# Patient Record
Sex: MALE | Race: WHITE | NOT HISPANIC OR LATINO | URBAN - METROPOLITAN AREA
[De-identification: names, ages, dates, MRNs, and addresses within clinical notes are randomized per-mention and may not be internally consistent; named-entity substitution may affect disease eponyms.]

---

## 2017-01-08 ENCOUNTER — GENERIC CONVERSION - ENCOUNTER (OUTPATIENT)
Dept: OTHER | Facility: OTHER | Age: 77
End: 2017-01-08

## 2017-01-24 ENCOUNTER — GENERIC CONVERSION - ENCOUNTER (OUTPATIENT)
Dept: OTHER | Facility: OTHER | Age: 77
End: 2017-01-24

## 2017-03-03 ENCOUNTER — GENERIC CONVERSION - ENCOUNTER (OUTPATIENT)
Dept: OTHER | Facility: OTHER | Age: 77
End: 2017-03-03

## 2017-03-22 ENCOUNTER — GENERIC CONVERSION - ENCOUNTER (OUTPATIENT)
Dept: OTHER | Facility: OTHER | Age: 77
End: 2017-03-22

## 2017-03-29 ENCOUNTER — GENERIC CONVERSION - ENCOUNTER (OUTPATIENT)
Dept: OTHER | Facility: OTHER | Age: 77
End: 2017-03-29

## 2017-06-15 ENCOUNTER — GENERIC CONVERSION - ENCOUNTER (OUTPATIENT)
Dept: OTHER | Facility: OTHER | Age: 77
End: 2017-06-15

## 2017-07-01 ENCOUNTER — GENERIC CONVERSION - ENCOUNTER (OUTPATIENT)
Dept: OTHER | Facility: OTHER | Age: 77
End: 2017-07-01

## 2017-07-14 ENCOUNTER — GENERIC CONVERSION - ENCOUNTER (OUTPATIENT)
Dept: OTHER | Facility: OTHER | Age: 77
End: 2017-07-14

## 2017-07-17 ENCOUNTER — GENERIC CONVERSION - ENCOUNTER (OUTPATIENT)
Dept: OTHER | Facility: OTHER | Age: 77
End: 2017-07-17

## 2017-07-31 ENCOUNTER — GENERIC CONVERSION - ENCOUNTER (OUTPATIENT)
Dept: OTHER | Facility: OTHER | Age: 77
End: 2017-07-31

## 2017-08-08 ENCOUNTER — GENERIC CONVERSION - ENCOUNTER (OUTPATIENT)
Dept: OTHER | Facility: OTHER | Age: 77
End: 2017-08-08

## 2017-08-09 ENCOUNTER — GENERIC CONVERSION - ENCOUNTER (OUTPATIENT)
Dept: OTHER | Facility: OTHER | Age: 77
End: 2017-08-09

## 2017-08-15 ENCOUNTER — GENERIC CONVERSION - ENCOUNTER (OUTPATIENT)
Dept: OTHER | Facility: OTHER | Age: 77
End: 2017-08-15

## 2017-08-22 ENCOUNTER — GENERIC CONVERSION - ENCOUNTER (OUTPATIENT)
Dept: OTHER | Facility: OTHER | Age: 77
End: 2017-08-22

## 2017-08-25 ENCOUNTER — GENERIC CONVERSION - ENCOUNTER (OUTPATIENT)
Dept: OTHER | Facility: OTHER | Age: 77
End: 2017-08-25

## 2017-08-30 ENCOUNTER — GENERIC CONVERSION - ENCOUNTER (OUTPATIENT)
Dept: OTHER | Facility: OTHER | Age: 77
End: 2017-08-30

## 2017-09-05 ENCOUNTER — ALLSCRIPTS OFFICE VISIT (OUTPATIENT)
Dept: OTHER | Facility: OTHER | Age: 77
End: 2017-09-05

## 2017-09-12 ENCOUNTER — GENERIC CONVERSION - ENCOUNTER (OUTPATIENT)
Dept: OTHER | Facility: OTHER | Age: 77
End: 2017-09-12

## 2017-09-22 ENCOUNTER — GENERIC CONVERSION - ENCOUNTER (OUTPATIENT)
Dept: OTHER | Facility: OTHER | Age: 77
End: 2017-09-22

## 2017-09-27 ENCOUNTER — GENERIC CONVERSION - ENCOUNTER (OUTPATIENT)
Dept: OTHER | Facility: OTHER | Age: 77
End: 2017-09-27

## 2017-10-03 ENCOUNTER — GENERIC CONVERSION - ENCOUNTER (OUTPATIENT)
Dept: OTHER | Facility: OTHER | Age: 77
End: 2017-10-03

## 2017-10-04 ENCOUNTER — GENERIC CONVERSION - ENCOUNTER (OUTPATIENT)
Dept: OTHER | Facility: OTHER | Age: 77
End: 2017-10-04

## 2017-10-11 ENCOUNTER — GENERIC CONVERSION - ENCOUNTER (OUTPATIENT)
Dept: OTHER | Facility: OTHER | Age: 77
End: 2017-10-11

## 2017-10-16 ENCOUNTER — GENERIC CONVERSION - ENCOUNTER (OUTPATIENT)
Dept: OTHER | Facility: OTHER | Age: 77
End: 2017-10-16

## 2017-10-27 ENCOUNTER — GENERIC CONVERSION - ENCOUNTER (OUTPATIENT)
Dept: OTHER | Facility: OTHER | Age: 77
End: 2017-10-27

## 2017-11-17 ENCOUNTER — GENERIC CONVERSION - ENCOUNTER (OUTPATIENT)
Dept: OTHER | Facility: OTHER | Age: 77
End: 2017-11-17

## 2017-11-22 ENCOUNTER — ALLSCRIPTS OFFICE VISIT (OUTPATIENT)
Dept: OTHER | Facility: OTHER | Age: 77
End: 2017-11-22

## 2017-12-05 ENCOUNTER — GENERIC CONVERSION - ENCOUNTER (OUTPATIENT)
Dept: OTHER | Facility: OTHER | Age: 77
End: 2017-12-05

## 2017-12-06 ENCOUNTER — GENERIC CONVERSION - ENCOUNTER (OUTPATIENT)
Dept: FAMILY MEDICINE CLINIC | Facility: CLINIC | Age: 77
End: 2017-12-06

## 2017-12-28 ENCOUNTER — GENERIC CONVERSION - ENCOUNTER (OUTPATIENT)
Dept: OTHER | Facility: OTHER | Age: 77
End: 2017-12-28

## 2018-01-09 NOTE — RESULT NOTES
Verified Results  Coumadin Flow Sheet [id number] 06:47PM      Test Name Result Flag Reference   INR 2 3

## 2018-01-09 NOTE — RESULT NOTES
Verified Results  (1) PT WITH INR [id number] 12:00AM Lucy Keane     Test Name Result Flag Reference   INR 2 3 H 0 8-1 2   Reference interval is for non-anticoagulated patients                   Suggested INR therapeutic range for Vitamin K                 antagonist therapy:                    Standard Dose (moderate intensity                                   therapeutic range):       2 0 - 3 0                    Higher intensity therapeutic range       2 5 - 3 5   Prothrombin Time 24 2 sec H 9 1-12 0

## 2018-01-10 NOTE — RESULT NOTES
Verified Results  Coumadin Flow Sheet [id number] 07:10AM Ray Beck     Test Name Result Flag Reference   Recheck INR [id number]     New Dose      7 5 t,th and 6mg other days

## 2018-01-10 NOTE — RESULT NOTES
Verified Results  Coumadin Flow Sheet [id number] 12:00AM Robyn Pearl     Test Name Result Flag Reference   Recheck INR [id number]     New Dose 6mg daily

## 2018-01-10 NOTE — RESULT NOTES
Verified Results  (1) PT WITH INR [id number] 12:00AM Isael Kinsey     Test Name Result Flag Reference   INR 1 1  0 8-1 2   Reference interval is for non-anticoagulated patients                   Suggested INR therapeutic range for Vitamin K                 antagonist therapy:                    Standard Dose (moderate intensity                                   therapeutic range):       2 0 - 3 0                    Higher intensity therapeutic range       2 5 - 3 5   Prothrombin Time 11 3 sec  9 1-12 0

## 2018-01-10 NOTE — RESULT NOTES
Verified Results  Coumadin Flow Sheet [id number] 03:36PM Amauri Crawford     Test Name Result Flag Reference   Recheck INR [id number]     Current Dose      6mg m,w,f and 7 5mg all other days   New Dose      6mg m,w,f and 7 5 mg all other days   INR 2 5

## 2018-01-10 NOTE — RESULT NOTES
Verified Results  Coumadin Flow Sheet [id number] 12:31PM Yasir Efrain     Test Name Result Flag Reference   Recheck INR [id number]     Current Dose      6mg m,w,f and 7 5mg other days   New Dose      6mg m,f and 7 5mg other days     (1) PT WITH INR [id number] 12:00AM Oliviacuco Zuniga     Test Name Result Flag Reference   INR 1 7 H 0 8-1 2   Reference interval is for non-anticoagulated patients                   Suggested INR therapeutic range for Vitamin K                 antagonist therapy:                    Standard Dose (moderate intensity                                   therapeutic range):       2 0 - 3 0                    Higher intensity therapeutic range       2 5 - 3 5   Prothrombin Time 17 9 sec H 9 1-12 0

## 2018-01-11 NOTE — RESULT NOTES
Verified Results  Coumadin Flow Sheet [id number] 03:38PM Rolando Ocampo     Test Name Result Flag Reference   Recheck INR [id number]     New Dose      7 5mg t, th and 6mg other days

## 2018-01-11 NOTE — RESULT NOTES
Verified Results  Coumadin Flow Sheet [id number] 12:17PM Ke Russo     Test Name Result Flag Reference   Recheck INR [id number]     New Dose      7 5mg t,th and 6mg other days

## 2018-01-11 NOTE — RESULT NOTES
Verified Results  (1) PT WITH INR [id number] 12:00AM Vineet Reeder     Test Name Result Flag Reference   INR 2 1 H 0 8-1 2   Reference interval is for non-anticoagulated patients                   Suggested INR therapeutic range for Vitamin K                 antagonist therapy:                    Standard Dose (moderate intensity                                   therapeutic range):       2 0 - 3 0                    Higher intensity therapeutic range       2 5 - 3 5   Prothrombin Time 22 2 sec H 9 1-12 0

## 2018-01-11 NOTE — RESULT NOTES
Verified Results  Coumadin Flow Sheet [id number] 10:12AM Stanford Nuñez     Test Name Result Flag Reference   Recheck INR [id number]     New Dose      hold 1 day, 7 5mg t and 6mg other days

## 2018-01-11 NOTE — RESULT NOTES
Verified Results  Coumadin Flow Sheet [id number] 12:22PM Lucy Keane     Test Name Result Flag Reference   Recheck INR [id number]     New Dose      6mg m,w,f and 7 5mg all other days

## 2018-01-11 NOTE — RESULT NOTES
Verified Results  Coumadin Flow Sheet [id number] 02:35PM Shyrl Colby     Test Name Result Flag Reference   Recheck INR [id number]     New Dose      6mg m,f and 7 5mg other days

## 2018-01-12 NOTE — PROGRESS NOTES
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)   11  Anxiety (300 00) (F41 9)    Plan  Anxiety disorder due to medical condition    · ALPRAZolam 0 5 MG Oral Tablet; Take 1/2 to 1 tab three times a day as needed  for anxiety  Central obesity    · Some eating tips that can help you lose weight ; Status:Complete;   Done: 09LET6509   · There are many exercise options for seniors ; Status:Complete;   Done: 33DPL7386  Depression screening    · *VB-Depression Screening; Status:Complete;   Done: 41QIS1352 02:58PM   · *VB-Depression Screening ; every 1 year; Last 18MFD1073; Next 40Ljb4096;  Status:Active  Encounter for screening for malignant neoplasm of colon    · COLONOSCOPY; Status:Temporary Deferral;    1/1/1901  Encounter for screening for osteoporosis    · * DXA BONE DENSITY SPINE HIP AND PELVIS; Status:Temporary Deferral;    1/1/1901  Encounter for screening for other nervous system disorder    · *VB - Fall Risk Assessment  (Dx Z13 89 Screen for Neurologic Disorder) ; every 1 year; Last 56RNB9333; Next 93Xvk9151; Status:Active  Encounter for special screening examination for genitourinary disorder    · *VB - Urinary Incontinence Screen (Dx Z13 89 Screen for UI); Status:Complete;   Done:  36DPA9217 02:57PM   · *VB - Urinary Incontinence Screen (Dx Z13 89 Screen for UI) ; every 1 year;  Last  50DIV6719; Next 93Wtk0663; Status:Active  Medicare annual wellness visit, subsequent    · Alcohol misuse can be a problem with advancing age ; Status:Complete;   Done:  01IRZ6246   · Always use a seat belt and shoulder strap when riding or driving a motor vehicle ;  Status:Complete;   Done: 83BPX7851   · Brush your teeth freq1 and floss at least once a day ; Status:Complete;   Done:  25IEX5561   · Eat foods that are high in calcium ; Status:Complete;   Done: 67RTC8803   · It is important that you drink enough fluids to stay healthy ; Status:Complete;   Done:  19FQL2454   · We encourage all of our patients to exercise regularly  30 minutes of exercise or physical  activity five or more days a week is recommended for children and adults ;  Status:Complete;   Done: 82EBT6647   · We encourage you to begin to make lifestyle changes to help control your blood  pressure  These may include losing weight, increasing your activity level, limiting salt in  your diet, decreasing alcohol intake, and eating a diet low in fat and rich in fruits  and vegetables ; Status:Complete;   Done: 36XSO3631   · We recommend routine visits to a dentist ; Status:Complete;   Done: 73VNI2643   · We recommend that you create an advance directive ; Status:Complete;   Done:  41YNC3661   · We recommend that you follow the "Mediterranean diet "; Status:Complete;   Done:  48FMD0782   · We recommend that you follow these steps to lower your risk of osteoporosis  ;  Status:Complete;   Done: 59HRY1527   · We recommend you modify your diet to achieve and maintain a healthy weight  Being  underweight may increase your risk of developing health problems from vitamin and  mineral deficiencies  We recommend a balanced diet rich in fruits and vegetables  You  may also consider increasing your calorie intake by eating more frequently or adding  nuts, avocados, and low-fat cheese or milk to your meals    Please let us know  if you would like to learn more about your nutrition and calorie needs, and additional  options to help you achieve your weight goals ; Status:Complete;   Done: 06YDP3023   · Call [phone] if: You have any warning signs of skin cancer ; Status:Complete;    Done: 25KEH3956   · Seek Immediate Medical Attention if: You experience a new kind of chest pain (angina) or  pressure ; Status:Complete;   Done: 11BMJ4438   · Medicare Annual Wellness Visit; Status:Complete;   Done: 92CJL3196 02:59PM   · Medicare Annual Wellness Visit ; every 1 year; Last 76BBS1282; Next 65Hqz2041;  Status:Active  Special screening for other neurological conditions    · *VB - Fall Risk Assessment  (Dx Z13 89 Screen for Neurologic Disorder);  Status:Complete;   Done: 81QUF3470 02:57PM    Discussion/Summary    1) schedule with eye doctor  2) deferring dexa and colonoscopy   3) hypertension: borderline, monitor at home and call if elevated, continue current medications and follow up with cardiology  4) anxiety, controlled, uses alprazolam as needed  Impression: Subsequent Annual Wellness Visit  Cardiovascular screening and counseling: screening is current  Diabetes screening and counseling: screening is current  Colorectal cancer screening and counseling: screening is current  Prostate cancer screening and counseling: screening is current  Osteoporosis screening and counseling: the patient declines screening  Abdominal aortic aneurysm screening and counseling: screening not indicated  Glaucoma screening and counseling: ophthalmologist referral    HIV screening and counseling: screening not indicated  Immunizations: influenza vaccine is up to date this year, influenza vaccination is recommended annually, hepatitis B vaccination series is not indicated at this time due to the patient's low risk of mikaela the disease, the patient declines the Zostavax vaccine, the patient declines the Td vaccine and the patient declines the Tdap vaccine  Advance Directive Planning: complete and up to date, paperwork and instructions were given to the patient   Patient Discussion: plan discussed with the patient, follow-up visit needed in one year  Chief Complaint  PATIENT IS HERE FOR AWV  PATIENT IS DEFERRING DEXA SCAN, AND COLONOSCOPY AT THIS TIME  Advance Directives  Advance Directive [facility]:   The patient is in agreement to receive the Advance Care Planning service    YES - Patient has an advance health care directive  The patient has a living will located   Durable Power of  For Healthcare:    Name: Jacinta Faulkner   Relationship: wife   Capacity/Competence: This patient has full decision making capacity for discussion of advance care planning and This patient has full decision making competency for discussion of advance care planning  History of Present Illness  pt here for medicare wellness  denies complaints today  no chest pain, no shortness of breath  denies recent illnesses  The patient is being seen for the subsequent annual wellness visit  Medicare Screening and Risk Factors   Hospitalizations: no previous hospitalizations  Once per lifetime medicare screening tests: ECG has not been done and AAA screening US has not yet been done  Medicare Screening Tests Risk Questions   Abdominal aortic aneurysm risk assessment: none indicated  Osteoporosis risk assessment: , over 48years of age, alcohol use and low calcium diet  HIV risk assessment: none indicated  Drug and Alcohol Use: The patient is a former cigarette smoker and SMOKES CIGARS  The patient reports drinking 2 drinks per day  Alcohol concern:   The patient has no concerns about alcohol abuse  He has never used illicit drugs     Diet and Physical Activity: Current diet includes limited junk food, 0 servings of fruit per day, 2 servings of vegetables per day, 1 servings of meat per day, 2 servings of whole grains per day, 0 servings of dairy products per day, 2 cups of coffee per day, 0 cups of tea per day, 0 cans of regular soda per day, 0 cans of diet soda per day and DRINKS TWO GLASSES OF WATER PER DAY  The patient does not exercise  Mood Disorder and Cognitive Impairment Screening:   Depression screening  no significant symptoms  He denies feeling down, depressed, or hopeless over the past two weeks  He denies feeling little interest or pleasure in doing things over the past two weeks  Cognitive impairment screening: denies difficulty learning/retaining new information, denies difficulty handling complex tasks, denies difficulty with reasoning, denies difficulty with spatial ability and orientation, denies difficulty with language and denies difficulty with behavior  Functional Ability/Level of Safety: Hearing is slightly decreased, slightly decreased in the right ear, slightly decreased in the left ear and a hearing aid is not used  He denies hearing difficulties  The patient is currently able to do activities of daily living without limitations, able to do instrumental activities of daily living without limitations and able to participate in social activities without limitations  Activities of daily living details: transportation help needed and needs help managing medications, but does not need help using the phone, does not need help shopping, no meal preparation help needed, does not need help doing housework, does not need help doing laundry and does not need help managing money  Home safety risk factors:  no unfamiliar surroundings, no loose rugs, no poor household lighting, no uneven floors, no household clutter, grab bars in the bathroom and handrails on the stairs  Advance Directives: Advance directives: no living will, no durable power of  for health care directives and no advance directives  Co-Managers and Medical Equipment/Suppliers: See Patient Care Team      Patient Care Team    Care Team Member Role Specialty Office Number   DR WAYNE 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)   3  Arthralgia (719 40) (M25 50)   4  Asymptomatic cholelithiasis (574 20) (K80 20)   5  Atrial fibrillation (427 31) (I48 91)   6  Benign essential hypertension (401 1) (I10)   7  Benign prostatic hypertrophy (600 00) (N40 0)   8  Bladder cancer (188 9) (C67 9)   9  Chronic kidney disease, stage 3 (585 3) (N18 3)   10  Colonoscopy (Fiberoptic) Screening   11  Coronary arteriosclerosis (414 00) (I25 10)   12  Depression screening (V79 0) (Z13 89)   13  Encounter for prostate cancer screening (V76 44) (Z12 5)   14  Encounter for screening for malignant neoplasm of colon (V76 51) (Z12 11)   15  Encounter for screening for osteoporosis (V82 81) (Z13 820)   16  Encounter for screening for other metabolic disorder (Q67 52) (Z13 228)   17  Encounter for screening for other nervous system disorder (V80 09) (Z13 858)   18  Encounter for special screening examination for genitourinary disorder (V81 6) (Z13 89)   19  Epilepsy (345 90) (G40 909)   20  Hiatal hernia (553 3) (K44 9)   21  Hyperlipidemia (272 4) (E78 5)   22  Impacted cerumen of both ears (380 4) (H61 23)   23  History of Intracerebral hemorrhage (431) (I61 9)   24  Medicare annual wellness visit, subsequent (V70 0) (Z00 00)   25  Paroxysmal atrial fibrillation (427 31) (I48 0)   26  Renal insufficiency (593 9) (N28 9)   27  Screening for glaucoma (V80 1) (Z13 5)   28  Screening PSA (prostate specific antigen) (V76 44) (Z12 5)    Past Medical History    1  Bladder cancer (188 9) (C67 9)   2   History of anemia (V12 3) (Z86 2)   3  History of gastroesophageal reflux (GERD) (V12 79) (Z87 19)   4  History of hematuria (V13 09) (Z87 448)   5  History of intracranial hemorrhage (V12 59) (Z86 79)   6  History of Intracerebral hemorrhage (431) (I61 9)   7  History of Stroke Syndrome (436)    Surgical History    1  History of Bladder Surgery    The surgical history was reviewed and updated today  Family History  Mother    1  Family history of Lung Cancer (V16 1)   2  Family history of Mother  At Age 72  Father    3  Family history of Father  At Age 79    The family history was reviewed and updated today  Social History    · Being A Social Drinker   · Former smoker (M20 31) (Z91 433)   · Smokes cigars (305 1) (F17 290)  The social history was reviewed and updated today  The social history was reviewed and is unchanged  Current Meds   1  ALPRAZolam 0 5 MG Oral Tablet; Take 1/2 to 1 tab three times a day as needed for   anxiety; Therapy: 48HDU3219 to (Evaluate:2016); Last Rx:2016 Ordered   2  Aspirin 81 MG TABS; TAKE 1 TABLET DAILY; Therapy: 19GEI6471 to Recorded   3  Centrum Silver Oral Tablet Chewable; CHEW AND SWALLOW 1 TABLET DAILY; Therapy: 20Ggv7987 to Recorded   4  Crestor 10 MG Oral Tablet; 1/2 tab 4 days a week; Therapy: 07LGD7960 to (Evaluate:41Ktk4138)  Requested for: 2014 Recorded   5  LevETIRAcetam 500 MG Oral Tablet; Take 1 tablet twice daily; Therapy: 64Wsr6503 to (Evaluate:06Hdl9298)  Requested for: 2016; Last   Rx:2016 Ordered   6  Lopressor 50 MG Oral Tablet; Take 1 tablet twice a day; Therapy: 97NPG0211 to Recorded   7  Warfarin Sodium 1 MG Oral Tablet; take one tablet by mouth daily as directed; Therapy: 45ZCZ5944 to (Evaluate:30Tkh5616)  Requested for: 2016; Last   Rx:2016 Ordered   8  Warfarin Sodium 5 MG Oral Tablet; TAKE 1 1/2 TABLETS DAILY;    Therapy: 47OUE7751 to (Javi Roblero)  Requested for: 47BMY2889; Last Rx: 01RNB4769 Ordered   9  Zostavax 91149 UNT/0 65ML Subcutaneous Solution Reconstituted; To be given at   pharmacy; Therapy: 69LXD5771 to (Last Rx:19Keh6993) Ordered    The medication list was reviewed and updated today  Allergies    1  No Known Drug Allergies    Immunizations  Influenza --- Lequita Hose: 64-Pye-0565Hvhapp Ridin-Oct-2016   PPSV --- Lequita Hose: 2014     Vitals  Signs   Recorded: 70AMW1560 87:34QV   Systolic: 210  Diastolic: 90  Recorded: 64YSZ4528 01:37PM   Temperature: 98 4 F  Heart Rate: 85  Systolic: 249  Diastolic: 86  Height: 5 ft 10 4 in  Weight: 221 lb 12 oz  BMI Calculated: 31 46  BSA Calculated: 2 19    Results/Data  Medicare Annual Wellness Visit 14Ilf6064 02:59PM Ray Beck     Test Name Result Flag Reference   MEDICARE [place] VISIT [id number]       Falls Risk Assessment (Dx Z13 89 Screen for Neurologic Disorder) 76LTG4995 01:25PM User, Ahs     Test Name Result Flag Reference   Falls Risk      No falls in the past year     AUA Symptom Score 95Bdi1133 01:24PM User, Ahs     Test Name Result Flag Reference   AUA Symptom Score (for prostate disease) 0     Incomplete emptying: Not at all (0)  Frequency: Not at all (0)  Intermittency: Not at all (0)  Urgency: Not at all (0)  Weak-stream: Not at all (0)  Straining: Not at all (0)  Nocturia: Not at all (0)   AUA Symptom Score (for prostate disease) - Score Category N/A     AUA Symptom Score (for prostate disease) - Quality of Life Due to Urinary Symptoms No Feelings       PHQ-9 Adult Depression Screening 81Twk2888 01:24PM User, Ahs     Test Name Result Flag Reference   PHQ-9 Adult Depression Score 5     Over the last two weeks, how often have you been bothered by any of the following problems?   Little interest or pleasure in doing things: Not at all - 0  Feeling down, depressed, or hopeless: Several days - 1  Trouble falling or staying asleep, or sleeping too much: More than half the days - 2  Feeling tired or having little energy: More than half the days - 2  Poor appetite or over eating: Not at all - 0  Feeling bad about yourself - or that you are a failure or have let yourself or your family down: Not at all - 0  Trouble concentrating on things, such as reading the newspaper or watching television: Not at all - 0  Moving or speaking so slowly that other people could have noticed  Or the opposite -  being so fidgety or restless that you have been moving around a lot more than usual: Not at all - 0  Thoughts that you would be better off dead, or of hurting yourself in some way: Not at all - 0   PHQ-9 Adult Depression Screening Negative     PHQ-9 Difficulty Level Somewhat difficult     PHQ-9 Severity Mild Depression         Health Management  Depression screening   *VB-Depression Screening; every 1 year; Last 23Dec2016; Next Due: 08Ovo0445; Active  Encounter for screening for other nervous system disorder   *VB - Fall Risk Assessment  (Dx Z13 89 Screen for Neurologic Disorder); every 1 year; Last  23Dec2016; Next Due: 76Pvm4009; Active  Encounter for special screening examination for genitourinary disorder   *VB - Urinary Incontinence Screen (Dx Z13 89 Screen for UI); every 1 year; Last 49Pds7542; Next  Due: 18Nyv2031;  Active    Signatures   Electronically signed by : Jackie Packer DO; Dec 24 2016  6:14AM EST                       (Author)

## 2018-01-12 NOTE — RESULT NOTES
Verified Results  Coumadin Flow Sheet [id number] 11:36AM Zoë Gu     Test Name Result Flag Reference   Recheck INR [id number]     Current Dose      m,w,f 7 5mg, other days 6mg   New Dose      6mg m,w,f and 7 5mg other days     (1) PT WITH INR [id number] 12:00AM Zoë Gu     Test Name Result Flag Reference   INR 1 9 H 0 8-1 2   Reference interval is for non-anticoagulated patients  Suggested INR therapeutic range for Vitamin K                 antagonist therapy:                    Standard Dose (moderate intensity                                   therapeutic range):       2 0 - 3 0                    Higher intensity therapeutic range       2 5 - 3 5   Prothrombin Time 20 2 sec H 9 1-12 0     (LC) One Specimen Identifier [id number] 12:00AM Zoë Gu     Test Name Result Flag Reference   One Specimen Identifier Comment     The specimen received included only one patient identifier on the  primary collection container    Our laboratory accrediting agency  states "All primary specimen containers must be labeled with 2  identifiers at the time of collection "

## 2018-01-12 NOTE — RESULT NOTES
Verified Results  (1) CBC/PLT/DIFF [id number] 12:00AM KG Fundingeror     Test Name Result Flag Reference   WBC 7 8 x10E3/uL  3 4-10 8   RBC 4 41 x10E6/uL  4 14-5 80   Hemoglobin 14 8 g/dL  12 6-17 7   Hematocrit 42 8 %  37 5-51 0   MCV 97 fL  79-97   MCH 33 6 pg H 26 6-33 0   MCHC 34 6 g/dL  31 5-35 7   RDW 13 7 %  12 3-15 4   Platelets 132 Z64F1/HQ  150-379   Neutrophils 56 %     Lymphs 27 %     Monocytes 11 %     Eos 5 %     Basos 1 %     Neutrophils (Absolute) 4 4 x10E3/uL  1 4-7 0   Lymphs (Absolute) 2 1 x10E3/uL  0 7-3 1   Monocytes(Absolute) 0 9 x10E3/uL  0 1-0 9   Eos (Absolute) 0 4 x10E3/uL  0 0-0 4   Baso (Absolute) 0 1 x10E3/uL  0 0-0 2   Immature Granulocytes 0 %     Immature Grans (Abs) 0 0 x10E3/uL  0 0-0 1     (1) TSH [id number] 12:00AM KG Fundingeror     Test Name Result Flag Reference   TSH 4 020 uIU/mL  0 450-4 500     (LC) PSA Total+% Free [id number] 12:00AM Verena EZMoveeror     Test Name Result Flag Reference   Prostate Specific Ag, Serum 1 2 ng/mL  0 0-4 0   Roche ECLIA methodology  According to the American Urological Association, Serum PSA should  decrease and remain at undetectable levels after radical  prostatectomy  The AUA defines biochemical recurrence as an initial  PSA value 0 2 ng/mL or greater followed by a subsequent confirmatory  PSA value 0 2 ng/mL or greater  Values obtained with different assay methods or kits cannot be used  interchangeably  Results cannot be interpreted as absolute evidence  of the presence or absence of malignant disease  PSA, Free 0 46 ng/mL  N/A   Roche ECLIA methodology  % Free PSA 38 3 %     The table below lists the probability of prostate cancer for  men with non-suspicious JUAN MIGUEL results and total PSA between  4 and 10 ng/mL, by patient age Ashleighlosjoseline Justine, [address] [place] [address],  [phone])                      % Free PSA       50-64 yr        65-75 yr                    0 00-10 00%        56%             55%                   10 01-15 00%        24%             35% 15 01-20 00%        17%             23%                   20 01-25 00%        10%             20%                        >25 00%         5%              9%  Please note:  Abril et al did not make specific                recommendations regarding the use of                percent free PSA for any other population                of men  (1) PT WITH INR [id number] 12:00AM Francisco Colby     Test Name Result Flag Reference   INR 2 7 H 0 8-1 2   Reference interval is for non-anticoagulated patients                   Suggested INR therapeutic range for Vitamin K                 antagonist therapy:                    Standard Dose (moderate intensity                                   therapeutic range):       2 0 - 3 0                    Higher intensity therapeutic range       2 5 - 3 5   Prothrombin Time 27 3 sec H 9 1-12 0

## 2018-01-12 NOTE — RESULT NOTES
Verified Results  (1) PT WITH INR [id number] 12:00AM Ariela Teixeira     Test Name Result Flag Reference   INR 3 0 H 0 8-1 2   Reference interval is for non-anticoagulated patients  Suggested INR therapeutic range for Vitamin K                 antagonist therapy:                    Standard Dose (moderate intensity                                   therapeutic range):       2 0 - 3 0                    Higher intensity therapeutic range       2 5 - 3 5   Prothrombin Time 30 5 sec H 9 1-12 0       Plan  Screening PSA (prostate specific antigen)    · (1) PT WITH INR; Status: In Progress - Specimen/Data Collected;   Done: [id number]

## 2018-01-12 NOTE — RESULT NOTES
Verified Results  Coumadin Flow Sheet [id number] 01:51PM Michelle      Test Name Result Flag Reference   INR 2 9     Recheck INR [id number]     Current Dose      6mg m,w,f and 7 5mg other days   New Dose      6mg m,w,f and 7 5mg other days

## 2018-01-12 NOTE — RESULT NOTES
Verified Results  (1) PT WITH INR [id number] 12:00AM Yesenia Batch     Test Name Result Flag Reference   INR 2 1 H 0 8-1 2   Reference interval is for non-anticoagulated patients                   Suggested INR therapeutic range for Vitamin K                 antagonist therapy:                    Standard Dose (moderate intensity                                   therapeutic range):       2 0 - 3 0                    Higher intensity therapeutic range       2 5 - 3 5   Prothrombin Time 22 9 sec H 9 1-12 0

## 2018-01-12 NOTE — RESULT NOTES
Verified Results  Coumadin Flow Sheet [id number] 12:34PM Shyrl Colby     Test Name Result Flag Reference   Recheck INR [id number]     Current Dose      6MG M,F AND 7 5MG OTHER DAYS   New Dose      6MG M,F AND 7 5MG OTHER DAYS     (1) PT WITH INR [id number] 12:00AM Shyrl Colby     Test Name Result Flag Reference   INR 2 0 H 0 8-1 2   Reference interval is for non-anticoagulated patients                   Suggested INR therapeutic range for Vitamin K                 antagonist therapy:                    Standard Dose (moderate intensity                                   therapeutic range):       2 0 - 3 0                    Higher intensity therapeutic range       2 5 - 3 5   Prothrombin Time 21 6 sec H 9 1-12 0

## 2018-01-13 NOTE — RESULT NOTES
Verified Results  Coumadin Flow Sheet [id number] 09:11AM Hanna Nunes     Test Name Result Flag Reference   Recheck INR [id number]     New Dose      7 5mg tu and thursday, 6mg all other days

## 2018-01-13 NOTE — RESULT NOTES
Verified Results  (1) PT WITH INR [id number] 12:00AM Stanford Rosin     Test Name Result Flag Reference   INR 2 2 H 0 8-1 2   Reference interval is for non-anticoagulated patients                   Suggested INR therapeutic range for Vitamin K                 antagonist therapy:                    Standard Dose (moderate intensity                                   therapeutic range):       2 0 - 3 0                    Higher intensity therapeutic range       2 5 - 3 5   Prothrombin Time 23 5 sec H 9 1-12 0

## 2018-01-13 NOTE — RESULT NOTES
Verified Results  Coumadin Flow Sheet [id number] 05:58AM Anny New     Test Name Result Flag Reference   Recheck INR [id number]     Current Dose 5mg daily     New Dose      take 10mg today then alternate 5mg/ 7 5mg daily     (1) PT WITH INR [id number] 12:00AM Anny New     Test Name Result Flag Reference   INR 1 4 H 0 8-1 2   Reference interval is for non-anticoagulated patients                   Suggested INR therapeutic range for Vitamin K                 antagonist therapy:                    Standard Dose (moderate intensity                                   therapeutic range):       2 0 - 3 0                    Higher intensity therapeutic range       2 5 - 3 5   Prothrombin Time 14 9 sec H 9 1-12 0

## 2018-01-13 NOTE — RESULT NOTES
Verified Results  (1) PT WITH INR [id number] 12:00AM Parvin Gonzales     Test Name Result Flag Reference   INR 2 0 H 0 8-1 2   Reference interval is for non-anticoagulated patients                   Suggested INR therapeutic range for Vitamin K                 antagonist therapy:                    Standard Dose (moderate intensity                                   therapeutic range):       2 0 - 3 0                    Higher intensity therapeutic range       2 5 - 3 5   Prothrombin Time 21 6 sec H 9 1-12 0

## 2018-01-14 VITALS
HEART RATE: 92 BPM | OXYGEN SATURATION: 98 % | WEIGHT: 207.5 LBS | SYSTOLIC BLOOD PRESSURE: 130 MMHG | BODY MASS INDEX: 29.71 KG/M2 | RESPIRATION RATE: 20 BRPM | HEIGHT: 70 IN | DIASTOLIC BLOOD PRESSURE: 85 MMHG | TEMPERATURE: 98.3 F

## 2018-01-14 NOTE — RESULT NOTES
Verified Results  (1) PT WITH INR [id number] 12:00AM Ray Beck     Test Name Result Flag Reference   INR 4 7 H 0 8-1 2   Reference interval is for non-anticoagulated patients  Suggested INR therapeutic range for Vitamin K                 antagonist therapy:                    Standard Dose (moderate intensity                                   therapeutic range):       2 0 - 3 0                    Higher intensity therapeutic range       2 5 - 3 5   Prothrombin Time 50 1 sec H 9 1-12 0     () One Specimen Identifier [id number] 12:00AM Ray Beck     Test Name Result Flag Reference   One Specimen Identifier Comment     The specimen received included only one patient identifier on the  primary collection container    Our laboratory accrediting agency  states "All primary specimen containers must be labeled with 2  identifiers at the time of collection "

## 2018-01-14 NOTE — RESULT NOTES
Verified Results  Coumadin Flow Sheet [id number] 10:48AM Ke Russo     Test Name Result Flag Reference   Recheck INR [id number]     Current Dose      7 5mg m and 6mg other days   New Dose      hold 1 day then 6mg daily

## 2018-01-15 NOTE — RESULT NOTES
Verified Results  Coumadin Flow Sheet [id number] 10:29AM Vineet Reeder     Test Name Result Flag Reference   Recheck INR [id number]     INR 3 5     Current Dose      6mg m,f and 7 5mg other days   New Dose      hold 2 days then 6mg m,w,f and 7 5 other days     Coumadin Flow Sheet [id number] 01:46PM      Test Name Result Flag Reference   Patient Notified YES/DB       Coumadin Flow Sheet [id number] 08:57AM      Test Name Result Flag Reference   INR 2 6     Current Dose      6 mg m and f, 7 5 mg all other days

## 2018-01-15 NOTE — RESULT NOTES
Verified Results  Coumadin Flow Sheet [id number] 09:12PM Rolando Ocampo     Test Name Result Flag Reference   Recheck INR [id number]     Current Dose 6mg daily     New Dose 6mg daily

## 2018-01-15 NOTE — RESULT NOTES
Verified Results  (1) PT WITH INR [id number] 12:00AM Zoë Riccardo     Test Name Result Flag Reference   INR 2 3 H 0 8-1 2   Reference interval is for non-anticoagulated patients                   Suggested INR therapeutic range for Vitamin K                 antagonist therapy:                    Standard Dose (moderate intensity                                   therapeutic range):       2 0 - 3 0                    Higher intensity therapeutic range       2 5 - 3 5   Prothrombin Time 24 0 sec H 9 1-12 0

## 2018-01-16 NOTE — RESULT NOTES
Verified Results  (1) COMPREHENSIVE METABOLIC PANEL [id number] 13:32FU Trov     Test Name Result Flag Reference   Glucose, Serum 100 mg/dL H 65-99   BUN 28 mg/dL H 8-27   Creatinine, Serum 1 49 mg/dL H 0 76-1 27   eGFR If NonAfricn Am 45 mL/min/1 73 L >59   eGFR If Africn Am 52 mL/min/1 73 L >59   BUN/Creatinine Ratio 19  10-22   Sodium, Serum 143 mmol/L  134-144   Potassium, Serum 4 9 mmol/L  3 5-5 2   Chloride, Serum 106 mmol/L     Carbon Dioxide, Total 19 mmol/L  18-29   Calcium, Serum 9 2 mg/dL  8 6-10 2   Protein, Total, Serum 7 3 g/dL  6 0-8 5   Albumin, Serum 4 2 g/dL  3 5-4 8   Globulin, Total 3 1 g/dL  1 5-4 5   A/G Ratio 1 4  1 1-2 5   Bilirubin, Total 0 8 mg/dL  0 0-1 2   Alkaline Phosphatase, S 80 IU/L     AST (SGOT) 20 IU/L  0-40   ALT (SGPT) 16 IU/L  0-44     (1) LIPID PANEL, FASTING [id number] 12:00AM Trov     Test Name Result Flag Reference   Cholesterol, Total 168 mg/dL  100-199   Triglycerides 104 mg/dL  0-149   HDL Cholesterol 47 mg/dL  >39   According to ATP-III Guidelines, HDL-C >59 mg/dL is considered a  negative risk factor for CHD  VLDL Cholesterol Nacho 21 mg/dL  5-40   LDL Cholesterol Calc 100 mg/dL H 0-99   T  Chol/HDL Ratio 3 6 ratio units  0 0-5 0   T  Chol/HDL Ratio                                                             Men  Women                                               1/2 Avg  Risk  3 4    3 3                                                   Avg Risk  5 0    4 4                                                2X Avg  Risk  9 6    7 1                                                3X Avg  Risk 23 4   11 0

## 2018-01-16 NOTE — RESULT NOTES
Verified Results  (1) PT WITH INR [id number] 12:00AM Az Mac     Test Name Result Flag Reference   INR 1 1  0 8-1 2   Reference interval is for non-anticoagulated patients                   Suggested INR therapeutic range for Vitamin K                 antagonist therapy:                    Standard Dose (moderate intensity                                   therapeutic range):       2 0 - 3 0                    Higher intensity therapeutic range       2 5 - 3 5   Prothrombin Time 12 0 sec  9 1-12 0

## 2018-01-16 NOTE — MISCELLANEOUS
Assessment    1  Gait instability (781 2) (R26 81)   2  History of Intracerebral hemorrhage (431) (I61 9)   3  Atrial fibrillation (427 31) (I48 91)   4  Need for pneumococcal vaccination (V03 82) (Z23)   5  Benign essential hypertension (401 1) (I10)   6  Current every day smoker (305 1) (F17 200)    Plan  Need for pneumococcal vaccination    · Prevnar 13 Intramuscular Suspension   For: Need for pneumococcal vaccination; Ordered By:Richard Santos; Effective Date:22Nov2017; Administered by: Mattie Sheppard: 11/22/2017 3:04:00 PM  SocHx: Current every day smoker    · You need to quit smoking ; Status:Complete;   Done: 53YXU8197   Ordered; For:SocHx: Current every day smoker; Ordered By:Richard Santos;    Discussion/Summary  Discussion Summary:   1) follow up APPT WITH DR Maricruz Spicer, 12/6  2) INCREASE ATORVASTATIN 10MG 1/2 TAB DAILY   3) HEAT 15 MIN 3 TIMES A DAY TO LOWER BACK   4) prevnar given today  5) hypertension: continue to monitor, slight elevation, may need medication adjustment  6) continue with physical therapy for increased strength  Medication SE Review and Pt Understands Tx: Possible side effects of new medications were reviewed with the patient/guardian today  The treatment plan was reviewed with the patient/guardian  The patient/guardian understands and agrees with the treatment plan      Chief Complaint  Chief Complaint Free Text Note Form: PT HERE FOR A FOLLOW UP TO [facility]  PT WAS TAKEN BY AMBULANCE ON 11/17/17 AND WAS ADMITTED FOR HIGH BP AND DIZZINESS  HE WAS DISCHARGED 11/19/17  PT IS TO FOLLOW UP WITH DR ESTRELLA ON 12/6  HE DOES HAVE HOME HEALTH COMING OUT TO SEE HIM  PT WANTS TO GET THE PREVNAR SHOT TODAY  HE DID HAVE HIS FLU SHOT AT [facility]       History of Present Illness  TCM Communication [facility]: The patient is being contacted for follow-up after hospitalization  Hospital records were reviewed   He was hospitalized at [facility]  The date of admission: 11/17/2017, date of discharge: 11/19/2017  Diagnosis: DIZZINESS  He was discharged to home  Medications reviewed and updated today  He scheduled a follow up appointment  Symptoms: dizziness  The patient is currently experiencing symptoms  MENTALLY SLOWER PER WIFE/STRENGTH AND WALKING NOT GOOD Counseling was provided to the patient and patient's family  Topics counseled included diagnostic results and instructions for management  Communication performed and completed by Lawyer Mcburney   HPI: pt here for follow up on hospitalization at [address] [place] [address] for dizziness  he had a ct and mri of brain with no new changes  his dizziness has now resolved  He has generalized weakness and home health/physical therapy is coming to the home  Review of Systems  Complete-Male:   Constitutional: feeling tired, but as noted in HPI, no fever and no chills  Eyes: No complaints of eye pain, no red eyes, no discharge from eyes, no itchy eyes  ENT: no complaints of earache, no hearing loss, no nosebleeds, no nasal discharge, no sore throat, no hoarseness  Cardiovascular: No complaints of slow heart rate, no fast heart rate, no chest pain, no palpitations, no leg claudication, no lower extremity  Respiratory: No complaints of shortness of breath, no wheezing, no cough, no SOB on exertion, no orthopnea or PND  Gastrointestinal: No complaints of abdominal pain, no constipation, no nausea or vomiting, no diarrhea or bloody stools  Genitourinary: No complaints of dysuria, no incontinence, no hesitancy, no nocturia, no genital lesion, no testicular pain  Musculoskeletal: weakness, but as noted in HPI  Integumentary: No complaints of skin rash or skin lesions, no itching, no skin wound, no dry skin  Neurological: dizziness, but as noted in HPI  Psychiatric: Is not suicidal, no sleep disturbances, no anxiety or depression, no change in personality, no emotional problems     Endocrine: No complaints of proptosis, no hot flashes, no muscle weakness, no erectile dysfunction, no deepening of the voice, no feelings of weakness  Hematologic/Lymphatic: No complaints of swollen glands, no swollen glands in the neck, does not bleed easily, no easy bruising  Active Problems    1  Anxiety (300 00) (F41 9)   2  Arthralgia (719 40) (M25 50)   3  Asymptomatic cholelithiasis (574 20) (K80 20)   4  Atrial fibrillation (427 31) (I48 91)   5  Benign essential hypertension (401 1) (I10)   6  Benign prostatic hypertrophy (600 00) (N40 0)   7  Cataract (366 9) (H26 9)   8  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)   9  Chronic kidney disease, stage 3 (585 3) (N18 3)   10  Coronary arteriosclerosis (414 00) (I25 10)   11  Epilepsy (345 90) (G40 909)   12  Hiatal hernia (553 3) (K44 9)   13  Hyperlipidemia (272 4) (E78 5)   14  History of Intracerebral hemorrhage (431) (I61 9)    Past Medical History    1  History of anemia (V12 3) (Z86 2)   2  History of gastroesophageal reflux (GERD) (V12 79) (Z87 19)   3  History of hematuria (V13 09) (Z87 448)   4  History of intracranial hemorrhage (V12 59) (Z86 79)   5  History of Intracerebral hemorrhage (431) (I61 9)   6  History of Stroke Syndrome (436)    Surgical History    1  History of Bladder Surgery  Surgical History Reviewed: The surgical history was reviewed and updated today  Family History  Mother    1  Family history of Lung Cancer (V16 1)   2  Family history of Mother  At Age 72  Father    3  Family history of Father  At Age 79  Family History Reviewed: The family history was reviewed and updated today  Social History    · Being A Social Drinker   · Current every day smoker (305 1) (F17 200)   · Smokes cigars (305 1) (F17 290)  Social History Reviewed: The social history was reviewed and updated today  The social history was reviewed and is unchanged  Current Meds   1  ALPRAZolam 0 5 MG Oral Tablet; Take 1/2 to 1 tab three times a day as needed for   anxiety;    Therapy: 43HZF8020 to (Evaluate:19Nov2017); Last Rx:77Lcs2151 Ordered   2  Crestor 10 MG Oral Tablet; 1/2 tab 4 days a week; Therapy: 16VZA6585 to (Evaluate:49Gxd4889)  Requested for: 55Xlz0703 Recorded   3  Lancets Miscellaneous; testing twice a monthdx I48 91; Therapy: 05ZUO8585 to (Last Rx:13Nov2017)  Requested for: 59IPB0891 Ordered   4  Lopressor 50 MG Oral Tablet; Take 1 tablet twice a day; Therapy: 28QPY5829 to Recorded   5  Warfarin Sodium 1 MG Oral Tablet; take one tablet by mouth daily as directed; Therapy: 70HLH0612 to (Evaluate:85Gar7975)  Requested for: 14ZNS8166; Last   Rx:22Jun2017 Ordered   6  Warfarin Sodium 5 MG Oral Tablet; Take 1 daily; Therapy: 20Jun2016 to (Evaluate:11Mar2018)  Requested for: 44Vxi5290; Last   Rx:77Lmx7946 Ordered   7  Zostavax 14574 UNT/0 65ML Subcutaneous Solution Reconstituted; To be given at   pharmacy; Therapy: 92RYZ3943 to (Last Rx:77Amg1526) Ordered  Medication List Reviewed: The medication list was reviewed and updated today  Allergies    1  No Known Drug Allergies    Vitals  Signs   Recorded: 22Nov2017 02:58PM   Temperature: 97 7 F  Heart Rate: 90  Systolic: 555  Diastolic: 90  Height: 5 ft 10 4 in  Weight: 207 lb 8 oz  BMI Calculated: 29 43  BSA Calculated: 2 13  O2 Saturation: 96    Physical Exam    Constitutional   General appearance: No acute distress, well appearing and well nourished  Eyes   Conjunctiva and lids: No swelling, erythema, or discharge  Pupils and irises: Equal, round and reactive to light  Pulmonary   Respiratory effort: No increased work of breathing or signs of respiratory distress  Auscultation of lungs: Clear to auscultation, equal breath sounds bilaterally, no wheezes, no rales, no rhonci  Cardiovascular   Auscultation of heart: Abnormal   irregularly irregular  Examination of extremities for edema and/or varicosities: Normal     Abdomen   Abdomen: Non-tender, no masses  Liver and spleen: No hepatomegaly or splenomegaly  Musculoskeletal   Gait and station: Abnormal     Digits and nails: Normal without clubbing or cyanosis  Inspection/palpation of joints, bones, and muscles: Normal     Psychiatric   Orientation to person, place and time: Normal     Mood and affect: Normal          Health Management  History of Depression screening   *VB-Depression Screening; every 1 year; Last 23Dec2016; Next Due: 60Fmz5037; Near Due  History of Encounter for screening for other nervous system disorder   *VB - Fall Risk Assessment  (Dx Z13 89 Screen for Neurologic Disorder); every 1 year; Last  23Dec2016; Next Due: 57Cpp2574; Near Due  History of Encounter for special screening examination for genitourinary disorder   *VB - Urinary Incontinence Screen (Dx Z13 89 Screen for UI); every 1 year; Last 23Dec2016; Next  Due: 09Pne3816; Near Due  History of Medicare annual wellness visit, subsequent   Medicare Annual Wellness Visit; every 1 year;  Last 23Dec2016; Next Due: 91GXZ8674; Near Due    Signatures   Electronically signed by : Xuan Nielsen DO; Nov 25 2017 12:26AM EST                       (Author)

## 2018-01-17 NOTE — RESULT NOTES
Verified Results  (1) PT WITH INR [id number] 12:00AM Zoëcuco Gu     Test Name Result Flag Reference   INR 2 1 H 0 8-1 2   Reference interval is for non-anticoagulated patients                   Suggested INR therapeutic range for Vitamin K                 antagonist therapy:                    Standard Dose (moderate intensity                                   therapeutic range):       2 0 - 3 0                    Higher intensity therapeutic range       2 5 - 3 5   Prothrombin Time 22 8 sec H 9 1-12 0

## 2018-01-17 NOTE — RESULT NOTES
Verified Results  (1) PT WITH INR [id number] 12:00AM Mita Prescott     Test Name Result Flag Reference   INR 2 9 H 0 8-1 2   Reference interval is for non-anticoagulated patients                   Suggested INR therapeutic range for Vitamin K                 antagonist therapy:                    Standard Dose (moderate intensity                                   therapeutic range):       2 0 - 3 0                    Higher intensity therapeutic range       2 5 - 3 5   Prothrombin Time 29 8 sec H 9 1-12 0

## 2018-01-18 ENCOUNTER — GENERIC CONVERSION - ENCOUNTER (OUTPATIENT)
Dept: OTHER | Facility: OTHER | Age: 78
End: 2018-01-18

## 2018-01-18 LAB — INR PPP: 3 (ref 0.86–1.16)

## 2018-01-18 NOTE — RESULT NOTES
Verified Results  Coumadin Flow Sheet [id number] 07:05PM Anny New     Test Name Result Flag Reference   Recheck INR [id number]     New Dose      7 5mg t,th and 6mg other days

## 2018-01-18 NOTE — RESULT NOTES
Verified Results  Coumadin Flow Sheet [id number] 12:40PM April Matos     Test Name Result Flag Reference   Recheck INR [id number]     Current Dose 6mg daily     New Dose      7 5 today and m,f and 6mg all other days     (1) PT WITH INR [id number] 12:00AM April Matos   A duplicate report has been generated due to demographic updates  Test Name Result Flag Reference   INR 1 5 H 0 8-1 2   Reference interval is for non-anticoagulated patients                   Suggested INR therapeutic range for Vitamin K                 antagonist therapy:                    Standard Dose (moderate intensity                                   therapeutic range):       2 0 - 3 0                    Higher intensity therapeutic range       2 5 - 3 5   Prothrombin Time 15 7 sec H 9 1-12 0

## 2018-01-18 NOTE — RESULT NOTES
Verified Results  Coumadin Flow Sheet [id number] 05:18PM Laura Large     Test Name Result Flag Reference   Recheck INR [id number]     New Dose      hold 1 day then restart 7 5mg t,th and 6mg other days

## 2018-01-18 NOTE — RESULT NOTES
Verified Results  Coumadin Flow Sheet [id number] 10:02AM Robyn Pearl     Test Name Result Flag Reference   Recheck INR [id number]     New Dose      7 5mg tu, 6mg all other days

## 2018-01-22 VITALS
HEART RATE: 90 BPM | TEMPERATURE: 97.7 F | BODY MASS INDEX: 29.71 KG/M2 | OXYGEN SATURATION: 96 % | SYSTOLIC BLOOD PRESSURE: 130 MMHG | WEIGHT: 207.5 LBS | DIASTOLIC BLOOD PRESSURE: 90 MMHG | HEIGHT: 70 IN

## 2018-01-23 ENCOUNTER — ANTICOAG VISIT (OUTPATIENT)
Dept: FAMILY MEDICINE CLINIC | Facility: CLINIC | Age: 78
End: 2018-01-23

## 2018-01-23 NOTE — RESULT NOTES
Verified Results  Coumadin Flow Sheet [id number] 10:18PM Leanna Erwin     Test Name Result Flag Reference   Recheck INR [id number]     Current Dose 6mg daily     New Dose 6mg daily

## 2018-02-02 LAB — INR PPP: 2.9 (ref 0.86–1.16)

## 2018-02-05 ENCOUNTER — TELEPHONE (OUTPATIENT)
Dept: FAMILY MEDICINE CLINIC | Facility: CLINIC | Age: 78
End: 2018-02-05

## 2018-02-05 ENCOUNTER — ANTICOAG VISIT (OUTPATIENT)
Dept: FAMILY MEDICINE CLINIC | Facility: CLINIC | Age: 78
End: 2018-02-05

## 2018-02-05 DIAGNOSIS — F41.9 ANXIETY: Primary | ICD-10-CM

## 2018-02-05 RX ORDER — ALPRAZOLAM 0.5 MG/1
0.5 TABLET ORAL 3 TIMES DAILY PRN
Qty: 90 TABLET | Refills: 0 | Status: SHIPPED | OUTPATIENT
Start: 2018-02-05 | End: 2018-05-30 | Stop reason: SDUPTHER

## 2018-02-05 RX ORDER — ALPRAZOLAM 0.5 MG/1
1 TABLET ORAL 3 TIMES DAILY PRN
COMMUNITY
Start: 2013-01-11 | End: 2018-02-05 | Stop reason: SDUPTHER

## 2018-02-21 ENCOUNTER — TELEPHONE (OUTPATIENT)
Dept: FAMILY MEDICINE CLINIC | Facility: CLINIC | Age: 78
End: 2018-02-21

## 2018-02-21 ENCOUNTER — ANTICOAG VISIT (OUTPATIENT)
Dept: FAMILY MEDICINE CLINIC | Facility: CLINIC | Age: 78
End: 2018-02-21

## 2018-02-21 LAB — INR PPP: 2.6 (ref 0.86–1.16)

## 2018-02-21 NOTE — TELEPHONE ENCOUNTER
Continue same dose and recheck 3/7  Try making fingers warm, run under warm water dilates blood vessels and milk blood down to tip of finger

## 2018-02-21 NOTE — TELEPHONE ENCOUNTER
dayai-Spoke w/pt's wife he almost a week over due for his inr check  Pt's wife states she is having a hard time getting blood out of him to check it  I advised her to call [facility] to possibly get a new machine  I also offered to put in a order to go to lab to check it checked an he doesn't have time for that  She stated she will talk to him about it and get back to us

## 2018-03-07 LAB — INR PPP: 2.7 (ref 0.86–1.16)

## 2018-03-08 ENCOUNTER — ANTICOAG VISIT (OUTPATIENT)
Dept: FAMILY MEDICINE CLINIC | Facility: CLINIC | Age: 78
End: 2018-03-08

## 2018-03-30 ENCOUNTER — TELEPHONE (OUTPATIENT)
Dept: FAMILY MEDICINE CLINIC | Facility: CLINIC | Age: 78
End: 2018-03-30

## 2018-03-30 ENCOUNTER — ANTICOAG VISIT (OUTPATIENT)
Dept: FAMILY MEDICINE CLINIC | Facility: CLINIC | Age: 78
End: 2018-03-30

## 2018-03-30 LAB — INR PPP: 2.4 (ref 0.86–1.16)

## 2018-04-13 ENCOUNTER — ANTICOAG VISIT (OUTPATIENT)
Dept: FAMILY MEDICINE CLINIC | Facility: CLINIC | Age: 78
End: 2018-04-13

## 2018-04-13 LAB — INR PPP: 2.7 (ref 0.86–1.16)

## 2018-04-27 ENCOUNTER — TELEPHONE (OUTPATIENT)
Dept: FAMILY MEDICINE CLINIC | Facility: CLINIC | Age: 78
End: 2018-04-27

## 2018-04-27 ENCOUNTER — ANTICOAG VISIT (OUTPATIENT)
Dept: FAMILY MEDICINE CLINIC | Facility: CLINIC | Age: 78
End: 2018-04-27

## 2018-04-27 LAB — INR PPP: 2.8 (ref 0.86–1.16)

## 2018-05-11 ENCOUNTER — TELEPHONE (OUTPATIENT)
Dept: FAMILY MEDICINE CLINIC | Facility: CLINIC | Age: 78
End: 2018-05-11

## 2018-05-13 LAB — INR PPP: 2.2 (ref 0.86–1.16)

## 2018-05-14 ENCOUNTER — ANTICOAG VISIT (OUTPATIENT)
Dept: FAMILY MEDICINE CLINIC | Facility: CLINIC | Age: 78
End: 2018-05-14

## 2018-05-14 ENCOUNTER — TELEPHONE (OUTPATIENT)
Dept: FAMILY MEDICINE CLINIC | Facility: CLINIC | Age: 78
End: 2018-05-14

## 2018-05-16 ENCOUNTER — ANTICOAG VISIT (OUTPATIENT)
Dept: FAMILY MEDICINE CLINIC | Facility: CLINIC | Age: 78
End: 2018-05-16

## 2018-05-30 DIAGNOSIS — F41.9 ANXIETY: ICD-10-CM

## 2018-05-30 LAB — INR PPP: 1.7 (ref 0.86–1.17)

## 2018-05-30 RX ORDER — ALPRAZOLAM 0.5 MG/1
0.5 TABLET ORAL 3 TIMES DAILY PRN
Qty: 90 TABLET | Refills: 0 | Status: SHIPPED | OUTPATIENT
Start: 2018-05-30 | End: 2018-08-27 | Stop reason: SDUPTHER

## 2018-05-30 NOTE — TELEPHONE ENCOUNTER
Pt due for regular blood work  Please find out what lab he is using and then will need routine screen

## 2018-05-31 DIAGNOSIS — I48.21 PERMANENT ATRIAL FIBRILLATION (HCC): ICD-10-CM

## 2018-05-31 DIAGNOSIS — N18.30 CHRONIC KIDNEY DISEASE, STAGE 3 (HCC): ICD-10-CM

## 2018-05-31 DIAGNOSIS — I25.10 CORONARY ARTERIOSCLEROSIS: ICD-10-CM

## 2018-05-31 DIAGNOSIS — I10 BENIGN ESSENTIAL HYPERTENSION: Primary | ICD-10-CM

## 2018-05-31 DIAGNOSIS — E78.49 OTHER HYPERLIPIDEMIA: ICD-10-CM

## 2018-05-31 PROBLEM — H26.9 CATARACT: Status: ACTIVE | Noted: 2017-10-25

## 2018-05-31 PROBLEM — R26.81 GAIT INSTABILITY: Status: ACTIVE | Noted: 2017-11-23

## 2018-06-01 ENCOUNTER — TELEPHONE (OUTPATIENT)
Dept: FAMILY MEDICINE CLINIC | Facility: CLINIC | Age: 78
End: 2018-06-01

## 2018-06-12 LAB
ALBUMIN SERPL-MCNC: 4.3 G/DL (ref 3.5–4.8)
ALBUMIN/GLOB SERPL: 1.3 {RATIO} (ref 1.2–2.2)
ALP SERPL-CCNC: 77 IU/L (ref 39–117)
ALT SERPL-CCNC: 16 IU/L (ref 0–44)
AST SERPL-CCNC: 17 IU/L (ref 0–40)
BASOPHILS # BLD AUTO: 0.1 X10E3/UL (ref 0–0.2)
BASOPHILS NFR BLD AUTO: 1 %
BILIRUB SERPL-MCNC: 0.8 MG/DL (ref 0–1.2)
BUN SERPL-MCNC: 24 MG/DL (ref 8–27)
BUN/CREAT SERPL: 18 (ref 10–24)
CALCIUM SERPL-MCNC: 9.6 MG/DL (ref 8.6–10.2)
CHLORIDE SERPL-SCNC: 102 MMOL/L (ref 96–106)
CHOLEST SERPL-MCNC: 164 MG/DL (ref 100–199)
CO2 SERPL-SCNC: 25 MMOL/L (ref 20–29)
CREAT SERPL-MCNC: 1.34 MG/DL (ref 0.76–1.27)
EOSINOPHIL # BLD AUTO: 0.4 X10E3/UL (ref 0–0.4)
EOSINOPHIL NFR BLD AUTO: 5 %
ERYTHROCYTE [DISTWIDTH] IN BLOOD BY AUTOMATED COUNT: 13.7 % (ref 12.3–15.4)
GLOBULIN SER-MCNC: 3.2 G/DL (ref 1.5–4.5)
GLUCOSE SERPL-MCNC: 106 MG/DL (ref 65–99)
HCT VFR BLD AUTO: 42 % (ref 37.5–51)
HDLC SERPL-MCNC: 56 MG/DL
HGB BLD-MCNC: 14.4 G/DL (ref 13–17.7)
IMM GRANULOCYTES # BLD: 0 X10E3/UL (ref 0–0.1)
IMM GRANULOCYTES NFR BLD: 0 %
LDLC SERPL CALC-MCNC: 88 MG/DL (ref 0–99)
LDLC/HDLC SERPL: 1.6 RATIO (ref 0–3.6)
LYMPHOCYTES # BLD AUTO: 2.2 X10E3/UL (ref 0.7–3.1)
LYMPHOCYTES NFR BLD AUTO: 26 %
MCH RBC QN AUTO: 34.1 PG (ref 26.6–33)
MCHC RBC AUTO-ENTMCNC: 34.3 G/DL (ref 31.5–35.7)
MCV RBC AUTO: 100 FL (ref 79–97)
MONOCYTES # BLD AUTO: 0.8 X10E3/UL (ref 0.1–0.9)
MONOCYTES NFR BLD AUTO: 9 %
NEUTROPHILS # BLD AUTO: 5.1 X10E3/UL (ref 1.4–7)
NEUTROPHILS NFR BLD AUTO: 59 %
PLATELET # BLD AUTO: 254 X10E3/UL (ref 150–379)
POTASSIUM SERPL-SCNC: 5.5 MMOL/L (ref 3.5–5.2)
PROT SERPL-MCNC: 7.5 G/DL (ref 6–8.5)
RBC # BLD AUTO: 4.22 X10E6/UL (ref 4.14–5.8)
SL AMB EGFR AFRICAN AMERICAN: 59 ML/MIN/1.73
SL AMB EGFR NON AFRICAN AMERICAN: 51 ML/MIN/1.73
SL AMB VLDL CHOLESTEROL CALC: 20 MG/DL (ref 5–40)
SODIUM SERPL-SCNC: 143 MMOL/L (ref 134–144)
TRIGL SERPL-MCNC: 99 MG/DL (ref 0–149)
TSH SERPL DL<=0.005 MIU/L-ACNC: 3.09 UIU/ML (ref 0.45–4.5)
WBC # BLD AUTO: 8.5 X10E3/UL (ref 3.4–10.8)

## 2018-06-22 ENCOUNTER — ANTICOAG VISIT (OUTPATIENT)
Dept: FAMILY MEDICINE CLINIC | Facility: CLINIC | Age: 78
End: 2018-06-22

## 2018-06-22 ENCOUNTER — TELEPHONE (OUTPATIENT)
Dept: FAMILY MEDICINE CLINIC | Facility: CLINIC | Age: 78
End: 2018-06-22

## 2018-06-22 LAB — INR PPP: 2.2 (ref 0.86–1.17)

## 2018-07-03 ENCOUNTER — TELEPHONE (OUTPATIENT)
Dept: FAMILY MEDICINE CLINIC | Facility: CLINIC | Age: 78
End: 2018-07-03

## 2018-07-03 ENCOUNTER — OFFICE VISIT (OUTPATIENT)
Dept: FAMILY MEDICINE CLINIC | Facility: CLINIC | Age: 78
End: 2018-07-03
Payer: COMMERCIAL

## 2018-07-03 VITALS
HEIGHT: 71 IN | OXYGEN SATURATION: 99 % | DIASTOLIC BLOOD PRESSURE: 90 MMHG | SYSTOLIC BLOOD PRESSURE: 130 MMHG | BODY MASS INDEX: 28.63 KG/M2 | HEART RATE: 79 BPM | WEIGHT: 204.5 LBS | TEMPERATURE: 98.5 F

## 2018-07-03 DIAGNOSIS — R29.6 FREQUENT FALLS: ICD-10-CM

## 2018-07-03 DIAGNOSIS — I48.21 PERMANENT ATRIAL FIBRILLATION (HCC): ICD-10-CM

## 2018-07-03 DIAGNOSIS — R26.81 GAIT INSTABILITY: Primary | ICD-10-CM

## 2018-07-03 DIAGNOSIS — R53.1 WEAKNESS: ICD-10-CM

## 2018-07-03 DIAGNOSIS — N18.30 CHRONIC KIDNEY DISEASE (CKD), STAGE III (MODERATE) (HCC): ICD-10-CM

## 2018-07-03 DIAGNOSIS — I10 BENIGN ESSENTIAL HYPERTENSION: ICD-10-CM
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
## 2018-07-03 DIAGNOSIS — R26.81 GAIT INSTABILITY: ICD-10-CM

## 2018-07-03 DIAGNOSIS — S22.42XD CLOSED FRACTURE OF MULTIPLE RIBS OF LEFT SIDE WITH ROUTINE HEALING, SUBSEQUENT ENCOUNTER: ICD-10-CM
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
## 2018-07-03 PROCEDURE — 1036F TOBACCO NON-USER: CPT | Performed by: FAMILY MEDICINE

## 2018-07-03 PROCEDURE — 99214 OFFICE O/P EST MOD 30 MIN: CPT | Performed by: FAMILY MEDICINE

## 2018-07-03 PROCEDURE — 1160F RVW MEDS BY RX/DR IN RCRD: CPT | Performed by: FAMILY MEDICINE

## 2018-07-03 PROCEDURE — 4040F PNEUMOC VAC/ADMIN/RCVD: CPT | Performed by: FAMILY MEDICINE

## 2018-07-03 PROCEDURE — 3008F BODY MASS INDEX DOCD: CPT | Performed by: FAMILY MEDICINE

## 2018-07-03 RX ORDER — WARFARIN SODIUM 5 MG/1
5 TABLET ORAL DAILY
Refills: 5 | COMMUNITY
Start: 2018-06-15 | End: 2018-07-13 | Stop reason: SDUPTHER

## 2018-07-03 NOTE — PATIENT INSTRUCTIONS
Increase water intake   Recheck kidney function next week  Physical Therapy - initially working with lower body until ribs heal , then upper body strength  Tylenol as needed for rib pain  Consider pain medication if pain worsens    Pt will try to take deep breaths during the day without inducing pain

## 2018-07-03 NOTE — TELEPHONE ENCOUNTER
Patient's wife called stating that patient fell the other day and finally went to [address] [place] [address] for treatment yesterday  At the hospital, they discovered that he has multiple broken ribs and other injuries and the hospital d/c him with the recommendation of outpatient rehab  Patient then fell again last night and EMS had to be called because the patient's wife is unable to life him and get him up  He is still at home, but patient needs advise on how to get started with the outpatient rehab  I will request the records from [facility], please advise

## 2018-07-04 PROBLEM — N18.30 CHRONIC KIDNEY DISEASE (CKD), STAGE III (MODERATE) (HCC): Status: ACTIVE | Noted: 2018-07-04

## 2018-07-04 NOTE — PROGRESS NOTES
Assessment/Plan:      Diagnoses and all orders for this visit:    Fall with injury, sequela  -     Ambulatory referral to Physical Therapy; Future    Closed fracture of multiple ribs of left side with routine healing, subsequent encounter    Weakness  -     Ambulatory referral to Physical Therapy; Future    Permanent atrial fibrillation (HCC)    Gait instability  -     Ambulatory referral to Physical Therapy; Future    Frequent falls  -     Ambulatory referral to Physical Therapy; Future    Chronic kidney disease (CKD), stage III (moderate)  -     Basic metabolic panel; Future  -     Basic metabolic panel    Benign essential hypertension    Other orders  -     warfarin (COUMADIN) 5 mg tablet; Take 5 mg by mouth daily        Fall with injury and fracture to left side of ribs: pt will avoid aggravating motions  Pt is on coumadin so cannot use ibuprofen, aleve, motrin  He can use tylenol  Pt would benefit from physical therapy for muscle strengthening and gait stability to avoid falls  Atrial fibrillation: controlled rate, pt on coumadin  Hypertension: controlled, continue current medication  Subjective:  Chief Complaint   Patient presents with    ER f/u     pt wasseen in [address] for 4 fx lt side ribs, pt could not walk yesterday , pt's wife is requesting rehab or a visiting nurse        Patient ID: Nieves Boss is a 69 y o  male  Pt is here with wife today in follow up to fall and recent visit to emergency department  Pt is having trouble with his balance  He has been falling and most recently fell on his left side breaking 5 ribs  Pt wife has been unable to get him up  She had to call the squad  Pts legs are weak  He denies chest pain or shortness of breath  Pts wife states he has been to physical therapy in the past after the stroke but has not been continuing with any exercises and continues to get weak  He is not very active     Pt states he does not remember hitting his head in the falls and has no headache  Wife states patient does not drink water and his kidney function was low in the hospital  They recommended recheck in 1 week         Review of Systems   Constitutional: Positive for fatigue  Negative for fever  HENT: Negative  Eyes: Negative  Respiratory: Negative  Negative for cough  Cardiovascular: Negative  Gastrointestinal: Negative  Endocrine: Negative  Genitourinary: Negative  Musculoskeletal:        Pain over left ribs   Skin: Negative  Allergic/Immunologic: Negative  Neurological: Negative  Psychiatric/Behavioral: Negative  The following portions of the patient's history were reviewed and updated as appropriate: allergies, current medications, past family history, past medical history, past social history, past surgical history and problem list     Objective:  Vitals:    07/03/18 1701   BP: 130/90   Pulse: 79   Temp: 98 5 °F (36 9 °C)   SpO2: 99%   Weight: 92 8 kg (204 lb 8 oz)   Height: 5' 11" (1 803 m)      Physical Exam   Constitutional: He is oriented to person, place, and time  He appears well-developed and well-nourished  HENT:   Head: Normocephalic and atraumatic  Cardiovascular: Normal rate, regular rhythm and normal heart sounds  Pulmonary/Chest: Effort normal and breath sounds normal    Abdominal: Soft  Bowel sounds are normal    Musculoskeletal: He exhibits tenderness  Pain to palpation mid axillary rib 7-12 left side  Neurological: He is alert and oriented to person, place, and time  Skin: Skin is warm and dry  Psychiatric: His behavior is normal  Judgment and thought content normal    Nursing note and vitals reviewed

## 2018-07-05 PROBLEM — S22.42XA MULTIPLE CLOSED FRACTURES OF RIBS OF LEFT SIDE: Status: ACTIVE | Noted: 2018-07-05

## 2018-07-07 DIAGNOSIS — Z79.01 ANTICOAGULATED: Primary | ICD-10-CM

## 2018-07-09 ENCOUNTER — TELEPHONE (OUTPATIENT)
Dept: FAMILY MEDICINE CLINIC | Facility: CLINIC | Age: 78
End: 2018-07-09

## 2018-07-09 DIAGNOSIS — S22.49XD CLOSED FRACTURE OF MULTIPLE RIBS WITH ROUTINE HEALING, UNSPECIFIED LATERALITY, SUBSEQUENT ENCOUNTER: Primary | ICD-10-CM

## 2018-07-09 RX ORDER — WARFARIN SODIUM 1 MG/1
TABLET ORAL
Qty: 30 TABLET | Refills: 5 | Status: SHIPPED | OUTPATIENT
Start: 2018-07-09 | End: 2018-12-31 | Stop reason: SDUPTHER

## 2018-07-09 RX ORDER — TRAMADOL HYDROCHLORIDE 50 MG/1
50 TABLET ORAL EVERY 6 HOURS PRN
Qty: 30 TABLET | Refills: 0 | Status: SHIPPED | OUTPATIENT
Start: 2018-07-09 | End: 2019-06-10 | Stop reason: SDUPTHER

## 2018-07-09 NOTE — TELEPHONE ENCOUNTER
Patient was in to see Dr Annika Zayas recently after falling and breaking several ribs  Patient has been taking tylenol but it is not touching the pain and they are asking if there's anything stronger he could take  Please advise, patient uses [facility]  Thanks

## 2018-07-13 DIAGNOSIS — Z79.01 ANTICOAGULATED: Primary | ICD-10-CM

## 2018-07-13 RX ORDER — WARFARIN SODIUM 5 MG/1
TABLET ORAL
Qty: 30 TABLET | Refills: 5 | Status: SHIPPED | OUTPATIENT
Start: 2018-07-13 | End: 2018-07-16 | Stop reason: SDUPTHER

## 2018-07-16 DIAGNOSIS — I48.21 PERMANENT ATRIAL FIBRILLATION (HCC): Primary | ICD-10-CM

## 2018-07-16 DIAGNOSIS — Z79.01 ANTICOAGULATED: ICD-10-CM

## 2018-07-16 RX ORDER — WARFARIN SODIUM 5 MG/1
TABLET ORAL
Qty: 30 TABLET | Refills: 5 | Status: SHIPPED | OUTPATIENT
Start: 2018-07-16 | End: 2019-07-02 | Stop reason: SDUPTHER

## 2018-07-17 ENCOUNTER — TELEPHONE (OUTPATIENT)
Dept: FAMILY MEDICINE CLINIC | Facility: CLINIC | Age: 78
End: 2018-07-17

## 2018-07-17 ENCOUNTER — ANTICOAG VISIT (OUTPATIENT)
Dept: FAMILY MEDICINE CLINIC | Facility: CLINIC | Age: 78
End: 2018-07-17

## 2018-07-17 LAB — INR PPP: 3.6 (ref 0.86–1.17)

## 2018-08-02 ENCOUNTER — ANTICOAG VISIT (OUTPATIENT)
Dept: FAMILY MEDICINE CLINIC | Facility: CLINIC | Age: 78
End: 2018-08-02

## 2018-08-02 ENCOUNTER — TELEPHONE (OUTPATIENT)
Dept: FAMILY MEDICINE CLINIC | Facility: CLINIC | Age: 78
End: 2018-08-02

## 2018-08-02 LAB — INR PPP: 3.2 (ref 0.86–1.17)

## 2018-08-15 ENCOUNTER — TELEPHONE (OUTPATIENT)
Dept: FAMILY MEDICINE CLINIC | Facility: CLINIC | Age: 78
End: 2018-08-15

## 2018-08-15 ENCOUNTER — ANTICOAG VISIT (OUTPATIENT)
Dept: FAMILY MEDICINE CLINIC | Facility: CLINIC | Age: 78
End: 2018-08-15

## 2018-08-15 LAB — INR PPP: 2.4 (ref 0.86–1.17)

## 2018-08-27 DIAGNOSIS — F41.9 ANXIETY: ICD-10-CM

## 2018-08-28 RX ORDER — ALPRAZOLAM 0.5 MG/1
0.5 TABLET ORAL 3 TIMES DAILY PRN
Qty: 90 TABLET | Refills: 0 | Status: SHIPPED | OUTPATIENT
Start: 2018-08-28 | End: 2018-12-12 | Stop reason: SDUPTHER

## 2018-09-06 LAB — INR PPP: 1.7 (ref 0.86–1.17)

## 2018-09-07 ENCOUNTER — ANTICOAG VISIT (OUTPATIENT)
Dept: FAMILY MEDICINE CLINIC | Facility: CLINIC | Age: 78
End: 2018-09-07

## 2018-09-07 ENCOUNTER — TELEPHONE (OUTPATIENT)
Dept: FAMILY MEDICINE CLINIC | Facility: CLINIC | Age: 78
End: 2018-09-07

## 2018-10-04 ENCOUNTER — ANTICOAG VISIT (OUTPATIENT)
Dept: FAMILY MEDICINE CLINIC | Facility: CLINIC | Age: 78
End: 2018-10-04

## 2018-10-04 DIAGNOSIS — Z79.01 ANTICOAGULANT LONG-TERM USE: Primary | ICD-10-CM

## 2018-10-04 LAB — INTERNATIONAL NORMALIZATION RATIO, POC: 2.4

## 2018-10-04 NOTE — PROGRESS NOTES
Please review INR and advise dosing schedule  INR 2 4 today and next test date is 10/18/2018  Patient uses Alere monitor

## 2018-10-05 ENCOUNTER — TELEPHONE (OUTPATIENT)
Dept: FAMILY MEDICINE CLINIC | Facility: CLINIC | Age: 78
End: 2018-10-05

## 2018-10-18 ENCOUNTER — TRANSCRIBE ORDERS (OUTPATIENT)
Dept: ADMINISTRATIVE | Facility: HOSPITAL | Age: 78
End: 2018-10-18

## 2018-10-18 DIAGNOSIS — I10 ESSENTIAL HYPERTENSION, MALIGNANT: Primary | ICD-10-CM

## 2018-10-23 ENCOUNTER — ANTICOAG VISIT (OUTPATIENT)
Dept: FAMILY MEDICINE CLINIC | Facility: CLINIC | Age: 78
End: 2018-10-23

## 2018-10-23 LAB — INR PPP: 1.8 (ref 0.86–1.17)

## 2018-11-23 ENCOUNTER — TELEPHONE (OUTPATIENT)
Dept: FAMILY MEDICINE CLINIC | Facility: CLINIC | Age: 78
End: 2018-11-23

## 2018-11-23 ENCOUNTER — ANTICOAG VISIT (OUTPATIENT)
Dept: FAMILY MEDICINE CLINIC | Facility: CLINIC | Age: 78
End: 2018-11-23

## 2018-11-23 LAB — INR PPP: 2 (ref 0.86–1.17)

## 2018-12-12 ENCOUNTER — ANTICOAG VISIT (OUTPATIENT)
Dept: FAMILY MEDICINE CLINIC | Facility: CLINIC | Age: 78
End: 2018-12-12

## 2018-12-12 DIAGNOSIS — F41.9 ANXIETY: ICD-10-CM

## 2018-12-12 LAB — INR PPP: 2 (ref 0.86–1.17)

## 2018-12-12 RX ORDER — ALPRAZOLAM 0.5 MG/1
0.5 TABLET ORAL 3 TIMES DAILY PRN
Qty: 90 TABLET | Refills: 0 | Status: SHIPPED | OUTPATIENT
Start: 2018-12-12 | End: 2019-05-09 | Stop reason: SDUPTHER

## 2018-12-12 NOTE — TELEPHONE ENCOUNTER
Please e-scribe  Left message for patient that this will be his last refill until he makes an appointment for controlled substance

## 2018-12-28 ENCOUNTER — ANTICOAG VISIT (OUTPATIENT)
Dept: FAMILY MEDICINE CLINIC | Facility: CLINIC | Age: 78
End: 2018-12-28

## 2018-12-28 LAB — INR PPP: 1.7 (ref 0.86–1.17)

## 2018-12-30 DIAGNOSIS — Z79.01 ANTICOAGULATED: ICD-10-CM

## 2018-12-31 ENCOUNTER — ANTICOAG VISIT (OUTPATIENT)
Dept: FAMILY MEDICINE CLINIC | Facility: CLINIC | Age: 78
End: 2018-12-31

## 2018-12-31 DIAGNOSIS — Z79.01 ANTICOAGULATED: ICD-10-CM

## 2018-12-31 RX ORDER — WARFARIN SODIUM 1 MG/1
TABLET ORAL
Qty: 45 TABLET | Refills: 0 | Status: SHIPPED | OUTPATIENT
Start: 2018-12-31 | End: 2019-03-30 | Stop reason: SDUPTHER

## 2018-12-31 RX ORDER — WARFARIN SODIUM 1 MG/1
TABLET ORAL
Qty: 30 TABLET | Refills: 5 | OUTPATIENT
Start: 2018-12-31

## 2019-01-16 ENCOUNTER — TELEPHONE (OUTPATIENT)
Dept: FAMILY MEDICINE CLINIC | Facility: CLINIC | Age: 79
End: 2019-01-16

## 2019-01-16 ENCOUNTER — ANTICOAG VISIT (OUTPATIENT)
Dept: FAMILY MEDICINE CLINIC | Facility: CLINIC | Age: 79
End: 2019-01-16

## 2019-01-16 LAB — INR PPP: 2.6 (ref 0.86–1.17)

## 2019-02-01 ENCOUNTER — TELEPHONE (OUTPATIENT)
Dept: FAMILY MEDICINE CLINIC | Facility: CLINIC | Age: 79
End: 2019-02-01

## 2019-02-01 ENCOUNTER — ANTICOAG VISIT (OUTPATIENT)
Dept: FAMILY MEDICINE CLINIC | Facility: CLINIC | Age: 79
End: 2019-02-01

## 2019-02-01 LAB — INR PPP: 3 (ref 0.86–1.17)

## 2019-03-06 ENCOUNTER — ANTICOAG VISIT (OUTPATIENT)
Dept: FAMILY MEDICINE CLINIC | Facility: CLINIC | Age: 79
End: 2019-03-06

## 2019-03-06 ENCOUNTER — TELEPHONE (OUTPATIENT)
Dept: FAMILY MEDICINE CLINIC | Facility: CLINIC | Age: 79
End: 2019-03-06

## 2019-03-06 LAB — INR PPP: 3 (ref 0.86–1.17)

## 2019-03-28 ENCOUNTER — ANTICOAG VISIT (OUTPATIENT)
Dept: FAMILY MEDICINE CLINIC | Facility: CLINIC | Age: 79
End: 2019-03-28

## 2019-03-28 LAB — INR PPP: 2.6 (ref 0.86–1.17)

## 2019-03-29 ENCOUNTER — TELEPHONE (OUTPATIENT)
Dept: FAMILY MEDICINE CLINIC | Facility: CLINIC | Age: 79
End: 2019-03-29

## 2019-03-29 NOTE — TELEPHONE ENCOUNTER
DO Darrin Wyatt [facility] Clerical 3 minutes ago (4:54 PM)      Please call patient  Coumadin level is good, continue same dose and recheck 4/11     Patient notified

## 2019-03-30 DIAGNOSIS — Z79.01 ANTICOAGULATED: ICD-10-CM

## 2019-04-01 RX ORDER — WARFARIN SODIUM 1 MG/1
TABLET ORAL
Qty: 60 TABLET | Refills: 0 | Status: SHIPPED | OUTPATIENT
Start: 2019-04-01 | End: 2019-06-22 | Stop reason: SDUPTHER

## 2019-04-13 LAB — INR PPP: 1.3 (ref 0.86–1.17)

## 2019-04-15 ENCOUNTER — TELEPHONE (OUTPATIENT)
Dept: FAMILY MEDICINE CLINIC | Facility: CLINIC | Age: 79
End: 2019-04-15

## 2019-04-15 ENCOUNTER — ANTICOAG VISIT (OUTPATIENT)
Dept: FAMILY MEDICINE CLINIC | Facility: CLINIC | Age: 79
End: 2019-04-15

## 2019-04-23 ENCOUNTER — ANTICOAG VISIT (OUTPATIENT)
Dept: FAMILY MEDICINE CLINIC | Facility: CLINIC | Age: 79
End: 2019-04-23

## 2019-04-23 ENCOUNTER — TELEPHONE (OUTPATIENT)
Dept: FAMILY MEDICINE CLINIC | Facility: CLINIC | Age: 79
End: 2019-04-23

## 2019-04-23 LAB
INR PPP: 1.8 (ref 0.86–1.17)
INR PPP: 1.8 (ref 0.86–1.17)

## 2019-04-24 ENCOUNTER — ANTICOAG VISIT (OUTPATIENT)
Dept: FAMILY MEDICINE CLINIC | Facility: CLINIC | Age: 79
End: 2019-04-24

## 2019-04-30 ENCOUNTER — OFFICE VISIT (OUTPATIENT)
Dept: FAMILY MEDICINE CLINIC | Facility: CLINIC | Age: 79
End: 2019-04-30
Payer: COMMERCIAL

## 2019-04-30 VITALS
HEIGHT: 71 IN | SYSTOLIC BLOOD PRESSURE: 120 MMHG | OXYGEN SATURATION: 96 % | WEIGHT: 209.5 LBS | TEMPERATURE: 97.8 F | BODY MASS INDEX: 29.33 KG/M2 | DIASTOLIC BLOOD PRESSURE: 70 MMHG | HEART RATE: 90 BPM

## 2019-04-30 DIAGNOSIS — F41.9 ANXIETY: ICD-10-CM

## 2019-04-30 DIAGNOSIS — I10 BENIGN ESSENTIAL HYPERTENSION: ICD-10-CM

## 2019-04-30 DIAGNOSIS — Z00.00 MEDICARE ANNUAL WELLNESS VISIT, SUBSEQUENT: Primary | ICD-10-CM

## 2019-04-30 PROBLEM — S22.49XD CLOSED FRACTURE OF MULTIPLE RIBS WITH ROUTINE HEALING: Status: ACTIVE | Noted: 2019-04-30

## 2019-04-30 PROCEDURE — 1125F AMNT PAIN NOTED PAIN PRSNT: CPT | Performed by: FAMILY MEDICINE

## 2019-04-30 PROCEDURE — 3078F DIAST BP <80 MM HG: CPT | Performed by: FAMILY MEDICINE

## 2019-04-30 PROCEDURE — 3074F SYST BP LT 130 MM HG: CPT | Performed by: FAMILY MEDICINE

## 2019-04-30 PROCEDURE — 1036F TOBACCO NON-USER: CPT | Performed by: FAMILY MEDICINE

## 2019-04-30 PROCEDURE — 1160F RVW MEDS BY RX/DR IN RCRD: CPT | Performed by: FAMILY MEDICINE

## 2019-04-30 PROCEDURE — 1170F FXNL STATUS ASSESSED: CPT | Performed by: FAMILY MEDICINE

## 2019-04-30 PROCEDURE — G0439 PPPS, SUBSEQ VISIT: HCPCS | Performed by: FAMILY MEDICINE

## 2019-04-30 RX ORDER — AMLODIPINE BESYLATE 5 MG/1
5 TABLET ORAL DAILY
Refills: 3 | COMMUNITY
Start: 2019-03-16

## 2019-05-03 ENCOUNTER — ANTICOAG VISIT (OUTPATIENT)
Dept: FAMILY MEDICINE CLINIC | Facility: CLINIC | Age: 79
End: 2019-05-03

## 2019-05-03 LAB
INR PPP: 2.8 (ref 0.86–1.17)
INR PPP: 2.8 (ref 0.86–1.17)

## 2019-05-04 ENCOUNTER — TELEPHONE (OUTPATIENT)
Dept: FAMILY MEDICINE CLINIC | Facility: CLINIC | Age: 79
End: 2019-05-04

## 2019-05-09 DIAGNOSIS — F41.9 ANXIETY: ICD-10-CM

## 2019-05-10 ENCOUNTER — ANTICOAG VISIT (OUTPATIENT)
Dept: FAMILY MEDICINE CLINIC | Facility: CLINIC | Age: 79
End: 2019-05-10

## 2019-05-10 RX ORDER — ALPRAZOLAM 0.5 MG/1
0.5 TABLET ORAL 3 TIMES DAILY PRN
Qty: 90 TABLET | Refills: 0 | Status: SHIPPED | OUTPATIENT
Start: 2019-05-10 | End: 2019-09-07 | Stop reason: SDUPTHER

## 2019-05-22 ENCOUNTER — ANTICOAG VISIT (OUTPATIENT)
Dept: FAMILY MEDICINE CLINIC | Facility: CLINIC | Age: 79
End: 2019-05-22

## 2019-05-22 LAB — INR PPP: 2.6 (ref 0.86–1.17)

## 2019-06-09 DIAGNOSIS — S22.49XD CLOSED FRACTURE OF MULTIPLE RIBS WITH ROUTINE HEALING, UNSPECIFIED LATERALITY, SUBSEQUENT ENCOUNTER: ICD-10-CM

## 2019-06-10 DIAGNOSIS — S22.49XD CLOSED FRACTURE OF MULTIPLE RIBS WITH ROUTINE HEALING, UNSPECIFIED LATERALITY, SUBSEQUENT ENCOUNTER: ICD-10-CM

## 2019-06-10 RX ORDER — TRAMADOL HYDROCHLORIDE 50 MG/1
50 TABLET ORAL EVERY 6 HOURS PRN
Qty: 30 TABLET | OUTPATIENT
Start: 2019-06-10

## 2019-06-10 RX ORDER — TRAMADOL HYDROCHLORIDE 50 MG/1
50 TABLET ORAL EVERY 6 HOURS PRN
Qty: 30 TABLET | Refills: 0 | Status: SHIPPED | OUTPATIENT
Start: 2019-06-10 | End: 2020-05-08 | Stop reason: ALTCHOICE

## 2019-06-13 ENCOUNTER — TELEPHONE (OUTPATIENT)
Dept: FAMILY MEDICINE CLINIC | Facility: CLINIC | Age: 79
End: 2019-06-13

## 2019-06-13 ENCOUNTER — ANTICOAG VISIT (OUTPATIENT)
Dept: FAMILY MEDICINE CLINIC | Facility: CLINIC | Age: 79
End: 2019-06-13

## 2019-06-13 LAB — INR PPP: 3 (ref 0.86–1.17)

## 2019-06-22 DIAGNOSIS — Z79.01 ANTICOAGULATED: ICD-10-CM

## 2019-06-23 ENCOUNTER — ANTICOAG VISIT (OUTPATIENT)
Dept: FAMILY MEDICINE CLINIC | Facility: CLINIC | Age: 79
End: 2019-06-23

## 2019-06-23 RX ORDER — WARFARIN SODIUM 1 MG/1
TABLET ORAL
Qty: 90 TABLET | Refills: 2 | Status: SHIPPED | OUTPATIENT
Start: 2019-06-23 | End: 2019-12-12 | Stop reason: SDUPTHER

## 2019-06-28 LAB
INR PPP: 2.4 (ref 0.84–1.19)
INR PPP: 2.4 (ref 0.84–1.19)

## 2019-07-02 DIAGNOSIS — I48.21 PERMANENT ATRIAL FIBRILLATION (HCC): ICD-10-CM

## 2019-07-02 DIAGNOSIS — Z79.01 ANTICOAGULATED: ICD-10-CM

## 2019-07-02 RX ORDER — WARFARIN SODIUM 5 MG/1
TABLET ORAL
Qty: 90 TABLET | Refills: 1 | Status: SHIPPED | OUTPATIENT
Start: 2019-07-02 | End: 2019-12-12 | Stop reason: SDUPTHER

## 2019-07-09 ENCOUNTER — TELEPHONE (OUTPATIENT)
Dept: FAMILY MEDICINE CLINIC | Facility: CLINIC | Age: 79
End: 2019-07-09

## 2019-07-09 ENCOUNTER — ANTICOAG VISIT (OUTPATIENT)
Dept: FAMILY MEDICINE CLINIC | Facility: CLINIC | Age: 79
End: 2019-07-09

## 2019-07-09 NOTE — TELEPHONE ENCOUNTER
Pt has not heard anything about the results of the [employer] and the direction of what he should do        Please advise

## 2019-07-11 NOTE — TELEPHONE ENCOUNTER
Informed patient's wife of the instructions  Sharyle Riding, can you help with entering the flowsheet?  thanks No retinal detachment or retinal tear noted.

## 2019-07-16 ENCOUNTER — ANTICOAG VISIT (OUTPATIENT)
Dept: FAMILY MEDICINE CLINIC | Facility: CLINIC | Age: 79
End: 2019-07-16

## 2019-07-16 ENCOUNTER — TELEPHONE (OUTPATIENT)
Dept: FAMILY MEDICINE CLINIC | Facility: CLINIC | Age: 79
End: 2019-07-16

## 2019-07-16 LAB — INR PPP: 3.4 (ref 0.84–1.19)

## 2019-07-16 NOTE — PROGRESS NOTES
PT PT/INR 3 4 TODAY  PLEASE ADVISE DOSING AND NEXT TESTING DATE   Hold for 3 days then take 7mg m and Friday, 6 mg all other days recheck 7/31

## 2019-08-01 ENCOUNTER — ANTICOAG VISIT (OUTPATIENT)
Dept: FAMILY MEDICINE CLINIC | Facility: CLINIC | Age: 79
End: 2019-08-01

## 2019-08-01 LAB
INR PPP: 2.7 (ref 0.84–1.19)
INR PPP: 2.7 (ref 0.84–1.19)

## 2019-08-17 LAB
INR PPP: 2.9 (ref 0.84–1.19)
INR PPP: 2.9 (ref 0.84–1.19)

## 2019-08-19 ENCOUNTER — ANTICOAG VISIT (OUTPATIENT)
Dept: FAMILY MEDICINE CLINIC | Facility: CLINIC | Age: 79
End: 2019-08-19

## 2019-08-20 DIAGNOSIS — Z79.01 ANTICOAGULATED: ICD-10-CM

## 2019-08-21 ENCOUNTER — ANTICOAG VISIT (OUTPATIENT)
Dept: FAMILY MEDICINE CLINIC | Facility: CLINIC | Age: 79
End: 2019-08-21

## 2019-08-21 RX ORDER — WARFARIN SODIUM 1 MG/1
TABLET ORAL
Qty: 30 TABLET | Refills: 3 | Status: SHIPPED | OUTPATIENT
Start: 2019-08-21 | End: 2019-09-13 | Stop reason: SDUPTHER

## 2019-09-07 DIAGNOSIS — F41.9 ANXIETY: ICD-10-CM

## 2019-09-08 RX ORDER — ALPRAZOLAM 0.5 MG/1
0.5 TABLET ORAL 3 TIMES DAILY PRN
Qty: 90 TABLET | Refills: 0 | Status: SHIPPED | OUTPATIENT
Start: 2019-09-08 | End: 2020-01-06

## 2019-09-10 ENCOUNTER — ANTICOAG VISIT (OUTPATIENT)
Dept: FAMILY MEDICINE CLINIC | Facility: CLINIC | Age: 79
End: 2019-09-10

## 2019-09-10 ENCOUNTER — TELEPHONE (OUTPATIENT)
Dept: FAMILY MEDICINE CLINIC | Facility: CLINIC | Age: 79
End: 2019-09-10

## 2019-09-12 LAB — INR PPP: 1.9 (ref 0.84–1.19)

## 2019-09-13 DIAGNOSIS — Z79.01 ANTICOAGULATED: ICD-10-CM

## 2019-09-13 RX ORDER — WARFARIN SODIUM 1 MG/1
TABLET ORAL
Qty: 36 TABLET | Refills: 3 | Status: SHIPPED | OUTPATIENT
Start: 2019-09-13 | End: 2019-12-14 | Stop reason: SDUPTHER

## 2019-09-25 ENCOUNTER — ANTICOAG VISIT (OUTPATIENT)
Dept: FAMILY MEDICINE CLINIC | Facility: CLINIC | Age: 79
End: 2019-09-25

## 2019-09-25 LAB — INR PPP: 2.1 (ref 0.84–1.19)

## 2019-09-26 ENCOUNTER — TELEPHONE (OUTPATIENT)
Dept: FAMILY MEDICINE CLINIC | Facility: CLINIC | Age: 79
End: 2019-09-26

## 2019-10-10 ENCOUNTER — ANTICOAG VISIT (OUTPATIENT)
Dept: FAMILY MEDICINE CLINIC | Facility: CLINIC | Age: 79
End: 2019-10-10

## 2019-10-10 LAB — INR PPP: 2 (ref 0.84–1.19)

## 2019-10-12 ENCOUNTER — TELEPHONE (OUTPATIENT)
Dept: FAMILY MEDICINE CLINIC | Facility: CLINIC | Age: 79
End: 2019-10-12

## 2019-10-24 ENCOUNTER — ANTICOAG VISIT (OUTPATIENT)
Dept: FAMILY MEDICINE CLINIC | Facility: CLINIC | Age: 79
End: 2019-10-24

## 2019-10-24 LAB — INR PPP: 2.3 (ref 0.84–1.19)

## 2019-11-07 ENCOUNTER — ANTICOAG VISIT (OUTPATIENT)
Dept: FAMILY MEDICINE CLINIC | Facility: CLINIC | Age: 79
End: 2019-11-07

## 2019-11-07 LAB — INR PPP: 3.2 (ref 0.84–1.19)

## 2019-11-08 ENCOUNTER — TELEPHONE (OUTPATIENT)
Dept: FAMILY MEDICINE CLINIC | Facility: CLINIC | Age: 79
End: 2019-11-08

## 2019-11-19 LAB — INR PPP: 2.2 (ref 0.84–1.19)

## 2019-12-12 ENCOUNTER — TELEPHONE (OUTPATIENT)
Dept: FAMILY MEDICINE CLINIC | Facility: CLINIC | Age: 79
End: 2019-12-12

## 2019-12-12 DIAGNOSIS — Z79.01 ANTICOAGULATED: ICD-10-CM

## 2019-12-12 DIAGNOSIS — I48.21 PERMANENT ATRIAL FIBRILLATION (HCC): ICD-10-CM

## 2019-12-12 LAB — INR PPP: 1.4 (ref 0.84–1.19)

## 2019-12-12 RX ORDER — WARFARIN SODIUM 5 MG/1
5 TABLET ORAL DAILY
Qty: 90 TABLET | Refills: 1 | Status: SHIPPED | OUTPATIENT
Start: 2019-12-12 | End: 2019-12-14 | Stop reason: SDUPTHER

## 2019-12-12 RX ORDER — WARFARIN SODIUM 5 MG/1
TABLET ORAL
Qty: 90 TABLET | Refills: 1 | OUTPATIENT
Start: 2019-12-12

## 2019-12-12 NOTE — TELEPHONE ENCOUNTER
Patient's wife stated that the only change for patient is that he started taking preservision vitamins for his eyes   Please review messages and advise course of action

## 2019-12-12 NOTE — TELEPHONE ENCOUNTER
Can you call and see if he has had any major diet changes, like eating a lot more green leafy vegetables, or any other changes like taking new supplements? This is very strange since his INR was completely fine last time on the same dose of medication  Tonight I would advise he take 10 mg  Dr Bossman Dennis will have further instructions for him tomorrow once we find out if there have been any changes  Please send the follow-up message to her

## 2019-12-13 ENCOUNTER — ANTICOAG VISIT (OUTPATIENT)
Dept: FAMILY MEDICINE CLINIC | Facility: CLINIC | Age: 79
End: 2019-12-13

## 2019-12-14 DIAGNOSIS — Z79.01 ANTICOAGULATED: ICD-10-CM

## 2019-12-14 DIAGNOSIS — I48.21 PERMANENT ATRIAL FIBRILLATION (HCC): ICD-10-CM

## 2019-12-14 NOTE — TELEPHONE ENCOUNTER
Ok, may adjust coumadin level  Instructed by Dr Chayo Valdivia to take 10mg 1 day,  Then take 6mg sat and Sunday and 7mg through the week , Monday through Friday    Recheck thursday12/19

## 2019-12-16 ENCOUNTER — ANTICOAG VISIT (OUTPATIENT)
Dept: FAMILY MEDICINE CLINIC | Facility: CLINIC | Age: 79
End: 2019-12-16

## 2019-12-16 RX ORDER — WARFARIN SODIUM 5 MG/1
5 TABLET ORAL DAILY
Qty: 90 TABLET | Refills: 1 | Status: SHIPPED | OUTPATIENT
Start: 2019-12-16 | End: 2020-09-02 | Stop reason: SDUPTHER

## 2019-12-16 RX ORDER — WARFARIN SODIUM 1 MG/1
TABLET ORAL
Qty: 48 TABLET | Refills: 2 | Status: SHIPPED | OUTPATIENT
Start: 2019-12-16 | End: 2020-03-09

## 2019-12-20 LAB — INR PPP: 2.2 (ref 0.84–1.19)

## 2019-12-23 ENCOUNTER — TELEPHONE (OUTPATIENT)
Dept: FAMILY MEDICINE CLINIC | Facility: CLINIC | Age: 79
End: 2019-12-23

## 2020-01-06 DIAGNOSIS — F41.9 ANXIETY: ICD-10-CM

## 2020-01-06 RX ORDER — ALPRAZOLAM 0.5 MG/1
0.5 TABLET ORAL 3 TIMES DAILY PRN
Qty: 90 TABLET | Refills: 0 | Status: SHIPPED | OUTPATIENT
Start: 2020-01-06 | End: 2020-05-07 | Stop reason: SDUPTHER

## 2020-01-09 ENCOUNTER — ANTICOAG VISIT (OUTPATIENT)
Dept: FAMILY MEDICINE CLINIC | Facility: CLINIC | Age: 80
End: 2020-01-09

## 2020-01-09 ENCOUNTER — TELEPHONE (OUTPATIENT)
Dept: FAMILY MEDICINE CLINIC | Facility: CLINIC | Age: 80
End: 2020-01-09

## 2020-01-09 LAB
INR PPP: 2.7 (ref 0.84–1.19)
INR PPP: 2.7 (ref 0.84–1.19)

## 2020-01-09 NOTE — PROGRESS NOTES
Continue same dose and recheck 1/30/2020    Pt/inr today 2 7  Please advise dosing and next testing date

## 2020-01-30 ENCOUNTER — ANTICOAG VISIT (OUTPATIENT)
Dept: FAMILY MEDICINE CLINIC | Facility: CLINIC | Age: 80
End: 2020-01-30

## 2020-01-30 LAB — INR PPP: 4.1 (ref 0.84–1.19)

## 2020-02-07 ENCOUNTER — ANTICOAG VISIT (OUTPATIENT)
Dept: FAMILY MEDICINE CLINIC | Facility: CLINIC | Age: 80
End: 2020-02-07

## 2020-02-07 LAB — INR PPP: 1.6 (ref 0.84–1.19)

## 2020-02-19 LAB — INR PPP: 1.8 (ref 0.84–1.19)

## 2020-02-20 ENCOUNTER — ANTICOAG VISIT (OUTPATIENT)
Dept: FAMILY MEDICINE CLINIC | Facility: CLINIC | Age: 80
End: 2020-02-20

## 2020-02-21 ENCOUNTER — TELEPHONE (OUTPATIENT)
Dept: FAMILY MEDICINE CLINIC | Facility: CLINIC | Age: 80
End: 2020-02-21

## 2020-03-07 LAB — INR PPP: 2.3 (ref 0.84–1.19)

## 2020-03-08 DIAGNOSIS — Z79.01 ANTICOAGULATED: ICD-10-CM

## 2020-03-09 ENCOUNTER — TELEPHONE (OUTPATIENT)
Dept: FAMILY MEDICINE CLINIC | Facility: CLINIC | Age: 80
End: 2020-03-09

## 2020-03-09 ENCOUNTER — ANTICOAG VISIT (OUTPATIENT)
Dept: FAMILY MEDICINE CLINIC | Facility: CLINIC | Age: 80
End: 2020-03-09

## 2020-03-09 RX ORDER — WARFARIN SODIUM 1 MG/1
TABLET ORAL
Qty: 144 TABLET | Refills: 0 | Status: SHIPPED | OUTPATIENT
Start: 2020-03-09 | End: 2020-06-04 | Stop reason: SDUPTHER

## 2020-03-25 ENCOUNTER — TELEPHONE (OUTPATIENT)
Dept: FAMILY MEDICINE CLINIC | Facility: CLINIC | Age: 80
End: 2020-03-25

## 2020-03-25 LAB — INR PPP: 3.1 (ref 0.84–1.19)

## 2020-03-25 NOTE — TELEPHONE ENCOUNTER
Please call patient or wife  Warfarin level slight elevation, hold 1 dose then continue same dose 6mg tu and Thursday and 7mg other days and recheck on 4/8

## 2020-04-09 LAB — INR PPP: 3.5 (ref 0.84–1.19)

## 2020-04-10 ENCOUNTER — ANTICOAG VISIT (OUTPATIENT)
Dept: FAMILY MEDICINE CLINIC | Facility: CLINIC | Age: 80
End: 2020-04-10

## 2020-04-10 ENCOUNTER — TELEPHONE (OUTPATIENT)
Dept: FAMILY MEDICINE CLINIC | Facility: CLINIC | Age: 80
End: 2020-04-10

## 2020-04-23 LAB — INR PPP: 3.2 (ref 0.84–1.19)

## 2020-04-24 ENCOUNTER — ANTICOAG VISIT (OUTPATIENT)
Dept: FAMILY MEDICINE CLINIC | Facility: CLINIC | Age: 80
End: 2020-04-24

## 2020-04-24 ENCOUNTER — TELEPHONE (OUTPATIENT)
Dept: FAMILY MEDICINE CLINIC | Facility: CLINIC | Age: 80
End: 2020-04-24

## 2020-05-07 ENCOUNTER — TELEPHONE (OUTPATIENT)
Dept: FAMILY MEDICINE CLINIC | Facility: CLINIC | Age: 80
End: 2020-05-07

## 2020-05-07 DIAGNOSIS — F41.9 ANXIETY: ICD-10-CM

## 2020-05-07 RX ORDER — ALPRAZOLAM 0.5 MG/1
0.5 TABLET ORAL 3 TIMES DAILY PRN
Qty: 90 TABLET | Refills: 0 | Status: SHIPPED | OUTPATIENT
Start: 2020-05-07 | End: 2020-09-04

## 2020-05-08 ENCOUNTER — ANTICOAG VISIT (OUTPATIENT)
Dept: FAMILY MEDICINE CLINIC | Facility: CLINIC | Age: 80
End: 2020-05-08

## 2020-05-08 ENCOUNTER — TELEMEDICINE (OUTPATIENT)
Dept: FAMILY MEDICINE CLINIC | Facility: CLINIC | Age: 80
End: 2020-05-08
Payer: COMMERCIAL

## 2020-05-08 VITALS
DIASTOLIC BLOOD PRESSURE: 78 MMHG | WEIGHT: 220 LBS | HEIGHT: 72 IN | SYSTOLIC BLOOD PRESSURE: 120 MMHG | BODY MASS INDEX: 29.8 KG/M2

## 2020-05-08 DIAGNOSIS — I48.21 PERMANENT ATRIAL FIBRILLATION (HCC): ICD-10-CM

## 2020-05-08 DIAGNOSIS — I10 BENIGN ESSENTIAL HYPERTENSION: Primary | ICD-10-CM

## 2020-05-08 DIAGNOSIS — F41.9 ANXIETY: ICD-10-CM

## 2020-05-08 PROBLEM — H35.369 MACULAR DRUSEN: Status: ACTIVE | Noted: 2017-08-29

## 2020-05-08 PROBLEM — S22.42XA MULTIPLE CLOSED FRACTURES OF RIBS OF LEFT SIDE: Status: RESOLVED | Noted: 2018-07-05 | Resolved: 2020-05-08
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
## 2020-05-08 PROBLEM — Z96.1 BILATERAL PSEUDOPHAKIA: Status: ACTIVE | Noted: 2017-09-13

## 2020-05-08 PROBLEM — S22.49XD CLOSED FRACTURE OF MULTIPLE RIBS WITH ROUTINE HEALING: Status: RESOLVED | Noted: 2019-04-30 | Resolved: 2020-05-08

## 2020-05-08 PROBLEM — Z00.00 MEDICARE ANNUAL WELLNESS VISIT, SUBSEQUENT: Status: RESOLVED | Noted: 2019-04-30 | Resolved: 2020-05-08

## 2020-05-08 PROBLEM — H43.813 POSTERIOR VITREOUS DETACHMENT OF BOTH EYES: Status: ACTIVE | Noted: 2017-08-29

## 2020-05-08 PROBLEM — H25.9 AGE-RELATED CATARACT OF RIGHT EYE: Status: ACTIVE | Noted: 2017-09-13

## 2020-05-08 LAB — INR PPP: 2.2 (ref 0.84–1.19)

## 2020-05-08 PROCEDURE — 99214 OFFICE O/P EST MOD 30 MIN: CPT | Performed by: FAMILY MEDICINE

## 2020-05-08 PROCEDURE — 3074F SYST BP LT 130 MM HG: CPT | Performed by: FAMILY MEDICINE

## 2020-05-08 PROCEDURE — 3078F DIAST BP <80 MM HG: CPT | Performed by: FAMILY MEDICINE

## 2020-05-08 PROCEDURE — 3288F FALL RISK ASSESSMENT DOCD: CPT | Performed by: FAMILY MEDICINE

## 2020-05-08 PROCEDURE — 1160F RVW MEDS BY RX/DR IN RCRD: CPT | Performed by: FAMILY MEDICINE

## 2020-05-08 PROCEDURE — 1101F PT FALLS ASSESS-DOCD LE1/YR: CPT | Performed by: FAMILY MEDICINE

## 2020-05-08 RX ORDER — CYCLOSPORINE 0.5 MG/ML
1 EMULSION OPHTHALMIC EVERY 12 HOURS
COMMUNITY
Start: 2017-08-07 | End: 2021-02-12 | Stop reason: ALTCHOICE

## 2020-05-23 LAB
INR PPP: 1.7 (ref 0.84–1.19)
INR PPP: 1.7 (ref 0.84–1.19)

## 2020-05-26 ENCOUNTER — ANTICOAG VISIT (OUTPATIENT)
Dept: FAMILY MEDICINE CLINIC | Facility: CLINIC | Age: 80
End: 2020-05-26

## 2020-05-29 ENCOUNTER — ANTICOAG VISIT (OUTPATIENT)
Dept: FAMILY MEDICINE CLINIC | Facility: CLINIC | Age: 80
End: 2020-05-29

## 2020-06-04 DIAGNOSIS — Z79.01 ANTICOAGULATED: ICD-10-CM

## 2020-06-04 RX ORDER — WARFARIN SODIUM 1 MG/1
TABLET ORAL
Qty: 144 TABLET | Refills: 0 | Status: SHIPPED | OUTPATIENT
Start: 2020-06-04 | End: 2020-08-27

## 2020-06-05 ENCOUNTER — TELEPHONE (OUTPATIENT)
Dept: FAMILY MEDICINE CLINIC | Facility: CLINIC | Age: 80
End: 2020-06-05

## 2020-06-05 ENCOUNTER — ANTICOAG VISIT (OUTPATIENT)
Dept: FAMILY MEDICINE CLINIC | Facility: CLINIC | Age: 80
End: 2020-06-05

## 2020-06-05 LAB — INR PPP: 2 (ref 0.84–1.19)

## 2020-06-23 ENCOUNTER — ANTICOAG VISIT (OUTPATIENT)
Dept: FAMILY MEDICINE CLINIC | Facility: CLINIC | Age: 80
End: 2020-06-23

## 2020-06-23 ENCOUNTER — TELEPHONE (OUTPATIENT)
Dept: FAMILY MEDICINE CLINIC | Facility: CLINIC | Age: 80
End: 2020-06-23

## 2020-06-23 LAB — INR PPP: 1.9 (ref 0.84–1.19)

## 2020-07-24 ENCOUNTER — TELEPHONE (OUTPATIENT)
Dept: FAMILY MEDICINE CLINIC | Facility: CLINIC | Age: 80
End: 2020-07-24

## 2020-07-24 ENCOUNTER — ANTICOAG VISIT (OUTPATIENT)
Dept: FAMILY MEDICINE CLINIC | Facility: CLINIC | Age: 80
End: 2020-07-24

## 2020-07-24 LAB — INR PPP: 3 (ref 0.84–1.19)

## 2020-08-11 ENCOUNTER — ANTICOAG VISIT (OUTPATIENT)
Dept: FAMILY MEDICINE CLINIC | Facility: CLINIC | Age: 80
End: 2020-08-11

## 2020-08-11 LAB — INR PPP: 2.9 (ref 0.84–1.19)

## 2020-08-12 ENCOUNTER — TELEPHONE (OUTPATIENT)
Dept: FAMILY MEDICINE CLINIC | Facility: CLINIC | Age: 80
End: 2020-08-12

## 2020-08-24 LAB — INR PPP: 2.8 (ref 0.84–1.19)

## 2020-08-26 ENCOUNTER — ANTICOAG VISIT (OUTPATIENT)
Dept: FAMILY MEDICINE CLINIC | Facility: CLINIC | Age: 80
End: 2020-08-26

## 2020-08-27 DIAGNOSIS — Z79.01 ANTICOAGULATED: ICD-10-CM

## 2020-08-27 RX ORDER — WARFARIN SODIUM 1 MG/1
TABLET ORAL
Qty: 144 TABLET | Refills: 0 | Status: SHIPPED | OUTPATIENT
Start: 2020-08-27 | End: 2020-12-22

## 2020-08-28 ENCOUNTER — ANTICOAG VISIT (OUTPATIENT)
Dept: FAMILY MEDICINE CLINIC | Facility: CLINIC | Age: 80
End: 2020-08-28

## 2020-09-02 DIAGNOSIS — I48.21 PERMANENT ATRIAL FIBRILLATION (HCC): ICD-10-CM

## 2020-09-02 DIAGNOSIS — Z79.01 ANTICOAGULATED: ICD-10-CM

## 2020-09-02 RX ORDER — WARFARIN SODIUM 5 MG/1
5 TABLET ORAL DAILY
Qty: 90 TABLET | Refills: 1 | Status: SHIPPED | OUTPATIENT
Start: 2020-09-02 | End: 2021-02-12 | Stop reason: SDUPTHER

## 2020-09-04 DIAGNOSIS — F41.9 ANXIETY: ICD-10-CM

## 2020-09-04 RX ORDER — ALPRAZOLAM 0.5 MG/1
0.5 TABLET ORAL 3 TIMES DAILY PRN
Qty: 90 TABLET | Refills: 0 | Status: SHIPPED | OUTPATIENT
Start: 2020-09-04 | End: 2020-12-11

## 2020-09-11 LAB — INR PPP: 2.4 (ref 0.84–1.19)

## 2020-10-02 LAB — INR PPP: 1.9 (ref 0.84–1.19)

## 2020-10-03 ENCOUNTER — ANTICOAG VISIT (OUTPATIENT)
Dept: FAMILY MEDICINE CLINIC | Facility: CLINIC | Age: 80
End: 2020-10-03

## 2020-10-03 ENCOUNTER — TELEPHONE (OUTPATIENT)
Dept: FAMILY MEDICINE CLINIC | Facility: CLINIC | Age: 80
End: 2020-10-03

## 2020-10-29 LAB — INR PPP: 2.2 (ref 0.84–1.19)

## 2020-10-30 ENCOUNTER — ANTICOAG VISIT (OUTPATIENT)
Dept: FAMILY MEDICINE CLINIC | Facility: CLINIC | Age: 80
End: 2020-10-30

## 2020-10-30 ENCOUNTER — TELEPHONE (OUTPATIENT)
Dept: FAMILY MEDICINE CLINIC | Facility: CLINIC | Age: 80
End: 2020-10-30

## 2020-11-27 ENCOUNTER — TELEPHONE (OUTPATIENT)
Dept: FAMILY MEDICINE CLINIC | Facility: CLINIC | Age: 80
End: 2020-11-27

## 2020-11-27 DIAGNOSIS — G89.29 CHRONIC MIDLINE LOW BACK PAIN WITHOUT SCIATICA: Primary | ICD-10-CM

## 2020-11-27 DIAGNOSIS — G89.29 CHRONIC LEFT HIP PAIN: ICD-10-CM

## 2020-11-27 DIAGNOSIS — M25.552 CHRONIC LEFT HIP PAIN: ICD-10-CM

## 2020-11-27 DIAGNOSIS — M54.50 CHRONIC MIDLINE LOW BACK PAIN WITHOUT SCIATICA: Primary | ICD-10-CM

## 2020-12-07 ENCOUNTER — HOSPITAL ENCOUNTER (OUTPATIENT)
Dept: RADIOLOGY | Facility: HOSPITAL | Age: 80
Discharge: HOME | End: 2020-12-07
Payer: COMMERCIAL

## 2020-12-07 DIAGNOSIS — G89.29 CHRONIC MIDLINE LOW BACK PAIN WITHOUT SCIATICA: ICD-10-CM

## 2020-12-07 DIAGNOSIS — M25.552 CHRONIC LEFT HIP PAIN: ICD-10-CM

## 2020-12-07 DIAGNOSIS — M54.50 CHRONIC MIDLINE LOW BACK PAIN WITHOUT SCIATICA: ICD-10-CM

## 2020-12-07 DIAGNOSIS — G89.29 CHRONIC LEFT HIP PAIN: ICD-10-CM

## 2020-12-07 PROCEDURE — 72110 X-RAY EXAM L-2 SPINE 4/>VWS: CPT

## 2020-12-07 PROCEDURE — 73502 X-RAY EXAM HIP UNI 2-3 VIEWS: CPT

## 2020-12-08 ENCOUNTER — TELEPHONE (OUTPATIENT)
Dept: FAMILY MEDICINE CLINIC | Facility: CLINIC | Age: 80
End: 2020-12-08

## 2020-12-08 NOTE — TELEPHONE ENCOUNTER
If he is willing to get injections, we will need to try to get a MRI approved through his insurance  The alere monitor is the only one that I know of to monitor INR  He may need to get blood draws monthly then if alere is not covered, can be done in office

## 2020-12-08 NOTE — TELEPHONE ENCOUNTER
Pt's wife wants to know if he has to go to a bone and joint center? Will he need injections? Also, an [employer]R Company will be sending a form for you to fill out  His old INR machine is not covered by insurance  They are trying to find out that is covered

## 2020-12-08 NOTE — TELEPHONE ENCOUNTER
----- Message from Jessica Owen DO sent at 12/7/2020  5:26 PM EST -----  He has some arthritis in his low back and bone spurs with degenerative discs at 2 levels

## 2020-12-11 DIAGNOSIS — F41.9 ANXIETY: ICD-10-CM

## 2020-12-11 RX ORDER — ALPRAZOLAM 0.5 MG/1
0.5 TABLET ORAL 3 TIMES DAILY PRN
Qty: 90 TABLET | Refills: 0 | Status: SHIPPED | OUTPATIENT
Start: 2020-12-11 | End: 2021-03-16

## 2020-12-14 ENCOUNTER — TELEPHONE (OUTPATIENT)
Dept: FAMILY MEDICINE CLINIC | Facility: CLINIC | Age: 80
End: 2020-12-14

## 2020-12-18 ENCOUNTER — TELEMEDICINE (OUTPATIENT)
Dept: FAMILY MEDICINE CLINIC | Facility: CLINIC | Age: 80
End: 2020-12-18
Payer: COMMERCIAL

## 2020-12-18 ENCOUNTER — ANTICOAG VISIT (OUTPATIENT)
Dept: FAMILY MEDICINE CLINIC | Facility: CLINIC | Age: 80
End: 2020-12-18

## 2020-12-18 VITALS
BODY MASS INDEX: 28.25 KG/M2 | HEART RATE: 94 BPM | HEIGHT: 72 IN | WEIGHT: 208.6 LBS | SYSTOLIC BLOOD PRESSURE: 115 MMHG | DIASTOLIC BLOOD PRESSURE: 79 MMHG

## 2020-12-18 DIAGNOSIS — M16.12 PRIMARY OSTEOARTHRITIS OF LEFT HIP: ICD-10-CM

## 2020-12-18 DIAGNOSIS — Z00.00 MEDICARE ANNUAL WELLNESS VISIT, SUBSEQUENT: Primary | ICD-10-CM

## 2020-12-18 DIAGNOSIS — M47.816 OSTEOARTHRITIS OF LUMBAR SPINE, UNSPECIFIED SPINAL OSTEOARTHRITIS COMPLICATION STATUS: ICD-10-CM

## 2020-12-18 DIAGNOSIS — I48.21 PERMANENT ATRIAL FIBRILLATION (HCC): ICD-10-CM

## 2020-12-18 LAB — INR PPP: 4.1 (ref 0.84–1.19)

## 2020-12-18 PROCEDURE — 3725F SCREEN DEPRESSION PERFORMED: CPT | Performed by: FAMILY MEDICINE

## 2020-12-18 PROCEDURE — 3078F DIAST BP <80 MM HG: CPT | Performed by: FAMILY MEDICINE

## 2020-12-18 PROCEDURE — 1036F TOBACCO NON-USER: CPT | Performed by: FAMILY MEDICINE

## 2020-12-18 PROCEDURE — G0439 PPPS, SUBSEQ VISIT: HCPCS | Performed by: FAMILY MEDICINE

## 2020-12-18 PROCEDURE — 1125F AMNT PAIN NOTED PAIN PRSNT: CPT | Performed by: FAMILY MEDICINE

## 2020-12-18 PROCEDURE — 1160F RVW MEDS BY RX/DR IN RCRD: CPT | Performed by: FAMILY MEDICINE

## 2020-12-18 PROCEDURE — 1170F FXNL STATUS ASSESSED: CPT | Performed by: FAMILY MEDICINE

## 2020-12-18 PROCEDURE — 3074F SYST BP LT 130 MM HG: CPT | Performed by: FAMILY MEDICINE

## 2020-12-19 PROBLEM — M16.12 PRIMARY OSTEOARTHRITIS OF LEFT HIP: Status: ACTIVE | Noted: 2020-12-19

## 2020-12-19 PROBLEM — M47.816 OSTEOARTHRITIS OF LUMBAR SPINE: Status: ACTIVE | Noted: 2020-12-19

## 2020-12-22 DIAGNOSIS — Z79.01 ANTICOAGULATED: ICD-10-CM

## 2020-12-22 RX ORDER — WARFARIN SODIUM 1 MG/1
TABLET ORAL
Qty: 144 TABLET | Refills: 0 | Status: SHIPPED | OUTPATIENT
Start: 2020-12-22 | End: 2021-02-12 | Stop reason: DRUGHIGH

## 2020-12-29 ENCOUNTER — ANTICOAG VISIT (OUTPATIENT)
Dept: FAMILY MEDICINE CLINIC | Facility: CLINIC | Age: 80
End: 2020-12-29

## 2020-12-29 LAB — INR PPP: 2.2 (ref 0.84–1.19)

## 2021-01-06 ENCOUNTER — OFFICE VISIT (OUTPATIENT)
Dept: OBGYN CLINIC | Facility: CLINIC | Age: 81
End: 2021-01-06
Payer: COMMERCIAL

## 2021-01-06 VITALS
BODY MASS INDEX: 28.17 KG/M2 | HEIGHT: 72 IN | SYSTOLIC BLOOD PRESSURE: 120 MMHG | WEIGHT: 208 LBS | HEART RATE: 78 BPM | DIASTOLIC BLOOD PRESSURE: 80 MMHG

## 2021-01-06 DIAGNOSIS — M51.36 DDD (DEGENERATIVE DISC DISEASE), LUMBAR: Primary | ICD-10-CM

## 2021-01-06 PROCEDURE — 3074F SYST BP LT 130 MM HG: CPT | Performed by: ORTHOPAEDIC SURGERY

## 2021-01-06 PROCEDURE — 1160F RVW MEDS BY RX/DR IN RCRD: CPT | Performed by: ORTHOPAEDIC SURGERY

## 2021-01-06 PROCEDURE — 99204 OFFICE O/P NEW MOD 45 MIN: CPT | Performed by: ORTHOPAEDIC SURGERY

## 2021-01-06 PROCEDURE — 3079F DIAST BP 80-89 MM HG: CPT | Performed by: ORTHOPAEDIC SURGERY

## 2021-01-06 PROCEDURE — 1036F TOBACCO NON-USER: CPT | Performed by: ORTHOPAEDIC SURGERY

## 2021-01-06 RX ORDER — METHOCARBAMOL 500 MG/1
500 TABLET, FILM COATED ORAL 3 TIMES DAILY
Qty: 30 TABLET | Refills: 1 | Status: SHIPPED | OUTPATIENT
Start: 2021-01-06 | End: 2021-02-09 | Stop reason: ALTCHOICE

## 2021-01-06 NOTE — PROGRESS NOTES
79 y o male presents for evaluation of low back pain  Patient states that he has had low back pain for many years  Denies any trauma at the onset of his symptoms  States that left side is worse than right side  He denies any radiating pain down either leg  He denies any associated numbness or tingling  Back pain is made worse with increased activity improved somewhat at rest   Patient takes over-the-counter Tylenol for symptomatic relief with mild symptomatic improvement  Patient is on Coumadin for history of stroke with very mild left-sided residual weakness  Patient has never had any treatment in the form of physical therapy or injections for his back pain the past     Review of Systems  Review of systems negative unless otherwise specified in HPI    Past Medical History  Past Medical History:   Diagnosis Date    Anemia     last assessed: 05/23/2015    GERD (gastroesophageal reflux disease)     Hematuria     [facility]     last assessed: 04/21/2014; right    [facility]     last assessed: 11/23/2017    Stroke syndrome     last assessed: 06/13/2013       Past Surgical History  Past Surgical History:   Procedure Laterality Date    BLADDER SURGERY         Current Medications  Current Outpatient Medications on File Prior to Visit   Medication Sig Dispense Refill    ALPRAZolam (XANAX) 0 5 mg tablet TAKE 1 TABLET (0 5 MG TOTAL) BY MOUTH 3 (THREE) TIMES A DAY AS NEEDED FOR ANXIETY   90 tablet 0    amLODIPine (NORVASC) 5 mg tablet Take 5 mg by mouth daily  3    cycloSPORINE (Restasis) 0 05 % ophthalmic emulsion Apply 1 drop to eye every 12 (twelve) hours      Lancets MISC by Does not apply route      metoprolol tartrate (LOPRESSOR) 50 mg tablet       rosuvastatin (CRESTOR) 5 mg tablet       warfarin (COUMADIN) 1 mg tablet TAKE 1MG TAB ON TUESDAY AND THURSDAY AND 2 TABS OTHER DAYS, COMBINING WITH 5MG  tablet 0    warfarin (COUMADIN) 5 mg tablet Take 1 tablet (5 mg total) by mouth daily 90 tablet 1     No current facility-administered medications on file prior to visit  Recent Labs [facility] [place])  0   Lab Value Date/Time    HCT 42 0 06/11/2018 0936    HCT 42 8 08/29/2016 0000    HGB 14 4 06/11/2018 0936    HGB 14 8 08/29/2016 0000    WBC 8 5 06/11/2018 0936    WBC 7 8 08/29/2016 0000    INR 2 20 (A) 12/29/2020    INR 3 0 (H) 11/03/2016 0000    GLUCOSE 100 (H) 08/29/2016 0000         Physical exam  · General: Awake, Alert, Oriented  · Eyes: Pupils equal, round and reactive to light  · Heart: regular rate and rhythm  · Lungs: No audible wheezing  · Abdomen: soft  Back exam  · No tenderness palpation over lumbosacral spine, no tenderness palpation over left or right SI joint  · Excellent range of motion that is painless of left and right hip  · Negative straight leg raise test bilaterally  · 5/5 strength L2-S1 and equal bilateral lower extremities  · Sensation intact to light touch and equal bilateral lower extremities  · Distal extremity warm well perfused  · Negative Guillermo's test    Procedure  None    Imaging  Previously obtained x-rays of lumbar spine reviewed with patient today's visit  This reveals multilevel degenerative disc disease with prominent osteophyte formation  No evidence of spondylolisthesis  64-year-old male with lumbar DDD    Long discussion was had with patient his wife concerning treatment options  At this point given his lack of previous treatment, we will begin with a course of physical therapy  Patient will follow up in office in a week's repeat evaluation  At this time of his symptoms have not improved, consideration will be given for MRI of lumbar spine  Patient was in agreement with this plan  Encouraged to call the office in the interim if any further problems should arise  Advised to continue with over-the-counter Tylenol    Patient will also be prescribed a muscle relaxant and sent to his pharmacy at today's visit

## 2021-01-06 NOTE — PROGRESS NOTES
Assessment:  No diagnosis found  Plan:      To do next visit:  No follow-ups on file  The above stated was discussed in layman's terms and the patient expressed understanding  All questions were answered to the patient's satisfaction         Scribe Attestation    I,:   am acting as a scribe while in the presence of the attending physician :       I,:   personally performed the services described in this documentation    as scribed in my presence :             Subjective:   Daisy Mora is a 79 y o  male who presents       Review of systems negative unless otherwise specified in HPI  Review of Systems    Past Medical History:   Diagnosis Date    Anemia     last assessed: 05/23/2015    GERD (gastroesophageal reflux disease)     Hematuria     [facility]     last assessed: 04/21/2014; right    [facility] )     last assessed: 11/23/2017    Stroke syndrome     last assessed: 06/13/2013       Past Surgical History:   Procedure Laterality Date    BLADDER SURGERY         Family History   Problem Relation Age of Onset    Lung cancer Mother        Social History     Occupational History    Not on file   Tobacco Use    Smoking status: Former Smoker     Types: Cigars    Smokeless tobacco: Current User   Substance and Sexual Activity    Alcohol use: Yes     Comment: Social     Drug use: No    Sexual activity: Not on file         Current Outpatient Medications:     ALPRAZolam (XANAX) 0 5 mg tablet, TAKE 1 TABLET (0 5 MG TOTAL) BY MOUTH 3 (THREE) TIMES A DAY AS NEEDED FOR ANXIETY , Disp: 90 tablet, Rfl: 0    amLODIPine (NORVASC) 5 mg tablet, Take 5 mg by mouth daily, Disp: , Rfl: 3    cycloSPORINE (Restasis) 0 05 % ophthalmic emulsion, Apply 1 drop to eye every 12 (twelve) hours, Disp: , Rfl:     Lancets MISC, by Does not apply route, Disp: , Rfl:     metoprolol tartrate (LOPRESSOR) 50 mg tablet, , Disp: , Rfl:     rosuvastatin (CRESTOR) 5 mg tablet, , Disp: , Rfl:   warfarin (COUMADIN) 1 mg tablet, TAKE 1MG TAB ON TUESDAY AND THURSDAY AND 2 TABS OTHER DAYS, COMBINING WITH 5MG TAB, Disp: 144 tablet, Rfl: 0    warfarin (COUMADIN) 5 mg tablet, Take 1 tablet (5 mg total) by mouth daily, Disp: 90 tablet, Rfl: 1    No Known Allergies         Vitals:    01/06/21 1314   BP: 120/80   Pulse: 78       Objective:                    Ortho Exam    Diagnostics, reviewed and taken today if performed as documented:    None performed      The attending physician has personally reviewed the pertinent films in PACS and interpretation is as follows:      Procedures, if performed today:    Procedures    None performed      Portions of the record may have been created with voice recognition software  Occasional wrong word or "sound a like" substitutions may have occurred due to the inherent limitations of voice recognition software  Read the chart carefully and recognize, using context, where substitutions have occurred

## 2021-01-13 ENCOUNTER — ANTICOAG VISIT (OUTPATIENT)
Dept: FAMILY MEDICINE CLINIC | Facility: CLINIC | Age: 81
End: 2021-01-13

## 2021-01-13 LAB — INR PPP: 3.2 (ref 0.84–1.19)

## 2021-01-27 ENCOUNTER — ANTICOAG VISIT (OUTPATIENT)
Dept: FAMILY MEDICINE CLINIC | Facility: CLINIC | Age: 81
End: 2021-01-27

## 2021-01-27 DIAGNOSIS — Z23 ENCOUNTER FOR IMMUNIZATION: ICD-10-CM

## 2021-01-27 LAB — INR PPP: 2.8 (ref 0.84–1.19)

## 2021-02-09 ENCOUNTER — TELEPHONE (OUTPATIENT)
Dept: OBGYN CLINIC | Facility: HOSPITAL | Age: 81
End: 2021-02-09

## 2021-02-09 DIAGNOSIS — M51.36 DDD (DEGENERATIVE DISC DISEASE), LUMBAR: ICD-10-CM

## 2021-02-09 RX ORDER — METHOCARBAMOL 500 MG/1
500 TABLET, FILM COATED ORAL 4 TIMES DAILY PRN
Qty: 30 TABLET | Refills: 1 | Status: SHIPPED | OUTPATIENT
Start: 2021-02-09 | End: 2021-04-05 | Stop reason: SDUPTHER

## 2021-02-09 NOTE — TELEPHONE ENCOUNTER
Patient needs refill, 0 left   Please  call when sent they are completely out  methocarbamol (ROBAXIN) 500 mg tablet [[id number]]     Order Details  Dose: 500 mg Route: Oral Frequency: 3 times daily   Dispense Quantity: 30 tablet Refills: 1 Fills remaining: --           Sig: Take 1 tablet (500 mg total) by mouth 3 (three) times a day          Written Date: 01/06/21 Expiration Date: 01/06/22     Start Date: 01/06/21 End Date: --            Ordering Provider: -- Phone:  -- Fax:  --    Address:  -- Nikhil Ruvalcaba #:  -- NPI:  --           Authorizing Provider: Didier Lai MD Phone:  [phone] Fax:  [phone]    Address:  [address] [place] [address] STAR #:  [id number] NPI:  [id number]           Ordering User:  Didier Lai MD            Diagnosis Association: DDD (degenerative disc disease), lumbar (M51 36)      Pharmacy:  [facility]/pharm[facility] #[id number] - [place] - [address]     Phone:  [phone]   Fax:  [phone]   Address:  [address] , [facility] [address]   STAR #:  [id number]   Pharmacy Comments: --          Fill quantity remaining: -- Fill quantity used: -- Next fill due: --       Warnings History    No Interaction Warnings Shown    E-Prescribing Status    Outpatient Medication Detail    methocarbamol (ROBAXIN) 500 mg tablet        Sig: Take 1 tablet (500 mg total) by mouth 3 (three) times a day        Sent to pharmacy as: methocarbamol (ROBAXIN) 500 mg tablet        Class: Normal        Route: Oral        E-Prescribing Status: Receipt confirmed by pharmacy (1/6/2021  2:07 PM EST)

## 2021-02-11 ENCOUNTER — ANTICOAG VISIT (OUTPATIENT)
Dept: FAMILY MEDICINE CLINIC | Facility: CLINIC | Age: 81
End: 2021-02-11

## 2021-02-11 ENCOUNTER — TELEPHONE (OUTPATIENT)
Dept: FAMILY MEDICINE CLINIC | Facility: CLINIC | Age: 81
End: 2021-02-11

## 2021-02-11 LAB
INR PPP: 3.6 (ref 0.84–1.19)
INR PPP: 3.6 (ref 0.84–1.19)

## 2021-02-11 NOTE — TELEPHONE ENCOUNTER
Fer Just hold for 2 days, no coumadin for 2 days  I have him alternating 6mg and 7mg  Has he been taking 6mg daily? Do they have 5mg tabs?  I would like him to restart on 2/13 with alternating 5mg/6mg every other day and recheck on 2/24

## 2021-02-11 NOTE — TELEPHONE ENCOUNTER
Called pt to inform him that he needs to test his INR  Spoke with wife and she said his INR was 3  6  He has been taking 6 mgs daily  She held the 5 mgs last night and only gave him 1 mg  Please advise dose and next blood draw date

## 2021-02-12 ENCOUNTER — IMMUNIZATIONS (OUTPATIENT)
Dept: FAMILY MEDICINE CLINIC | Facility: HOSPITAL | Age: 81
End: 2021-02-12

## 2021-02-12 DIAGNOSIS — I48.21 PERMANENT ATRIAL FIBRILLATION (HCC): ICD-10-CM

## 2021-02-12 DIAGNOSIS — Z79.01 ANTICOAGULATED: ICD-10-CM

## 2021-02-12 DIAGNOSIS — Z23 ENCOUNTER FOR IMMUNIZATION: Primary | ICD-10-CM

## 2021-02-12 PROBLEM — I10 HYPERTENSION: Status: ACTIVE | Noted: 2021-02-12

## 2021-02-12 PROCEDURE — 91301 SARS-COV-2 / COVID-19 MRNA VACCINE (MODERNA) 100 MCG: CPT

## 2021-02-12 PROCEDURE — 0011A SARS-COV-2 / COVID-19 MRNA VACCINE (MODERNA) 100 MCG: CPT

## 2021-02-12 RX ORDER — WARFARIN SODIUM 5 MG/1
TABLET ORAL
Qty: 90 TABLET | Refills: 1 | Status: SHIPPED | OUTPATIENT
Start: 2021-02-12 | End: 2021-08-25

## 2021-02-12 NOTE — TELEPHONE ENCOUNTER
PT'S WIFE INFORMED OF CHANGE IN DOSING TO 5MG / 6 MG     REQUESTING RX TO BE SENT FOR THE 5 MG TO [facility]      DR Bev Rodriguez INFORMED AND IS SENDING

## 2021-02-25 ENCOUNTER — TELEPHONE (OUTPATIENT)
Dept: FAMILY MEDICINE CLINIC | Facility: CLINIC | Age: 81
End: 2021-02-25

## 2021-02-25 ENCOUNTER — ANTICOAG VISIT (OUTPATIENT)
Dept: FAMILY MEDICINE CLINIC | Facility: CLINIC | Age: 81
End: 2021-02-25

## 2021-02-25 LAB — INR PPP: 2.1 (ref 0.84–1.19)

## 2021-03-12 ENCOUNTER — IMMUNIZATIONS (OUTPATIENT)
Dept: FAMILY MEDICINE CLINIC | Facility: HOSPITAL | Age: 81
End: 2021-03-12

## 2021-03-12 DIAGNOSIS — Z23 ENCOUNTER FOR IMMUNIZATION: Primary | ICD-10-CM

## 2021-03-12 PROCEDURE — 0012A SARS-COV-2 / COVID-19 MRNA VACCINE (MODERNA) 100 MCG: CPT

## 2021-03-12 PROCEDURE — 91301 SARS-COV-2 / COVID-19 MRNA VACCINE (MODERNA) 100 MCG: CPT

## 2021-03-14 DIAGNOSIS — F41.9 ANXIETY: ICD-10-CM

## 2021-03-16 ENCOUNTER — TELEPHONE (OUTPATIENT)
Dept: FAMILY MEDICINE CLINIC | Facility: CLINIC | Age: 81
End: 2021-03-16

## 2021-03-16 ENCOUNTER — ANTICOAG VISIT (OUTPATIENT)
Dept: FAMILY MEDICINE CLINIC | Facility: CLINIC | Age: 81
End: 2021-03-16
Payer: COMMERCIAL

## 2021-03-16 LAB — SL AMB POCT INR: 1.9

## 2021-03-16 PROCEDURE — 85610 PROTHROMBIN TIME: CPT | Performed by: FAMILY MEDICINE

## 2021-03-16 RX ORDER — ALPRAZOLAM 0.5 MG/1
0.5 TABLET ORAL 3 TIMES DAILY PRN
Qty: 90 TABLET | Refills: 0 | Status: SHIPPED | OUTPATIENT
Start: 2021-03-16 | End: 2021-06-21

## 2021-03-16 NOTE — TELEPHONE ENCOUNTER
Phoned Gisela Orlando regarding Ousmane's inr        Per Dr Margarette Gallardo,   He is to   Take 6 mg daily through 3/21  Then return to alternating 5 mg / 6 mg   Re-check on 03/29

## 2021-03-30 ENCOUNTER — ANTICOAG VISIT (OUTPATIENT)
Dept: FAMILY MEDICINE CLINIC | Facility: CLINIC | Age: 81
End: 2021-03-30

## 2021-03-30 LAB — INR PPP: 2 (ref 0.84–1.19)

## 2021-04-05 ENCOUNTER — TELEPHONE (OUTPATIENT)
Dept: OBGYN CLINIC | Facility: HOSPITAL | Age: 81
End: 2021-04-05

## 2021-04-05 DIAGNOSIS — M51.36 DDD (DEGENERATIVE DISC DISEASE), LUMBAR: ICD-10-CM

## 2021-04-05 RX ORDER — METHOCARBAMOL 500 MG/1
500 TABLET, FILM COATED ORAL 4 TIMES DAILY PRN
Qty: 30 TABLET | Refills: 1 | Status: SHIPPED | OUTPATIENT
Start: 2021-04-05 | End: 2021-07-12

## 2021-04-05 NOTE — TELEPHONE ENCOUNTER
Patient sees Dr Bryanna Hernandez  Patient's wife called requesting medication refill of: methocarbamol (ROBAXIN) 500 mg  Patient has 1 remaining tablet

## 2021-04-07 ENCOUNTER — ANTICOAG VISIT (OUTPATIENT)
Dept: FAMILY MEDICINE CLINIC | Facility: CLINIC | Age: 81
End: 2021-04-07

## 2021-04-07 LAB — INR PPP: 2.3 (ref 0.84–1.19)

## 2021-04-12 ENCOUNTER — TELEPHONE (OUTPATIENT)
Dept: FAMILY MEDICINE CLINIC | Facility: CLINIC | Age: 81
End: 2021-04-12

## 2021-04-12 ENCOUNTER — ANTICOAG VISIT (OUTPATIENT)
Dept: FAMILY MEDICINE CLINIC | Facility: CLINIC | Age: 81
End: 2021-04-12

## 2021-04-12 LAB
INR PPP: 1.8 (ref 0.84–1.19)
INR PPP: 1.8 (ref 0.84–1.19)

## 2021-04-19 LAB — INR PPP: 1.7 (ref 0.84–1.19)

## 2021-04-22 ENCOUNTER — TELEPHONE (OUTPATIENT)
Dept: FAMILY MEDICINE CLINIC | Facility: CLINIC | Age: 81
End: 2021-04-22

## 2021-04-22 NOTE — TELEPHONE ENCOUNTER
Ousmane's PT/INR number on 4/19 was 1 7  Salina Mauricio gave first two nights 19th and 20th 6 mg and now back to 5 alt with 6  Please advise Salina Mauricio at [phone]    They never notified us of labs

## 2021-04-22 NOTE — TELEPHONE ENCOUNTER
[facility] INR    Calling with patient's results - 1 7 on the 19th  She will also refax, she stated that she will be faxing these every week to us

## 2021-04-27 NOTE — TELEPHONE ENCOUNTER
Dr Ivonne Owens, this pt showed up in my inbasket due for his INR on 4/26/21  His anticoag visits look a little confusing lately and I just wanted to run this by you so we can get the patient to test if he is truly supposed to be testing on 4/26  Pt had his INR on 3/15/21 and it was 1 9  Supposed to recehck on 3/29  INR was 2 00 on 3/29 with his recheck due on 6/22/21??? Pt had an INR documented on 4/7, but INR was done on 4/5 and it was 2 30  Recheck was 4/21  Pt had another INR done on 4/12 and it was 1 80  Recheck on 4/26  No INR documented since 4/12  Please advise so I can tell pt to check his INR if needed

## 2021-04-28 ENCOUNTER — ANTICOAG VISIT (OUTPATIENT)
Dept: FAMILY MEDICINE CLINIC | Facility: CLINIC | Age: 81
End: 2021-04-28

## 2021-04-28 LAB
INR PPP: 1.8 (ref 0.84–1.19)
INR PPP: 1.9 (ref 0.84–1.19)
INR PPP: 1.9 (ref 0.84–1.19)

## 2021-04-28 NOTE — PROGRESS NOTES
WIFE INFORMED   DENI TO TAKE 6 MG X 3 DAYS THEN ALT 5 MG / 6 MG EVERY OTHER DAY  RECHECK 05/10     AMILCAR STATED DENI IS CHECKING EVERY Monday  PER HIS INSURANCE COMPANY  THAT IS WHY WE ARE SEEING INCREASED FREQUENCY OF HIS INR TESTING

## 2021-04-28 NOTE — TELEPHONE ENCOUNTER
I corrected the inr and warfarin doses on the flowsheet and Yun Mata contacted the patient regarding the most recent instructions  He changed meters and companies   His insurance would no longer cover the alere meter

## 2021-05-05 ENCOUNTER — ANTICOAG VISIT (OUTPATIENT)
Dept: FAMILY MEDICINE CLINIC | Facility: CLINIC | Age: 81
End: 2021-05-05

## 2021-05-05 LAB — INR PPP: 2 (ref 0.84–1.19)

## 2021-05-05 NOTE — PROGRESS NOTES
Pt's wife informed   Pt is to continue same dose and recheck in 2 weeks  Pt does check weekly due to insurance

## 2021-05-11 ENCOUNTER — TELEPHONE (OUTPATIENT)
Dept: FAMILY MEDICINE CLINIC | Facility: CLINIC | Age: 81
End: 2021-05-11

## 2021-05-11 NOTE — TELEPHONE ENCOUNTER
Pt's INR was 2 4  They tested late Monday afternoon  She states there must be a problem because we have not been receiving his results

## 2021-05-12 ENCOUNTER — ANTICOAG VISIT (OUTPATIENT)
Dept: FAMILY MEDICINE CLINIC | Facility: CLINIC | Age: 81
End: 2021-05-12

## 2021-05-12 LAB — INR PPP: 2.4 (ref 0.84–1.19)

## 2021-05-17 ENCOUNTER — TELEPHONE (OUTPATIENT)
Dept: FAMILY MEDICINE CLINIC | Facility: CLINIC | Age: 81
End: 2021-05-17

## 2021-05-17 LAB
INR PPP: 2.2 (ref 0.84–1.19)
INR PPP: 2.2 (ref 0.84–1.19)

## 2021-05-18 ENCOUNTER — ANTICOAG VISIT (OUTPATIENT)
Dept: FAMILY MEDICINE CLINIC | Facility: CLINIC | Age: 81
End: 2021-05-18

## 2021-05-18 NOTE — PATIENT INSTRUCTIONS
Keo Hanna spoke with pt's wife in regards to INR instructions per note written on 5/17/21 in pt's chart

## 2021-05-26 ENCOUNTER — TELEPHONE (OUTPATIENT)
Dept: FAMILY MEDICINE CLINIC | Facility: CLINIC | Age: 81
End: 2021-05-26

## 2021-05-26 ENCOUNTER — ANTICOAG VISIT (OUTPATIENT)
Dept: FAMILY MEDICINE CLINIC | Facility: CLINIC | Age: 81
End: 2021-05-26

## 2021-05-26 ENCOUNTER — TRANSITIONAL CARE MANAGEMENT (OUTPATIENT)
Dept: FAMILY MEDICINE CLINIC | Facility: CLINIC | Age: 81
End: 2021-05-26

## 2021-05-26 LAB
INR PPP: 1.8 (ref 0.84–1.19)
INR PPP: 1.8 (ref 0.84–1.19)

## 2021-05-26 NOTE — TELEPHONE ENCOUNTER
Take 6mg for the next 3 days then return to alternating 5mg and 6mg every other day  Recheck 1 week as required by insurance  Next Wednesday would be better

## 2021-05-26 NOTE — PROGRESS NOTES
Pt's wife informed   Pt to take 6 mg for 3 days then   5 mg and 6 mg alternating every other day  Re-check next Wednesday

## 2021-06-02 ENCOUNTER — ANTICOAG VISIT (OUTPATIENT)
Dept: FAMILY MEDICINE CLINIC | Facility: CLINIC | Age: 81
End: 2021-06-02

## 2021-06-03 ENCOUNTER — TELEPHONE (OUTPATIENT)
Dept: FAMILY MEDICINE CLINIC | Facility: CLINIC | Age: 81
End: 2021-06-03

## 2021-06-03 LAB — INR PPP: 2.4 (ref 0.84–1.19)

## 2021-06-03 NOTE — TELEPHONE ENCOUNTER
Shiela called with patients INR - 2 4    Please advise - I believe I saw a form come through with this information today as well

## 2021-06-04 NOTE — TELEPHONE ENCOUNTER
Yes, I saw the paper and commented on the paper  Continue same dose and recheck 1 week  Levels are good

## 2021-06-11 ENCOUNTER — TELEPHONE (OUTPATIENT)
Dept: FAMILY MEDICINE CLINIC | Facility: CLINIC | Age: 81
End: 2021-06-11

## 2021-06-17 ENCOUNTER — TELEPHONE (OUTPATIENT)
Dept: FAMILY MEDICINE CLINIC | Facility: CLINIC | Age: 81
End: 2021-06-17

## 2021-06-18 ENCOUNTER — ANTICOAG VISIT (OUTPATIENT)
Dept: FAMILY MEDICINE CLINIC | Facility: CLINIC | Age: 81
End: 2021-06-18

## 2021-06-18 ENCOUNTER — TELEPHONE (OUTPATIENT)
Dept: FAMILY MEDICINE CLINIC | Facility: CLINIC | Age: 81
End: 2021-06-18

## 2021-06-18 LAB
INR PPP: 2.1 (ref 0.84–1.19)

## 2021-06-21 DIAGNOSIS — F41.9 ANXIETY: ICD-10-CM

## 2021-06-21 RX ORDER — ALPRAZOLAM 0.5 MG/1
0.5 TABLET ORAL 3 TIMES DAILY PRN
Qty: 90 TABLET | Refills: 0 | Status: SHIPPED | OUTPATIENT
Start: 2021-06-21 | End: 2021-09-13

## 2021-06-25 ENCOUNTER — TELEPHONE (OUTPATIENT)
Dept: FAMILY MEDICINE CLINIC | Facility: CLINIC | Age: 81
End: 2021-06-25

## 2021-06-29 ENCOUNTER — TELEPHONE (OUTPATIENT)
Dept: FAMILY MEDICINE CLINIC | Facility: CLINIC | Age: 81
End: 2021-06-29

## 2021-06-29 ENCOUNTER — ANTICOAG VISIT (OUTPATIENT)
Dept: FAMILY MEDICINE CLINIC | Facility: CLINIC | Age: 81
End: 2021-06-29

## 2021-06-29 LAB — INR PPP: 1.9 (ref 0.84–1.19)

## 2021-07-10 DIAGNOSIS — M51.36 DDD (DEGENERATIVE DISC DISEASE), LUMBAR: ICD-10-CM

## 2021-07-12 ENCOUNTER — ANTICOAG VISIT (OUTPATIENT)
Dept: FAMILY MEDICINE CLINIC | Facility: CLINIC | Age: 81
End: 2021-07-12

## 2021-07-12 LAB
INR PPP: 2.3 (ref 0.84–1.19)
INR PPP: 2.3 (ref 0.84–1.19)

## 2021-07-12 RX ORDER — METHOCARBAMOL 500 MG/1
500 TABLET, FILM COATED ORAL 4 TIMES DAILY PRN
Qty: 30 TABLET | Refills: 1 | Status: SHIPPED | OUTPATIENT
Start: 2021-07-12 | End: 2021-09-04

## 2021-07-19 LAB — INR PPP: 2.3 (ref 0.84–1.19)

## 2021-07-23 ENCOUNTER — TELEPHONE (OUTPATIENT)
Dept: FAMILY MEDICINE CLINIC | Facility: CLINIC | Age: 81
End: 2021-07-23

## 2021-07-23 ENCOUNTER — ANTICOAG VISIT (OUTPATIENT)
Dept: FAMILY MEDICINE CLINIC | Facility: CLINIC | Age: 81
End: 2021-07-23

## 2021-07-28 ENCOUNTER — TELEPHONE (OUTPATIENT)
Dept: FAMILY MEDICINE CLINIC | Facility: CLINIC | Age: 81
End: 2021-07-28

## 2021-07-28 ENCOUNTER — ANTICOAG VISIT (OUTPATIENT)
Dept: FAMILY MEDICINE CLINIC | Facility: CLINIC | Age: 81
End: 2021-07-28

## 2021-07-28 LAB — INR PPP: 2.5 (ref 0.84–1.19)

## 2021-08-03 ENCOUNTER — TELEPHONE (OUTPATIENT)
Dept: FAMILY MEDICINE CLINIC | Facility: CLINIC | Age: 81
End: 2021-08-03

## 2021-08-03 ENCOUNTER — ANTICOAG VISIT (OUTPATIENT)
Dept: FAMILY MEDICINE CLINIC | Facility: CLINIC | Age: 81
End: 2021-08-03

## 2021-08-03 LAB — INR PPP: 3.1 (ref 0.84–1.19)

## 2021-08-03 NOTE — TELEPHONE ENCOUNTER
Hold one dose warfarin and then resume alternating 6mg and 5mg every other day and recheck in 1 week

## 2021-08-10 ENCOUNTER — ANTICOAG VISIT (OUTPATIENT)
Dept: FAMILY MEDICINE CLINIC | Facility: CLINIC | Age: 81
End: 2021-08-10

## 2021-08-10 ENCOUNTER — TELEPHONE (OUTPATIENT)
Dept: FAMILY MEDICINE CLINIC | Facility: CLINIC | Age: 81
End: 2021-08-10

## 2021-08-10 LAB — INR PPP: 1.9 (ref 0.84–1.19)

## 2021-08-10 NOTE — TELEPHONE ENCOUNTER
INFORMED WIFE ,(AMILCAR), THAT DENI IS TO TAKE 7 MG X 2 DAYS THEN ALT 7/6 MG EVERY OTHER DAY  SHE CONFIRMED  SHE IS ALSO CONCERNED ABOUT HIM  PT FELL AGAIN YESTERDAY  PT'S BACK IS GETTING WORSE   PT IS NOT DOING WELL  I INFORMED HER TO SCHEDULE APPOINTMENT FOR HIM TO BE EVALUATED  IF SHE IS UNABLE TO GET HIM IN HERE SHE WILL DO VIRTUAL  SHE WAS UNABLE TO SCHEDULE WHILE ON THE PHONE   SHE WILL CALL BACK LATER TODAY

## 2021-08-13 ENCOUNTER — OFFICE VISIT (OUTPATIENT)
Dept: FAMILY MEDICINE CLINIC | Facility: CLINIC | Age: 81
End: 2021-08-13
Payer: COMMERCIAL

## 2021-08-13 VITALS
WEIGHT: 212 LBS | TEMPERATURE: 97.3 F | DIASTOLIC BLOOD PRESSURE: 84 MMHG | HEIGHT: 72 IN | OXYGEN SATURATION: 100 % | BODY MASS INDEX: 28.71 KG/M2 | SYSTOLIC BLOOD PRESSURE: 130 MMHG | HEART RATE: 71 BPM

## 2021-08-13 DIAGNOSIS — R53.1 WEAKNESS: ICD-10-CM

## 2021-08-13 DIAGNOSIS — I10 ESSENTIAL HYPERTENSION: ICD-10-CM

## 2021-08-13 DIAGNOSIS — F41.9 ANXIETY: ICD-10-CM

## 2021-08-13 DIAGNOSIS — M25.552 PAIN OF LEFT HIP JOINT: ICD-10-CM

## 2021-08-13 DIAGNOSIS — M16.12 PRIMARY OSTEOARTHRITIS OF LEFT HIP: ICD-10-CM

## 2021-08-13 DIAGNOSIS — R26.81 GAIT INSTABILITY: ICD-10-CM

## 2021-08-13 DIAGNOSIS — G89.29 CHRONIC BILATERAL LOW BACK PAIN WITHOUT SCIATICA: Primary | ICD-10-CM

## 2021-08-13 DIAGNOSIS — N18.31 STAGE 3A CHRONIC KIDNEY DISEASE (HCC): ICD-10-CM

## 2021-08-13 DIAGNOSIS — M54.50 CHRONIC BILATERAL LOW BACK PAIN WITHOUT SCIATICA: Primary | ICD-10-CM

## 2021-08-13 DIAGNOSIS — E78.49 OTHER HYPERLIPIDEMIA: ICD-10-CM

## 2021-08-13 DIAGNOSIS — I48.11 LONGSTANDING PERSISTENT ATRIAL FIBRILLATION (HCC): ICD-10-CM

## 2021-08-13 DIAGNOSIS — R29.6 FREQUENT FALLS: ICD-10-CM

## 2021-08-13 PROBLEM — Z00.00 MEDICARE ANNUAL WELLNESS VISIT, SUBSEQUENT: Status: RESOLVED | Noted: 2019-04-30 | Resolved: 2021-08-13

## 2021-08-13 PROBLEM — E86.0 LUETSCHER'S SYNDROME: Status: ACTIVE | Noted: 2021-08-13

## 2021-08-13 PROCEDURE — 1036F TOBACCO NON-USER: CPT | Performed by: FAMILY MEDICINE

## 2021-08-13 PROCEDURE — 99214 OFFICE O/P EST MOD 30 MIN: CPT | Performed by: FAMILY MEDICINE

## 2021-08-13 PROCEDURE — 3725F SCREEN DEPRESSION PERFORMED: CPT | Performed by: FAMILY MEDICINE

## 2021-08-13 PROCEDURE — 1160F RVW MEDS BY RX/DR IN RCRD: CPT | Performed by: FAMILY MEDICINE

## 2021-08-13 NOTE — PROGRESS NOTES
Assessment/Plan:      1  Chronic bilateral low back pain without sciatica  Assessment & Plan:  Physical therapy evaluation  Robaxin as needed  Pt is on warfarin and is limited with medication  2  Primary osteoarthritis of left hip  Assessment & Plan:  Physical therapy, if no help, consider ortho evaluation      3  Gait instability  Assessment & Plan:  Pt would benefit from physical therapy    Orders:  -     Ambulatory referral to Physical Therapy; Future    4  Weakness  -     TSH, 3rd generation with Free T4 reflex; Future  -     Ambulatory referral to Physical Therapy; Future  -     TSH, 3rd generation with Free T4 reflex    5  Frequent falls    6  Essential hypertension  Assessment & Plan:  Controlled, continue current medication    Orders:  -     CBC and differential; Future  -     Comprehensive metabolic panel; Future  -     Lipid Panel with Direct LDL reflex; Future  -     TSH, 3rd generation with Free T4 reflex; Future  -     CBC and differential  -     Comprehensive metabolic panel  -     Lipid Panel with Direct LDL reflex  -     TSH, 3rd generation with Free T4 reflex    7  Other hyperlipidemia  Assessment & Plan:  Continue crestor, due for lipid panel    Orders:  -     CBC and differential; Future  -     Comprehensive metabolic panel; Future  -     Lipid Panel with Direct LDL reflex; Future  -     CBC and differential  -     Comprehensive metabolic panel  -     Lipid Panel with Direct LDL reflex    8  Anxiety  -     CBC and differential; Future  -     Comprehensive metabolic panel; Future  -     TSH, 3rd generation with Free T4 reflex; Future  -     CBC and differential  -     Comprehensive metabolic panel  -     TSH, 3rd generation with Free T4 reflex    9  Longstanding persistent atrial fibrillation (HCC)  Assessment & Plan:  Controlled rate, continue warfarin and requires inr weekly  10  Pain of left hip joint    11   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 [facility])  Assessment & Plan:  Lab Results Component Value Date    CREATININE 1 34 (H) 06/11/2018    CREATININE 1 49 (H) 08/29/2016    CREATININE 1 60 (H) 05/22/2015   gfr 59 6/11/2018          Subjective:  Chief Complaint   Patient presents with    Back Pain     pt c/o back pain         Patient ID: Matt Cordero is a [de-identified] y o  male  Pt is here with his wife today  Complains of ongoing back pain with no specific injury, worse in the am, left side worse than the right  No radiation into legs  Shower helps with warm water  Some days hard to get out of bed due to back pain  Also with pain in the Left hip area   Had xrays of lumbar spine and left hip with degenerative discs and arthritis and moderate arthritis in his left hip  Review of Systems   Constitutional: Negative  Negative for fatigue and fever  HENT: Negative  Eyes: Negative  Respiratory: Negative  Negative for cough and chest tightness  Cardiovascular: Negative  Gastrointestinal: Negative  Endocrine: Negative  Genitourinary: Negative  Musculoskeletal: Positive for arthralgias, back pain, gait problem and myalgias  Skin: Negative  Allergic/Immunologic: Negative  Psychiatric/Behavioral: Negative  The following portions of the patient's history were reviewed and updated as appropriate: allergies, current medications, past family history, past medical history, past social history, past surgical history and problem list     Objective:  Vitals:    08/13/21 1331   BP: 130/84   BP Location: Right arm   Patient Position: Sitting   Cuff Size: Large   Pulse: 71   Temp: (!) 97 3 °F (36 3 °C)   SpO2: 100%   Weight: 96 2 kg (212 lb)   Height: 6' (1 829 m)      Physical Exam  Vitals and nursing note reviewed  Constitutional:       Appearance: He is well-developed  HENT:      Head: Normocephalic and atraumatic  Cardiovascular:      Rate and Rhythm: Normal rate  Rhythm irregular  Heart sounds: Normal heart sounds     Pulmonary:      Effort: Pulmonary effort is normal       Breath sounds: Normal breath sounds  Abdominal:      General: Bowel sounds are normal       Palpations: Abdomen is soft  Musculoskeletal:         General: Tenderness present  Comments: Generalized tenderness over lumbar spine paraspinal left greater than right  No central tenderness  Mild tenderness over left greater trochanter- good rom     Skin:     General: Skin is warm and dry  Neurological:      Mental Status: He is alert and oriented to person, place, and time  Psychiatric:         Behavior: Behavior normal          Thought Content: Thought content normal          Judgment: Judgment normal        BMI Counseling: Body mass index is 28 75 kg/m²  The BMI is above normal  Nutrition recommendations include reducing portion sizes  Exercise recommendations include exercising 3-5 times per week

## 2021-08-15 PROBLEM — G89.29 CHRONIC BILATERAL LOW BACK PAIN WITHOUT SCIATICA: Status: ACTIVE | Noted: 2021-08-15

## 2021-08-15 PROBLEM — M54.50 CHRONIC BILATERAL LOW BACK PAIN WITHOUT SCIATICA: Status: ACTIVE | Noted: 2021-08-15

## 2021-08-15 PROBLEM — M51.369 DDD (DEGENERATIVE DISC DISEASE), LUMBAR: Status: ACTIVE | Noted: 2021-08-15

## 2021-08-15 PROBLEM — M51.36 DDD (DEGENERATIVE DISC DISEASE), LUMBAR: Status: ACTIVE | Noted: 2021-08-15

## 2021-08-16 ENCOUNTER — TELEPHONE (OUTPATIENT)
Dept: FAMILY MEDICINE CLINIC | Facility: CLINIC | Age: 81
End: 2021-08-16

## 2021-08-16 LAB — INR PPP: 2.7 (ref 0.84–1.19)

## 2021-08-16 NOTE — ASSESSMENT & PLAN NOTE
Lab Results   Component Value Date    CREATININE 1 34 (H) 06/11/2018    CREATININE 1 49 (H) 08/29/2016    CREATININE 1 60 (H) 05/22/2015   gfr 59 6/11/2018

## 2021-08-19 ENCOUNTER — ANTICOAG VISIT (OUTPATIENT)
Dept: FAMILY MEDICINE CLINIC | Facility: CLINIC | Age: 81
End: 2021-08-19

## 2021-08-23 LAB — INR PPP: 2 (ref 0.84–1.19)

## 2021-08-24 ENCOUNTER — EVALUATION (OUTPATIENT)
Dept: PHYSICAL THERAPY | Facility: CLINIC | Age: 81
End: 2021-08-24
Payer: COMMERCIAL

## 2021-08-24 DIAGNOSIS — R26.89 BALANCE PROBLEM: ICD-10-CM

## 2021-08-24 DIAGNOSIS — M62.81 MUSCLE WEAKNESS (GENERALIZED): ICD-10-CM

## 2021-08-24 DIAGNOSIS — R26.2 DIFFICULTY WALKING: Primary | ICD-10-CM

## 2021-08-24 PROCEDURE — 97162 PT EVAL MOD COMPLEX 30 MIN: CPT | Performed by: PHYSICAL THERAPIST

## 2021-08-24 PROCEDURE — 97110 THERAPEUTIC EXERCISES: CPT | Performed by: PHYSICAL THERAPIST

## 2021-08-24 NOTE — PROGRESS NOTES
PT Evaluation     Today's date: 2021  Patient name: Agustin Levi  :   MRN: [id number]  Referring provider: Bonnie Parish DO  Dx:   Encounter Diagnosis     ICD-10-CM    1  Difficulty walking  R26 2    2  Balance problem  R26 89    3  Muscle weakness (generalized)  M62 81                   Assessment  Assessment details: Agustin Levi is a [de-identified] y o  male presenting to outpatient physical therapy at [address] [place] [address] with complaints of gait disturbances and balance impairments   He presents with decreased range of motion, decreased strength, limited flexibility, poor postural awareness, poor body mechanics, poor balance, decreased tolerance to activity and decreased functional mobility due to difficulty walking  (primary encounter diagnosis), Balance problem, and muscle weakness (generalized)  Therapist discussed diagnosis, prognosis, plan of care, proper responses to exercises, updated and reviewed HEP  He would benefit from skilled PT services in order to address these deficits and reach maximum level of function   Thank you for the referral!  Impairments: abnormal coordination, abnormal gait, abnormal muscle firing, abnormal muscle tone, abnormal or restricted ROM, abnormal movement, activity intolerance, impaired balance, impaired physical strength, lacks appropriate home exercise program, pain with function, scapular dyskinesis and poor posture     Symptom irritability: moderateUnderstanding of Dx/Px/POC: good   Prognosis: good    Goals  STGs (in 4 weeks):  1  Pt will report having at least a 50% improvement since I E   2  Pt will be able to perform 10 sit<>stands in 30 seconds with definite use of UE support  3  Pt will report having at most 2/10 with low back and hip with function mobility  LTGs (in 12 weeks)  1  Pt will report having at least a 75% improvement since I E   2  Pt will perform at least 12 sit<>stands in 30 seconds without use of UE support     3  Pt will perform TUG in average of less than 18 seconds without AD  4  Pt will ambulate at least 400m during 6MWT to demonstrate improved endurance  Plan  Patient would benefit from: skilled physical therapy  Planned modality interventions: unattended electrical stimulation and TENS  Planned therapy interventions: activity modification, balance, manual therapy, joint mobilization, neuromuscular re-education, patient education, self care, strengthening, stretching, therapeutic activities, therapeutic exercise, home exercise program and gait training  Frequency: 2x week  Plan of Care beginning date: 2021  Plan of Care expiration date: 2021  Treatment plan discussed with: patient        Subjective Evaluation    History of Present Illness  Mechanism of injury: Pt is a [de-identified]year old male who presents with insidious onset of chronic progressive low back pain with bilateral hip pain  He reports imaging reveals degeneration  He also reports having decreasing balance when walking especially over uneven terrain resulting in falls  Patient also reports having decreased strength and balance when getting in and out of a chair  Now referred to skilled OP PT  Quality of life: good    Pain  Current pain ratin  At best pain ratin  At worst pain ratin  Quality: dull ache, tight, pressure and discomfort  Relieving factors: relaxation, rest and medications  Aggravating factors: standing, walking and lifting (chair transfers, AM stiffness, difficulty ambulating inclines/desclines and over uneven terrain)  Progression: worsening    Patient Goals  Patient goals for therapy: decreased pain, improved balance, increased motion, increased strength, independence with ADLs/IADLs and return to sport/leisure activities          Objective     Posture: Patient stands with forward flexed trunk  Lumbar AROM limitations:  (*=  Pain)  Lumbar flexion: 50%  Lumbar extension:  To neutral*  R side glide:  50%  L side glide:  50%      Strength:  Core strength: Upper abs: 3-/5; lower abs: 3-/5     Right  Left  Hip flexion:  3+/5  3+/5  Knee ext  4/5  4/5  Ankle DF  4/5  4/5  Ankle PF  3+/5  3+/5  Knee flex  3+/5  3-/5      Hip abduction  3+/5  3+/5  Hip extension  3-/5  3-/5    Tenderness/Palpation: slight tenderness with palpation of right glute med/piriformis    Sensation: intact throughout BLE with light touch     Flexibility: decreased HS, piriformis, hip flexor, and quadriceps bilaterally    Function: Pt ambulates with decreased arm swing and WBOS as well as decreased alvin  During task such as TUG, therapist noticed pill rolling with LUE       Squat: pt is unable to fully squat to chair without use of UE    Special test:   TU seconds without AD  30 second sit<>stand: 6 times with definite use of UEs  6MWT: TBA  DGI: TBA      Precautions: fall risk probable PD    HEP: LTR; seated HS stretch    Specialty Daily Treatment Diary       Manual        B HS; guido; piriformis stretch        STM/TPR to R sided lumbar spine and glutes                                Exercise Diary         RB/laps/ treadmill        6 MWT focus on speed and arm swing (BIG movements)                 HR/TR        Mini -> Chair Squats        3 way H'         Lat Band Walks                Step Ups        Lat Step Ups                Floor -> Airex NBOS        Floor -> Airex Tandem stance        Floor -> Airex SLS                1 alt cone taps        2 alt cone taps        Arc cone taps                 Stepping over hurdles        Cone tap stepping over        Lateral stepping over hurdles/cones                BIG lunges forward and laterally                                Seated HS stretch                Bridges        [facility]        Sidelying H' ABD        TAC; TAC c BKFO        TAC c Marching        TAC Dying Bug        LTR 5 sec x 5 ea        2 way piriformis stretch        Guido stretch/Prone quad stretch c SOS                        HEP/EDU Focus on response to exercises; HEP; attendance policy        Modalities                CP        Estim           Skin checks performed pre and post application: intact

## 2021-08-26 ENCOUNTER — ANTICOAG VISIT (OUTPATIENT)
Dept: FAMILY MEDICINE CLINIC | Facility: CLINIC | Age: 81
End: 2021-08-26

## 2021-08-26 NOTE — PATIENT INSTRUCTIONS
Left detailed message for Monse Perez regarding Ousmane's INR   Sriram is to continue his same dose and recheck in 1 week on 8/30/2021

## 2021-08-27 ENCOUNTER — APPOINTMENT (OUTPATIENT)
Dept: PHYSICAL THERAPY | Facility: CLINIC | Age: 81
End: 2021-08-27
Payer: COMMERCIAL

## 2021-08-30 ENCOUNTER — OFFICE VISIT (OUTPATIENT)
Dept: PHYSICAL THERAPY | Facility: CLINIC | Age: 81
End: 2021-08-30
Payer: COMMERCIAL

## 2021-08-30 DIAGNOSIS — R26.89 BALANCE PROBLEM: ICD-10-CM

## 2021-08-30 DIAGNOSIS — R26.2 DIFFICULTY WALKING: Primary | ICD-10-CM

## 2021-08-30 DIAGNOSIS — M62.81 MUSCLE WEAKNESS (GENERALIZED): ICD-10-CM

## 2021-08-30 LAB — INR PPP: 2.4 (ref 0.84–1.19)

## 2021-08-30 PROCEDURE — 97110 THERAPEUTIC EXERCISES: CPT | Performed by: PHYSICAL THERAPIST

## 2021-08-30 PROCEDURE — 97112 NEUROMUSCULAR REEDUCATION: CPT | Performed by: PHYSICAL THERAPIST

## 2021-08-30 PROCEDURE — 97140 MANUAL THERAPY 1/> REGIONS: CPT | Performed by: PHYSICAL THERAPIST

## 2021-08-30 NOTE — PROGRESS NOTES
Daily Note     Today's date: 2021  Patient name: Amrit Rivera  :   MRN: [id number]  Referring provider: Hanna Nunes DO  Dx:   Encounter Diagnosis     ICD-10-CM    1  Difficulty walking  R26 2    2  Balance problem  R26 89    3  Muscle weakness (generalized)  M62 81          Subjective: Patient reports that he was not in significant pain after IE  He mentions that he just felt as though he worked out  Patient reports that he was not compliant with his HEP  He states he would like drop down to coming to PT 1x per week and mention he has pain pre-treatment today rating 8/10  Objective: See treatment diary below      Assessment: Patient tolerated treatment well  He was able to complete program as noted below  Discussed importance of completing HEP especially if he drops to 1x per week, patient reports understanding  He demonstrates fatigue at end of session and would benefit from continued PT  Plan: Continue per plan of care        Precautions: fall risk probable PD    HEP: LTR; seated HS stretch    Specialty Daily Treatment Diary       Manual       B HS; fransisco; piriformis stretch  SK  HS & Piriformis b/l      STM/TPR to R sided lumbar spine and glutes                                Exercise Diary         RB/laps/ treadmill        6 MWT focus on speed and arm swing (BIG movements)                 HR/TR        Mini -> Chair Squats        3 way H'         Lat Band Walks                Step Ups        Lat Step Ups                Floor -> Airex NBOS        Floor -> Airex Tandem stance        Floor -> Airex SLS                1 alt cone taps        2 alt cone taps        Arc cone taps                 Stepping over hurdles        Cone tap stepping over        Lateral stepping over hurdles/cones                BIG lunges forward and laterally                                Seated HS stretch  20 sec x 3 ea              Bridges        Clamshells  Hooklying  YTB 5 sec  2 x 10      Sidelying H' ABD        TAC; TAC c BKFO  5 sec x 10 TAC      TAC c Marching  x 10 ea      TAC Dying Bug        LTR 5 sec x 5 ea  5 sec x 10 ea      2 way piriformis stretch        Guido stretch/Prone quad stretch c SOS                        HEP/EDU Focus on response to exercises; HEP; attendance policy  Importance of completing HEP        Modalities                CP        Estim

## 2021-08-31 ENCOUNTER — TELEPHONE (OUTPATIENT)
Dept: FAMILY MEDICINE CLINIC | Facility: CLINIC | Age: 81
End: 2021-08-31

## 2021-08-31 ENCOUNTER — ANTICOAG VISIT (OUTPATIENT)
Dept: FAMILY MEDICINE CLINIC | Facility: CLINIC | Age: 81
End: 2021-08-31

## 2021-09-03 ENCOUNTER — APPOINTMENT (OUTPATIENT)
Dept: PHYSICAL THERAPY | Facility: CLINIC | Age: 81
End: 2021-09-03
Payer: COMMERCIAL

## 2021-09-04 DIAGNOSIS — M51.36 DDD (DEGENERATIVE DISC DISEASE), LUMBAR: ICD-10-CM

## 2021-09-04 RX ORDER — METHOCARBAMOL 500 MG/1
500 TABLET, FILM COATED ORAL 4 TIMES DAILY PRN
Qty: 30 TABLET | Refills: 1 | Status: SHIPPED | OUTPATIENT
Start: 2021-09-04 | End: 2021-12-02 | Stop reason: SDUPTHER

## 2021-09-07 ENCOUNTER — ANTICOAG VISIT (OUTPATIENT)
Dept: FAMILY MEDICINE CLINIC | Facility: CLINIC | Age: 81
End: 2021-09-07

## 2021-09-07 LAB — INR PPP: 2 (ref 0.84–1.19)

## 2021-09-07 NOTE — PATIENT INSTRUCTIONS
Left detailed message for Aisha Darden and asked that she call us back to confirm she received our message  LL 9/7/21

## 2021-09-09 ENCOUNTER — OFFICE VISIT (OUTPATIENT)
Dept: PHYSICAL THERAPY | Facility: CLINIC | Age: 81
End: 2021-09-09
Payer: COMMERCIAL

## 2021-09-09 DIAGNOSIS — R26.89 BALANCE PROBLEM: ICD-10-CM

## 2021-09-09 DIAGNOSIS — M62.81 MUSCLE WEAKNESS (GENERALIZED): ICD-10-CM

## 2021-09-09 DIAGNOSIS — R26.2 DIFFICULTY WALKING: Primary | ICD-10-CM

## 2021-09-09 PROCEDURE — 97112 NEUROMUSCULAR REEDUCATION: CPT

## 2021-09-09 PROCEDURE — 97110 THERAPEUTIC EXERCISES: CPT

## 2021-09-09 NOTE — PROGRESS NOTES
Daily Note     Today's date: 2021  Patient name: Colten Cortez  :   MRN: [id number]  Referring provider: Kathrine Rico DO  Dx:   Encounter Diagnosis     ICD-10-CM    1  Difficulty walking  R26 2    2  Balance problem  R26 89    3  Muscle weakness (generalized)  M62 81                   Subjective: Patient states he is doing okay today  Reports that he has some back pain       Objective: See treatment diary below      Assessment: Tolerated treatment fair  Initiated POC in supine with TE  He demonstrates weakness in all areas  When asked if he has been keeping up with HEP he denied  Provided patient new HEP and encouraged patient importance of completing at home  Patient demonstrated fatigue post treatment and would benefit from continued PT      Plan: Continue per plan of care        Precautions: fall risk probable PD    HEP: LTR; seated HS stretch    Specialty Daily Treatment Diary       Manual      B HS; fransisco; piriformis stretch  SK  HS & Piriformis b/l RA HS stretch      STM/TPR to R sided lumbar spine and glutes                                Exercise Diary         RB/laps/ treadmill        6 MWT focus on speed and arm swing (BIG movements)                 HR/TR   20 ea      Mini -> Chair Squats   15x      3 way H'         Lat Band Walks                Step Ups        Lat Step Ups                Floor -> Airex NBOS        Floor -> Airex Tandem stance        Floor -> Airex SLS                1 alt cone taps        2 alt cone taps        Arc cone taps                 Stepping over hurdles        Cone tap stepping over        Lateral stepping over hurdles/cones                BIG lunges forward and laterally                                Seated HS stretch  20 sec x 3 ea      Ball squeezes   5 sec x 30      Bridges        Clamshells  Hooklying  YTB 5 sec  2 x 10 hooklying GTB 30      Sidelying H' ABD        TAC; TAC c BKFO  5 sec x 10 TAC 5 sec x 10 TAC      TAC c Marching  x 10 ea 20x TAC Dying Bug        LTR 5 sec x 5 ea  5 sec x 10 ea 5 sec x 10 ea      2 way piriformis stretch        Guido stretch/Prone quad stretch c SOS                        HEP/EDU Focus on response to exercises; HEP; attendance policy  Importance of completing HEP        Modalities                CP        Estim

## 2021-09-13 ENCOUNTER — APPOINTMENT (OUTPATIENT)
Dept: PHYSICAL THERAPY | Facility: CLINIC | Age: 81
End: 2021-09-13
Payer: COMMERCIAL

## 2021-09-13 ENCOUNTER — ANTICOAG VISIT (OUTPATIENT)
Dept: FAMILY MEDICINE CLINIC | Facility: CLINIC | Age: 81
End: 2021-09-13

## 2021-09-13 DIAGNOSIS — F41.9 ANXIETY: ICD-10-CM

## 2021-09-13 LAB — INR PPP: 2.7 (ref 0.84–1.19)

## 2021-09-13 RX ORDER — ALPRAZOLAM 0.5 MG/1
0.5 TABLET ORAL 3 TIMES DAILY PRN
Qty: 90 TABLET | Refills: 0 | Status: SHIPPED | OUTPATIENT
Start: 2021-09-13 | End: 2021-11-29

## 2021-09-14 ENCOUNTER — OFFICE VISIT (OUTPATIENT)
Dept: PHYSICAL THERAPY | Facility: CLINIC | Age: 81
End: 2021-09-14
Payer: COMMERCIAL

## 2021-09-14 DIAGNOSIS — M62.81 MUSCLE WEAKNESS (GENERALIZED): ICD-10-CM

## 2021-09-14 DIAGNOSIS — R26.2 DIFFICULTY WALKING: Primary | ICD-10-CM

## 2021-09-14 DIAGNOSIS — R26.89 BALANCE PROBLEM: ICD-10-CM

## 2021-09-14 PROCEDURE — 97110 THERAPEUTIC EXERCISES: CPT

## 2021-09-14 NOTE — PROGRESS NOTES
Daily Note     Today's date: 2021  Patient name: Davia Sacks  :   MRN: [id number]  Referring provider: Lucy Keane DO  Dx:   Encounter Diagnosis     ICD-10-CM    1  Difficulty walking  R26 2    2  Balance problem  R26 89    3  Muscle weakness (generalized)  M62 81                   Subjective: Patient states he is doing alright today  Objective: See treatment diary below      Assessment: Tolerated treatment fair  Added in some balance exercises today, he needed cuing for correct technique  He continues to be limited in exercise limit  Concluded with RB  Patient demonstrated fatigue post treatment, exhibited good technique with therapeutic exercises and would benefit from continued PT      Plan: Continue per plan of care        Precautions: fall risk probable PD    HEP: LTR; seated HS stretch    Specialty Daily Treatment Diary       Manual     B HS; fransisco; piriformis stretch  SK  HS & Piriformis b/l RA HS stretch  RA HS stretch     STM/TPR to R sided lumbar spine and glutes                                Exercise Diary         RB/laps/ treadmill    2 min     6 MWT focus on speed and arm swing (BIG movements)                 HR/TR   20 ea  20 ea    Mini -> Chair Squats   15x  20    3 way H'     20 R only per patient request    Lat Band Walks                Step Ups        Lat Step Ups                Floor -> Airex NBOS    Floor 30 sec x 2     Floor -> Airex Tandem stance    Floor 30 sec x 2     Floor -> Airex SLS                1 alt cone taps        2 alt cone taps        Arc cone taps                 Stepping over hurdles        Cone tap stepping over        Lateral stepping over hurdles/cones                BIG lunges forward and laterally                                Seated HS stretch  20 sec x 3 ea      Ball squeezes   5 sec x 30  5 sec x 30    Bridges        Clamshells  Hooklying  YTB 5 sec  2 x 10 hooklying GTB 30  hooklying GTB 30     Sidelying H' ABD TAC; TAC c BKFO  5 sec x 10 TAC 5 sec x 10 TAC  5 sec x 10 TAC     TAC c Marching  x 10 ea 20x  30x     TAC Dying Bug        LTR 5 sec x 5 ea  5 sec x 10 ea 5 sec x 10 ea  5 sec x 15    2 way piriformis stretch        Guido stretch/Prone quad stretch c SOS        LAQ    20 ea             HEP/EDU Focus on response to exercises; HEP; attendance policy  Importance of completing HEP        Modalities                CP        Estim

## 2021-09-16 ENCOUNTER — APPOINTMENT (OUTPATIENT)
Dept: PHYSICAL THERAPY | Facility: CLINIC | Age: 81
End: 2021-09-16
Payer: COMMERCIAL

## 2021-09-20 ENCOUNTER — ANTICOAG VISIT (OUTPATIENT)
Dept: FAMILY MEDICINE CLINIC | Facility: CLINIC | Age: 81
End: 2021-09-20

## 2021-09-20 LAB — INR PPP: 2.3 (ref 0.84–1.19)

## 2021-09-21 ENCOUNTER — OFFICE VISIT (OUTPATIENT)
Dept: PHYSICAL THERAPY | Facility: CLINIC | Age: 81
End: 2021-09-21
Payer: COMMERCIAL

## 2021-09-21 DIAGNOSIS — R26.2 DIFFICULTY WALKING: Primary | ICD-10-CM

## 2021-09-21 DIAGNOSIS — M62.81 MUSCLE WEAKNESS (GENERALIZED): ICD-10-CM

## 2021-09-21 DIAGNOSIS — R26.89 BALANCE PROBLEM: ICD-10-CM

## 2021-09-21 PROCEDURE — 97110 THERAPEUTIC EXERCISES: CPT

## 2021-09-21 PROCEDURE — 97112 NEUROMUSCULAR REEDUCATION: CPT

## 2021-09-21 NOTE — PROGRESS NOTES
Daily Note     Today's date: 2021  Patient name: Raven Cedeño  :   MRN: [id number]  Referring provider: Isael Kinsey DO  Dx:   Encounter Diagnosis     ICD-10-CM    1  Difficulty walking  R26 2    2  Balance problem  R26 89    3  Muscle weakness (generalized)  M62 81                   Subjective: Patient offers no new complaints arriving to therapy  Objective: See treatment diary below      Assessment: Tolerated treatment fair  Patient wanted to drop to 1x a week  He presents with unsteady gait, encouraged/educated patient on importance of utilizing AD in order to have safe gait  Patient required use of therapist in order to safely ambulate out of clinic at end of session  He requires attention for correct counting with reps  Added in step ups today  Patient demonstrated fatigue post treatment and would benefit from continued PT      Plan: Continue per plan of care        Precautions: fall risk probable PD    HEP: LTR; seated HS stretch    Specialty Daily Treatment Diary       Manual     B HS; fransisco; piriformis stretch  SK  HS & Piriformis b/l RA HS stretch  RA HS stretch  RA HS stretch    STM/TPR to R sided lumbar spine and glutes                                Exercise Diary         RB/laps/ treadmill    2 min  2 min RB   6 MWT focus on speed and arm swing (BIG movements)                 HR/TR   20 ea  20 ea 20   Mini -> Chair Squats   15x  20 20   3 way H'     20 R only per patient request 20 b/l    Lat Band Walks                Step Ups     6" x 10 b/l   Lat Step Ups                Floor -> Airex NBOS    Floor 30 sec x 2  Floor 30 sec x 2   Floor -> Airex Tandem stance    Floor 30 sec x 2  Floor 30 sec x 2    Floor -> Airex SLS                1 alt cone taps        2 alt cone taps        Arc cone taps                 Stepping over hurdles        Cone tap stepping over        Lateral stepping over hurdles/cones                BIG lunges forward and laterally SLR     15x b/l   Seated HS stretch  20 sec x 3 ea      Ball squeezes   5 sec x 30  5 sec x 30 5 sec x 30    Bridges        Clamshells  Hooklying  YTB 5 sec  2 x 10 hooklying GTB 30  hooklying GTB 30  Seated GTB 30   Sidelying H' ABD        TAC; TAC c BKFO  5 sec x 10 TAC 5 sec x 10 TAC  5 sec x 10 TAC  5 sec x 10   TAC c Marching  x 10 ea 20x  30x  Seated 30   TAC Dying Bug        LTR 5 sec x 5 ea  5 sec x 10 ea 5 sec x 10 ea  5 sec x 15 5 sec x 15    2 way piriformis stretch        Guido stretch/Prone quad stretch c SOS        LAQ    20 ea  Seated 30 ea           HEP/EDU Focus on response to exercises; HEP; attendance policy  Importance of completing HEP        Modalities                CP        Estim

## 2021-09-23 ENCOUNTER — APPOINTMENT (OUTPATIENT)
Dept: PHYSICAL THERAPY | Facility: CLINIC | Age: 81
End: 2021-09-23
Payer: COMMERCIAL

## 2021-09-27 ENCOUNTER — ANTICOAG VISIT (OUTPATIENT)
Dept: FAMILY MEDICINE CLINIC | Facility: CLINIC | Age: 81
End: 2021-09-27

## 2021-09-27 ENCOUNTER — APPOINTMENT (OUTPATIENT)
Dept: PHYSICAL THERAPY | Facility: CLINIC | Age: 81
End: 2021-09-27
Payer: COMMERCIAL

## 2021-09-27 LAB — INR PPP: 2.2 (ref 0.84–1.19)

## 2021-09-27 NOTE — PATIENT INSTRUCTIONS
Cristóbal Johnson informed of Ousmane's INR instructions to continue same dose and recheck in 1 week   LL

## 2021-09-29 ENCOUNTER — APPOINTMENT (OUTPATIENT)
Dept: PHYSICAL THERAPY | Facility: CLINIC | Age: 81
End: 2021-09-29
Payer: COMMERCIAL

## 2021-10-08 ENCOUNTER — ANTICOAG VISIT (OUTPATIENT)
Dept: FAMILY MEDICINE CLINIC | Facility: CLINIC | Age: 81
End: 2021-10-08

## 2021-10-08 LAB — INR PPP: 2.4 (ref 0.84–1.19)

## 2021-10-13 ENCOUNTER — ANTICOAG VISIT (OUTPATIENT)
Dept: FAMILY MEDICINE CLINIC | Facility: CLINIC | Age: 81
End: 2021-10-13
Payer: COMMERCIAL

## 2021-10-13 ENCOUNTER — ANTICOAG VISIT (OUTPATIENT)
Dept: FAMILY MEDICINE CLINIC | Facility: CLINIC | Age: 81
End: 2021-10-13

## 2021-10-13 ENCOUNTER — TELEPHONE (OUTPATIENT)
Dept: FAMILY MEDICINE CLINIC | Facility: CLINIC | Age: 81
End: 2021-10-13

## 2021-10-13 LAB
INR PPP: 1.7 (ref 0.84–1.19)
SL AMB POCT INR: 1.7

## 2021-10-13 PROCEDURE — 85610 PROTHROMBIN TIME: CPT | Performed by: FAMILY MEDICINE

## 2021-10-26 ENCOUNTER — TELEPHONE (OUTPATIENT)
Dept: FAMILY MEDICINE CLINIC | Facility: CLINIC | Age: 81
End: 2021-10-26

## 2021-10-26 ENCOUNTER — ANTICOAG VISIT (OUTPATIENT)
Dept: FAMILY MEDICINE CLINIC | Facility: CLINIC | Age: 81
End: 2021-10-26

## 2021-10-26 LAB — INR PPP: 1.6 (ref 0.84–1.19)

## 2021-11-02 ENCOUNTER — TELEPHONE (OUTPATIENT)
Dept: FAMILY MEDICINE CLINIC | Facility: CLINIC | Age: 81
End: 2021-11-02

## 2021-11-02 ENCOUNTER — ANTICOAG VISIT (OUTPATIENT)
Dept: FAMILY MEDICINE CLINIC | Facility: CLINIC | Age: 81
End: 2021-11-02

## 2021-11-02 LAB — INR PPP: 1.6 (ref 0.84–1.19)

## 2021-11-10 ENCOUNTER — TELEPHONE (OUTPATIENT)
Dept: FAMILY MEDICINE CLINIC | Facility: CLINIC | Age: 81
End: 2021-11-10

## 2021-11-16 ENCOUNTER — ANTICOAG VISIT (OUTPATIENT)
Dept: FAMILY MEDICINE CLINIC | Facility: CLINIC | Age: 81
End: 2021-11-16

## 2021-11-16 LAB — INR PPP: 2.4 (ref 0.84–1.19)

## 2021-11-29 DIAGNOSIS — F41.9 ANXIETY: ICD-10-CM

## 2021-11-29 RX ORDER — ALPRAZOLAM 0.5 MG/1
0.5 TABLET ORAL 3 TIMES DAILY PRN
Qty: 90 TABLET | Refills: 0 | Status: SHIPPED | OUTPATIENT
Start: 2021-11-29 | End: 2022-01-20

## 2021-12-02 DIAGNOSIS — M51.36 DDD (DEGENERATIVE DISC DISEASE), LUMBAR: ICD-10-CM

## 2021-12-02 RX ORDER — METHOCARBAMOL 500 MG/1
500 TABLET, FILM COATED ORAL 4 TIMES DAILY PRN
Qty: 30 TABLET | Refills: 1 | Status: SHIPPED | OUTPATIENT
Start: 2021-12-02 | End: 2022-01-20

## 2021-12-14 ENCOUNTER — TELEPHONE (OUTPATIENT)
Dept: FAMILY MEDICINE CLINIC | Facility: CLINIC | Age: 81
End: 2021-12-14

## 2021-12-14 ENCOUNTER — ANTICOAG VISIT (OUTPATIENT)
Dept: FAMILY MEDICINE CLINIC | Facility: CLINIC | Age: 81
End: 2021-12-14

## 2021-12-14 LAB — INR PPP: 2.9 (ref 0.84–1.19)

## 2021-12-21 ENCOUNTER — ANTICOAG VISIT (OUTPATIENT)
Dept: FAMILY MEDICINE CLINIC | Facility: CLINIC | Age: 81
End: 2021-12-21

## 2021-12-21 ENCOUNTER — TELEPHONE (OUTPATIENT)
Dept: FAMILY MEDICINE CLINIC | Facility: CLINIC | Age: 81
End: 2021-12-21

## 2021-12-21 LAB — INR PPP: 2.7 (ref 0.84–1.19)

## 2021-12-29 ENCOUNTER — TELEPHONE (OUTPATIENT)
Dept: FAMILY MEDICINE CLINIC | Facility: CLINIC | Age: 81
End: 2021-12-29

## 2021-12-29 ENCOUNTER — ANTICOAG VISIT (OUTPATIENT)
Dept: FAMILY MEDICINE CLINIC | Facility: CLINIC | Age: 81
End: 2021-12-29

## 2021-12-29 LAB — INR PPP: 2.9 (ref 0.84–1.19)

## 2022-01-04 ENCOUNTER — ANTICOAG VISIT (OUTPATIENT)
Dept: FAMILY MEDICINE CLINIC | Facility: CLINIC | Age: 82
End: 2022-01-04

## 2022-01-04 ENCOUNTER — TELEPHONE (OUTPATIENT)
Dept: FAMILY MEDICINE CLINIC | Facility: CLINIC | Age: 82
End: 2022-01-04

## 2022-01-04 LAB — INR PPP: 2.4 (ref 0.84–1.19)

## 2022-01-10 ENCOUNTER — TELEPHONE (OUTPATIENT)
Dept: FAMILY MEDICINE CLINIC | Facility: CLINIC | Age: 82
End: 2022-01-10

## 2022-01-10 ENCOUNTER — ANTICOAG VISIT (OUTPATIENT)
Dept: FAMILY MEDICINE CLINIC | Facility: CLINIC | Age: 82
End: 2022-01-10

## 2022-01-10 LAB — INR PPP: 2.3 (ref 0.84–1.19)

## 2022-01-12 DIAGNOSIS — I48.21 PERMANENT ATRIAL FIBRILLATION (HCC): ICD-10-CM

## 2022-01-12 DIAGNOSIS — Z79.01 ANTICOAGULATED: ICD-10-CM

## 2022-01-12 RX ORDER — WARFARIN SODIUM 1 MG/1
TABLET ORAL
Qty: 144 TABLET | Refills: 0 | Status: SHIPPED | OUTPATIENT
Start: 2022-01-12 | End: 2022-04-07

## 2022-01-12 RX ORDER — WARFARIN SODIUM 5 MG/1
TABLET ORAL
Qty: 90 TABLET | Refills: 1 | Status: SHIPPED | OUTPATIENT
Start: 2022-01-12

## 2022-01-18 ENCOUNTER — ANTICOAG VISIT (OUTPATIENT)
Dept: FAMILY MEDICINE CLINIC | Facility: CLINIC | Age: 82
End: 2022-01-18

## 2022-01-18 ENCOUNTER — TELEPHONE (OUTPATIENT)
Dept: FAMILY MEDICINE CLINIC | Facility: CLINIC | Age: 82
End: 2022-01-18

## 2022-01-18 LAB — INR PPP: 2.2 (ref 0.84–1.19)

## 2022-01-20 DIAGNOSIS — M51.36 DDD (DEGENERATIVE DISC DISEASE), LUMBAR: ICD-10-CM

## 2022-01-20 DIAGNOSIS — F41.9 ANXIETY: ICD-10-CM

## 2022-01-20 RX ORDER — METHOCARBAMOL 500 MG/1
TABLET, FILM COATED ORAL
Qty: 30 TABLET | Refills: 1 | Status: SHIPPED | OUTPATIENT
Start: 2022-01-20

## 2022-01-20 RX ORDER — ALPRAZOLAM 0.5 MG/1
0.5 TABLET ORAL 3 TIMES DAILY PRN
Qty: 90 TABLET | Refills: 0 | Status: SHIPPED | OUTPATIENT
Start: 2022-01-20 | End: 2022-04-18 | Stop reason: SDUPTHER

## 2022-01-24 ENCOUNTER — TELEPHONE (OUTPATIENT)
Dept: FAMILY MEDICINE CLINIC | Facility: CLINIC | Age: 82
End: 2022-01-24

## 2022-01-24 NOTE — TELEPHONE ENCOUNTER
Submitted prior auth for patient's methocarbamol (ROBAXIN) 500 mg tablet   To Cover My Meds  Awaiting Determination

## 2022-01-24 NOTE — TELEPHONE ENCOUNTER
Patient's methocarbamal was approved through 1/24/24  Adivsed patient's wife - approval sent to centralized faxing

## 2022-01-31 ENCOUNTER — ANTICOAG VISIT (OUTPATIENT)
Dept: FAMILY MEDICINE CLINIC | Facility: CLINIC | Age: 82
End: 2022-01-31

## 2022-01-31 ENCOUNTER — TELEPHONE (OUTPATIENT)
Dept: FAMILY MEDICINE CLINIC | Facility: CLINIC | Age: 82
End: 2022-01-31

## 2022-01-31 LAB — INR PPP: 2.3 (ref 0.84–1.19)

## 2022-02-07 ENCOUNTER — ANTICOAG VISIT (OUTPATIENT)
Dept: FAMILY MEDICINE CLINIC | Facility: CLINIC | Age: 82
End: 2022-02-07

## 2022-02-07 ENCOUNTER — TELEPHONE (OUTPATIENT)
Dept: FAMILY MEDICINE CLINIC | Facility: CLINIC | Age: 82
End: 2022-02-07

## 2022-02-07 LAB — INR PPP: 2.5 (ref 0.84–1.19)

## 2022-02-14 LAB — INR PPP: 3 (ref 0.84–1.19)

## 2022-02-15 ENCOUNTER — ANTICOAG VISIT (OUTPATIENT)
Dept: FAMILY MEDICINE CLINIC | Facility: CLINIC | Age: 82
End: 2022-02-15

## 2022-02-21 ENCOUNTER — TELEPHONE (OUTPATIENT)
Dept: FAMILY MEDICINE CLINIC | Facility: CLINIC | Age: 82
End: 2022-02-21

## 2022-02-21 ENCOUNTER — ANTICOAG VISIT (OUTPATIENT)
Dept: FAMILY MEDICINE CLINIC | Facility: CLINIC | Age: 82
End: 2022-02-21

## 2022-02-21 LAB — INR PPP: 2.6 (ref 0.84–1.19)

## 2022-02-28 ENCOUNTER — TELEPHONE (OUTPATIENT)
Dept: FAMILY MEDICINE CLINIC | Facility: CLINIC | Age: 82
End: 2022-02-28

## 2022-02-28 ENCOUNTER — ANTICOAG VISIT (OUTPATIENT)
Dept: FAMILY MEDICINE CLINIC | Facility: CLINIC | Age: 82
End: 2022-02-28

## 2022-02-28 LAB — INR PPP: 2.6 (ref 0.84–1.19)

## 2022-03-08 ENCOUNTER — TELEPHONE (OUTPATIENT)
Dept: FAMILY MEDICINE CLINIC | Facility: CLINIC | Age: 82
End: 2022-03-08

## 2022-03-08 ENCOUNTER — ANTICOAG VISIT (OUTPATIENT)
Dept: FAMILY MEDICINE CLINIC | Facility: CLINIC | Age: 82
End: 2022-03-08

## 2022-03-08 LAB
INR PPP: 2.4 (ref 0.84–1.19)
INR PPP: 2.4 (ref 0.84–1.19)

## 2022-03-16 ENCOUNTER — TELEPHONE (OUTPATIENT)
Dept: FAMILY MEDICINE CLINIC | Facility: CLINIC | Age: 82
End: 2022-03-16

## 2022-03-16 ENCOUNTER — ANTICOAG VISIT (OUTPATIENT)
Dept: FAMILY MEDICINE CLINIC | Facility: CLINIC | Age: 82
End: 2022-03-16

## 2022-03-16 LAB — INR PPP: 2.6 (ref 0.84–1.19)

## 2022-03-16 NOTE — TELEPHONE ENCOUNTER
Called wife to remind her Nedra Holman is due for his 1 week coumadin check  Toney Sender stated she tested him on Monday and phoned here with the results to be given to you     03/14 2 6    Please advise dosing and testing date  Akua and I will document in chart

## 2022-03-22 ENCOUNTER — ANTICOAG VISIT (OUTPATIENT)
Dept: FAMILY MEDICINE CLINIC | Facility: CLINIC | Age: 82
End: 2022-03-22

## 2022-03-22 LAB — INR PPP: 2.1 (ref 0.84–1.19)

## 2022-03-28 LAB — INR PPP: 2.3 (ref 0.84–1.19)

## 2022-03-31 ENCOUNTER — ANTICOAG VISIT (OUTPATIENT)
Dept: FAMILY MEDICINE CLINIC | Facility: CLINIC | Age: 82
End: 2022-03-31

## 2022-04-04 LAB — INR PPP: 2.5 (ref 0.84–1.19)

## 2022-04-05 ENCOUNTER — ANTICOAG VISIT (OUTPATIENT)
Dept: FAMILY MEDICINE CLINIC | Facility: CLINIC | Age: 82
End: 2022-04-05

## 2022-04-07 DIAGNOSIS — Z79.01 ANTICOAGULATED: ICD-10-CM

## 2022-04-07 RX ORDER — WARFARIN SODIUM 1 MG/1
TABLET ORAL
Qty: 48 TABLET | Refills: 0 | Status: SHIPPED | OUTPATIENT
Start: 2022-04-07 | End: 2022-04-21

## 2022-04-11 ENCOUNTER — TELEPHONE (OUTPATIENT)
Dept: FAMILY MEDICINE CLINIC | Facility: CLINIC | Age: 82
End: 2022-04-11

## 2022-04-11 ENCOUNTER — ANTICOAG VISIT (OUTPATIENT)
Dept: FAMILY MEDICINE CLINIC | Facility: CLINIC | Age: 82
End: 2022-04-11

## 2022-04-11 LAB — INR PPP: 2.2 (ref 0.84–1.19)

## 2022-04-18 ENCOUNTER — ANTICOAG VISIT (OUTPATIENT)
Dept: FAMILY MEDICINE CLINIC | Facility: CLINIC | Age: 82
End: 2022-04-18

## 2022-04-18 ENCOUNTER — TELEPHONE (OUTPATIENT)
Dept: FAMILY MEDICINE CLINIC | Facility: CLINIC | Age: 82
End: 2022-04-18

## 2022-04-18 ENCOUNTER — OFFICE VISIT (OUTPATIENT)
Dept: FAMILY MEDICINE CLINIC | Facility: CLINIC | Age: 82
End: 2022-04-18
Payer: COMMERCIAL

## 2022-04-18 VITALS
TEMPERATURE: 97.4 F | HEART RATE: 78 BPM | WEIGHT: 205.12 LBS | OXYGEN SATURATION: 97 % | SYSTOLIC BLOOD PRESSURE: 132 MMHG | HEIGHT: 72 IN | DIASTOLIC BLOOD PRESSURE: 80 MMHG | BODY MASS INDEX: 27.78 KG/M2

## 2022-04-18 DIAGNOSIS — E78.49 OTHER HYPERLIPIDEMIA: ICD-10-CM

## 2022-04-18 DIAGNOSIS — M54.31 SCIATICA, RIGHT SIDE: ICD-10-CM

## 2022-04-18 DIAGNOSIS — I10 ESSENTIAL HYPERTENSION: ICD-10-CM

## 2022-04-18 DIAGNOSIS — I10 BENIGN ESSENTIAL HYPERTENSION: ICD-10-CM

## 2022-04-18 DIAGNOSIS — G40.919 INTRACTABLE EPILEPSY WITHOUT STATUS EPILEPTICUS, UNSPECIFIED EPILEPSY TYPE (HCC): ICD-10-CM

## 2022-04-18 DIAGNOSIS — F41.9 ANXIETY: ICD-10-CM

## 2022-04-18 DIAGNOSIS — N18.31 STAGE 3A CHRONIC KIDNEY DISEASE (HCC): ICD-10-CM

## 2022-04-18 DIAGNOSIS — R35.0 URINARY FREQUENCY: ICD-10-CM

## 2022-04-18 DIAGNOSIS — M51.36 DDD (DEGENERATIVE DISC DISEASE), LUMBAR: ICD-10-CM

## 2022-04-18 DIAGNOSIS — I48.21 PERMANENT ATRIAL FIBRILLATION (HCC): ICD-10-CM

## 2022-04-18 DIAGNOSIS — Z00.00 MEDICARE ANNUAL WELLNESS VISIT, SUBSEQUENT: Primary | ICD-10-CM

## 2022-04-18 PROBLEM — M54.41 CHRONIC BILATERAL LOW BACK PAIN WITH RIGHT-SIDED SCIATICA: Status: ACTIVE | Noted: 2021-08-15

## 2022-04-18 LAB
INR PPP: 2.2 (ref 0.84–1.19)
INR PPP: 2.2 (ref 0.84–1.19)

## 2022-04-18 PROCEDURE — 1160F RVW MEDS BY RX/DR IN RCRD: CPT | Performed by: FAMILY MEDICINE

## 2022-04-18 PROCEDURE — 1101F PT FALLS ASSESS-DOCD LE1/YR: CPT | Performed by: FAMILY MEDICINE

## 2022-04-18 PROCEDURE — 1036F TOBACCO NON-USER: CPT | Performed by: FAMILY MEDICINE

## 2022-04-18 PROCEDURE — 1125F AMNT PAIN NOTED PAIN PRSNT: CPT | Performed by: FAMILY MEDICINE

## 2022-04-18 PROCEDURE — 3079F DIAST BP 80-89 MM HG: CPT | Performed by: FAMILY MEDICINE

## 2022-04-18 PROCEDURE — 3288F FALL RISK ASSESSMENT DOCD: CPT | Performed by: FAMILY MEDICINE

## 2022-04-18 PROCEDURE — 1170F FXNL STATUS ASSESSED: CPT | Performed by: FAMILY MEDICINE

## 2022-04-18 PROCEDURE — G0439 PPPS, SUBSEQ VISIT: HCPCS | Performed by: FAMILY MEDICINE

## 2022-04-18 PROCEDURE — 3725F SCREEN DEPRESSION PERFORMED: CPT | Performed by: FAMILY MEDICINE

## 2022-04-18 PROCEDURE — 3075F SYST BP GE 130 - 139MM HG: CPT | Performed by: FAMILY MEDICINE

## 2022-04-18 PROCEDURE — 99214 OFFICE O/P EST MOD 30 MIN: CPT | Performed by: FAMILY MEDICINE

## 2022-04-18 RX ORDER — GABAPENTIN 100 MG/1
CAPSULE ORAL
Qty: 90 CAPSULE | Refills: 0 | Status: SHIPPED | OUTPATIENT
Start: 2022-04-18 | End: 2022-05-25

## 2022-04-18 RX ORDER — ALPRAZOLAM 0.5 MG/1
0.5 TABLET ORAL 3 TIMES DAILY PRN
Qty: 90 TABLET | Refills: 0 | Status: SHIPPED | OUTPATIENT
Start: 2022-04-18

## 2022-04-18 NOTE — PROGRESS NOTES
Assessment and Plan:     Problem List Items Addressed This Visit        Cardiovascular and Mediastinum    Atrial fibrillation (HCC)     Controlled rate         Benign essential hypertension     Controlled, continue current medication         Essential hypertension       Nervous and Auditory    Epilepsy ([facility] [address] )    Relevant Medications    gabapentin (NEURONTIN) 100 mg capsule    Sciatica, right side     Complains of right leg pain going down to right foot  Trial gabapentin 100mg and titrate gradually  If no help, xray and pain management evaluation         Relevant Medications    gabapentin (NEURONTIN) 100 mg capsule       Musculoskeletal and Integument    DDD (degenerative disc disease), lumbar       Genitourinary    Chronic kidney disease (CKD), stage III (moderate) (HCC)     Lab Results   Component Value Date    CREATININE 1 34 (H) 06/11/2018    CREATININE 1 49 (H) 08/29/2016    CREATININE 1 60 (H) 05/22/2015   had blood work done recently by Dr Jos Lopez, cardiology            Other    Anxiety    Relevant Medications    ALPRAZolam (XANAX) 0 5 mg tablet    Hyperlipidemia      Other Visit Diagnoses     Medicare annual wellness visit, subsequent    -  Primary    Urinary frequency        Relevant Orders    PSA, total and free        BMI Counseling: Body mass index is 27 82 kg/m²  The BMI is above normal  Nutrition recommendations include decreasing portion sizes  Exercise recommendations include exercising 3-5 times per week  No pharmacotherapy was ordered  Rationale for BMI follow-up plan is due to patient being overweight or obese  Depression Screening and Follow-up Plan: Patient was screened for depression during today's encounter  They screened negative with a PHQ-2 score of 2  Preventive health issues were discussed with patient, and age appropriate screening tests were ordered as noted in patient's After Visit Summary    Personalized health advice and appropriate referrals for health education or preventive services given if needed, as noted in patient's After Visit Summary       History of Present Illness:     Patient presents for Medicare Annual Wellness visit    Patient Care Team:  Lydia Joseph DO as PCP - General  Lydia Joseph DO     Problem List:     Patient Active Problem List   Diagnosis    Anxiety    Arthralgia    Asymptomatic cholelithiasis    Atrial fibrillation ([facility] [address] )    Benign essential hypertension    Benign prostatic hyperplasia    Bilateral pseudophakia    Chronic kidney disease, stage 3 ([facility] [address] )    Coronary arteriosclerosis    Epilepsy ([facility] [address] )    Gait instability    Hiatal hernia    Hyperlipidemia    Frequent falls    Weakness    Chronic kidney disease (CKD), stage III (moderate) ([facility])    Age-related cataract of right eye    Macular drusen    Posterior vitreous detachment of both eyes    Primary osteoarthritis of left hip    Osteoarthritis of lumbar spine    Essential hypertension    Luetscher's syndrome    Chronic bilateral low back pain with right-sided sciatica    DDD (degenerative disc disease), lumbar    Sciatica, right side      Past Medical and Surgical History:     Past Medical History:   Diagnosis Date    Anemia     last assessed: 05/23/2015    GERD (gastroesophageal reflux disease)     Hematuria     Intracerebral hemorrhage ([facility] [address] )     last assessed: 04/21/2014; right    Intracerebral hemorrhage ([facility] [address] )     last assessed: 11/23/2017    Stroke syndrome     last assessed: 06/13/2013     Past Surgical History:   Procedure Laterality Date    BLADDER SURGERY        Family History:     Family History   Problem Relation Age of Onset    Lung cancer Mother       Social History:     Social History     Socioeconomic History    Marital status: /Civil Union     Spouse name: None    Number of children: None    Years of education: None    Highest education level: None   Occupational History    None   Tobacco Use    Smoking status: Former Smoker     Types: Cigars  Smokeless tobacco: Current User   Vaping Use    Vaping Use: Never used   Substance and Sexual Activity    Alcohol use: Yes     Comment: Social     Drug use: No    Sexual activity: None   Other Topics Concern    None   Social History Narrative    None     Social Determinants of Health     Financial Resource Strain: Not on file   Food Insecurity: Not on file   Transportation Needs: Not on file   Physical Activity: Not on file   Stress: Not on file   Social Connections: Not on file   Intimate Partner Violence: Not on file   Housing Stability: Not on file      Medications and Allergies:     Current Outpatient Medications   Medication Sig Dispense Refill    ALPRAZolam (XANAX) 0 5 mg tablet Take 1 tablet (0 5 mg total) by mouth 3 (three) times a day as needed for anxiety 90 tablet 0    amLODIPine (NORVASC) 5 mg tablet Take 5 mg by mouth daily  3    Lancets MISC by Does not apply route      methocarbamol (ROBAXIN) 500 mg tablet TAKE 1 TABLET BY MOUTH 4 TIMES A DAY AS NEEDED FOR MUSCLE SPASMS  30 tablet 1    metoprolol tartrate (LOPRESSOR) 50 mg tablet       rosuvastatin (CRESTOR) 5 mg tablet       warfarin (COUMADIN) 1 mg tablet TAKE 1MG TAB ON TUESDAY AND THURSDAY AND 2 TABS OTHER DAYS, COMBINING WITH 5MG TAB 48 tablet 0    warfarin (COUMADIN) 5 mg tablet TAKE 1 TABLET BY MOUTH EVERY DAY 90 tablet 1    gabapentin (NEURONTIN) 100 mg capsule Take 1 tab daily for 1 week, then 2 tabs daily in am for 1 week, then 3 tabs daily in am 90 capsule 0     No current facility-administered medications for this visit       No Known Allergies   Immunizations:     Immunization History   Administered Date(s) Administered    COVID-19 MODERNA VACC 0 5 ML IM 02/12/2021, 03/12/2021, 11/15/2021    INFLUENZA 10/03/2018    Influenza Split High Dose Preservative Free IM 09/22/2017, 10/03/2018    Influenza, high dose seasonal 0 7 mL 10/13/2020    Influenza, seasonal, injectable 10/11/2014, 10/08/2016    Pneumococcal Conjugate 13-Valent 11/22/2017    Pneumococcal Polysaccharide PPV23 04/21/2014    influenza, trivalent, adjuvanted 10/10/2019      Health Maintenance: There are no preventive care reminders to display for this patient  There are no preventive care reminders to display for this patient  Medicare Health Risk Assessment:     /80   Pulse 78   Temp (!) 97 4 °F (36 3 °C)   Ht 6' (1 829 m)   Wt 93 kg (205 lb 1 9 oz)   SpO2 97%   BMI 27 82 kg/m²      Gene Chakraborty is here for his Subsequent Wellness visit  Last Medicare Wellness visit information reviewed, patient interviewed and updates made to the record today  Health Risk Assessment:   Patient rates overall health as good  Patient feels that their physical health rating is much worse  Patient is satisfied with their life  Eyesight was rated as same  Hearing was rated as same  Patient feels that their emotional and mental health rating is same  Patients states they are sometimes angry  Patient states they are sometimes unusually tired/fatigued  Pain experienced in the last 7 days has been a lot  Patient's pain rating has been 8/10  Patient states that he has experienced no weight loss or gain in last 6 months  Depression Screening:   PHQ-2 Score: 2      Fall Risk Screening: In the past year, patient has experienced: history of falling in past year    Number of falls: 1  Injured during fall?: No    Feels unsteady when standing or walking?: Yes    Worried about falling?: Yes      Home Safety:  Patient does not have trouble with stairs inside or outside of their home  Patient has working smoke alarms and has working carbon monoxide detector  Home safety hazards include: none  Nutrition:   Current diet is Regular  Medications:   Patient is currently taking over-the-counter supplements  OTC medications include: see medication list  Patient is able to manage medications   With help    Activities of Daily Living (ADLs)/Instrumental Activities of Daily Living (IADLs):   Walk and transfer into and out of bed and chair?: Yes  Dress and groom yourself?: Yes    Bathe or shower yourself?: Yes    Feed yourself? Yes  Do your laundry/housekeeping?: Yes  Manage your money, pay your bills and track your expenses?: Yes  Make your own meals?: Yes    Do your own shopping?: No    Previous Hospitalizations:   Any hospitalizations or ED visits within the last 12 months?: No      Advance Care Planning:   Living will: Yes    Durable POA for healthcare: Yes    Advanced directive: Yes      Cognitive Screening:   Provider or family/friend/caregiver concerned regarding cognition?: No    PREVENTIVE SCREENINGS      Cardiovascular Screening:    General: Screening Not Indicated and History Lipid Disorder      Prostate Cancer Screening:    General: Screening Not Indicated      Abdominal Aortic Aneurysm (AAA) Screening:    Risk factors include: tobacco use        Lung Cancer Screening:     General: Screening Not Indicated    Screening, Brief Intervention, and Referral to Treatment (SBIRT)    Screening  Typical number of drinks in a day: 2  Typical number of drinks in a week: 14  Interpretation: Low risk drinking behavior      Single Item Drug Screening:  How often have you used an illegal drug (including marijuana) or a prescription medication for non-medical reasons in the past year? never    Single Item Drug Screen Score: 0  Interpretation: Negative screen for possible drug use disorder      Espinoza Casper, DO

## 2022-04-18 NOTE — PATIENT INSTRUCTIONS
Gabapentin 100mg 1 tab daily in am for 1 week, 2 tabs in am daily for 1 week then 3 tabs daily    If no help then xray lumbar spine       Medicare Preventive Visit Patient Instructions  Thank you for completing your Welcome to Medicare Visit or Medicare Annual Wellness Visit today  Your next wellness visit will be due in one year (4/19/2023)  The screening/preventive services that you may require over the next 5-10 years are detailed below  Some tests may not apply to you based off risk factors and/or age  Screening tests ordered at today's visit but not completed yet may show as past due  Also, please note that scanned in results may not display below  Preventive Screenings:  Service Recommendations Previous Testing/Comments   Colorectal Cancer Screening  · Colonoscopy    · Fecal Occult Blood Test (FOBT)/Fecal Immunochemical Test (FIT)  · Fecal DNA/Cologuard Test  · Flexible Sigmoidoscopy Age: 54-65 years old   Colonoscopy: every 10 years (May be performed more frequently if at higher risk)  OR  FOBT/FIT: every 1 year  OR  Cologuard: every 3 years  OR  Sigmoidoscopy: every 5 years  Screening may be recommended earlier than age 48 if at higher risk for colorectal cancer  Also, an individualized decision between you and your healthcare provider will decide whether screening between the ages of 74-80 would be appropriate   Colonoscopy: Not on file  FOBT/FIT: Not on file  Cologuard: Not on file  Sigmoidoscopy: Not on file          Prostate Cancer Screening Individualized decision between patient and health care provider in men between ages of 53-78   Medicare will cover every 12 months beginning on the day after your 50th birthday PSA: No results in last 5 years     Screening Not Indicated     Hepatitis C Screening Once for adults born between [facility]  More frequently in patients at high risk for Hepatitis C Hep C Antibody: Not on file        Diabetes Screening 1-2 times per year if you're at risk for diabetes or have pre-diabetes Fasting glucose: No results in last 5 years   A1C: No results in last 5 years        Cholesterol Screening Once every 5 years if you don't have a lipid disorder  May order more often based on risk factors  Lipid panel: 06/11/2018    Screening Not Indicated  History Lipid Disorder      Other Preventive Screenings Covered by Medicare:  1  Abdominal Aortic Aneurysm (AAA) Screening: covered once if your at risk  You're considered to be at risk if you have a family history of AAA or a male between the age of 73-68 who smoking at least 100 cigarettes in your lifetime  2  Lung Cancer Screening: covers low dose CT scan once per year if you meet all of the following conditions: (1) Age 50-69; (2) No signs or symptoms of lung cancer; (3) Current smoker or have quit smoking within the last 15 years; (4) You have a tobacco smoking history of at least 30 pack years (packs per day x number of years you smoked); (5) You get a written order from a healthcare provider  3  Glaucoma Screening: covered annually if you're considered high risk: (1) You have diabetes OR (2) Family history of glaucoma OR (3)  aged 48 and older OR (3)  American aged 72 and older  3  Osteoporosis Screening: covered every 2 years if you meet one of the following conditions: (1) Have a vertebral abnormality; (2) On glucocorticoid therapy for more than 3 months; (3) Have primary hyperparathyroidism; (4) On osteoporosis medications and need to assess response to drug therapy  5  HIV Screening: covered annually if you're between the age of 12-76  Also covered annually if you are younger than 13 and older than 72 with risk factors for HIV infection  For pregnant patients, it is covered up to 3 times per pregnancy      Immunizations:  Immunization Recommendations   Influenza Vaccine Annual influenza vaccination during flu season is recommended for all persons aged >= 6 months who do not have contraindications Pneumococcal Vaccine (Prevnar and Pneumovax)  * Prevnar = PCV13  * Pneumovax = PPSV23 Adults 25-60 years old: 1-3 doses may be recommended based on certain risk factors  Adults 72 years old: Prevnar (PCV13) vaccine recommended followed by Pneumovax (PPSV23) vaccine  If already received PPSV23 since turning 65, then PCV13 recommended at least one year after PPSV23 dose  Hepatitis B Vaccine 3 dose series if at intermediate or high risk (ex: diabetes, end stage renal disease, liver disease)   Tetanus (Td) Vaccine - COST NOT COVERED BY MEDICARE PART B Following completion of primary series, a booster dose should be given every 10 years to maintain immunity against tetanus  Td may also be given as tetanus wound prophylaxis  Tdap Vaccine - COST NOT COVERED BY MEDICARE PART B Recommended at least once for all adults  For pregnant patients, recommended with each pregnancy  Shingles Vaccine (Shingrix) - COST NOT COVERED BY MEDICARE PART B  2 shot series recommended in those aged 48 and above     Health Maintenance Due:  There are no preventive care reminders to display for this patient  Immunizations Due:  There are no preventive care reminders to display for this patient  Advance Directives   What are advance directives? Advance directives are legal documents that state your wishes and plans for medical care  These plans are made ahead of time in case you lose your ability to make decisions for yourself  Advance directives can apply to any medical decision, such as the treatments you want, and if you want to donate organs  What are the types of advance directives? There are many types of advance directives, and each state has rules about how to use them  You may choose a combination of any of the following:  · Living will: This is a written record of the treatment you want  You can also choose which treatments you do not want, which to limit, and which to stop at a certain time   This includes surgery, medicine, IV fluid, and tube feedings  · Durable power of  for healthcare [facility]): This is a written record that states who you want to make healthcare choices for you when you are unable to make them for yourself  This person, called a proxy, is usually a family member or a friend  You may choose more than 1 proxy  · Do not resuscitate (DNR) order:  A DNR order is used in case your heart stops beating or you stop breathing  It is a request not to have certain forms of treatment, such as CPR  A DNR order may be included in other types of advance directives  · Medical directive: This covers the care that you want if you are in a coma, near death, or unable to make decisions for yourself  You can list the treatments you want for each condition  Treatment may include pain medicine, surgery, blood transfusions, dialysis, IV or tube feedings, and a ventilator (breathing machine)  · Values history: This document has questions about your views, beliefs, and how you feel and think about life  This information can help others choose the care that you would choose  Why are advance directives important? An advance directive helps you control your care  Although spoken wishes may be used, it is better to have your wishes written down  Spoken wishes can be misunderstood, or not followed  Treatments may be given even if you do not want them  An advance directive may make it easier for your family to make difficult choices about your care  Fall Prevention    Fall prevention  includes ways to make your home and other areas safer  It also includes ways you can move more carefully to prevent a fall  Health conditions that cause changes in your blood pressure, vision, or muscle strength and coordination may increase your risk for falls  Medicines may also increase your risk for falls if they make you dizzy, weak, or sleepy  Fall prevention tips:   · Stand or sit up slowly  · Use assistive devices as directed  · Wear shoes that fit well and have soles that   · Wear a personal alarm  · Stay active  · Manage your medical conditions  Home Safety Tips:  · Add items to prevent falls in the bathroom  · Keep paths clear  · Install bright lights in your home  · Keep items you use often on shelves within reach  · Paint or place reflective tape on the edges of your stairs  How to Quit Using Smokeless Tobacco   Why it is important to stop using smokeless tobacco:  Smokeless tobacco comes in many forms  Examples include chew, snuff, dip, dissolvable tobacco, and snus  All smokeless tobacco products contain nicotine and may contain as much nicotine as 3 cigarettes  You may be physically dependent on nicotine  You may also be emotionally addicted to it  The cravings can be strong, but it is important to quit using smokeless tobacco  You will improve your health and decrease your cancer, stroke, and heart attack risk  Mouth sores and tooth problems will also improve when you quit  You can benefit from quitting no matter how long you have used smokeless tobacco    Prepare to stop using smokeless tobacco:  Nicotine is a highly addictive drug  Withdrawal symptoms can happen when you stop and make it hard to quit  The following can help keep you on track:  · Set a quit date  · Tell friends, family, and coworkers that you plan to quit  · Remove all smokeless tobacco products from your home, car, and workplace  Manage weight gain after you quit:  Nicotine can affect your metabolism  You may gain a few pounds after you quit  The following can help you control your weight:  · Eat healthy foods  · Drink water before, during, and between meals  · Exercise as directed  Weight Management   Why it is important to manage your weight:  Being overweight increases your risk of health conditions such as heart disease, high blood pressure, type 2 diabetes, and certain types of cancer   It can also increase your risk for osteoarthritis, sleep apnea, and other respiratory problems  Aim for a slow, steady weight loss  Even a small amount of weight loss can lower your risk of health problems  How to lose weight safely:  A safe and healthy way to lose weight is to eat fewer calories and get regular exercise  You can lose up about 1 pound a week by decreasing the number of calories you eat by 500 calories each day  Healthy meal plan for weight management:  A healthy meal plan includes a variety of foods, contains fewer calories, and helps you stay healthy  A healthy meal plan includes the following:  · Eat whole-grain foods more often  A healthy meal plan should contain fiber  Fiber is the part of grains, fruits, and vegetables that is not broken down by your body  Whole-grain foods are healthy and provide extra fiber in your diet  Some examples of whole-grain foods are whole-wheat breads and pastas, oatmeal, brown rice, and bulgur  · Eat a variety of vegetables every day  Include dark, leafy greens such as spinach, kale, dominga greens, and mustard greens  Eat yellow and orange vegetables such as carrots, sweet potatoes, and winter squash  · Eat a variety of fruits every day  Choose fresh or canned fruit (canned in its own juice or light syrup) instead of juice  Fruit juice has very little or no fiber  · Eat low-fat dairy foods  Drink fat-free (skim) milk or 1% milk  Eat fat-free yogurt and low-fat cottage cheese  Try low-fat cheeses such as mozzarella and other reduced-fat cheeses  · Choose meat and other protein foods that are low in fat  Choose beans or other legumes such as split peas or lentils  Choose fish, skinless poultry (chicken or turkey), or lean cuts of red meat (beef or pork)  Before you cook meat or poultry, cut off any visible fat  · Use less fat and oil  Try baking foods instead of frying them   Add less fat, such as margarine, sour cream, regular salad dressing and mayonnaise to foods  Eat fewer high-fat foods  Some examples of high-fat foods include french fries, doughnuts, ice cream, and cakes  · Eat fewer sweets  Limit foods and drinks that are high in sugar  This includes candy, cookies, regular soda, and sweetened drinks  Exercise:  Exercise at least 30 minutes per day on most days of the week  Some examples of exercise include walking, biking, dancing, and swimming  You can also fit in more physical activity by taking the stairs instead of the elevator or parking farther away from stores  Ask your healthcare provider about the best exercise plan for you  © Copyright "[employer] 2018 Information is for End User's use only and may not be sold, redistributed or otherwise used for commercial purposes   All illustrations and images included in CareNotes® are the copyrighted property of [employer] M , Inc  or [address] [place], [place] [address]paB[facility]rological Institute

## 2022-04-19 NOTE — ASSESSMENT & PLAN NOTE
Complains of right leg pain going down to right foot  Trial gabapentin 100mg and titrate gradually   If no help, xray and pain management evaluation

## 2022-04-19 NOTE — PROGRESS NOTES
Assessment/Plan:      1  Medicare annual wellness visit, subsequent    2  Sciatica, right side  Assessment & Plan:  Complains of right leg pain going down to right foot  Trial gabapentin 100mg and titrate gradually  If no help, xray and pain management evaluation    Orders:  -     gabapentin (NEURONTIN) 100 mg capsule; Take 1 tab daily for 1 week, then 2 tabs daily in am for 1 week, then 3 tabs daily in am    3  Permanent atrial fibrillation (HCC)  Assessment & Plan:  Controlled rate      4  Benign essential hypertension  Assessment & Plan:  Controlled, continue current medication      5  Anxiety  -     ALPRAZolam (XANAX) 0 5 mg tablet; Take 1 tablet (0 5 mg total) by mouth 3 (three) times a day as needed for anxiety    6  Other hyperlipidemia    7  Essential hypertension    8  Urinary frequency  -     PSA, total and free; Future  -     PSA, total and free    9  Intractable epilepsy without status epilepticus, unspecified epilepsy type ([facility] [address] )    10  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 ([facility]  Assessment & Plan:  Lab Results   Component Value Date    CREATININE 1 34 (H) 06/11/2018    CREATININE 1 49 (H) 08/29/2016    CREATININE 1 60 (H) 05/22/2015   had blood work done recently by Dr Genaro Wadsworth, cardiology      11  DDD (degenerative disc disease), lumbar        Subjective:  Chief Complaint   Patient presents with   [facility] Wellness Visit     pt here for med check and AWV         Patient ID: Teresa Plaza is a 81 y o  male  Pt is seen for a medicare wellness and follow up on chronic conditions  Pt denies chest pain or shortness of breath  Complains of right leg pain from low back to foot  Last xray reviewed 12/7/2020 with degenerative discs and arthritis  He complains of right knee pain gave out 2 times  Seen by Dr Genaro Wadsworth and had blood work done  Review of Systems   Constitutional: Negative  Negative for fatigue and fever  HENT: Negative  Eyes: Negative  Respiratory: Negative    Negative for cough and shortness of breath  Cardiovascular: Negative  Negative for chest pain  Gastrointestinal: Negative  Endocrine: Negative  Genitourinary: Negative  Musculoskeletal: Positive for back pain  Right leg pain   Skin: Negative  Allergic/Immunologic: Negative  Neurological: Negative  Psychiatric/Behavioral: Negative  The following portions of the patient's history were reviewed and updated as appropriate: allergies, current medications, past family history, past medical history, past social history, past surgical history and problem list     Objective:  Vitals:    04/18/22 1545   BP: 132/80   Pulse: 78   Temp: (!) 97 4 °F (36 3 °C)   SpO2: 97%   Weight: 93 kg (205 lb 1 9 oz)   Height: 6' (1 829 m)      Physical Exam  Vitals and nursing note reviewed  Constitutional:       Appearance: He is well-developed  HENT:      Head: Normocephalic and atraumatic  Cardiovascular:      Rate and Rhythm: Normal rate  Rhythm irregular  Heart sounds: Normal heart sounds  Pulmonary:      Effort: Pulmonary effort is normal       Breath sounds: Normal breath sounds  Abdominal:      General: Bowel sounds are normal       Palpations: Abdomen is soft  Musculoskeletal:         General: No swelling, tenderness, deformity or signs of injury  Skin:     General: Skin is warm and dry  Neurological:      Mental Status: He is alert and oriented to person, place, and time  Psychiatric:         Behavior: Behavior normal          Thought Content:  Thought content normal          Judgment: Judgment normal

## 2022-04-19 NOTE — ASSESSMENT & PLAN NOTE
Lab Results   Component Value Date    CREATININE 1 34 (H) 06/11/2018    CREATININE 1 49 (H) 08/29/2016    CREATININE 1 60 (H) 05/22/2015   had blood work done recently by Dr Chary Li, cardiology

## 2022-04-25 LAB — INR PPP: 2 (ref 0.84–1.19)

## 2022-04-28 ENCOUNTER — TELEPHONE (OUTPATIENT)
Dept: FAMILY MEDICINE CLINIC | Facility: CLINIC | Age: 82
End: 2022-04-28

## 2022-04-28 ENCOUNTER — ANTICOAG VISIT (OUTPATIENT)
Dept: FAMILY MEDICINE CLINIC | Facility: CLINIC | Age: 82
End: 2022-04-28

## 2022-04-28 LAB
PSA FREE MFR SERPL: 36.9 %
PSA FREE SERPL-MCNC: 0.59 NG/ML
PSA SERPL-MCNC: 1.6 NG/ML (ref 0–4)

## 2022-04-28 NOTE — TELEPHONE ENCOUNTER
Called and lvm, with INR results was 2 0, dr Darlen Spatz to continue same dose and re check on 1 week may 2th

## 2022-04-28 NOTE — TELEPHONE ENCOUNTER
----- Message from Morales Núñez DO sent at 4/28/2022  1:22 PM EDT -----  Your prostate level is good  Recheck in 1 year

## 2022-04-29 ENCOUNTER — TELEPHONE (OUTPATIENT)
Dept: FAMILY MEDICINE CLINIC | Facility: CLINIC | Age: 82
End: 2022-04-29

## 2022-04-29 NOTE — TELEPHONE ENCOUNTER
Pt's wife call requesting a form for pt to be completed for handicap parking, [facility] states pt has a hard time walking and lifting his legs, pt last ov (AWV) on 4/18/2022, form given to [facility])

## 2022-05-09 LAB — INR PPP: 2.2 (ref 0.84–1.19)

## 2022-05-10 ENCOUNTER — ANTICOAG VISIT (OUTPATIENT)
Dept: FAMILY MEDICINE CLINIC | Facility: CLINIC | Age: 82
End: 2022-05-10

## 2022-05-11 ENCOUNTER — TELEPHONE (OUTPATIENT)
Dept: FAMILY MEDICINE CLINIC | Facility: CLINIC | Age: 82
End: 2022-05-11

## 2022-05-11 ENCOUNTER — ANTICOAG VISIT (OUTPATIENT)
Dept: FAMILY MEDICINE CLINIC | Facility: CLINIC | Age: 82
End: 2022-05-11

## 2022-05-11 LAB — INR PPP: 2.2 (ref 0.84–1.19)

## 2022-05-23 LAB — INR PPP: 2.7 (ref 0.84–1.19)

## 2022-05-24 DIAGNOSIS — G89.29 CHRONIC BILATERAL LOW BACK PAIN WITHOUT SCIATICA: ICD-10-CM

## 2022-05-24 DIAGNOSIS — M54.31 SCIATICA, RIGHT SIDE: Primary | ICD-10-CM

## 2022-05-24 DIAGNOSIS — M16.12 PRIMARY OSTEOARTHRITIS OF LEFT HIP: ICD-10-CM

## 2022-05-24 DIAGNOSIS — M47.816 OSTEOARTHRITIS OF LUMBAR SPINE, UNSPECIFIED SPINAL OSTEOARTHRITIS COMPLICATION STATUS: ICD-10-CM

## 2022-05-24 DIAGNOSIS — G89.29 CHRONIC MIDLINE LOW BACK PAIN WITHOUT SCIATICA: ICD-10-CM

## 2022-05-24 DIAGNOSIS — M51.36 DDD (DEGENERATIVE DISC DISEASE), LUMBAR: ICD-10-CM

## 2022-05-24 DIAGNOSIS — M54.50 CHRONIC BILATERAL LOW BACK PAIN WITHOUT SCIATICA: ICD-10-CM

## 2022-05-24 DIAGNOSIS — M54.50 CHRONIC MIDLINE LOW BACK PAIN WITHOUT SCIATICA: ICD-10-CM

## 2022-05-25 DIAGNOSIS — M54.31 SCIATICA, RIGHT SIDE: ICD-10-CM

## 2022-05-25 RX ORDER — GABAPENTIN 100 MG/1
CAPSULE ORAL
Qty: 90 CAPSULE | Refills: 0 | Status: SHIPPED | OUTPATIENT
Start: 2022-05-25 | End: 2022-06-21

## 2022-05-27 ENCOUNTER — ANTICOAG VISIT (OUTPATIENT)
Dept: FAMILY MEDICINE CLINIC | Facility: CLINIC | Age: 82
End: 2022-05-27

## 2022-05-30 LAB — INR PPP: 3.1 (ref 0.84–1.19)

## 2022-06-01 ENCOUNTER — ANTICOAG VISIT (OUTPATIENT)
Dept: FAMILY MEDICINE CLINIC | Facility: CLINIC | Age: 82
End: 2022-06-01

## 2022-06-01 ENCOUNTER — TELEPHONE (OUTPATIENT)
Dept: FAMILY MEDICINE CLINIC | Facility: CLINIC | Age: 82
End: 2022-06-01

## 2022-06-01 DIAGNOSIS — M25.551 CHRONIC RIGHT HIP PAIN: Primary | ICD-10-CM

## 2022-06-01 DIAGNOSIS — G89.29 CHRONIC RIGHT HIP PAIN: Primary | ICD-10-CM

## 2022-06-03 ENCOUNTER — HOSPITAL ENCOUNTER (OUTPATIENT)
Dept: RADIOLOGY | Facility: HOSPITAL | Age: 82
Discharge: HOME | End: 2022-06-03
Payer: COMMERCIAL

## 2022-06-03 DIAGNOSIS — M47.816 OSTEOARTHRITIS OF LUMBAR SPINE, UNSPECIFIED SPINAL OSTEOARTHRITIS COMPLICATION STATUS: ICD-10-CM

## 2022-06-03 DIAGNOSIS — M54.31 SCIATICA, RIGHT SIDE: ICD-10-CM

## 2022-06-03 DIAGNOSIS — G89.29 CHRONIC MIDLINE LOW BACK PAIN WITHOUT SCIATICA: ICD-10-CM

## 2022-06-03 DIAGNOSIS — M25.551 CHRONIC RIGHT HIP PAIN: ICD-10-CM

## 2022-06-03 DIAGNOSIS — G89.29 CHRONIC RIGHT HIP PAIN: ICD-10-CM

## 2022-06-03 DIAGNOSIS — M54.50 CHRONIC MIDLINE LOW BACK PAIN WITHOUT SCIATICA: ICD-10-CM

## 2022-06-03 PROCEDURE — 73502 X-RAY EXAM HIP UNI 2-3 VIEWS: CPT

## 2022-06-03 PROCEDURE — 72110 X-RAY EXAM L-2 SPINE 4/>VWS: CPT

## 2022-06-03 PROCEDURE — 73562 X-RAY EXAM OF KNEE 3: CPT

## 2022-06-08 ENCOUNTER — TELEPHONE (OUTPATIENT)
Dept: FAMILY MEDICINE CLINIC | Facility: CLINIC | Age: 82
End: 2022-06-08

## 2022-06-08 NOTE — TELEPHONE ENCOUNTER
----- Message from Constnatin Arthur DO sent at 6/8/2022 12:39 PM EDT -----  Cedrick Baca of his knee is ok

## 2022-06-13 LAB — INR PPP: 2.2 (ref 0.84–1.19)

## 2022-06-15 ENCOUNTER — ANTICOAG VISIT (OUTPATIENT)
Dept: FAMILY MEDICINE CLINIC | Facility: CLINIC | Age: 82
End: 2022-06-15

## 2022-06-24 ENCOUNTER — TELEPHONE (OUTPATIENT)
Dept: FAMILY MEDICINE CLINIC | Facility: CLINIC | Age: 82
End: 2022-06-24

## 2022-06-24 NOTE — TELEPHONE ENCOUNTER
----- Message from Morales Núñez DO sent at 6/24/2022 12:30 AM EDT -----  I am not sure if this is a duplicate result  The warfarin level is good, repeat next week

## 2022-07-04 LAB — INR PPP: 1.9 (ref 0.84–1.19)

## 2022-07-08 ENCOUNTER — ANTICOAG VISIT (OUTPATIENT)
Dept: FAMILY MEDICINE CLINIC | Facility: CLINIC | Age: 82
End: 2022-07-08

## 2022-07-18 ENCOUNTER — ANTICOAG VISIT (OUTPATIENT)
Dept: FAMILY MEDICINE CLINIC | Facility: CLINIC | Age: 82
End: 2022-07-18
Payer: COMMERCIAL

## 2022-07-18 DIAGNOSIS — M54.31 SCIATICA, RIGHT SIDE: ICD-10-CM

## 2022-07-18 LAB — SL AMB POCT INR: 1.7

## 2022-07-18 PROCEDURE — 85610 PROTHROMBIN TIME: CPT | Performed by: FAMILY MEDICINE

## 2022-07-18 RX ORDER — GABAPENTIN 100 MG/1
CAPSULE ORAL
Qty: 90 CAPSULE | Refills: 0 | Status: SHIPPED | OUTPATIENT
Start: 2022-07-18 | End: 2022-08-18

## 2022-07-26 ENCOUNTER — ANTICOAG VISIT (OUTPATIENT)
Dept: FAMILY MEDICINE CLINIC | Facility: CLINIC | Age: 82
End: 2022-07-26

## 2022-07-26 LAB — INR PPP: 1.9 (ref 0.84–1.19)

## 2022-08-02 ENCOUNTER — ANTICOAG VISIT (OUTPATIENT)
Dept: FAMILY MEDICINE CLINIC | Facility: CLINIC | Age: 82
End: 2022-08-02

## 2022-08-02 ENCOUNTER — TELEPHONE (OUTPATIENT)
Dept: FAMILY MEDICINE CLINIC | Facility: CLINIC | Age: 82
End: 2022-08-02

## 2022-08-02 LAB — INR PPP: 3.3 (ref 0.84–1.19)

## 2022-08-02 NOTE — TELEPHONE ENCOUNTER
BETHANY Gil stating her mammogram was normal and result letter mailed. [facility]   Warfarin level slightly elevated  Hold medication for 2 days then restart 6mg daily   Recheck in 1 week

## 2022-08-08 ENCOUNTER — ANTICOAG VISIT (OUTPATIENT)
Dept: FAMILY MEDICINE CLINIC | Facility: CLINIC | Age: 82
End: 2022-08-08

## 2022-08-08 ENCOUNTER — TELEPHONE (OUTPATIENT)
Dept: FAMILY MEDICINE CLINIC | Facility: CLINIC | Age: 82
End: 2022-08-08

## 2022-08-08 LAB — INR PPP: 1.4 (ref 0.84–1.19)

## 2022-08-10 ENCOUNTER — ANTICOAG VISIT (OUTPATIENT)
Dept: FAMILY MEDICINE CLINIC | Facility: CLINIC | Age: 82
End: 2022-08-10

## 2022-08-15 LAB — INR PPP: 1.8 (ref 0.84–1.19)

## 2022-08-17 ENCOUNTER — ANTICOAG VISIT (OUTPATIENT)
Dept: FAMILY MEDICINE CLINIC | Facility: CLINIC | Age: 82
End: 2022-08-17

## 2022-08-18 DIAGNOSIS — F41.9 ANXIETY: ICD-10-CM

## 2022-08-18 DIAGNOSIS — M54.31 SCIATICA, RIGHT SIDE: ICD-10-CM

## 2022-08-18 RX ORDER — GABAPENTIN 100 MG/1
CAPSULE ORAL
Qty: 90 CAPSULE | Refills: 0 | Status: SHIPPED | OUTPATIENT
Start: 2022-08-18 | End: 2022-09-14

## 2022-08-18 RX ORDER — ALPRAZOLAM 0.5 MG/1
0.5 TABLET ORAL 3 TIMES DAILY PRN
Qty: 90 TABLET | Refills: 0 | Status: SHIPPED | OUTPATIENT
Start: 2022-08-18

## 2022-08-22 ENCOUNTER — ANTICOAG VISIT (OUTPATIENT)
Dept: FAMILY MEDICINE CLINIC | Facility: CLINIC | Age: 82
End: 2022-08-22

## 2022-08-22 LAB — INR PPP: 2.1 (ref 0.84–1.19)

## 2022-08-29 ENCOUNTER — TELEPHONE (OUTPATIENT)
Dept: FAMILY MEDICINE CLINIC | Facility: CLINIC | Age: 82
End: 2022-08-29

## 2022-08-29 ENCOUNTER — ANTICOAG VISIT (OUTPATIENT)
Dept: FAMILY MEDICINE CLINIC | Facility: CLINIC | Age: 82
End: 2022-08-29

## 2022-08-29 LAB — INR PPP: 1.8 (ref 0.84–1.19)

## 2022-08-29 NOTE — TELEPHONE ENCOUNTER
Take 9mg tomorrow and Friday- 1 1/2 tabs of the 6mg  Then recheck on Tuesday 9/6 since Monday is a holiday

## 2022-09-06 ENCOUNTER — ANTICOAG VISIT (OUTPATIENT)
Dept: FAMILY MEDICINE CLINIC | Facility: CLINIC | Age: 82
End: 2022-09-06

## 2022-09-06 ENCOUNTER — TELEPHONE (OUTPATIENT)
Dept: FAMILY MEDICINE CLINIC | Facility: CLINIC | Age: 82
End: 2022-09-06

## 2022-09-06 LAB — INR PPP: 2 (ref 0.84–1.19)

## 2022-09-12 ENCOUNTER — TELEPHONE (OUTPATIENT)
Dept: FAMILY MEDICINE CLINIC | Facility: CLINIC | Age: 82
End: 2022-09-12

## 2022-09-12 ENCOUNTER — ANTICOAG VISIT (OUTPATIENT)
Dept: FAMILY MEDICINE CLINIC | Facility: CLINIC | Age: 82
End: 2022-09-12

## 2022-09-12 LAB — INR PPP: 2.6 (ref 0.84–1.19)

## 2022-09-14 DIAGNOSIS — M54.31 SCIATICA, RIGHT SIDE: ICD-10-CM

## 2022-09-14 RX ORDER — GABAPENTIN 100 MG/1
CAPSULE ORAL
Qty: 90 CAPSULE | Refills: 0 | Status: SHIPPED | OUTPATIENT
Start: 2022-09-14 | End: 2022-10-15

## 2022-09-21 ENCOUNTER — ANTICOAG VISIT (OUTPATIENT)
Dept: FAMILY MEDICINE CLINIC | Facility: CLINIC | Age: 82
End: 2022-09-21

## 2022-09-21 LAB — INR PPP: 1.5 (ref 0.84–1.19)

## 2022-09-27 ENCOUNTER — ANTICOAG VISIT (OUTPATIENT)
Dept: FAMILY MEDICINE CLINIC | Facility: CLINIC | Age: 82
End: 2022-09-27

## 2022-09-27 ENCOUNTER — TELEPHONE (OUTPATIENT)
Dept: FAMILY MEDICINE CLINIC | Facility: CLINIC | Age: 82
End: 2022-09-27

## 2022-09-27 LAB — INR PPP: 1.8 (ref 0.84–1.19)

## 2022-10-11 ENCOUNTER — ANTICOAG VISIT (OUTPATIENT)
Dept: FAMILY MEDICINE CLINIC | Facility: CLINIC | Age: 82
End: 2022-10-11

## 2022-10-11 ENCOUNTER — TELEPHONE (OUTPATIENT)
Dept: FAMILY MEDICINE CLINIC | Facility: CLINIC | Age: 82
End: 2022-10-11

## 2022-10-11 LAB — INR PPP: 1.9 (ref 0.84–1.19)

## 2022-10-12 PROBLEM — E86.0 LUETSCHER'S SYNDROME: Status: RESOLVED | Noted: 2021-08-13 | Resolved: 2022-10-12

## 2022-10-14 DIAGNOSIS — Z79.01 ANTICOAGULATED: ICD-10-CM

## 2022-10-14 DIAGNOSIS — I48.21 PERMANENT ATRIAL FIBRILLATION (HCC): ICD-10-CM

## 2022-10-14 RX ORDER — WARFARIN SODIUM 5 MG/1
TABLET ORAL
Qty: 90 TABLET | Refills: 1 | Status: SHIPPED | OUTPATIENT
Start: 2022-10-14

## 2022-10-15 DIAGNOSIS — M54.31 SCIATICA, RIGHT SIDE: ICD-10-CM

## 2022-10-15 RX ORDER — GABAPENTIN 100 MG/1
CAPSULE ORAL
Qty: 90 CAPSULE | Refills: 0 | Status: SHIPPED | OUTPATIENT
Start: 2022-10-15

## 2022-10-17 ENCOUNTER — TELEPHONE (OUTPATIENT)
Dept: FAMILY MEDICINE CLINIC | Facility: CLINIC | Age: 82
End: 2022-10-17

## 2022-10-17 ENCOUNTER — IMMUNIZATIONS (OUTPATIENT)
Dept: FAMILY MEDICINE CLINIC | Facility: CLINIC | Age: 82
End: 2022-10-17
Payer: COMMERCIAL

## 2022-10-17 ENCOUNTER — ANTICOAG VISIT (OUTPATIENT)
Dept: FAMILY MEDICINE CLINIC | Facility: CLINIC | Age: 82
End: 2022-10-17

## 2022-10-17 DIAGNOSIS — Z23 NEED FOR INFLUENZA VACCINATION: Primary | ICD-10-CM

## 2022-10-17 LAB — INR PPP: 2 (ref 0.84–1.19)

## 2022-10-17 PROCEDURE — G0008 ADMIN INFLUENZA VIRUS VAC: HCPCS

## 2022-10-17 PROCEDURE — 90662 IIV NO PRSV INCREASED AG IM: CPT

## 2022-10-24 ENCOUNTER — TELEPHONE (OUTPATIENT)
Dept: FAMILY MEDICINE CLINIC | Facility: CLINIC | Age: 82
End: 2022-10-24

## 2022-10-24 LAB — INR PPP: 2.1 (ref 0.84–1.19)

## 2022-10-25 ENCOUNTER — ANTICOAG VISIT (OUTPATIENT)
Dept: FAMILY MEDICINE CLINIC | Facility: CLINIC | Age: 82
End: 2022-10-25

## 2022-10-26 DIAGNOSIS — E78.2 MIXED HYPERLIPIDEMIA: ICD-10-CM

## 2022-10-26 DIAGNOSIS — I10 PRIMARY HYPERTENSION: Primary | ICD-10-CM

## 2022-10-27 RX ORDER — ROSUVASTATIN CALCIUM 5 MG/1
TABLET, COATED ORAL
Qty: 90 TABLET | Refills: 3 | Status: SHIPPED | OUTPATIENT
Start: 2022-10-27

## 2022-10-27 RX ORDER — AMLODIPINE BESYLATE 5 MG/1
TABLET ORAL
Qty: 90 TABLET | Refills: 3 | Status: SHIPPED | OUTPATIENT
Start: 2022-10-27

## 2022-10-28 ENCOUNTER — TELEPHONE (OUTPATIENT)
Dept: FAMILY MEDICINE CLINIC | Facility: CLINIC | Age: 82
End: 2022-10-28

## 2022-10-28 NOTE — TELEPHONE ENCOUNTER
Dr Dimple Quintana has told me he see's blood in his urine, now for over a month  He did have bladder cancer awhile back  I can't remember his doctors name   Would you have it in your files, or can you recommend a Dr Armand Beck

## 2022-10-31 ENCOUNTER — ANTICOAG VISIT (OUTPATIENT)
Dept: FAMILY MEDICINE CLINIC | Facility: CLINIC | Age: 82
End: 2022-10-31

## 2022-10-31 ENCOUNTER — TELEPHONE (OUTPATIENT)
Dept: FAMILY MEDICINE CLINIC | Facility: CLINIC | Age: 82
End: 2022-10-31

## 2022-10-31 LAB — INR PPP: 1.9 (ref 0.84–1.19)

## 2022-11-08 ENCOUNTER — ANTICOAG VISIT (OUTPATIENT)
Dept: FAMILY MEDICINE CLINIC | Facility: CLINIC | Age: 82
End: 2022-11-08

## 2022-11-08 LAB — INR PPP: 1.8 (ref 0.84–1.19)

## 2022-11-14 LAB — INR PPP: 2.1 (ref 0.84–1.19)

## 2022-11-17 ENCOUNTER — TELEPHONE (OUTPATIENT)
Dept: FAMILY MEDICINE CLINIC | Facility: CLINIC | Age: 82
End: 2022-11-17

## 2022-11-18 ENCOUNTER — ANTICOAG VISIT (OUTPATIENT)
Dept: FAMILY MEDICINE CLINIC | Facility: CLINIC | Age: 82
End: 2022-11-18

## 2022-11-21 DIAGNOSIS — F41.9 ANXIETY: ICD-10-CM

## 2022-11-21 LAB — INR PPP: 2.1 (ref 0.84–1.19)

## 2022-11-22 ENCOUNTER — ANTICOAG VISIT (OUTPATIENT)
Dept: FAMILY MEDICINE CLINIC | Facility: CLINIC | Age: 82
End: 2022-11-22

## 2022-11-22 RX ORDER — ALPRAZOLAM 0.5 MG/1
0.5 TABLET ORAL 3 TIMES DAILY PRN
Qty: 90 TABLET | Refills: 0 | Status: SHIPPED | OUTPATIENT
Start: 2022-11-22

## 2022-12-01 ENCOUNTER — TELEPHONE (OUTPATIENT)
Dept: FAMILY MEDICINE CLINIC | Facility: CLINIC | Age: 82
End: 2022-12-01

## 2022-12-01 NOTE — TELEPHONE ENCOUNTER
Called to reminder for INR test,  melanymirza states that she do it every Monday and that his last one was this Monday 2 7   and that she call them to get it sent again

## 2022-12-02 ENCOUNTER — ANTICOAG VISIT (OUTPATIENT)
Dept: FAMILY MEDICINE CLINIC | Facility: CLINIC | Age: 82
End: 2022-12-02

## 2022-12-02 LAB — INR PPP: 2.7 (ref 0.84–1.19)

## 2022-12-05 ENCOUNTER — ANTICOAG VISIT (OUTPATIENT)
Dept: FAMILY MEDICINE CLINIC | Facility: CLINIC | Age: 82
End: 2022-12-05

## 2022-12-05 LAB — INR PPP: 2.6 (ref 0.84–1.19)

## 2022-12-12 LAB — INR PPP: 2.8 (ref 0.84–1.19)

## 2022-12-14 ENCOUNTER — ANTICOAG VISIT (OUTPATIENT)
Dept: FAMILY MEDICINE CLINIC | Facility: CLINIC | Age: 82
End: 2022-12-14

## 2022-12-19 ENCOUNTER — ANTICOAG VISIT (OUTPATIENT)
Dept: FAMILY MEDICINE CLINIC | Facility: CLINIC | Age: 82
End: 2022-12-19

## 2022-12-19 LAB — INR PPP: 3.1 (ref 0.84–1.19)

## 2022-12-19 RX ORDER — ANTIOX #8/OM3/DHA/EPA/LUT/ZEAX 250-2.5 MG
CAPSULE ORAL
COMMUNITY
Start: 2020-02-05

## 2022-12-26 LAB — INR PPP: 2.1 (ref 0.84–1.19)

## 2022-12-27 ENCOUNTER — ANTICOAG VISIT (OUTPATIENT)
Dept: FAMILY MEDICINE CLINIC | Facility: CLINIC | Age: 82
End: 2022-12-27

## 2023-01-03 ENCOUNTER — ANTICOAG VISIT (OUTPATIENT)
Dept: FAMILY MEDICINE CLINIC | Facility: CLINIC | Age: 83
End: 2023-01-03

## 2023-01-03 LAB — INR PPP: 1.9 (ref 0.84–1.19)

## 2023-01-10 ENCOUNTER — ANTICOAG VISIT (OUTPATIENT)
Dept: FAMILY MEDICINE CLINIC | Facility: CLINIC | Age: 83
End: 2023-01-10

## 2023-01-10 LAB — SL AMB POCT INR: 1.9

## 2023-01-12 DIAGNOSIS — I10 PRIMARY HYPERTENSION: Primary | ICD-10-CM

## 2023-01-12 RX ORDER — METOPROLOL TARTRATE 50 MG/1
TABLET, FILM COATED ORAL
Qty: 180 TABLET | Refills: 3 | Status: SHIPPED | OUTPATIENT
Start: 2023-01-12

## 2023-01-17 ENCOUNTER — ANTICOAG VISIT (OUTPATIENT)
Dept: FAMILY MEDICINE CLINIC | Facility: CLINIC | Age: 83
End: 2023-01-17

## 2023-01-17 LAB — SL AMB POCT INR: 1.9

## 2023-01-17 NOTE — PROGRESS NOTES
Called and LVM to Pt;  Pt advised on INR results and to cont same dose of meds and to recheck in 1 wk

## 2023-01-24 ENCOUNTER — ANTICOAG VISIT (OUTPATIENT)
Dept: FAMILY MEDICINE CLINIC | Facility: CLINIC | Age: 83
End: 2023-01-24

## 2023-01-24 LAB — SL AMB POCT INR: 2.1

## 2023-02-01 ENCOUNTER — ANTICOAG VISIT (OUTPATIENT)
Dept: FAMILY MEDICINE CLINIC | Facility: CLINIC | Age: 83
End: 2023-02-01

## 2023-02-01 LAB — SL AMB POCT INR: 2.1

## 2023-02-01 NOTE — PROGRESS NOTES
Called Pt   Pt advised on INR results, dosage, and recheck date: 2/1/23 - Cont same dose and recheck in 1 week

## 2023-02-07 ENCOUNTER — ANTICOAG VISIT (OUTPATIENT)
Dept: FAMILY MEDICINE CLINIC | Facility: CLINIC | Age: 83
End: 2023-02-07

## 2023-02-07 LAB — SL AMB POCT INR: 2.5

## 2023-02-14 ENCOUNTER — ANTICOAG VISIT (OUTPATIENT)
Dept: FAMILY MEDICINE CLINIC | Facility: CLINIC | Age: 83
End: 2023-02-14

## 2023-02-14 LAB — SL AMB POCT INR: 2.6

## 2023-02-18 NOTE — TELEPHONE ENCOUNTER
His warfarin level is good   Continue same dose and recheck 1 week Patient : Artis Randle Age: 13 year old Sex: male   MRN: [id number] Encounter Date: 2/18/2023      History     Chief Complaint   Patient presents with   • Syncope     HPI   Artis Randle is a 13 year old male who presents with after a syncopal episode earlier today. The patient's Dad states that the patient was arousable immediately after the syncopal episode but denies that the patient experienced any seizure activity. Dad adds that the patient had a similar syncopal episode last summer. Dad denies that the patient hit his head. The patient denies chest pain, SOB, biting his tongue, bladder or bowel incontinence. He denies difficulty with exercise.    No Known Allergies    Discharge Medication List as of 2/18/2023  5:20 PM      Prior to Admission Medications    Details   Pediatric Multiple Vit-C-FA (MULTIVITAMIN CHILDRENS PO) Historical Med      Acetaminophen (TYLENOL CHILDRENS PO) Take  by mouth.Historical Med             No past medical history on file.    Past Surgical History:   Procedure Laterality Date   • TONSILLECTOMY AND ADENOIDECTOMY         No family history on file.    Social History     Tobacco Use   • Smoking status: Never   • Smokeless tobacco: Never   Substance Use Topics   • Alcohol use: No   • Drug use: No       E-cigarette/Vaping     E-Cigarette/Vaping Substances & Devices       Review of Systems  Constitutional, Eyes, ENT, Pulmonary, Cardiovascular, Gastrointestinal, Renal, Endocrine, Genitourinary, Musculoskeletal, Neurologic, Skin, and Psychiatric systems were reviewed and negative unless indicated in the HPI above.    Physical Exam     ED Triage Vitals [02/18/23 1630]   ED Triage Vitals Group      Temp 99 °F (37.2 °C)      Heart Rate 80      Resp 18      /79      SpO2 98 %      EtCO2 mmHg       Height       Weight 110 lb 0.2 oz (49.9 kg)      Weight Scale Used Standing scale      BMI (Calculated)       IBW/kg (Calculated)        Physical Exam  Vitals and nursing note reviewed.    Constitutional:       General: He is not in acute distress.     Appearance: He is well-developed. He is not toxic-appearing.   HENT:      Right Ear: Tympanic membrane normal.      Left Ear: Tympanic membrane normal.      Mouth/Throat:      Mouth: Mucous membranes are moist.      Pharynx: Oropharynx is clear.      Tonsils: No tonsillar exudate.   Eyes:      General:         Right eye: No discharge.         Left eye: No discharge.      Conjunctiva/sclera: Conjunctivae normal.      Pupils: Pupils are equal, round, and reactive to light.   Cardiovascular:      Rate and Rhythm: Normal rate and regular rhythm.      Heart sounds: No murmur heard.  Pulmonary:      Effort: Pulmonary effort is normal. No respiratory distress or retractions.      Breath sounds: Normal breath sounds and air entry. No stridor or decreased air movement. No wheezing, rhonchi or rales.   Abdominal:      General: There is no distension.      Palpations: Abdomen is soft. There is no mass.      Tenderness: There is no abdominal tenderness. There is no guarding or rebound.   Musculoskeletal:         General: No deformity. Normal range of motion.      Cervical back: Normal range of motion.   Skin:     General: Skin is warm and dry.   Neurological:      Mental Status: He is alert and oriented for age.      GCS: GCS eye subscore is 4. GCS verbal subscore is 5. GCS motor subscore is 6.      Coordination: Coordination normal.   Psychiatric:         Behavior: Behavior normal. Behavior is cooperative.            ED Course     Procedures    Lab Results     Results for orders placed or performed during the hospital encounter of 02/18/23   ISTAT8 VENOUS  POINT OF CARE   Result Value Ref Range    BUN - POINT OF CARE 14 5 - 18 mg/dL    SODIUM - POINT OF CARE 140 135 - 145 mmol/L    POTASSIUM - POINT OF CARE 3.8 3.4 - 5.1 mmol/L    CHLORIDE - POINT OF CARE 101 97 - 110 mmol/L    TCO2 - POINT OF CARE 26 (H) 19 - 24 mmol/L    ANION GAP - POINT OF CARE 18 7 - 19  mmol/L    HEMATOCRIT - POINT OF CARE 42.0 39.0 - 51.0 %    HEMOGLOBIN - POINT OF CARE 14.3 13.0 - 17.0 g/dL    GLUCOSE - POINT OF CARE 106 (H) 70 - 99 mg/dL    CALCIUM, IONIZED - POINT OF CARE 1.28 1.15 - 1.29 mmol/L    Creatinine 0.60 0.38 - 1.15 mg/dL    Glomerular Filtration Rate     TROPONIN I  POINT OF CARE   Result Value Ref Range    TROPONIN I - POINT OF CARE <0.10 <0.10 ng/mL       EKG Results     EKG Interpretation  Rate: 68  Rhythm: normal sinus rhythm   Possible Right ventricular hypertrophy  When compared with EKG from 01/06/2022 there is no significant change  Abnormality: yes    EKG tracing interpreted by ED physician    Radiology Results     Imaging Results    None         ED Medication Orders (From admission, onward)    None               MDM    13-year-old male presenting to the emergency department after a syncopal episode at home.  Vital signs are within normal limits.  Physical exam reveals a well-appearing male in no acute distress, normal cardiopulmonary exam without murmur.      Broad differential causes of his symptoms today.  Considered cardiogenic causes of syncope such as valvular pathology, dysrhythmia.  EKG was obtained.  There is some abnormality although unchanged from a year ago.    Exam shows no murmurs making valvular prosthesis unlikely.  Low suspicion for central neurologic process.      Labs are very reassuring.  Normal electrolytes.      On re-evaluation patient is resting comfortably.  Discussed results the patient and his parents.  I discussed avoiding dangerous activities noted be a danger with syncope eyes is such as swimming, skiing, going up on ladders, etc until he can follow-up with pediatric cardiology.    I have personally and independently reviewed all labs  I have personally and independently reviewed all EKG  I have personally and independently reviewed previous notes including nursing documentation    Ramses Limon MD      Clinical Impression     ED Diagnosis   1.  Syncope, unspecified syncope type  SERVICE TO PEDIATRIC CARDIOLOGY          Disposition        Discharge 2/18/2023  5:20 PM  [facility] discharge to home/self care.            This chart was documented by Gunjan Hanson, acting as a scribe for Ramses Limon MD. 2/18/2023, 4:38 PM.      The documentation recorded by the scribe accurately and completely reflects the service(s) I personally performed and the decisions made by me.        Ramses Limon MD  02/18/23 8435

## 2023-02-21 LAB — SL AMB POCT INR: 2.2

## 2023-02-21 NOTE — PROGRESS NOTES
Called Pt and spoke to Pt's wife  Pt's wife told Pt and Pt advised on INR result 2 2 and to recheck in 1 wk

## 2023-02-27 ENCOUNTER — TELEPHONE (OUTPATIENT)
Dept: FAMILY MEDICINE CLINIC | Facility: CLINIC | Age: 83
End: 2023-02-27

## 2023-02-27 LAB — SL AMB POCT INR: 2.1

## 2023-02-27 NOTE — TELEPHONE ENCOUNTER
02/27/23 Contacted patient regarding INR  INR 2 1    Continue same dose, recheck in one week  Spoke with wife

## 2023-02-28 ENCOUNTER — ANTICOAG VISIT (OUTPATIENT)
Dept: FAMILY MEDICINE CLINIC | Facility: CLINIC | Age: 83
End: 2023-02-28

## 2023-02-28 DIAGNOSIS — F41.9 ANXIETY: ICD-10-CM

## 2023-03-01 ENCOUNTER — TELEPHONE (OUTPATIENT)
Dept: FAMILY MEDICINE CLINIC | Facility: CLINIC | Age: 83
End: 2023-03-01

## 2023-03-01 ENCOUNTER — ANTICOAG VISIT (OUTPATIENT)
Dept: FAMILY MEDICINE CLINIC | Facility: CLINIC | Age: 83
End: 2023-03-01

## 2023-03-01 RX ORDER — ALPRAZOLAM 0.5 MG/1
0.5 TABLET ORAL 3 TIMES DAILY PRN
Qty: 90 TABLET | Refills: 0 | Status: SHIPPED | OUTPATIENT
Start: 2023-03-01

## 2023-03-07 ENCOUNTER — TELEPHONE (OUTPATIENT)
Dept: FAMILY MEDICINE CLINIC | Facility: CLINIC | Age: 83
End: 2023-03-07

## 2023-03-07 ENCOUNTER — ANTICOAG VISIT (OUTPATIENT)
Dept: FAMILY MEDICINE CLINIC | Facility: CLINIC | Age: 83
End: 2023-03-07

## 2023-03-07 LAB — SL AMB POCT INR: 2

## 2023-03-07 NOTE — TELEPHONE ENCOUNTER
----- Message from Gary Stevens DO sent at 3/7/2023  9:20 AM EST -----  Warfarin level is good    Continue same dose and recheck in 1 week

## 2023-03-08 ENCOUNTER — TELEPHONE (OUTPATIENT)
Dept: FAMILY MEDICINE CLINIC | Facility: CLINIC | Age: 83
End: 2023-03-08

## 2023-03-08 ENCOUNTER — ANTICOAG VISIT (OUTPATIENT)
Dept: FAMILY MEDICINE CLINIC | Facility: CLINIC | Age: 83
End: 2023-03-08

## 2023-03-08 NOTE — TELEPHONE ENCOUNTER
Jaye Chu at 3/7/2023 10:56 AM     Called Pt  Asked if Pt had any Q's regarding VM received earlier  Spoke to Pt's spouse and advised on INR result of 2 0 and to recheck in 1 wk

## 2023-03-14 ENCOUNTER — TELEPHONE (OUTPATIENT)
Dept: FAMILY MEDICINE CLINIC | Facility: CLINIC | Age: 83
End: 2023-03-14

## 2023-03-14 ENCOUNTER — ANTICOAG VISIT (OUTPATIENT)
Dept: FAMILY MEDICINE CLINIC | Facility: CLINIC | Age: 83
End: 2023-03-14

## 2023-03-14 LAB — SL AMB POCT INR: 2.6

## 2023-03-14 NOTE — TELEPHONE ENCOUNTER
Called Pt  Spoke to Pt's spouse who will relay message to Pt  Advised that INR of 2 6 from 3/14/23 is stable   Continue current regiment and recheck in 1 week

## 2023-03-14 NOTE — PROGRESS NOTES
Called Pt  Spoke to Pt's spouse  Pt's spouse says she will relay the message back to [facility]  Advised that INR 2 6 from 3/13 is stable  Continue same dose and recheck in 1 week

## 2023-03-20 ENCOUNTER — TELEPHONE (OUTPATIENT)
Dept: FAMILY MEDICINE CLINIC | Facility: CLINIC | Age: 83
End: 2023-03-20

## 2023-03-20 LAB — SL AMB POCT INR: 2.5

## 2023-03-20 NOTE — TELEPHONE ENCOUNTER
Called Pt  Spoke to Pt's spouse  Advised that Pt's Warfarin level good  Continue same dose and recheck in 1 week

## 2023-03-20 NOTE — PROGRESS NOTES
Called Pt  Spoke to Pt's spouse  Advised Warfarin level good  Continue same dose and recheck in 1 week

## 2023-03-28 ENCOUNTER — ANTICOAG VISIT (OUTPATIENT)
Dept: FAMILY MEDICINE CLINIC | Facility: CLINIC | Age: 83
End: 2023-03-28

## 2023-03-28 ENCOUNTER — TELEPHONE (OUTPATIENT)
Dept: FAMILY MEDICINE CLINIC | Facility: CLINIC | Age: 83
End: 2023-03-28

## 2023-03-28 LAB
INR PPP: 1.7 (ref 0.84–1.19)
SL AMB POCT INR: 1.7

## 2023-03-28 NOTE — TELEPHONE ENCOUNTER
Called Pt  Spoke to Pt's wife and advised on INR monitoring changes  Pt advised to take 6 mg for 2 days, and then go back to 5 mg / 6 mg alternating  Recheck 1 wk

## 2023-04-04 ENCOUNTER — TELEPHONE (OUTPATIENT)
Dept: FAMILY MEDICINE CLINIC | Facility: CLINIC | Age: 83
End: 2023-04-04

## 2023-04-04 ENCOUNTER — ANTICOAG VISIT (OUTPATIENT)
Dept: FAMILY MEDICINE CLINIC | Facility: CLINIC | Age: 83
End: 2023-04-04

## 2023-04-04 LAB — SL AMB POCT INR: 2.6

## 2023-04-25 ENCOUNTER — ANTICOAG VISIT (OUTPATIENT)
Dept: FAMILY MEDICINE CLINIC | Facility: CLINIC | Age: 83
End: 2023-04-25

## 2023-04-25 ENCOUNTER — TELEPHONE (OUTPATIENT)
Dept: FAMILY MEDICINE CLINIC | Facility: CLINIC | Age: 83
End: 2023-04-25

## 2023-04-25 LAB — SL AMB POCT INR: 2.1

## 2023-04-25 NOTE — TELEPHONE ENCOUNTER
Called Pt  Spoke to Pt's spouse  Advised INR result from 4/24/23 was 2 1 and to continue same dose and recheck in 1 week

## 2023-04-28 DIAGNOSIS — R53.83 FATIGUE, UNSPECIFIED TYPE: ICD-10-CM

## 2023-04-28 DIAGNOSIS — R29.6 FREQUENT FALLS: ICD-10-CM

## 2023-04-28 DIAGNOSIS — G40.919 INTRACTABLE EPILEPSY WITHOUT STATUS EPILEPTICUS, UNSPECIFIED EPILEPSY TYPE (HCC): ICD-10-CM

## 2023-04-28 DIAGNOSIS — R53.1 WEAKNESS: ICD-10-CM

## 2023-04-28 DIAGNOSIS — F41.9 ANXIETY: ICD-10-CM

## 2023-04-28 DIAGNOSIS — I10 PRIMARY HYPERTENSION: ICD-10-CM

## 2023-04-28 DIAGNOSIS — I48.21 PERMANENT ATRIAL FIBRILLATION (HCC): ICD-10-CM

## 2023-04-28 DIAGNOSIS — E78.2 MIXED HYPERLIPIDEMIA: ICD-10-CM

## 2023-04-28 DIAGNOSIS — R35.0 URINARY FREQUENCY: ICD-10-CM

## 2023-04-28 DIAGNOSIS — Z79.01 ANTICOAGULATED: Primary | ICD-10-CM

## 2023-04-28 DIAGNOSIS — N18.31 STAGE 3A CHRONIC KIDNEY DISEASE (HCC): ICD-10-CM

## 2023-05-01 ENCOUNTER — OFFICE VISIT (OUTPATIENT)
Dept: FAMILY MEDICINE CLINIC | Facility: CLINIC | Age: 83
End: 2023-05-01

## 2023-05-01 VITALS
OXYGEN SATURATION: 96 % | BODY MASS INDEX: 24.92 KG/M2 | HEART RATE: 84 BPM | HEIGHT: 72 IN | SYSTOLIC BLOOD PRESSURE: 142 MMHG | DIASTOLIC BLOOD PRESSURE: 70 MMHG | TEMPERATURE: 98 F | WEIGHT: 184 LBS

## 2023-05-01 DIAGNOSIS — Z00.00 MEDICARE ANNUAL WELLNESS VISIT, SUBSEQUENT: Primary | ICD-10-CM

## 2023-05-01 DIAGNOSIS — K21.9 GASTROESOPHAGEAL REFLUX DISEASE WITHOUT ESOPHAGITIS: ICD-10-CM

## 2023-05-01 DIAGNOSIS — I48.21 PERMANENT ATRIAL FIBRILLATION (HCC): ICD-10-CM

## 2023-05-01 DIAGNOSIS — E78.49 OTHER HYPERLIPIDEMIA: ICD-10-CM

## 2023-05-01 DIAGNOSIS — R05.3 CHRONIC COUGH: ICD-10-CM

## 2023-05-01 DIAGNOSIS — I10 BENIGN ESSENTIAL HYPERTENSION: ICD-10-CM

## 2023-05-01 RX ORDER — OMEPRAZOLE 40 MG/1
40 CAPSULE, DELAYED RELEASE ORAL DAILY
Qty: 14 CAPSULE | Refills: 0 | Status: SHIPPED | OUTPATIENT
Start: 2023-05-01 | End: 2023-05-15

## 2023-05-01 NOTE — PROGRESS NOTES
Assessment and Plan:     Problem List Items Addressed This Visit        Digestive    Gastroesophageal reflux disease without esophagitis     Start omeprazole 40mg in AM before eating, take for 2 weeks           Relevant Medications    omeprazole (PriLOSEC) 40 MG capsule       Cardiovascular and Mediastinum    Atrial fibrillation (HCC)     Continues on metoprolol and coumadin         Benign essential hypertension     BP stable            Other    Hyperlipidemia     Has lab orders, complete fasting         Chronic cough     Start a daily antihistamine 1 tab daily at bedtime- like claritin or allegra or zrytec- just no D next to it  Check chest xray  If coughing still will consider swallow evaluation           Relevant Orders    XR chest pa & lateral   Other Visit Diagnoses     Medicare annual wellness visit, subsequent    -  Primary          Depression Screening and Follow-up Plan: Patient was screened for depression during today's encounter  They screened negative with a PHQ-2 score of 0  Preventive health issues were discussed with patient, and age appropriate screening tests were ordered as noted in patient's After Visit Summary  Personalized health advice and appropriate referrals for health education or preventive services given if needed, as noted in patient's After Visit Summary  History of Present Illness:     Patient presents for a Medicare Wellness Visit    Has been ongoing for about 2 months- Bringing up a lot of mucous thin watery mucous but it burns  No sore throat  Also happens at night when lying down  Has been drinking v8 juice every morning  Wife has noticed he coughs after eating not so much with drinking then starts to cough and sneeze as well  Bowel movements every other day- no changes in bowels  No straining with bowel movements  Hasnt tried taking anything for symptoms besides a tylenol  It did help a little bit       Has lost 20lbs in last year- diet has changed, not eating as much                 Patient Care Team:  Rosa Thibodeaux DO as PCP - General  Rosa Thibodeaux DO     Review of Systems:     Review of Systems   Constitutional: Positive for unexpected weight change  Negative for activity change, appetite change, fatigue and fever  HENT: Positive for postnasal drip, rhinorrhea and sore throat (burning in back of throat)  Negative for trouble swallowing (wife states he chokes when eating/coughs)  Eyes: Negative  Respiratory: Positive for cough and choking  Negative for shortness of breath and wheezing  Cardiovascular: Negative for chest pain, palpitations and leg swelling  Gastrointestinal: Negative for blood in stool, constipation, diarrhea, nausea and vomiting  ? Reflux     Genitourinary: Negative for dysuria, frequency and urgency  Neurological: Negative  Hematological: Negative  Psychiatric/Behavioral: Negative           Problem List:     Patient Active Problem List   Diagnosis    Anxiety    Arthralgia    Asymptomatic cholelithiasis    Atrial fibrillation ([facility]    Benign essential hypertension    Benign prostatic hyperplasia    Bilateral pseudophakia    Chronic kidney disease, stage 3 ([address] )    Coronary arteriosclerosis    Epilepsy ([facility]    Gait instability    Hiatal hernia    Hyperlipidemia    Frequent falls    Weakness    Chronic kidney disease [facility]CKD), stage III (moderate) [facility]    Age-related cataract of right eye    Macular drusen    Posterior vitreous detachment of both eyes    Primary osteoarthritis of left hip    Osteoarthritis of lumbar spine    Essential hypertension    Chronic bilateral low back pain with right-sided sciatica    DDD (degenerative disc disease), lumbar    Sciatica, right side    Chronic cough    Gastroesophageal reflux disease without esophagitis      Past Medical and Surgical History:     Past Medical History:   Diagnosis Date    Anemia     last assessed: 05/23/2015    GERD (gastroesophageal reflux disease)  Hematuria     [facility]tracerebral hemorrhage ([facility] )     last assessed: 04/21/2014; right    Intracerebral hemorrhage ([facility] )     last assessed: 11/23/2017    Stroke syndrome     last assessed: 06/13/2013     Past Surgical History:   Procedure Laterality Date    BLADDER SURGERY        Family History:     Family History   Problem Relation Age of Onset    Lung cancer Mother       Social History:     Social History     Socioeconomic History    Marital status: /Civil Union     Spouse name: None    Number of children: None    Years of education: None    Highest education level: None   Occupational History    None   Tobacco Use    Smoking status: Former     Types: Cigars    Smokeless tobacco: Never   Vaping Use    Vaping Use: Never used   Substance and Sexual Activity    Alcohol use: Yes     Comment: Social     Drug use: No    Sexual activity: None   Other Topics Concern    None   Social History Narrative    None     Social Determinants of Health     Financial Resource Strain: Low Risk     Difficulty of Paying Living Expenses: Not hard at all   Food Insecurity: Not on file   Transportation Needs: No Transportation Needs    Lack of Transportation (Medical): No    Lack of Transportation (Non-Medical): No   Physical Activity: Not on file   Stress: Not on file   Social Connections: Not on file   Intimate Partner Violence: Not on file   Housing Stability: Not on file      Medications and Allergies:     Current Outpatient Medications   Medication Sig Dispense Refill    ALPRAZolam (XANAX) 0 5 mg tablet TAKE 1 TABLET (0 5 MG TOTAL) BY MOUTH 3 (THREE) TIMES A DAY AS NEEDED FOR ANXIETY  90 tablet 0    gabapentin (NEURONTIN) 100 mg capsule TAKE 3 TABS DAILY IN  capsule 3    Lancets MISC by Does not apply route      methocarbamol (ROBAXIN) 500 mg tablet TAKE 1 TABLET BY MOUTH 4 TIMES A DAY AS NEEDED FOR MUSCLE SPASMS   30 tablet 1    metoprolol tartrate (LOPRESSOR) 50 mg tablet TAKE 1 TABLET BY MOUTH TWICE A  tablet 3    Multiple Vitamins-Minerals (PreserVision AREDS 2) CAPS       omeprazole (PriLOSEC) 40 MG capsule Take 1 capsule (40 mg total) by mouth daily for 14 days 14 capsule 0    rosuvastatin (CRESTOR) 5 mg tablet TAKE 1 TABLET BY MOUTH EVERY DAY 90 tablet 3    warfarin (COUMADIN) 1 mg tablet Take 1mg tab every other day combining with 5mg tab 45 tablet 2    warfarin (COUMADIN) 5 mg tablet TAKE 1 TABLET BY MOUTH EVERY DAY, combine with 1mg tab as directed 90 tablet 1     No current facility-administered medications for this visit  No Known Allergies   Immunizations:     Immunization History   Administered Date(s) Administered    COVID-19 MODERNA VACC 0 5 ML IM 02/12/2021, 03/12/2021, 11/15/2021    INFLUENZA 10/03/2018, 10/17/2022    Influenza Split High Dose Preservative Free IM 09/22/2017, 10/03/2018    Influenza, high dose seasonal 0 7 mL 10/13/2020, 10/17/2022    Influenza, seasonal, injectable 10/11/2014, 10/08/2016    Pneumococcal Conjugate 13-Valent 11/22/2017    Pneumococcal Polysaccharide PPV23 04/21/2014    influenza, trivalent, adjuvanted 10/10/2019      Health Maintenance: There are no preventive care reminders to display for this patient  Topic Date Due    COVID-19 Vaccine (4 - Booster for Moderna series) 01/10/2022      Medicare Screening Tests and Risk Assessments:     Clarence Hutchison is here for his Subsequent Wellness visit  Last Medicare Wellness visit information reviewed, patient interviewed and updates made to the record today  Health Risk Assessment:   Patient rates overall health as very good  Patient feels that their physical health rating is same  Patient is very satisfied with their life  Eyesight was rated as same  Hearing was rated as same  Patient feels that their emotional and mental health rating is same  Patients states they are never, rarely angry  Patient states they are sometimes unusually tired/fatigued   Pain experienced in the last 7 days has been some  Patient's pain rating has been 3/10  Patient states that he has experienced no weight loss or gain in last 6 months  Back pain that comes and goes    Depression Screening:   PHQ-2 Score: 0      Fall Risk Screening: In the past year, patient has experienced: no history of falling in past year      Home Safety:  Patient does not have trouble with stairs inside or outside of their home  Patient has working smoke alarms and has working carbon monoxide detector  Home safety hazards include: none  Nutrition:   Current diet is Regular  Pt needs to eat better w/ some meals, trying more balanced diet    Medications:   Patient is currently taking over-the-counter supplements  OTC medications include: see medication list  Patient is able to manage medications  Activities of Daily Living (ADLs)/Instrumental Activities of Daily Living (IADLs):   Walk and transfer into and out of bed and chair?: Yes  Dress and groom yourself?: Yes    Bathe or shower yourself?: Yes    Feed yourself?  Yes  Do your laundry/housekeeping?: Yes  Manage your money, pay your bills and track your expenses?: Yes  Make your own meals?: Yes    Do your own shopping?: Yes    Previous Hospitalizations:   Any hospitalizations or ED visits within the last 12 months?: No      Advance Care Planning:   Living will: No      PREVENTIVE SCREENINGS      Cardiovascular Screening:    General: Screening Not Indicated and History Lipid Disorder    Due for: Lipid Panel      Diabetes Screening:       Due for: Blood Glucose      Colorectal Cancer Screening:     General: Screening Not Indicated      Prostate Cancer Screening:    General: Screening Not Indicated      Osteoporosis Screening:    General: Screening Not Indicated      Abdominal Aortic Aneurysm (AAA) Screening:    Risk factors include: tobacco use        General: Screening Current      Lung Cancer Screening:     General: Screening Not Indicated      Hepatitis C Screening:    General: Patient Declines      Preventive Screening Comments: Had CT abdomen- no AAA present      Screening, Brief Intervention, and Referral to Treatment (SBIRT)    Screening  Typical number of drinks in a day: 1  Typical number of drinks in a week: 1  Interpretation: Low risk drinking behavior  AUDIT-C Screenin) How often did you have a drink containing alcohol in the past year? monthly or less  2) How many drinks did you have on a typical day when you were drinking in the past year? 1 to 2  3) How often did you have 6 or more drinks on one occasion in the past year? never    AUDIT-C Score: 1  Interpretation: Score 0-3 (male): Negative screen for alcohol misuse    Single Item Drug Screening:  How often have you used an illegal drug (including marijuana) or a prescription medication for non-medical reasons in the past year? never    Single Item Drug Screen Score: 0  Interpretation: Negative screen for possible drug use disorder    No results found  Physical Exam:     /70 Comment: 142/70  Pulse 84   Temp 98 °F (36 7 °C)   Ht 6' (1 829 m)   Wt 83 5 kg (184 lb)   SpO2 96%   BMI 24 95 kg/m²     Physical Exam  Vitals and nursing note reviewed  Constitutional:       General: He is not in acute distress  Appearance: Normal appearance  He is normal weight  HENT:      Right Ear: Tympanic membrane, ear canal and external ear normal       Left Ear: Tympanic membrane, ear canal and external ear normal       Nose: Rhinorrhea present  Mouth/Throat:      Mouth: Mucous membranes are moist       Pharynx: Oropharynx is clear  No posterior oropharyngeal erythema  Eyes:      Extraocular Movements: Extraocular movements intact  Conjunctiva/sclera: Conjunctivae normal       Pupils: Pupils are equal, round, and reactive to light  Cardiovascular:      Rate and Rhythm: Regular rhythm  Heart sounds: Normal heart sounds  No murmur heard  Pulmonary:      Effort: No respiratory distress        Breath sounds: Normal breath sounds  No stridor  No wheezing, rhonchi or rales  Chest:      Chest wall: No tenderness  Abdominal:      General: Abdomen is flat  Bowel sounds are normal       Palpations: Abdomen is soft  Tenderness: There is no abdominal tenderness  Musculoskeletal:      Cervical back: Normal range of motion  No tenderness  Right lower leg: No edema  Left lower leg: No edema  Lymphadenopathy:      Cervical: No cervical adenopathy  Skin:     Capillary Refill: Capillary refill takes less than 2 seconds  Findings: No rash  Neurological:      Mental Status: He is alert and oriented to person, place, and time        Comments: Forgetful at times     Psychiatric:         Behavior: Behavior normal           ANASTASIYA Luu

## 2023-05-01 NOTE — PATIENT INSTRUCTIONS
Start omeprazole 40mg in AM before eating, take for 2 weeks  Start a daily antihistamine 1 tab daily at bedtime- like claritin or allegra or zrytec- just no D next to it  Check chest xray  If coughing still will consider swallow evaluation        Medicare Preventive Visit Patient Instructions  Thank you for completing your Welcome to Medicare Visit or Medicare Annual Wellness Visit today  Your next wellness visit will be due in one year (5/1/2024)  The screening/preventive services that you may require over the next 5-10 years are detailed below  Some tests may not apply to you based off risk factors and/or age  Screening tests ordered at today's visit but not completed yet may show as past due  Also, please note that scanned in results may not display below  Preventive Screenings:  Service Recommendations Previous Testing/Comments   Colorectal Cancer Screening  Colonoscopy    Fecal Occult Blood Test (FOBT)/Fecal Immunochemical Test (FIT)  Fecal DNA/Cologuard Test  Flexible Sigmoidoscopy Age: 39-70 years old   Colonoscopy: every 10 years (May be performed more frequently if at higher risk)  OR  FOBT/FIT: every 1 year  OR  Cologuard: every 3 years  OR  Sigmoidoscopy: every 5 years  Screening may be recommended earlier than age 39 if at higher risk for colorectal cancer  Also, an individualized decision between you and your healthcare provider will decide whether screening between the ages of 74-80 would be appropriate   Colonoscopy: Not on file  FOBT/FIT: Not on file  Cologuard: Not on file  Sigmoidoscopy: Not on file          Prostate Cancer Screening Individualized decision between patient and health care provider in men between ages of 53-78   Medicare will cover every 12 months beginning on the day after your 50th birthday PSA: 1 6 ng/mL     Screening Not Indicated     Hepatitis C Screening Once for adults born between [facility]  More frequently in patients at high risk for Hepatitis C Hep C Antibody: Not on file        Diabetes Screening 1-2 times per year if you're at risk for diabetes or have pre-diabetes Fasting glucose: No results in last 5 years (No results in last 5 years)  A1C: No results in last 5 years (No results in last 5 years)      Cholesterol Screening Once every 5 years if you don't have a lipid disorder  May order more often based on risk factors  Lipid panel: 06/11/2018  Screening Not Indicated  History Lipid Disorder      Other Preventive Screenings Covered by Medicare:  Abdominal Aortic Aneurysm (AAA) Screening: covered once if your at risk  You're considered to be at risk if you have a family history of AAA or a male between the age of 73-68 who smoking at least 100 cigarettes in your lifetime  Lung Cancer Screening: covers low dose CT scan once per year if you meet all of the following conditions: (1) Age 50-69; (2) No signs or symptoms of lung cancer; (3) Current smoker or have quit smoking within the last 15 years; (4) You have a tobacco smoking history of at least 20 pack years (packs per day x number of years you smoked); (5) You get a written order from a healthcare provider  Glaucoma Screening: covered annually if you're considered high risk: (1) You have diabetes OR (2) Family history of glaucoma OR (3)  aged 48 and older OR (3)  American aged 72 and older  Osteoporosis Screening: covered every 2 years if you meet one of the following conditions: (1) Have a vertebral abnormality; (2) On glucocorticoid therapy for more than 3 months; (3) Have primary hyperparathyroidism; (4) On osteoporosis medications and need to assess response to drug therapy  HIV Screening: covered annually if you're between the age of 12-76  Also covered annually if you are younger than 13 and older than 72 with risk factors for HIV infection  For pregnant patients, it is covered up to 3 times per pregnancy      Immunizations:  Immunization Recommendations   Influenza Vaccine Annual influenza vaccination during flu season is recommended for all persons aged >= 6 months who do not have contraindications   Pneumococcal Vaccine   * Pneumococcal conjugate vaccine = PCV13 (Prevnar 13), PCV15 (Vaxneuvance), PCV20 (Prevnar 20)  * Pneumococcal polysaccharide vaccine = PPSV23 (Pneumovax) Adults 25-60 years old: 1-3 doses may be recommended based on certain risk factors  Adults 72 years old: 1-2 doses may be recommended based off what pneumonia vaccine you previously received   Hepatitis B Vaccine 3 dose series if at intermediate or high risk (ex: diabetes, end stage renal disease, liver disease)   Tetanus (Td) Vaccine - COST NOT COVERED BY MEDICARE PART B Following completion of primary series, a booster dose should be given every 10 years to maintain immunity against tetanus  Td may also be given as tetanus wound prophylaxis  Tdap Vaccine - COST NOT COVERED BY MEDICARE PART B Recommended at least once for all adults  For pregnant patients, recommended with each pregnancy  Shingles Vaccine (Shingrix) - COST NOT COVERED BY MEDICARE PART B  2 shot series recommended in those aged 48 and above     Health Maintenance Due:  There are no preventive care reminders to display for this patient  Immunizations Due:      Topic Date Due    COVID-19 Vaccine (4 - Booster for Moderna series) 01/10/2022     Advance Directives   What are advance directives? Advance directives are legal documents that state your wishes and plans for medical care  These plans are made ahead of time in case you lose your ability to make decisions for yourself  Advance directives can apply to any medical decision, such as the treatments you want, and if you want to donate organs  What are the types of advance directives? There are many types of advance directives, and each state has rules about how to use them  You may choose a combination of any of the following:  Living will: This is a written record of the treatment you want   You can also choose which treatments you do not want, which to limit, and which to stop at a certain time  This includes surgery, medicine, IV fluid, and tube feedings  [facility] power of  for healthcare [place] [facility]): This is a written record that states who you want to make healthcare choices for you when you are unable to make them for yourself  This person, called a proxy, is usually a family member or a friend  You may choose more than 1 proxy  Do not resuscitate (DNR) order:  A DNR order is used in case your heart stops beating or you stop breathing  It is a request not to have certain forms of treatment, such as CPR  A DNR order may be included in other types of advance directives  Medical directive: This covers the care that you want if you are in a coma, near death, or unable to make decisions for yourself  You can list the treatments you want for each condition  Treatment may include pain medicine, surgery, blood transfusions, dialysis, IV or tube feedings, and a ventilator (breathing machine)  Values history: This document has questions about your views, beliefs, and how you feel and think about life  This information can help others choose the care that you would choose  Why are advance directives important? An advance directive helps you control your care  Although spoken wishes may be used, it is better to have your wishes written down  Spoken wishes can be misunderstood, or not followed  Treatments may be given even if you do not want them  An advance directive may make it easier for your family to make difficult choices about your care  How to Quit Using Smokeless Tobacco   Why it is important to stop using smokeless tobacco:  Smokeless tobacco comes in many forms  Examples include chew, snuff, dip, dissolvable tobacco, and snus  All smokeless tobacco products contain nicotine and may contain as much nicotine as 3 cigarettes  You may be physically dependent on nicotine   You may also be emotionally addicted to it  The cravings can be strong, but it is important to quit using smokeless tobacco  You will improve your health and decrease your cancer, stroke, and heart attack risk  Mouth sores and tooth problems will also improve when you quit  You can benefit from quitting no matter how long you have used smokeless tobacco    Prepare to stop using smokeless tobacco:  Nicotine is a highly addictive drug  Withdrawal symptoms can happen when you stop and make it hard to quit  The following can help keep you on track:  Set a quit date  Tell friends, family, and coworkers that you plan to quit  Remove all smokeless tobacco products from your home, car, and workplace  Manage weight gain after you quit:  Nicotine can affect your metabolism  You may gain a few pounds after you quit  The following can help you control your weight:  Eat healthy foods  Drink water before, during, and between meals  Exercise as directed  © Copyright [employer] 2018 Information is for End User's use only and may not be sold, redistributed or otherwise used for commercial purposes   All illustrations and images included in CareNotes® are the copyrighted property of [employer]  or [address] [place] [address] [address] [employer]

## 2023-05-02 ENCOUNTER — TELEPHONE (OUTPATIENT)
Dept: FAMILY MEDICINE CLINIC | Facility: CLINIC | Age: 83
End: 2023-05-02

## 2023-05-02 ENCOUNTER — ANTICOAG VISIT (OUTPATIENT)
Dept: FAMILY MEDICINE CLINIC | Facility: CLINIC | Age: 83
End: 2023-05-02

## 2023-05-02 LAB — SL AMB POCT INR: 2.5

## 2023-05-02 NOTE — TELEPHONE ENCOUNTER
Called Pt  Spoke to Pt's spouse  Advised that Pt's INR 2 5 is stable, continue same dose and recheck in 1 week

## 2023-05-03 PROBLEM — R05.3 CHRONIC COUGH: Status: ACTIVE | Noted: 2023-05-03

## 2023-05-03 PROBLEM — K21.9 GASTROESOPHAGEAL REFLUX DISEASE WITHOUT ESOPHAGITIS: Status: ACTIVE | Noted: 2023-05-03

## 2023-05-03 NOTE — ASSESSMENT & PLAN NOTE
Start a daily antihistamine 1 tab daily at bedtime- like claritin or allegra or zrytec- just no D next to it    Check chest xray  If coughing still will consider swallow evaluation

## 2023-05-03 NOTE — ASSESSMENT & PLAN NOTE
Assumed care. Assessment complete. VSS. Fundus firm, lochia light. Pt ambulating and voiding without difficulty. Pt would like pain medication as needed. Call light within reach. Will continue to monitor.   Continues on metoprolol and coumadin

## 2023-05-03 NOTE — ASSESSMENT & PLAN NOTE
Physical Therapy Daily Treatment Note     Name: Hien Jeter  Clinic Number: [id number]    Therapy Diagnosis:   Encounter Diagnoses   Name Primary?    Decreased range of motion     Muscle weakness      Physician: Rolando Sawyer MD    Visit Date: 8/9/2018  Physician Orders: PT Eval and Treat  Medical Diagnosis: Impingement syndrome of left shoulder  Evaluation Date: 3/22/18  Authorization Period Expiration: 12/31/2018  Plan of Care Certification Period: 8/16/18  Visit #/Visits authorized: 24/ 50    Time In: 1400  Time Out: 1500    PROCEDURES:    TIMED  Procedure Min.   MT 10   TE 45   TE Supervised      UNTIMED  Procedure Min.   CP 10       Total Timed Minutes:  55  Total Timed Units:  3  Total Untimed Units:    Total Billable Time: 55 minutes  Charges Billed/# of units: 4 (TE-3, MT-1)    Precautions: Standard and Multiple Sclerosis      Subjective     Pt reports: no new complaints.  She was compliant with home exercise program.  Response to previous treatment: sore  Functional change: donning/doffing shirt independently    Pain: 0/10  Location: denied shoulder pain. Reports only discomfort. 5/10 HA ; 2/10 HA following interventions.    Objective     Hien received therapeutic exercises to develop strength, endurance, ROM, flexibility and posture for 45 minutes including: MT to B UT,levator and upper cervical occipitals   Log below    Date 8/9/18 8/2/18 7/26/18 7/24/18   Visit 27 26 25 24   POC exp  8/16/18       FOTO dc dc dc dc          UBE 3fwd/3bkwd 3/fwd/3bkwd 0 60           Stretches       Corner pec       Foam roll 5' 5' 5' 5'          MT 10' 10' Pain free  ASTYM next          Supine       Dowel flex     NT   Foam roll thoracic mobility:D&S serratus  CirclesCW/CC  Horizon abd  Diagonals  Angle wing 15' 2x10 ea. 15' 2 x 10 ea. 15' 15'          Sidelying       Sh abd 2x12 3# 2x10 3# 2x10 3# NT   Sh ER 2x10 3# 2x10 3# 2x10 3# NT   Sh Flex 2x10 3# 2x10 3# 2x10 3# NT   Gator 2x10 3# 2x10 3# 2x10 3# NT         BP stable   Prone       Row 2x12 3# 2x10 3# 2x10 3# NT   scaption 2x12 3# 2x10 3# 2x10 3# NT   Ext w/ ER  2x12 3# 2x10 3# 2x10 3# NT          Stand       Rows    NT   Lats    NT   Sh ER/IR    NT   TB  ER  Hor ABd  OH ABd   3x10 GTB  3x10 GTB  3x10 GTB   3x10 GTB  3x10 GTB  3x10 GTB   3x10 GTB  3x10 GTB  3x10 GTB NT  NT  NT  NT   Wall push up plus    NT   Wall slide    NT   Mat pushup 3x10  3x10  3x10          Seen by  MB 2/6 MB 2/6 MB 1/6 KV        Presented pain free today however did report having a migraine HA      Home Exercises Provided and Patient Education Provided     Education provided:     Written Home Exercises Provided: Patient instructed to cont prior HEP.  Exercises were reviewed and Hien was able to demonstrate them prior to the end of the session.  Hien demonstrated good  understanding of the education provided.     See EMR under Patient Instructions for exercises provided prior visit.    Assessment     Pt tolerated treatment well with no c/o pain or discomfort.    Hien is progressing well towards her goals.   Pt prognosis is Good.     Pt will continue to benefit from skilled outpatient physical therapy to address the deficits listed in the problem list box on initial evaluation, provide pt/family education and to maximize pt's level of independence in the home and community environment.     Pt's spiritual, cultural and educational needs considered and pt agreeable to plan of care and goals.    Anticipated barriers to physical therapy: none    Goals:     Goals to be met in 4 weeks: (4/22/18)  1) Initiate Hep - MET  2) Pt will increase L shoulder AROM by 10 degrees grossly for improved performance with overhead ADL's - Partially MET  3) Pt will report 4/10 pain in (L)shoulder at worst - Partially MET  4) Pt will demonstrate increased MMT to 4-/5 grossly L shoulder - MET  5) Patient will be able to achieve less than or equal to 48 on FOTO shoulder survey demonstrating overall improved functional ability with  upper extremity. - MET        New Short Term Goals Status:   Cont with goals 2-3  Long Term Goal Status:   continue per initial plan of care.     Goals to be met by discharge:  1) Independent with HEP - Progressing towards  2) Pt will demonstrate (L) shoulder AROM WNL grossly for Clemons with ADL's  - Progressing towards  3) Pt will demonstrate (L) shoulder MMT WNL grossly for Clemons with functional activities  - Progressing towards  4) Independent and pain free with ADL's and IADL's - Progressing towards  5) Patient will be able to achieve less than or equal to 33 on FOTO shoulder survey demonstrating overall improved functional ability with upper extremity. - Progressing towards      Plan     Cont POC.    Ignacio Osei, PTA

## 2023-05-09 ENCOUNTER — TELEPHONE (OUTPATIENT)
Dept: FAMILY MEDICINE CLINIC | Facility: CLINIC | Age: 83
End: 2023-05-09

## 2023-05-09 ENCOUNTER — ANTICOAG VISIT (OUTPATIENT)
Dept: FAMILY MEDICINE CLINIC | Facility: CLINIC | Age: 83
End: 2023-05-09

## 2023-05-09 LAB — SL AMB POCT INR: 2.1

## 2023-05-09 NOTE — TELEPHONE ENCOUNTER
Called Pt  Spoke to Pt's spouse, and Pt advised on results for INR  Advised recheck in 1 week, continue same dose

## 2023-05-11 DIAGNOSIS — Z79.01 ANTICOAGULATED: ICD-10-CM

## 2023-05-11 RX ORDER — WARFARIN SODIUM 1 MG/1
TABLET ORAL
Qty: 135 TABLET | Refills: 1 | Status: SHIPPED | OUTPATIENT
Start: 2023-05-11

## 2023-05-14 DIAGNOSIS — K21.9 GASTROESOPHAGEAL REFLUX DISEASE WITHOUT ESOPHAGITIS: ICD-10-CM

## 2023-05-14 RX ORDER — OMEPRAZOLE 40 MG/1
CAPSULE, DELAYED RELEASE ORAL
Qty: 14 CAPSULE | Refills: 0 | Status: SHIPPED | OUTPATIENT
Start: 2023-05-14

## 2023-05-15 ENCOUNTER — TELEPHONE (OUTPATIENT)
Dept: FAMILY MEDICINE CLINIC | Facility: CLINIC | Age: 83
End: 2023-05-15

## 2023-05-15 ENCOUNTER — ANTICOAG VISIT (OUTPATIENT)
Dept: FAMILY MEDICINE CLINIC | Facility: CLINIC | Age: 83
End: 2023-05-15

## 2023-05-15 LAB — INR PPP: 3.3 (ref 0.84–1.19)

## 2023-05-15 NOTE — TELEPHONE ENCOUNTER
Called Pt  Spoke to Pt's spouse  Pt's spouse will forward message to Pt   Pt advised hold 2 days and then alternate 5mg/6mg every other day and recheck INR on 5/22

## 2023-05-23 ENCOUNTER — ANTICOAG VISIT (OUTPATIENT)
Dept: FAMILY MEDICINE CLINIC | Facility: CLINIC | Age: 83
End: 2023-05-23

## 2023-05-23 ENCOUNTER — TELEPHONE (OUTPATIENT)
Dept: FAMILY MEDICINE CLINIC | Facility: CLINIC | Age: 83
End: 2023-05-23

## 2023-05-23 LAB
ALBUMIN SERPL-MCNC: 4.2 G/DL (ref 3.6–4.6)
ALBUMIN/GLOB SERPL: 1.4 {RATIO} (ref 1.2–2.2)
ALP SERPL-CCNC: 89 IU/L (ref 44–121)
ALT SERPL-CCNC: 11 IU/L (ref 0–44)
AST SERPL-CCNC: 18 IU/L (ref 0–40)
BASOPHILS # BLD AUTO: 0.1 X10E3/UL (ref 0–0.2)
BASOPHILS NFR BLD AUTO: 1 %
BILIRUB SERPL-MCNC: 1 MG/DL (ref 0–1.2)
BUN SERPL-MCNC: 36 MG/DL (ref 8–27)
BUN/CREAT SERPL: 19 (ref 10–24)
CALCIUM SERPL-MCNC: 9.3 MG/DL (ref 8.6–10.2)
CHLORIDE SERPL-SCNC: 107 MMOL/L (ref 96–106)
CHOLEST SERPL-MCNC: 149 MG/DL (ref 100–199)
CO2 SERPL-SCNC: 21 MMOL/L (ref 20–29)
CREAT SERPL-MCNC: 1.89 MG/DL (ref 0.76–1.27)
EGFR: 35 ML/MIN/1.73
EOSINOPHIL # BLD AUTO: 0.3 X10E3/UL (ref 0–0.4)
EOSINOPHIL NFR BLD AUTO: 5 %
ERYTHROCYTE [DISTWIDTH] IN BLOOD BY AUTOMATED COUNT: 12.2 % (ref 11.6–15.4)
GLOBULIN SER-MCNC: 2.9 G/DL (ref 1.5–4.5)
GLUCOSE SERPL-MCNC: 102 MG/DL (ref 70–99)
HCT VFR BLD AUTO: 38.3 % (ref 37.5–51)
HDLC SERPL-MCNC: 48 MG/DL
HGB BLD-MCNC: 13.2 G/DL (ref 13–17.7)
IMM GRANULOCYTES # BLD: 0 X10E3/UL (ref 0–0.1)
IMM GRANULOCYTES NFR BLD: 0 %
INR PPP: 1.8 (ref 0.9–1.2)
LDLC SERPL CALC-MCNC: 86 MG/DL (ref 0–99)
LDLC/HDLC SERPL: 1.8 RATIO (ref 0–3.6)
LYMPHOCYTES # BLD AUTO: 1.6 X10E3/UL (ref 0.7–3.1)
LYMPHOCYTES NFR BLD AUTO: 22 %
MCH RBC QN AUTO: 33 PG (ref 26.6–33)
MCHC RBC AUTO-ENTMCNC: 34.5 G/DL (ref 31.5–35.7)
MCV RBC AUTO: 96 FL (ref 79–97)
MONOCYTES # BLD AUTO: 0.7 X10E3/UL (ref 0.1–0.9)
MONOCYTES NFR BLD AUTO: 9 %
NEUTROPHILS # BLD AUTO: 4.5 X10E3/UL (ref 1.4–7)
NEUTROPHILS NFR BLD AUTO: 63 %
PLATELET # BLD AUTO: 245 X10E3/UL (ref 150–450)
POTASSIUM SERPL-SCNC: 4.7 MMOL/L (ref 3.5–5.2)
PROT SERPL-MCNC: 7.1 G/DL (ref 6–8.5)
PROTHROMBIN TIME: 18 SEC (ref 9.1–12)
PSA FREE MFR SERPL: 42 %
PSA FREE SERPL-MCNC: 0.63 NG/ML
PSA SERPL-MCNC: 1.5 NG/ML (ref 0–4)
RBC # BLD AUTO: 4 X10E6/UL (ref 4.14–5.8)
SL AMB VLDL CHOLESTEROL CALC: 15 MG/DL (ref 5–40)
SODIUM SERPL-SCNC: 141 MMOL/L (ref 134–144)
TRIGL SERPL-MCNC: 75 MG/DL (ref 0–149)
TSH SERPL DL<=0.005 MIU/L-ACNC: 3.84 UIU/ML (ref 0.45–4.5)
WBC # BLD AUTO: 7.1 X10E3/UL (ref 3.4–10.8)

## 2023-05-23 NOTE — TELEPHONE ENCOUNTER
----- Message from Zackery Ace DO sent at 5/23/2023  5:32 PM EDT -----  Regarding: warfarin  take 6mg for 3 days then alternate 5mg/6mg every other day and recheck 5/30

## 2023-05-26 ENCOUNTER — TELEPHONE (OUTPATIENT)
Dept: FAMILY MEDICINE CLINIC | Facility: CLINIC | Age: 83
End: 2023-05-26

## 2023-05-26 ENCOUNTER — ANTICOAG VISIT (OUTPATIENT)
Dept: FAMILY MEDICINE CLINIC | Facility: CLINIC | Age: 83
End: 2023-05-26

## 2023-05-26 NOTE — TELEPHONE ENCOUNTER
5/23/23  Called Pt  Spoke to Pt's spouse   Advised on Pt's INR result of 1 9 and advised on Pt's adjustment for warfarin/coumadin: take 6mg for 3 days then alternate 5mg/6mg every other day and recheck 5/30 5/26/23   Called and spoke to Pt's spouse on INR reminder to take INR on 5/30

## 2023-05-26 NOTE — PROGRESS NOTES
Called Pt  Spoke to Pt's spouse  Advised on Pt's INR result of 1 9 and advised on Pt's adjustment for warfarin/coumadin: take 6mg for 3 days then alternate 5mg/6mg every other day and recheck 5/30

## 2023-05-28 DIAGNOSIS — N18.32 STAGE 3B CHRONIC KIDNEY DISEASE (HCC): Primary | ICD-10-CM

## 2023-05-28 PROBLEM — E78.00 PURE HYPERCHOLESTEROLEMIA: Status: ACTIVE | Noted: 2017-12-06

## 2023-05-28 PROBLEM — I66.9 CEREBRAL ARTERY OCCLUSION: Status: ACTIVE | Noted: 2017-12-06

## 2023-05-28 PROBLEM — H83.09 LABYRINTHITIS, UNSPECIFIED EAR: Status: ACTIVE | Noted: 2017-12-06

## 2023-05-30 DIAGNOSIS — F41.9 ANXIETY: ICD-10-CM

## 2023-05-30 LAB — INR PPP: 1.9 (ref 0.84–1.19)

## 2023-05-30 RX ORDER — ALPRAZOLAM 0.5 MG/1
0.5 TABLET ORAL 3 TIMES DAILY PRN
Qty: 90 TABLET | Refills: 0 | Status: SHIPPED | OUTPATIENT
Start: 2023-05-30

## 2023-05-30 NOTE — TELEPHONE ENCOUNTER
----- Message from Devon Chakraborty DO sent at 5/28/2023 12:16 PM EDT -----  Your kidney function is very low  The last comparison I have is 2019 and it was just slightly low, so we will have to keep a closer eye on this and recheck in 2-3 months  Drinking water is very important and can improve kidney function  All other labs are good including cholesterol, liver function and prostate levels

## 2023-05-31 ENCOUNTER — TELEPHONE (OUTPATIENT)
Dept: FAMILY MEDICINE CLINIC | Facility: CLINIC | Age: 83
End: 2023-05-31

## 2023-05-31 NOTE — TELEPHONE ENCOUNTER
Pt's spouse said Pt got his INR done on Monday 5/29/23, and it was 1.9, asked to fax us over the results

## 2023-06-02 ENCOUNTER — ANTICOAG VISIT (OUTPATIENT)
Dept: FAMILY MEDICINE CLINIC | Facility: CLINIC | Age: 83
End: 2023-06-02

## 2023-06-02 DIAGNOSIS — K21.9 GASTROESOPHAGEAL REFLUX DISEASE WITHOUT ESOPHAGITIS: ICD-10-CM

## 2023-06-02 LAB — SL AMB POCT INR: 1.9

## 2023-06-02 RX ORDER — OMEPRAZOLE 40 MG/1
CAPSULE, DELAYED RELEASE ORAL
Qty: 14 CAPSULE | Refills: 0 | Status: SHIPPED | OUTPATIENT
Start: 2023-06-02

## 2023-06-02 NOTE — PROGRESS NOTES
Called and spoke to Pt's spouse   Advised on INR result (INR done on Monday 5/29/23, and it was 1 9) and confirmed that they received dosage instructions: 6mg for 3 days, then alternating 6mg/5mg until next week, recheck next week

## 2023-06-02 NOTE — TELEPHONE ENCOUNTER
----- Message from Daja Ayon DO sent at 6/1/2023  4:01 PM EDT -----  Regarding: warfarin  Please verify pt has instructions,  6mg for 3 days starting 5/30, then alternating 6mg/5mg until next week, recheck next week 6/5  ----- Message -----  From: Fred Tian  Sent: 6/1/2023   8:12 AM EDT  To: Daja Ayon DO

## 2023-06-02 NOTE — TELEPHONE ENCOUNTER
Called and spoke to Pt's spouse   Advised on INR result and dosage instructions: 6mg for 3 days starting, then alternating 6mg/5mg until next week, recheck next week

## 2023-06-06 ENCOUNTER — TELEPHONE (OUTPATIENT)
Dept: UROLOGY | Facility: AMBULATORY SURGERY CENTER | Age: 83
End: 2023-06-06

## 2023-06-06 ENCOUNTER — TELEPHONE (OUTPATIENT)
Dept: FAMILY MEDICINE CLINIC | Facility: CLINIC | Age: 83
End: 2023-06-06

## 2023-06-06 ENCOUNTER — ANTICOAG VISIT (OUTPATIENT)
Dept: FAMILY MEDICINE CLINIC | Facility: CLINIC | Age: 83
End: 2023-06-06
Payer: COMMERCIAL

## 2023-06-06 DIAGNOSIS — R30.0 DYSURIA: ICD-10-CM

## 2023-06-06 DIAGNOSIS — R31.0 GROSS HEMATURIA: Primary | ICD-10-CM

## 2023-06-06 LAB — SL AMB POCT INR: 1.9

## 2023-06-06 PROCEDURE — 85610 PROTHROMBIN TIME: CPT

## 2023-06-06 RX ORDER — CEPHALEXIN 500 MG/1
500 CAPSULE ORAL EVERY 12 HOURS SCHEDULED
Qty: 20 CAPSULE | Refills: 0 | Status: SHIPPED | OUTPATIENT
Start: 2023-06-06 | End: 2023-06-16

## 2023-06-06 NOTE — TELEPHONE ENCOUNTER
Called, spoke to Pt's spouse, advised on INR results and dosage instructions for INR test completed 6/5/23, 1 9, cont same dose and recheck 1 wk

## 2023-06-06 NOTE — TELEPHONE ENCOUNTER
Please triage   New Patient    What is the reason for the patient’s appointment?: Patient has a history of bladder cancer and is having burning when urinating and it has been going on for a couple weeks  Patient was also seeing a little bit of blood in his urine on and off  Patient's wife did not have much detail on the blood in her 's urine  What office location does the patient prefer?: [facility]     Does patient have Imaging/Lab Results: PSA was done on 5/22/23 1 5    Have patient records been requested?:  If No, are the records showing in Epic:       INSURANCE:  Do we accept the patient's insurance or is the patient Self-Pay?: Yes    Insurance Provider: [id number]  Plan Type/Number:  Member ID#:       HISTORY:   Has the patient had any previous Urologist(s)?: Patient's previous urologist passed away,    Was the patient seen in the ED?: No    Has the patient had any outside testing done?: Basic labs done on 5/22/23    Does the patient have a personal history of cancer?: History of bladder cancer over 10 years ago         Patient's wife can be reached at [phone]

## 2023-06-06 NOTE — TELEPHONE ENCOUNTER
Called spoke with wife  Reports pt is having burning and discomfort with urination  Wife reports pt does have history of bladder cancer 8 years ago  No issues since this  Reports symptoms are increasing  Appt scheduled for June 28th  Did advise to reach out to PCP for possible urine testing until upcoming appt with our office  Wife reports she will reach out to PCP and see, ER precautions reviewed  Did advise to try and get old records, wife reports she will check with doctor for copy

## 2023-06-08 ENCOUNTER — TELEPHONE (OUTPATIENT)
Dept: FAMILY MEDICINE CLINIC | Facility: CLINIC | Age: 83
End: 2023-06-08

## 2023-06-08 LAB
APPEARANCE UR: ABNORMAL
BACTERIA UR CULT: NORMAL
BACTERIA URNS QL MICRO: ABNORMAL
BILIRUB UR QL STRIP: POSITIVE
CASTS URNS QL MICRO: ABNORMAL /LPF
COLOR UR: ABNORMAL
EPI CELLS #/AREA URNS HPF: ABNORMAL /HPF (ref 0–10)
GLUCOSE UR QL: NEGATIVE
HGB UR QL STRIP: ABNORMAL
KETONES UR QL STRIP: NEGATIVE
LEUKOCYTE ESTERASE UR QL STRIP: ABNORMAL
Lab: NO GROWTH
MICRO URNS: ABNORMAL
NITRITE UR QL STRIP: NEGATIVE
PH UR STRIP: 5.5 [PH] (ref 5–7.5)
PROT UR QL STRIP: ABNORMAL
RBC #/AREA URNS HPF: >30 /HPF (ref 0–2)
SP GR UR: 1.02 (ref 1–1.03)
UROBILINOGEN UR STRIP-ACNC: 0.2 MG/DL (ref 0.2–1)
WBC #/AREA URNS HPF: ABNORMAL /HPF (ref 0–5)

## 2023-06-08 NOTE — TELEPHONE ENCOUNTER
----- Message from Timoteo Anthony, [address] sent at 6/8/2023  9:09 AM EDT -----  Please let Sasha Pérez know his urine showed blood and bilirubin which is what is giving the dark color in his urine  Please make sure they schedule the CT abdomen and pelvis as soon as possible so we can evaluate his bladder and kidneys as well as his liver  Order is in chart  It doesn't look like she saw the [employer] message from 2 days ago

## 2023-06-08 NOTE — TELEPHONE ENCOUNTER
Called Pt   LVM for Pt to call the office back as we have received their results, provided call back #

## 2023-06-08 NOTE — RESULT ENCOUNTER NOTE
Please let Yesenia Zacarias know his urine showed blood and bilirubin which is what is giving the dark color in his urine  Please make sure they schedule the CT abdomen and pelvis as soon as possible so we can evaluate his bladder and kidneys as well as his liver  Order is in chart  It doesn't look like she saw the [employer] message from 2 days ago

## 2023-06-09 ENCOUNTER — APPOINTMENT (OUTPATIENT)
Dept: LAB | Facility: HOSPITAL | Age: 83
End: 2023-06-09
Payer: COMMERCIAL

## 2023-06-09 ENCOUNTER — HOSPITAL ENCOUNTER (OUTPATIENT)
Dept: CT IMAGING | Facility: HOSPITAL | Age: 83
Discharge: HOME | End: 2023-06-09
Payer: COMMERCIAL

## 2023-06-09 ENCOUNTER — TELEPHONE (OUTPATIENT)
Dept: FAMILY MEDICINE CLINIC | Facility: CLINIC | Age: 83
End: 2023-06-09

## 2023-06-09 DIAGNOSIS — N18.32 STAGE 3B CHRONIC KIDNEY DISEASE (HCC): Primary | ICD-10-CM

## 2023-06-09 DIAGNOSIS — R31.0 GROSS HEMATURIA: ICD-10-CM

## 2023-06-09 LAB
ANION GAP SERPL CALCULATED.3IONS-SCNC: 4 MMOL/L (ref 4–13)
BUN SERPL-MCNC: 37 MG/DL (ref 5–25)
CALCIUM SERPL-MCNC: 9.1 MG/DL (ref 8.4–10.2)
CHLORIDE SERPL-SCNC: 109 MMOL/L (ref 96–108)
CO2 SERPL-SCNC: 24 MMOL/L (ref 21–32)
CREAT SERPL-MCNC: 1.78 MG/DL (ref 0.6–1.3)
GFR SERPL CREATININE-BSD FRML MDRD: 34 ML/MIN/1.73SQ M
GLUCOSE P FAST SERPL-MCNC: 86 MG/DL (ref 65–99)
POTASSIUM SERPL-SCNC: 5.1 MMOL/L (ref 3.5–5.3)
SODIUM SERPL-SCNC: 137 MMOL/L (ref 135–147)

## 2023-06-09 PROCEDURE — 80048 BASIC METABOLIC PNL TOTAL CA: CPT

## 2023-06-09 PROCEDURE — 74178 CT ABD&PLV WO CNTR FLWD CNTR: CPT

## 2023-06-09 PROCEDURE — G1004 CDSM NDSC: HCPCS

## 2023-06-09 PROCEDURE — 36415 COLL VENOUS BLD VENIPUNCTURE: CPT

## 2023-06-09 RX ADMIN — IOHEXOL 80 ML: 350 INJECTION, SOLUTION INTRAVENOUS at 15:33

## 2023-06-09 NOTE — TELEPHONE ENCOUNTER
Called Pt  Spoke to Pt's spouse   Pt advised STAT BMP order is in provided address and number for hospital  [address] [place] [address], [facility], [address]  [phone]  Jesús cline BW done prior to CT scan today

## 2023-06-09 NOTE — TELEPHONE ENCOUNTER
Yolande LANGFORD called for STAT BMP order for Pt, DX code Chronic Kidney Disease  BMP w/ dx ordered for [facility]  Advised Pt to get BW done at hospital today 1 hr before his CT scan

## 2023-06-12 DIAGNOSIS — C67.2 MALIGNANT NEOPLASM OF LATERAL WALL OF URINARY BLADDER (HCC): Primary | ICD-10-CM

## 2023-06-12 NOTE — RESULT ENCOUNTER NOTE
Spoke with patients wife this morning regarding lab work and CT scan findings of diffuse wall thickening, mass in posterior wall extending into right UV junction causing right hydronephrosis hydroureter suggesting transitional cell carcinoma  Office will reach out to urology to set up sooner appointment then 6/28/23

## 2023-06-13 ENCOUNTER — ANTICOAG VISIT (OUTPATIENT)
Dept: FAMILY MEDICINE CLINIC | Facility: CLINIC | Age: 83
End: 2023-06-13

## 2023-06-13 LAB — INR PPP: 1.8 (ref 0.84–1.19)

## 2023-06-14 ENCOUNTER — TELEPHONE (OUTPATIENT)
Dept: FAMILY MEDICINE CLINIC | Facility: CLINIC | Age: 83
End: 2023-06-14

## 2023-06-14 NOTE — TELEPHONE ENCOUNTER
----- Message from Rosas Mauricio DO sent at 6/13/2023  7:10 PM EDT -----  Regarding: warfarin  Increase your warfarin to 6mg daily except for Wednesdays- take 5mg just the one day and recheck 6/19

## 2023-06-15 ENCOUNTER — OFFICE VISIT (OUTPATIENT)
Dept: UROLOGY | Facility: MEDICAL CENTER | Age: 83
End: 2023-06-15
Payer: COMMERCIAL

## 2023-06-15 VITALS
HEART RATE: 68 BPM | DIASTOLIC BLOOD PRESSURE: 80 MMHG | WEIGHT: 187 LBS | OXYGEN SATURATION: 99 % | BODY MASS INDEX: 25.33 KG/M2 | HEIGHT: 72 IN | SYSTOLIC BLOOD PRESSURE: 118 MMHG

## 2023-06-15 DIAGNOSIS — N39.41 URGE INCONTINENCE: ICD-10-CM

## 2023-06-15 DIAGNOSIS — K21.9 GASTROESOPHAGEAL REFLUX DISEASE WITHOUT ESOPHAGITIS: ICD-10-CM

## 2023-06-15 DIAGNOSIS — N13.39 OTHER HYDRONEPHROSIS: ICD-10-CM

## 2023-06-15 DIAGNOSIS — C67.2 MALIGNANT NEOPLASM OF LATERAL WALL OF URINARY BLADDER (HCC): Primary | ICD-10-CM

## 2023-06-15 LAB
SL AMB  POCT GLUCOSE, UA: ABNORMAL
SL AMB LEUKOCYTE ESTERASE,UA: ABNORMAL
SL AMB POCT BILIRUBIN,UA: ABNORMAL
SL AMB POCT BLOOD,UA: ABNORMAL
SL AMB POCT CLARITY,UA: CLEAR
SL AMB POCT COLOR,UA: YELLOW
SL AMB POCT KETONES,UA: ABNORMAL
SL AMB POCT NITRITE,UA: ABNORMAL
SL AMB POCT PH,UA: 5.5
SL AMB POCT SPECIFIC GRAVITY,UA: 1.02
SL AMB POCT URINE PROTEIN: ABNORMAL
SL AMB POCT UROBILINOGEN: 0.2

## 2023-06-15 PROCEDURE — 81003 URINALYSIS AUTO W/O SCOPE: CPT | Performed by: UROLOGY

## 2023-06-15 PROCEDURE — 52000 CYSTOURETHROSCOPY: CPT | Performed by: UROLOGY

## 2023-06-15 PROCEDURE — 99204 OFFICE O/P NEW MOD 45 MIN: CPT | Performed by: UROLOGY

## 2023-06-15 RX ORDER — OMEPRAZOLE 40 MG/1
CAPSULE, DELAYED RELEASE ORAL
Qty: 14 CAPSULE | Refills: 0 | Status: SHIPPED | OUTPATIENT
Start: 2023-06-15

## 2023-06-15 NOTE — PROGRESS NOTES
HISTORY:    1  Long history of bladder cancer, he is not too specific on when his last treatment was  2   Intermittent hematuria for several months  No burning, stream is good, feels like empties okay    3  Recent CT scan showing a large bladder tumor    4  Although patient minimizes symptoms, he has urgency and urge incontinence on occasion  Possible related to stroke in the past      Cystoscopy     Date/Time 6/15/2023 2:00 PM     Performed by  Mau Agrawal MD   Authorized by Mau Agrawal MD         Procedure Details:  Procedure type: cystoscopy    Patient tolerance: Patient tolerated the procedure well with no immediate complications    Additional Procedure Details:      Patient presents for cystoscopy  I have discussed the reasons for doing the exam, and the potential risks and complications  Patient expressed understanding, and signed informed consent document  The patient was carefully  positioned supine on the examining table  Sterile preparation was performed on the urethra  Xylocaine jelly was instilled and left  Indwelling for the procedure  The 13 Urdu flexible cystoscope was passed with the following findings:      Urethra: No stricture    Prostate:  lateral lobes moderately enlarged                  Bladder: Cloudy urine, but large bulky tumor posterior wall behind the trigone  Unable to see orifices but report describes obstruction     Residual urine: 100 mL    Patient tolerated the procedure well and was escorted from the examining table  Recent PSA 1 5 May 2023         ASSESSMENT / PLAN:    1  Large bladder tumor, report describes obstruction and possible involvement of distal right ureter  2   Needs cardiology clearance    3  Plan cystoscopy, TUR bladder tumor, retrograde pyelograms, possible right ureteroscopy and stent placement  I told him sometimes the tumor obstructs the right ureter and we cannot see the orifice of it      Certainly high risk due to his "comorbidities, patient and wife understand    The following portions of the patient's history were reviewed and updated as appropriate: allergies, current medications, past family history, past medical history, past social history, past surgical history and problem list     Review of Systems   All other systems reviewed and are negative  Objective:     Physical Exam  Constitutional:       Comments: Halting ambulation secondary to stroke   Genitourinary:     Comments: Penis testes normal    Prostate irregular  Neurological:      Mental Status: He is alert             0   Lab Value Date/Time    PSA 1 5 05/22/2023 1055    PSA 1 6 04/27/2022 1300    PSA 1 2 08/29/2016 0000    PSA 0 9 05/22/2015 1300   ]  BUN   Date Value Ref Range Status   06/09/2023 37 (H) 5 - 25 mg/dL Final   05/22/2023 36 (H) 8 - 27 mg/dL Final     Creatinine   Date Value Ref Range Status   06/09/2023 1 78 (H) 0 60 - 1 30 mg/dL Final     Comment:     Standardized to IDMS reference method   08/29/2016 1 49 (H) 0 76 - 1 27 mg/dL Final     No components found for: \"CBC\"      Patient Active Problem List   Diagnosis   • Anxiety   • Arthralgia   • Asymptomatic cholelithiasis   • Paroxysmal atrial fibrillation (HCC)   • Benign essential hypertension   • Benign prostatic hyperplasia   • Bilateral pseudophakia   • Chronic kidney disease, stage 3 (HCC)   • Coronary arteriosclerosis   • Epilepsy ([address] )   • Gait instability   • Hiatal hernia   • Hyperlipidemia   • Frequent falls   • Weakness   • Stage 3b chronic kidney disease (HCC)   • Age-related cataract of right eye   • Macular drusen   • Posterior vitreous detachment of both eyes   • Primary osteoarthritis of left hip   • Osteoarthritis of lumbar spine   • Essential hypertension   • Chronic bilateral low back pain with right-sided sciatica   • DDD (degenerative disc disease), lumbar   • Sciatica, right side   • Chronic cough   • Gastroesophageal reflux disease without esophagitis   • Cerebral artery " occlusion   • Labyrinthitis, unspecified ear   • Pure hypercholesterolemia        Diagnoses and all orders for this visit:    Malignant 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 ([facility] [address] )  -     Ambulatory Referral to Urology  -     POCT urine dip auto non-scope    Urge incontinence    Other orders  -     Cystoscopy           Patient ID: Enma Kyle is a 81 y o  male        Current Outpatient Medications:   •  ALPRAZolam (XANAX) 0 5 mg tablet, TAKE 1 TABLET (0 5 MG TOTAL) BY MOUTH 3 (THREE) TIMES A DAY AS NEEDED FOR ANXIETY , Disp: 90 tablet, Rfl: 0  •  cephalexin (KEFLEX) 500 mg capsule, Take 1 capsule (500 mg total) by mouth every 12 (twelve) hours for 10 days, Disp: 20 capsule, Rfl: 0  •  gabapentin (NEURONTIN) 100 mg capsule, TAKE 3 TABS DAILY IN AM, Disp: 270 capsule, Rfl: 3  •  metoprolol tartrate (LOPRESSOR) 50 mg tablet, TAKE 1 TABLET BY MOUTH TWICE A DAY, Disp: 180 tablet, Rfl: 3  •  Multiple Vitamins-Minerals (PreserVision AREDS 2) CAPS, , Disp: , Rfl:   •  omeprazole (PriLOSEC) 40 MG capsule, TAKE 1 CAPSULE BY MOUTH DAILY FOR 14 DAYS , Disp: 14 capsule, Rfl: 0  •  rosuvastatin (CRESTOR) 5 mg tablet, TAKE 1 TABLET BY MOUTH EVERY DAY, Disp: 90 tablet, Rfl: 3  •  warfarin (COUMADIN) 1 mg tablet, TAKE 1MG TAB EVERY OTHER DAY COMBINING WITH 5MG TAB, Disp: 135 tablet, Rfl: 1  •  warfarin (COUMADIN) 5 mg tablet, TAKE 1 TABLET BY MOUTH EVERY DAY, combine with 1mg tab as directed, Disp: 90 tablet, Rfl: 1  •  Lancets MISC, by Does not apply route, Disp: , Rfl:   •  methocarbamol (ROBAXIN) 500 mg tablet, TAKE 1 TABLET BY MOUTH 4 TIMES A DAY AS NEEDED FOR MUSCLE SPASMS , Disp: 30 tablet, Rfl: 1    Past Medical History:   Diagnosis Date   • Anemia     last assessed: 05/23/2015   • GERD (gastroesophageal reflux disease)    • Hematuria    • Intracerebral hemorrhage ([facility] [address] )     last assessed: 04/21/2014; right   • Intracerebral hemorrhage ([facility] )     last assessed: 11/23/2017   • Stroke syndrome     last assessed: 06/13/2013 Past Surgical History:   Procedure Laterality Date   • BLADDER SURGERY         Social History

## 2023-06-20 ENCOUNTER — ANTICOAG VISIT (OUTPATIENT)
Dept: FAMILY MEDICINE CLINIC | Facility: CLINIC | Age: 83
End: 2023-06-20

## 2023-06-20 ENCOUNTER — TELEPHONE (OUTPATIENT)
Dept: FAMILY MEDICINE CLINIC | Facility: CLINIC | Age: 83
End: 2023-06-20

## 2023-06-20 LAB — INR PPP: 1.4 (ref 0.84–1.19)

## 2023-06-20 NOTE — TELEPHONE ENCOUNTER
Called Pt  Spoke to Pt's wife who will relay results to Pt  Pt advised on INR 1 4  Advised to increase to 6 mg daily and recheck INR in 1 week

## 2023-06-27 ENCOUNTER — TELEPHONE (OUTPATIENT)
Dept: FAMILY MEDICINE CLINIC | Facility: CLINIC | Age: 83
End: 2023-06-27

## 2023-06-27 ENCOUNTER — ANTICOAG VISIT (OUTPATIENT)
Dept: FAMILY MEDICINE CLINIC | Facility: CLINIC | Age: 83
End: 2023-06-27
Payer: COMMERCIAL

## 2023-06-27 LAB — SL AMB POCT INR: 1.5

## 2023-06-27 PROCEDURE — 85610 PROTHROMBIN TIME: CPT

## 2023-06-27 NOTE — TELEPHONE ENCOUNTER
Called Pt  Spoke to Pt's spouse on INR results  Advised INR is a little low 1 5 and to take an extra 1/2 tablet today so 9mg today (instead of 6mg) then resume 6mg daily  Recheck 1 week

## 2023-06-27 NOTE — RESULT ENCOUNTER NOTE
Please let him know his INR is a little low 1.5 take an extra 1/2 tablet today so 9mg today (instead of 6mg) then resume 6mg daily. Recheck 1 week.

## 2023-06-27 NOTE — TELEPHONE ENCOUNTER
----- Message from Dimitris Wolf, [address]  sent at 6/27/2023  1:39 PM EDT -----  Please let him know his INR is a little low 1 5 take an extra 1/2 tablet today so 9mg today (instead of 6mg) then resume 6mg daily  Recheck 1 week

## 2023-07-05 ENCOUNTER — TELEPHONE (OUTPATIENT)
Dept: FAMILY MEDICINE CLINIC | Facility: CLINIC | Age: 83
End: 2023-07-05

## 2023-07-05 ENCOUNTER — ANTICOAG VISIT (OUTPATIENT)
Dept: FAMILY MEDICINE CLINIC | Facility: CLINIC | Age: 83
End: 2023-07-05

## 2023-07-05 LAB — INR PPP: 1.9 (ref 0.84–1.19)

## 2023-07-05 NOTE — TELEPHONE ENCOUNTER
Called Pt, and spoke to Pt's spouse.  Pt's spouse says she will relay the message to Pt on Pt's INR results INR 1.9, continue same dose, repeat 1 week

## 2023-07-10 ENCOUNTER — APPOINTMENT (OUTPATIENT)
Dept: LAB | Facility: HOSPITAL | Age: 83
End: 2023-07-10
Payer: COMMERCIAL

## 2023-07-10 DIAGNOSIS — C67.2 MALIGNANT NEOPLASM OF LATERAL WALL OF URINARY BLADDER (HCC): ICD-10-CM

## 2023-07-10 LAB
ALBUMIN SERPL BCP-MCNC: 3.7 G/DL (ref 3.5–5)
ALP SERPL-CCNC: 86 U/L (ref 46–116)
ALT SERPL W P-5'-P-CCNC: 23 U/L (ref 12–78)
ANION GAP SERPL CALCULATED.3IONS-SCNC: 5 MMOL/L
AST SERPL W P-5'-P-CCNC: 21 U/L (ref 5–45)
BASOPHILS # BLD AUTO: 0.06 THOUSANDS/ÂΜL (ref 0–0.1)
BASOPHILS NFR BLD AUTO: 1 % (ref 0–1)
BILIRUB SERPL-MCNC: 0.92 MG/DL (ref 0.2–1)
BUN SERPL-MCNC: 32 MG/DL (ref 5–25)
CALCIUM SERPL-MCNC: 9.3 MG/DL (ref 8.3–10.1)
CHLORIDE SERPL-SCNC: 110 MMOL/L (ref 96–108)
CO2 SERPL-SCNC: 25 MMOL/L (ref 21–32)
CREAT SERPL-MCNC: 1.93 MG/DL (ref 0.6–1.3)
EOSINOPHIL # BLD AUTO: 0.28 THOUSAND/ÂΜL (ref 0–0.61)
EOSINOPHIL NFR BLD AUTO: 4 % (ref 0–6)
ERYTHROCYTE [DISTWIDTH] IN BLOOD BY AUTOMATED COUNT: 13.3 % (ref 11.6–15.1)
GFR SERPL CREATININE-BSD FRML MDRD: 31 ML/MIN/1.73SQ M
GLUCOSE P FAST SERPL-MCNC: 102 MG/DL (ref 65–99)
HCT VFR BLD AUTO: 41.9 % (ref 36.5–49.3)
HGB BLD-MCNC: 13.6 G/DL (ref 12–17)
IMM GRANULOCYTES # BLD AUTO: 0.02 THOUSAND/UL (ref 0–0.2)
IMM GRANULOCYTES NFR BLD AUTO: 0 % (ref 0–2)
LYMPHOCYTES # BLD AUTO: 1.68 THOUSANDS/ÂΜL (ref 0.6–4.47)
LYMPHOCYTES NFR BLD AUTO: 25 % (ref 14–44)
MCH RBC QN AUTO: 33.3 PG (ref 26.8–34.3)
MCHC RBC AUTO-ENTMCNC: 32.5 G/DL (ref 31.4–37.4)
MCV RBC AUTO: 103 FL (ref 82–98)
MONOCYTES # BLD AUTO: 0.61 THOUSAND/ÂΜL (ref 0.17–1.22)
MONOCYTES NFR BLD AUTO: 9 % (ref 4–12)
NEUTROPHILS # BLD AUTO: 4.03 THOUSANDS/ÂΜL (ref 1.85–7.62)
NEUTS SEG NFR BLD AUTO: 61 % (ref 43–75)
NRBC BLD AUTO-RTO: 0 /100 WBCS
PLATELET # BLD AUTO: 259 THOUSANDS/UL (ref 149–390)
PMV BLD AUTO: 10.5 FL (ref 8.9–12.7)
POTASSIUM SERPL-SCNC: 4.7 MMOL/L (ref 3.5–5.3)
PROT SERPL-MCNC: 7.8 G/DL (ref 6.4–8.4)
RBC # BLD AUTO: 4.08 MILLION/UL (ref 3.88–5.62)
SODIUM SERPL-SCNC: 140 MMOL/L (ref 135–147)
WBC # BLD AUTO: 6.68 THOUSAND/UL (ref 4.31–10.16)

## 2023-07-10 PROCEDURE — 80053 COMPREHEN METABOLIC PANEL: CPT

## 2023-07-10 PROCEDURE — 85025 COMPLETE CBC W/AUTO DIFF WBC: CPT

## 2023-07-10 PROCEDURE — 87086 URINE CULTURE/COLONY COUNT: CPT

## 2023-07-10 PROCEDURE — 36415 COLL VENOUS BLD VENIPUNCTURE: CPT

## 2023-07-11 ENCOUNTER — ANTICOAG VISIT (OUTPATIENT)
Dept: FAMILY MEDICINE CLINIC | Facility: CLINIC | Age: 83
End: 2023-07-11
Payer: COMMERCIAL

## 2023-07-11 ENCOUNTER — TELEPHONE (OUTPATIENT)
Dept: FAMILY MEDICINE CLINIC | Facility: CLINIC | Age: 83
End: 2023-07-11

## 2023-07-11 LAB — SL AMB POCT INR: 2.4

## 2023-07-11 PROCEDURE — 85610 PROTHROMBIN TIME: CPT

## 2023-07-11 NOTE — PROGRESS NOTES
Called Pt. Spoke to Pt's spouse. Advised continue same dose and recheck in 1 week. stated Eyes with no visual disturbances.  Ears clean and dry and no hearing difficulties. Nose with pink mucosa and no drainage.  Mouth mucous membranes moist and pink.  No tenderness or swelling to throat or neck.

## 2023-07-12 LAB — BACTERIA UR CULT: NORMAL

## 2023-07-18 ENCOUNTER — TELEPHONE (OUTPATIENT)
Dept: FAMILY MEDICINE CLINIC | Facility: CLINIC | Age: 83
End: 2023-07-18

## 2023-07-18 ENCOUNTER — ANTICOAG VISIT (OUTPATIENT)
Dept: FAMILY MEDICINE CLINIC | Facility: CLINIC | Age: 83
End: 2023-07-18

## 2023-07-18 ENCOUNTER — ANTICOAG VISIT (OUTPATIENT)
Dept: FAMILY MEDICINE CLINIC | Facility: CLINIC | Age: 83
End: 2023-07-18
Payer: COMMERCIAL

## 2023-07-18 LAB
INR PPP: 2.5 (ref 0.84–1.19)
SL AMB POCT INR: 2.5

## 2023-07-18 PROCEDURE — 85610 PROTHROMBIN TIME: CPT

## 2023-07-18 NOTE — TELEPHONE ENCOUNTER
Called Pt. Spoke to Pt's spouse. Pt advised warfarin levels look good.  Continue same dose and recheck in 1 week    Surgery 7/26/23-hold warfarin 4 days before that (stopping warfarin from 7/22-7/26)

## 2023-07-20 DIAGNOSIS — F41.9 ANXIETY: ICD-10-CM

## 2023-07-20 RX ORDER — ALPRAZOLAM 0.5 MG/1
0.5 TABLET ORAL 3 TIMES DAILY PRN
Qty: 90 TABLET | Refills: 0 | Status: SHIPPED | OUTPATIENT
Start: 2023-07-20

## 2023-07-21 NOTE — PRE-PROCEDURE INSTRUCTIONS
Pre-Surgery Instructions:   Medication Instructions   • ALPRAZolam (XANAX) 0.5 mg tablet Uses PRN- OK to take day of surgery   • Cholecalciferol (VITAMIN D3 PO) Stop taking 4 days prior to surgery. • gabapentin (NEURONTIN) 100 mg capsule Take day of surgery. • metoprolol tartrate (LOPRESSOR) 50 mg tablet Take day of surgery. • Multiple Vitamins-Minerals (PreserVision AREDS 2) CAPS Stop taking 4 days prior to surgery. • rosuvastatin (CRESTOR) 5 mg tablet Take day of surgery. • warfarin (COUMADIN) 1 mg tablet Instructions provided by MD   • warfarin (COUMADIN) 5 mg tablet Instructions provided by MD     Medication instructions for day surgery reviewed. Please use only a sip of water to take your instructed medications. Avoid all over the counter vitamins, supplements and NSAIDS for one week prior to surgery per anesthesia guidelines. Tylenol is ok to take as needed. You will receive a call one business day prior to surgery with an arrival time and hospital directions. If your surgery is scheduled on a Monday, the hospital will be calling you on the Friday prior to your surgery. If you have not heard from anyone by 8pm, please call the hospital supervisor through the hospital  at [phone]. Skyler Sharp [phone]). Do not eat or drink anything after midnight the night before your surgery, including candy, mints, lifesavers, or chewing gum. Do not drink alcohol 24hrs before your surgery. Try not to smoke at least 24hrs before your surgery. Follow the pre surgery showering instructions as listed in the [facility] Surgical Experience Booklet” or otherwise provided by your surgeon's office. Do not shave the surgical area 24 hours before surgery. Do not apply any lotions, creams, including makeup, cologne, deodorant, or perfumes after showering on the day of your surgery. No contact lenses, eye make-up, or artificial eyelashes.  Remove nail polish, including gel polish, and any artificial, gel, or acrylic nails if possible. Remove all jewelry including rings and body piercing jewelry. Wear causal clothing that is easy to take on and off. Consider your type of surgery. Keep any valuables, jewelry, piercings at home. Please bring any specially ordered equipment (sling, braces) if indicated. Arrange for a responsible person to drive you to and from the hospital on the day of your surgery. Visitor Guidelines discussed. Call the surgeon's office with any new illnesses, exposures, or additional questions prior to surgery. Please reference your [facility] Surgical Experience Booklet” for additional information to prepare for your upcoming surgery.

## 2023-07-22 ENCOUNTER — TELEPHONE (OUTPATIENT)
Dept: UROLOGY | Facility: CLINIC | Age: 83
End: 2023-07-22

## 2023-07-22 NOTE — TELEPHONE ENCOUNTER
Patient's wife left a voicemail on 7/21/23, asking for surgery scheduler call back to provide directions to surgery campus.

## 2023-07-24 PROCEDURE — NC001 PR NO CHARGE: Performed by: UROLOGY

## 2023-07-24 NOTE — TELEPHONE ENCOUNTER
I left a  Voice mail message in return to check if I can Email directions to him (site directions should have been included in packet mailed)

## 2023-07-24 NOTE — H&P
HISTORY AND PHYSICAL  ? ? Patient Name: Davion Bell  Patient MRN: [id number]  Attending Provider: Nicole Rod MD  Service: Urology  Chief Complaint    Bladder cancer    HPI   Davion Bell is a 81 y.o. male with long history of bladder cancer, recent gross hematuria for several months. Recent cystoscopy shows:  Urethra: No stricture     Prostate:  lateral lobes moderately enlarged                   Bladder: Cloudy urine, but large bulky tumor posterior wall behind the trigone. Unable to see orifices but report describes obstruction    Tumor confirmed on CT scan with hydronephrosis on the right side  I plan cystoscopy, TUR bladder tumor, bilateral retrograde collar grams, possible right stent placement. Patient understands if there is too much obstruction on the right side, I may not be able to get a stent in place  Potential risks and complications discussed, and informed consent was given by the patient. Medications  Meds/Allergies   No current facility-administered medications for this encounter. Cannot display prior to admission medications because the patient has not been admitted in this contact. No current facility-administered medications for this encounter.     Current Outpatient Medications:   •  ALPRAZolam (XANAX) 0.5 mg tablet, TAKE 1 TABLET (0.5 MG TOTAL) BY MOUTH 3 (THREE) TIMES A DAY AS NEEDED FOR ANXIETY., Disp: 90 tablet, Rfl: 0  •  Cholecalciferol (VITAMIN D3 PO), Take by mouth, Disp: , Rfl:   •  gabapentin (NEURONTIN) 100 mg capsule, TAKE 3 TABS DAILY IN AM, Disp: 270 capsule, Rfl: 3  •  metoprolol tartrate (LOPRESSOR) 50 mg tablet, TAKE 1 TABLET BY MOUTH TWICE A DAY, Disp: 180 tablet, Rfl: 3  •  Multiple Vitamins-Minerals (PreserVision AREDS 2) CAPS, , Disp: , Rfl:   •  rosuvastatin (CRESTOR) 5 mg tablet, TAKE 1 TABLET BY MOUTH EVERY DAY, Disp: 90 tablet, Rfl: 3  •  warfarin (COUMADIN) 1 mg tablet, TAKE 1MG TAB EVERY OTHER DAY COMBINING WITH 5MG TAB, Disp: 135 tablet, Rfl: 1  •  warfarin (COUMADIN) 5 mg tablet, TAKE 1 TABLET BY MOUTH EVERY DAY, combine with 1mg tab as directed, Disp: 90 tablet, Rfl: 1  •  Lancets MISC, by Does not apply route, Disp: , Rfl:   •  methocarbamol (ROBAXIN) 500 mg tablet, TAKE 1 TABLET BY MOUTH 4 TIMES A DAY AS NEEDED FOR MUSCLE SPASMS., Disp: 30 tablet, Rfl: 1  •  omeprazole (PriLOSEC) 40 MG capsule, TAKE 1 CAPSULE BY MOUTH DAILY FOR 14 DAYS., Disp: 14 capsule, Rfl: 0  Review of Systems  10 point review of systems negative except as noted in HPI  Allergies  No Known Allergies  PMH  Past Medical History:   Diagnosis Date   • Ambulates with cane    • Anemia     last assessed: 05/23/2015   • GERD (gastroesophageal reflux disease)    • Hematuria    • Hypertension    • Intracerebral hemorrhage ([address])     last assessed: 04/21/2014; right   • Intracerebral hemorrhage (HCC)     last assessed: 11/23/2017   • Mobility impaired    • Stroke syndrome     last assessed: 06/13/2013   • UTI (urinary tract infection)      Past surgical history  Past Surgical History:   Procedure Laterality Date   • BLADDER SURGERY       Social history  Social History     Tobacco Use   • Smoking status: Some Days     Types: Cigars   • Smokeless tobacco: Never   Vaping Use   • Vaping Use: Never used   Substance Use Topics   • Alcohol use: Yes     Comment: Social    • Drug use: No     ?  Physical Exam    .vs  General appearance: alert and oriented, in no acute distress  Head: Normocephalic, without obvious abnormality, atraumatic  Throat: lips, mucosa, and tongue normal; teeth and gums normal  Neck: no adenopathy, no carotid bruit, no JVD, supple, symmetrical, trachea midline and thyroid not enlarged, symmetric, no tenderness/mass/nodules  Lungs: clear to auscultation bilaterally  Heart: regular rate and rhythm, S1, S2 normal, no murmur, click, rub or gallop  Abdomen: soft, non-tender; bowel sounds normal; no masses,  no organomegaly  Extremities: extremities normal, warm and well-perfused; no cyanosis, clubbing, or edema  Morena Baltazar MD DISCHARGE

## 2023-07-25 ENCOUNTER — ANESTHESIA EVENT (OUTPATIENT)
Dept: PERIOP | Facility: HOSPITAL | Age: 83
DRG: 669 | End: 2023-07-25
Payer: COMMERCIAL

## 2023-07-26 ENCOUNTER — HOSPITAL ENCOUNTER (INPATIENT)
Facility: HOSPITAL | Age: 83
LOS: 1 days | Discharge: HOME HEALTH | DRG: 669 | End: 2023-07-28
Attending: UROLOGY | Admitting: UROLOGY
Payer: COMMERCIAL

## 2023-07-26 ENCOUNTER — APPOINTMENT (OUTPATIENT)
Dept: RADIOLOGY | Facility: HOSPITAL | Age: 83
DRG: 669 | End: 2023-07-26
Payer: COMMERCIAL

## 2023-07-26 ENCOUNTER — ANESTHESIA (OUTPATIENT)
Dept: PERIOP | Facility: HOSPITAL | Age: 83
DRG: 669 | End: 2023-07-26
Payer: COMMERCIAL

## 2023-07-26 DIAGNOSIS — N13.39 OTHER HYDRONEPHROSIS: ICD-10-CM

## 2023-07-26 DIAGNOSIS — C67.2 MALIGNANT NEOPLASM OF LATERAL WALL OF URINARY BLADDER (HCC): Primary | ICD-10-CM

## 2023-07-26 PROBLEM — D68.9 COAGULATION DISORDER (HCC): Status: ACTIVE | Noted: 2023-07-26

## 2023-07-26 LAB
APTT PPP: 35 SECONDS (ref 23–37)
INR PPP: 1.22 (ref 0.84–1.19)
PROTHROMBIN TIME: 15.4 SECONDS (ref 11.6–14.5)

## 2023-07-26 PROCEDURE — 52240 CYSTOSCOPY AND TREATMENT: CPT | Performed by: UROLOGY

## 2023-07-26 PROCEDURE — NC001 PR NO CHARGE: Performed by: PHYSICIAN ASSISTANT

## 2023-07-26 PROCEDURE — 88307 TISSUE EXAM BY PATHOLOGIST: CPT | Performed by: STUDENT IN AN ORGANIZED HEALTH CARE EDUCATION/TRAINING PROGRAM

## 2023-07-26 PROCEDURE — 85610 PROTHROMBIN TIME: CPT | Performed by: ANESTHESIOLOGY

## 2023-07-26 PROCEDURE — C1769 GUIDE WIRE: HCPCS | Performed by: UROLOGY

## 2023-07-26 PROCEDURE — 0T9330Z DRAINAGE OF RIGHT KIDNEY PELVIS WITH DRAINAGE DEVICE, PERCUTANEOUS APPROACH: ICD-10-PCS | Performed by: INTERNAL MEDICINE

## 2023-07-26 PROCEDURE — 88342 IMHCHEM/IMCYTCHM 1ST ANTB: CPT | Performed by: STUDENT IN AN ORGANIZED HEALTH CARE EDUCATION/TRAINING PROGRAM

## 2023-07-26 PROCEDURE — 85730 THROMBOPLASTIN TIME PARTIAL: CPT | Performed by: UROLOGY

## 2023-07-26 PROCEDURE — 88341 IMHCHEM/IMCYTCHM EA ADD ANTB: CPT | Performed by: STUDENT IN AN ORGANIZED HEALTH CARE EDUCATION/TRAINING PROGRAM

## 2023-07-26 PROCEDURE — 0TBB8ZZ EXCISION OF BLADDER, VIA NATURAL OR ARTIFICIAL OPENING ENDOSCOPIC: ICD-10-PCS | Performed by: UROLOGY

## 2023-07-26 RX ORDER — METOPROLOL TARTRATE 5 MG/5ML
INJECTION INTRAVENOUS AS NEEDED
Status: DISCONTINUED | OUTPATIENT
Start: 2023-07-26 | End: 2023-07-26

## 2023-07-26 RX ORDER — SODIUM CHLORIDE 9 MG/ML
125 INJECTION, SOLUTION INTRAVENOUS CONTINUOUS
Status: DISCONTINUED | OUTPATIENT
Start: 2023-07-26 | End: 2023-07-26

## 2023-07-26 RX ORDER — CEFAZOLIN SODIUM 1 G/50ML
1000 SOLUTION INTRAVENOUS EVERY 8 HOURS
Status: COMPLETED | OUTPATIENT
Start: 2023-07-26 | End: 2023-07-27

## 2023-07-26 RX ORDER — ROCURONIUM BROMIDE 10 MG/ML
INJECTION, SOLUTION INTRAVENOUS AS NEEDED
Status: DISCONTINUED | OUTPATIENT
Start: 2023-07-26 | End: 2023-07-26

## 2023-07-26 RX ORDER — OXYCODONE HYDROCHLORIDE AND ACETAMINOPHEN 5; 325 MG/1; MG/1
1 TABLET ORAL EVERY 4 HOURS PRN
Status: DISCONTINUED | OUTPATIENT
Start: 2023-07-26 | End: 2023-07-28 | Stop reason: HOSPADM

## 2023-07-26 RX ORDER — CEFAZOLIN SODIUM 2 G/50ML
2000 SOLUTION INTRAVENOUS ONCE
Status: COMPLETED | OUTPATIENT
Start: 2023-07-26 | End: 2023-07-26

## 2023-07-26 RX ORDER — SODIUM CHLORIDE, SODIUM LACTATE, POTASSIUM CHLORIDE, CALCIUM CHLORIDE 600; 310; 30; 20 MG/100ML; MG/100ML; MG/100ML; MG/100ML
75 INJECTION, SOLUTION INTRAVENOUS CONTINUOUS
Status: DISCONTINUED | OUTPATIENT
Start: 2023-07-26 | End: 2023-07-28 | Stop reason: HOSPADM

## 2023-07-26 RX ORDER — CEFUROXIME AXETIL 250 MG/1
250 TABLET ORAL EVERY 12 HOURS SCHEDULED
Qty: 14 TABLET | Refills: 0 | Status: SHIPPED | OUTPATIENT
Start: 2023-07-26 | End: 2023-08-02

## 2023-07-26 RX ORDER — FENTANYL CITRATE/PF 50 MCG/ML
50 SYRINGE (ML) INJECTION
Status: DISCONTINUED | OUTPATIENT
Start: 2023-07-26 | End: 2023-07-26 | Stop reason: HOSPADM

## 2023-07-26 RX ORDER — FENTANYL CITRATE 50 UG/ML
INJECTION, SOLUTION INTRAMUSCULAR; INTRAVENOUS AS NEEDED
Status: DISCONTINUED | OUTPATIENT
Start: 2023-07-26 | End: 2023-07-26

## 2023-07-26 RX ORDER — MAGNESIUM HYDROXIDE 1200 MG/15ML
LIQUID ORAL AS NEEDED
Status: DISCONTINUED | OUTPATIENT
Start: 2023-07-26 | End: 2023-07-26 | Stop reason: HOSPADM

## 2023-07-26 RX ORDER — METOPROLOL TARTRATE 50 MG/1
50 TABLET, FILM COATED ORAL 2 TIMES DAILY
Status: DISCONTINUED | OUTPATIENT
Start: 2023-07-26 | End: 2023-07-28 | Stop reason: HOSPADM

## 2023-07-26 RX ORDER — ACETAMINOPHEN 325 MG/1
650 TABLET ORAL EVERY 6 HOURS SCHEDULED
Status: DISCONTINUED | OUTPATIENT
Start: 2023-07-26 | End: 2023-07-28 | Stop reason: HOSPADM

## 2023-07-26 RX ORDER — DEXAMETHASONE SODIUM PHOSPHATE 10 MG/ML
INJECTION, SOLUTION INTRAMUSCULAR; INTRAVENOUS AS NEEDED
Status: DISCONTINUED | OUTPATIENT
Start: 2023-07-26 | End: 2023-07-26

## 2023-07-26 RX ORDER — PHENYLEPHRINE HCL IN 0.9% NACL 1 MG/10 ML
SYRINGE (ML) INTRAVENOUS AS NEEDED
Status: DISCONTINUED | OUTPATIENT
Start: 2023-07-26 | End: 2023-07-26

## 2023-07-26 RX ORDER — PROPOFOL 10 MG/ML
INJECTION, EMULSION INTRAVENOUS AS NEEDED
Status: DISCONTINUED | OUTPATIENT
Start: 2023-07-26 | End: 2023-07-26

## 2023-07-26 RX ORDER — OXYCODONE HYDROCHLORIDE AND ACETAMINOPHEN 5; 325 MG/1; MG/1
1-2 TABLET ORAL EVERY 6 HOURS PRN
Qty: 10 TABLET | Refills: 0 | Status: SHIPPED | OUTPATIENT
Start: 2023-07-26 | End: 2023-08-01 | Stop reason: ALTCHOICE

## 2023-07-26 RX ORDER — ONDANSETRON 2 MG/ML
4 INJECTION INTRAMUSCULAR; INTRAVENOUS EVERY 6 HOURS PRN
Status: DISCONTINUED | OUTPATIENT
Start: 2023-07-26 | End: 2023-07-28 | Stop reason: HOSPADM

## 2023-07-26 RX ORDER — LIDOCAINE HYDROCHLORIDE 20 MG/ML
INJECTION, SOLUTION EPIDURAL; INFILTRATION; INTRACAUDAL; PERINEURAL AS NEEDED
Status: DISCONTINUED | OUTPATIENT
Start: 2023-07-26 | End: 2023-07-26

## 2023-07-26 RX ORDER — ONDANSETRON 2 MG/ML
INJECTION INTRAMUSCULAR; INTRAVENOUS AS NEEDED
Status: DISCONTINUED | OUTPATIENT
Start: 2023-07-26 | End: 2023-07-26

## 2023-07-26 RX ORDER — OXYBUTYNIN CHLORIDE 5 MG/1
5 TABLET ORAL EVERY 6 HOURS PRN
Status: DISCONTINUED | OUTPATIENT
Start: 2023-07-26 | End: 2023-07-28 | Stop reason: HOSPADM

## 2023-07-26 RX ORDER — ALPRAZOLAM 0.5 MG/1
0.5 TABLET ORAL 3 TIMES DAILY PRN
Status: DISCONTINUED | OUTPATIENT
Start: 2023-07-26 | End: 2023-07-28 | Stop reason: HOSPADM

## 2023-07-26 RX ORDER — EPHEDRINE SULFATE 50 MG/ML
INJECTION INTRAVENOUS AS NEEDED
Status: DISCONTINUED | OUTPATIENT
Start: 2023-07-26 | End: 2023-07-26

## 2023-07-26 RX ORDER — PHENAZOPYRIDINE HYDROCHLORIDE 200 MG/1
200 TABLET, FILM COATED ORAL ONCE
Status: COMPLETED | OUTPATIENT
Start: 2023-07-26 | End: 2023-07-26

## 2023-07-26 RX ORDER — ONDANSETRON 2 MG/ML
4 INJECTION INTRAMUSCULAR; INTRAVENOUS ONCE AS NEEDED
Status: DISCONTINUED | OUTPATIENT
Start: 2023-07-26 | End: 2023-07-26 | Stop reason: HOSPADM

## 2023-07-26 RX ADMIN — FENTANYL CITRATE 50 MCG: 50 INJECTION INTRAMUSCULAR; INTRAVENOUS at 11:47

## 2023-07-26 RX ADMIN — METOPROLOL TARTRATE 50 MG: 50 TABLET, FILM COATED ORAL at 17:33

## 2023-07-26 RX ADMIN — SODIUM CHLORIDE, SODIUM LACTATE, POTASSIUM CHLORIDE, AND CALCIUM CHLORIDE 75 ML/HR: .6; .31; .03; .02 INJECTION, SOLUTION INTRAVENOUS at 13:56

## 2023-07-26 RX ADMIN — PHENYLEPHRINE HYDROCHLORIDE 50 MCG/MIN: 10 INJECTION INTRAVENOUS at 09:25

## 2023-07-26 RX ADMIN — OXYCODONE AND ACETAMINOPHEN 1 TABLET: 5; 325 TABLET ORAL at 15:27

## 2023-07-26 RX ADMIN — EPHEDRINE SULFATE 5 MG: 50 INJECTION, SOLUTION INTRAVENOUS at 09:27

## 2023-07-26 RX ADMIN — PROPOFOL 150 MG: 10 INJECTION, EMULSION INTRAVENOUS at 09:15

## 2023-07-26 RX ADMIN — ROCURONIUM BROMIDE 40 MG: 10 INJECTION, SOLUTION INTRAVENOUS at 09:15

## 2023-07-26 RX ADMIN — ONDANSETRON 4 MG: 2 INJECTION INTRAMUSCULAR; INTRAVENOUS at 09:07

## 2023-07-26 RX ADMIN — SUGAMMADEX 200 MG: 100 INJECTION, SOLUTION INTRAVENOUS at 10:34

## 2023-07-26 RX ADMIN — Medication 200 MCG: at 09:27

## 2023-07-26 RX ADMIN — Medication 150 MCG: at 09:24

## 2023-07-26 RX ADMIN — LIDOCAINE HYDROCHLORIDE 80 MG: 20 INJECTION, SOLUTION EPIDURAL; INFILTRATION; INTRACAUDAL at 09:15

## 2023-07-26 RX ADMIN — Medication 150 MCG: at 10:11

## 2023-07-26 RX ADMIN — CEFAZOLIN SODIUM 2000 MG: 2 SOLUTION INTRAVENOUS at 09:02

## 2023-07-26 RX ADMIN — FENTANYL CITRATE 25 MCG: 50 INJECTION INTRAMUSCULAR; INTRAVENOUS at 10:03

## 2023-07-26 RX ADMIN — PHENAZOPYRIDINE 200 MG: 200 TABLET ORAL at 07:58

## 2023-07-26 RX ADMIN — FENTANYL CITRATE 25 MCG: 50 INJECTION INTRAMUSCULAR; INTRAVENOUS at 09:33

## 2023-07-26 RX ADMIN — ROCURONIUM BROMIDE 20 MG: 10 INJECTION, SOLUTION INTRAVENOUS at 09:41

## 2023-07-26 RX ADMIN — OXYCODONE AND ACETAMINOPHEN 1 TABLET: 5; 325 TABLET ORAL at 19:43

## 2023-07-26 RX ADMIN — FENTANYL CITRATE 50 MCG: 50 INJECTION INTRAMUSCULAR; INTRAVENOUS at 11:19

## 2023-07-26 RX ADMIN — FENTANYL CITRATE 50 MCG: 50 INJECTION INTRAMUSCULAR; INTRAVENOUS at 09:14

## 2023-07-26 RX ADMIN — METOROPROLOL TARTRATE 1 MG: 5 INJECTION, SOLUTION INTRAVENOUS at 10:25

## 2023-07-26 RX ADMIN — SODIUM CHLORIDE: 0.9 INJECTION, SOLUTION INTRAVENOUS at 10:31

## 2023-07-26 RX ADMIN — METOROPROLOL TARTRATE 2 MG: 5 INJECTION, SOLUTION INTRAVENOUS at 10:44

## 2023-07-26 RX ADMIN — CEFAZOLIN SODIUM 1000 MG: 1 SOLUTION INTRAVENOUS at 17:33

## 2023-07-26 RX ADMIN — SODIUM CHLORIDE, SODIUM LACTATE, POTASSIUM CHLORIDE, AND CALCIUM CHLORIDE 75 ML/HR: .6; .31; .03; .02 INJECTION, SOLUTION INTRAVENOUS at 22:52

## 2023-07-26 RX ADMIN — Medication 100 MCG: at 09:19

## 2023-07-26 RX ADMIN — SODIUM CHLORIDE 125 ML/HR: 0.9 INJECTION, SOLUTION INTRAVENOUS at 08:17

## 2023-07-26 RX ADMIN — DEXAMETHASONE SODIUM PHOSPHATE 10 MG: 10 INJECTION INTRAMUSCULAR; INTRAVENOUS at 09:18

## 2023-07-26 NOTE — ANESTHESIA POSTPROCEDURE EVALUATION
Post-Op Assessment Note    CV Status:  Stable    Pain management: adequate     Mental Status:  Alert and awake   Hydration Status:  Euvolemic   PONV Controlled:  Controlled   Airway Patency:  Patent      Post Op Vitals Reviewed: Yes      Staff: Anesthesiologist         No notable events documented.     BP      Temp      Pulse     Resp      SpO2      /90   Pulse 94   Temp 98.1 °F (36.7 °C) (Temporal)   Resp 13   Ht 6' (1.829 m)   Wt 84.6 kg (186 lb 8.2 oz)   SpO2 99%   BMI 25.30 kg/m²

## 2023-07-26 NOTE — ANESTHESIA PREPROCEDURE EVALUATION
Procedure:  (TURBT) (Bladder)  CYSTO  BILATERAL RETROGRADE PYELOGRAM W/ PSB  RIGHT USCOPE (Ureter)    Relevant Problems   CARDIO   (+) Benign essential hypertension   (+) Coronary arteriosclerosis   (+) Essential hypertension   (+) Hyperlipidemia   (+) Paroxysmal atrial fibrillation (HCC)   (+) Pure hypercholesterolemia      GI/HEPATIC   (+) Gastroesophageal reflux disease without esophagitis   (+) Hiatal hernia      /RENAL   (+) Benign prostatic hyperplasia   (+) Chronic kidney disease, stage 3 (HCC)   (+) Other hydronephrosis   (+) Stage 3b chronic kidney disease (HCC)      HEMATOLOGY   (+) Coagulation disorder (HCC)      MUSCULOSKELETAL   (+) Chronic bilateral low back pain with right-sided sciatica   (+) DDD (degenerative disc disease), lumbar   (+) Hiatal hernia   (+) Osteoarthritis of lumbar spine   (+) Primary osteoarthritis of left hip   (+) Sciatica, right side      NEURO/PSYCH   (+) Anxiety   (+) Chronic bilateral low back pain with right-sided sciatica   (+) Epilepsy (HCC)        Physical Exam    Airway    Mallampati score: IV  TM Distance: <3 FB  Neck ROM: full     Dental   upper dentures,     Cardiovascular  Rhythm: irregular, Rate: normal, Cardiovascular exam normal    Pulmonary  Pulmonary exam normal Breath sounds clear to auscultation,     Other Findings        Anesthesia Plan  ASA Score- 3     Anesthesia Type- general with ASA Monitors. Additional Monitors:   Airway Plan: ETT. Plan Factors-Exercise tolerance (METS): >4 METS. Chart reviewed. Existing labs reviewed. Patient summary reviewed. Patient is not a current smoker. Induction- intravenous. Postoperative Plan-     Informed Consent- Anesthetic plan and risks discussed with patient.

## 2023-07-26 NOTE — OP NOTE
OPERATIVE REPORT  PATIENT NAME: Juancho Koenig    :  1940  MRN: [id number]  Pt Location: [facility] OR ROOM 04    SURGERY DATE: 2023    Surgeon(s) and Role:     * Chiara Johnson MD - Primary    Preop Diagnosis:  Malignant 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 ([address]) [C67.2]    Post-Op Diagnosis Codes:     * Malignant 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 ([address]) [C67.2]    Procedure(s):  (TURBT)  CYSTO  BILATERAL RETROGRADE PYELOGRAM W/ PSB  RIGHT USCOPE  Correction: no retrograde pyelograms, no ureteroscopy  Specimen(s):  ID Type Source Tests Collected by Time Destination   1 : tumor right lateral and posterior wall Tissue Urinary Bladder TISSUE EXAM Chiara Johnson MD 2023  9:44 AM        Estimated Blood Loss:   Minimal    Drains:  * No LDAs found *    Anesthesia Type:   General    Operative Indications:  Malignant 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 ([address]) [C67.2]      Operative Findings:  Bladder cancer extensive over trigone, right orifice, right lateral wall, at least 8 cm diameter. Unable to see right orifice, but there is suspicion of tumor in region of right ureter. Left ureter not involved    Complications:   None    Procedure and Technique:  After the patient was placed for adequate general anesthesia, he was prepped and draped using chlorhexidine solution in lithotomy position. The 25 Armenian scope was passed without difficulty urethra which had no strictures. The prostate had significant hyperplasia. There was tumor from the bladder extending into the bladder neck. With the scope in the bladder, I could see the extensive nature of the tumor as described above. Examination showed the left orifice was not involved by tumor. There was necrotic papillary and nodular tumor all across the trigone, region of right ureter, up to about 2:00 on the right lateral wall. It extended into the bladder neck and possibly into proximal prostatic urethra.     I could not see the right ureter for retrograde pyelogram.  I decided the left retrograde was not necessary    The resectoscope was used to resect tumor into but not through muscle, using the bipolar thunder beat. Care was taken not to go too deep, although the very extensive nature of the cancer showed that there were some small areas of fat visible on the right lateral wall. No sizable perforation was seen. When resecting in the presumed area of the right orifice, I could see tumor extruding from the tissue into the operative field, suggestive of possibly tumor in the right distal ureter extending into the bladder as I unroofed it. Thorough hemostasis was obtained using electrocautery. Tumor fragments were flushed out. Careful examination over the next 20 minutes was done with rollerball used to cauterize any bleeding sites. After assuring no significant bleeding, scope was removed, Gracia cath was inserted and left to normal saline continuous irrigation. Patient taken to recovery in good condition   I was present for the entire procedure.     Patient Disposition:  PACU         SIGNATURE: Sinai Avina MD  DATE: July 26, 2023  TIME: 10:36 AM

## 2023-07-26 NOTE — CONSULTS
e-Consult (IPC)  - Interventional Radiology  Abdelrahman Lara 81 y.o. male MRN: [id number]  Unit/Bed#: E5 -01 Encounter: [id number]          Interventional Radiology has been consulted to evaluate Abdelrahman Lara    We were consulted by urology concerning this patient with right hydronephrosis. Inpatient Consult to IR  Consult performed by: Lion Fernandez PA-C  Consult ordered by: Nilda Anaya MD        07/26/23    Assessment/Recommendation:   Patient is an 80 y/o male with PMH HTN, HLD, CKD, paroxsymal Afib on Coumadin, and long history of bladder cancer who presented today for cystoscopy, TURBT, and possible right stent placement with urology. Per urology op note, extensive bladder cancer over trigone, right orifice, right lateral wall, and unable to see right orifice, but suspicion of tumor in region of right ureter. Patient with known right hydronephrosis. We were consulted to evaluate patient for right PCN placement.    -Case discussed with Dr. Delano Castañeda for right PCN placement tomorrow, pending IR schedule  -Please make patient NPO at midnight  -Please continue to hold Coumadin, reportedly held for procedure since 7/22  -Appreciate anesthesia assistance  -Remainder of care per primary team  -Please reach out to IR with any questions/concerns    5-10 minutes, >50% of the total time devoted to medical consultative verbal/EMR discussion between providers. Written report will be generated in the EMR. Thank you for allowing Interventional Radiology to participate in the care of Abdelrahman Lara. Please don't hesitate to call or TigerText us with any questions.      Lion Fernandez PA-C

## 2023-07-27 ENCOUNTER — ANESTHESIA EVENT (OUTPATIENT)
Dept: RADIOLOGY | Facility: HOSPITAL | Age: 83
DRG: 669 | End: 2023-07-27
Payer: COMMERCIAL

## 2023-07-27 ENCOUNTER — APPOINTMENT (OUTPATIENT)
Dept: RADIOLOGY | Facility: HOSPITAL | Age: 83
DRG: 669 | End: 2023-07-27
Payer: COMMERCIAL

## 2023-07-27 ENCOUNTER — TELEPHONE (OUTPATIENT)
Dept: UROLOGY | Facility: HOSPITAL | Age: 83
End: 2023-07-27

## 2023-07-27 ENCOUNTER — ANESTHESIA (OUTPATIENT)
Dept: RADIOLOGY | Facility: HOSPITAL | Age: 83
DRG: 669 | End: 2023-07-27
Payer: COMMERCIAL

## 2023-07-27 LAB
BASOPHILS # BLD AUTO: 0.03 THOUSANDS/ÂΜL (ref 0–0.1)
BASOPHILS NFR BLD AUTO: 0 % (ref 0–1)
EOSINOPHIL # BLD AUTO: 0.02 THOUSAND/ÂΜL (ref 0–0.61)
EOSINOPHIL NFR BLD AUTO: 0 % (ref 0–6)
ERYTHROCYTE [DISTWIDTH] IN BLOOD BY AUTOMATED COUNT: 13.3 % (ref 11.6–15.1)
HCT VFR BLD AUTO: 34.8 % (ref 36.5–49.3)
HGB BLD-MCNC: 11.1 G/DL (ref 12–17)
IMM GRANULOCYTES # BLD AUTO: 0.07 THOUSAND/UL (ref 0–0.2)
IMM GRANULOCYTES NFR BLD AUTO: 1 % (ref 0–2)
LYMPHOCYTES # BLD AUTO: 1.95 THOUSANDS/ÂΜL (ref 0.6–4.47)
LYMPHOCYTES NFR BLD AUTO: 14 % (ref 14–44)
MCH RBC QN AUTO: 33.1 PG (ref 26.8–34.3)
MCHC RBC AUTO-ENTMCNC: 31.9 G/DL (ref 31.4–37.4)
MCV RBC AUTO: 104 FL (ref 82–98)
MONOCYTES # BLD AUTO: 1.24 THOUSAND/ÂΜL (ref 0.17–1.22)
MONOCYTES NFR BLD AUTO: 9 % (ref 4–12)
NEUTROPHILS # BLD AUTO: 10.37 THOUSANDS/ÂΜL (ref 1.85–7.62)
NEUTS SEG NFR BLD AUTO: 76 % (ref 43–75)
NRBC BLD AUTO-RTO: 0 /100 WBCS
PLATELET # BLD AUTO: 196 THOUSANDS/UL (ref 149–390)
PMV BLD AUTO: 9.8 FL (ref 8.9–12.7)
RBC # BLD AUTO: 3.35 MILLION/UL (ref 3.88–5.62)
WBC # BLD AUTO: 13.68 THOUSAND/UL (ref 4.31–10.16)

## 2023-07-27 PROCEDURE — 50432 PLMT NEPHROSTOMY CATHETER: CPT

## 2023-07-27 PROCEDURE — C1729 CATH, DRAINAGE: HCPCS

## 2023-07-27 PROCEDURE — C1894 INTRO/SHEATH, NON-LASER: HCPCS

## 2023-07-27 PROCEDURE — C1769 GUIDE WIRE: HCPCS

## 2023-07-27 PROCEDURE — 50432 PLMT NEPHROSTOMY CATHETER: CPT | Performed by: INTERNAL MEDICINE

## 2023-07-27 PROCEDURE — 87070 CULTURE OTHR SPECIMN AEROBIC: CPT | Performed by: UROLOGY

## 2023-07-27 PROCEDURE — 99232 SBSQ HOSP IP/OBS MODERATE 35: CPT | Performed by: PHYSICIAN ASSISTANT

## 2023-07-27 PROCEDURE — 85025 COMPLETE CBC W/AUTO DIFF WBC: CPT | Performed by: INTERNAL MEDICINE

## 2023-07-27 PROCEDURE — 87205 SMEAR GRAM STAIN: CPT | Performed by: UROLOGY

## 2023-07-27 RX ORDER — HYDROMORPHONE HCL/PF 1 MG/ML
0.5 SYRINGE (ML) INJECTION
Status: DISCONTINUED | OUTPATIENT
Start: 2023-07-27 | End: 2023-07-27 | Stop reason: HOSPADM

## 2023-07-27 RX ORDER — CEFAZOLIN SODIUM 1 G/3ML
INJECTION, POWDER, FOR SOLUTION INTRAMUSCULAR; INTRAVENOUS AS NEEDED
Status: DISCONTINUED | OUTPATIENT
Start: 2023-07-27 | End: 2023-07-27

## 2023-07-27 RX ORDER — ONDANSETRON 2 MG/ML
4 INJECTION INTRAMUSCULAR; INTRAVENOUS ONCE AS NEEDED
Status: DISCONTINUED | OUTPATIENT
Start: 2023-07-27 | End: 2023-07-27 | Stop reason: HOSPADM

## 2023-07-27 RX ORDER — SODIUM CHLORIDE 9 MG/ML
10 INJECTION INTRAVENOUS DAILY
Qty: 300 ML | Refills: 3 | Status: SHIPPED | OUTPATIENT
Start: 2023-07-27 | End: 2023-08-04 | Stop reason: SDUPTHER

## 2023-07-27 RX ORDER — FENTANYL CITRATE 50 UG/ML
INJECTION, SOLUTION INTRAMUSCULAR; INTRAVENOUS AS NEEDED
Status: DISCONTINUED | OUTPATIENT
Start: 2023-07-27 | End: 2023-07-27

## 2023-07-27 RX ORDER — SODIUM CHLORIDE 9 MG/ML
10 INJECTION INTRAVENOUS DAILY
Qty: 300 ML | Refills: 3 | Status: SHIPPED | OUTPATIENT
Start: 2023-07-27 | End: 2023-11-24

## 2023-07-27 RX ORDER — PROPOFOL 10 MG/ML
INJECTION, EMULSION INTRAVENOUS AS NEEDED
Status: DISCONTINUED | OUTPATIENT
Start: 2023-07-27 | End: 2023-07-27

## 2023-07-27 RX ORDER — LORAZEPAM 2 MG/ML
0.5 INJECTION INTRAMUSCULAR ONCE
Status: DISCONTINUED | OUTPATIENT
Start: 2023-07-27 | End: 2023-07-28 | Stop reason: HOSPADM

## 2023-07-27 RX ORDER — LIDOCAINE HYDROCHLORIDE 20 MG/ML
INJECTION, SOLUTION EPIDURAL; INFILTRATION; INTRACAUDAL; PERINEURAL AS NEEDED
Status: DISCONTINUED | OUTPATIENT
Start: 2023-07-27 | End: 2023-07-27

## 2023-07-27 RX ORDER — FENTANYL CITRATE/PF 50 MCG/ML
25 SYRINGE (ML) INJECTION
Status: DISCONTINUED | OUTPATIENT
Start: 2023-07-27 | End: 2023-07-27 | Stop reason: HOSPADM

## 2023-07-27 RX ORDER — LIDOCAINE HYDROCHLORIDE 10 MG/ML
INJECTION, SOLUTION EPIDURAL; INFILTRATION; INTRACAUDAL; PERINEURAL AS NEEDED
Status: COMPLETED | OUTPATIENT
Start: 2023-07-27 | End: 2023-07-27

## 2023-07-27 RX ORDER — ONDANSETRON 2 MG/ML
INJECTION INTRAMUSCULAR; INTRAVENOUS AS NEEDED
Status: DISCONTINUED | OUTPATIENT
Start: 2023-07-27 | End: 2023-07-27

## 2023-07-27 RX ORDER — ALBUTEROL SULFATE 2.5 MG/3ML
2.5 SOLUTION RESPIRATORY (INHALATION) ONCE AS NEEDED
Status: DISCONTINUED | OUTPATIENT
Start: 2023-07-27 | End: 2023-07-27 | Stop reason: HOSPADM

## 2023-07-27 RX ORDER — ROCURONIUM BROMIDE 10 MG/ML
INJECTION, SOLUTION INTRAVENOUS AS NEEDED
Status: DISCONTINUED | OUTPATIENT
Start: 2023-07-27 | End: 2023-07-27

## 2023-07-27 RX ORDER — PROMETHAZINE HYDROCHLORIDE 25 MG/ML
12.5 INJECTION, SOLUTION INTRAMUSCULAR; INTRAVENOUS ONCE AS NEEDED
Status: DISCONTINUED | OUTPATIENT
Start: 2023-07-27 | End: 2023-07-27 | Stop reason: HOSPADM

## 2023-07-27 RX ORDER — MIDAZOLAM HYDROCHLORIDE 2 MG/2ML
INJECTION, SOLUTION INTRAMUSCULAR; INTRAVENOUS AS NEEDED
Status: DISCONTINUED | OUTPATIENT
Start: 2023-07-27 | End: 2023-07-27

## 2023-07-27 RX ADMIN — OXYCODONE AND ACETAMINOPHEN 1 TABLET: 5; 325 TABLET ORAL at 16:14

## 2023-07-27 RX ADMIN — SODIUM CHLORIDE, SODIUM LACTATE, POTASSIUM CHLORIDE, AND CALCIUM CHLORIDE 75 ML/HR: .6; .31; .03; .02 INJECTION, SOLUTION INTRAVENOUS at 17:26

## 2023-07-27 RX ADMIN — CEFAZOLIN 2000 MG: 1 INJECTION, POWDER, FOR SOLUTION INTRAMUSCULAR; INTRAVENOUS at 12:58

## 2023-07-27 RX ADMIN — METOPROLOL TARTRATE 50 MG: 50 TABLET, FILM COATED ORAL at 17:20

## 2023-07-27 RX ADMIN — ALPRAZOLAM 0.5 MG: 0.5 TABLET ORAL at 11:50

## 2023-07-27 RX ADMIN — LIDOCAINE HYDROCHLORIDE 5 ML: 10 INJECTION, SOLUTION EPIDURAL; INFILTRATION; INTRACAUDAL; PERINEURAL at 13:24

## 2023-07-27 RX ADMIN — ONDANSETRON 4 MG: 2 INJECTION INTRAMUSCULAR; INTRAVENOUS at 12:58

## 2023-07-27 RX ADMIN — LIDOCAINE HYDROCHLORIDE 100 MG: 20 INJECTION, SOLUTION EPIDURAL; INFILTRATION; INTRACAUDAL at 12:57

## 2023-07-27 RX ADMIN — SODIUM CHLORIDE, SODIUM LACTATE, POTASSIUM CHLORIDE, AND CALCIUM CHLORIDE 75 ML/HR: .6; .31; .03; .02 INJECTION, SOLUTION INTRAVENOUS at 12:00

## 2023-07-27 RX ADMIN — CEFAZOLIN SODIUM 1000 MG: 1 SOLUTION INTRAVENOUS at 01:27

## 2023-07-27 RX ADMIN — IOHEXOL 26 ML: 350 INJECTION, SOLUTION INTRAVENOUS at 13:48

## 2023-07-27 RX ADMIN — PROPOFOL 150 MG: 10 INJECTION, EMULSION INTRAVENOUS at 12:58

## 2023-07-27 RX ADMIN — MIDAZOLAM 2 MG: 1 INJECTION INTRAMUSCULAR; INTRAVENOUS at 12:55

## 2023-07-27 RX ADMIN — ROCURONIUM BROMIDE 50 MG: 10 INJECTION, SOLUTION INTRAVENOUS at 12:58

## 2023-07-27 RX ADMIN — SUGAMMADEX 200 MG: 100 INJECTION, SOLUTION INTRAVENOUS at 13:47

## 2023-07-27 RX ADMIN — METOPROLOL TARTRATE 50 MG: 50 TABLET, FILM COATED ORAL at 09:34

## 2023-07-27 RX ADMIN — ACETAMINOPHEN 325MG 650 MG: 325 TABLET ORAL at 06:50

## 2023-07-27 RX ADMIN — FENTANYL CITRATE 50 MCG: 50 INJECTION INTRAMUSCULAR; INTRAVENOUS at 12:57

## 2023-07-27 NOTE — DISCHARGE SUMMARY
Discharge Summary - Venice Hugo 81 y.o. male MRN: [id number]    Unit/Bed#: E5 -01 Encounter: [id number]    Admission Date: 7/26/2023     Discharge Date: 07/27/23    HPI: Venice Hugo is a 81 y.o. male who presented for TURBT by Dr. Ceclia Galeazzi      Procedure(s):  (TURBT)  Surgeon(s):  Obdulia Toledo MD  7/26/2023    Hospital Course: Patient presented to [facility] 7/[facility]/2023 for scheduled procedure listed above. Dr. Ceclia Galeazzi performed the procedure. During his procedure he was unable to identify right ureteral orifice and was suspicious of obstruction from right ureteral lesion. He was unable to perform retrograde pyelograms or ureteroscopy. At the conclusion of the case patient was extubated in the OR and transferred to the PACU in stable condition. He was later transferred to the medical surgical unit where he continued his recovery overnight without complications. Today he had right PCN placed with IR given obstruction evident during his procedure. His urine has remained clear yellow in his Gracia catheter. He will be discharged home with his right PCN to drainage and urethral Gracia catheter to drainage. His Gracia catheter will be removed in office early next week. He has office follow-up scheduled with Dr. Ceclia Galeazzi on 8/7/2023. Discharge Diagnosis: Malignant 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 (HCC)    Condition at Discharge: good     Discharge Medications:  See after visit summary for reconciled discharge medications provided to patient and family. Patient was discharged home on home medications with the addition of Ceftin and Percocet    Discharge instructions/Information to patient and family:   See after visit summary for information provided to patient and family. Provisions for Follow-Up Care:  See after visit summary for information related to follow-up care and any pertinent home health orders.       Disposition: Home    Planned Readmission: No    Discharge Statement   I spent 15 minutes discharging the patient. This time was spent on the day of discharge. I had direct contact with the patient on the day of discharge. Additional documentation is required if more than 30 minutes were spent on discharge.      Signature:   Josette Foss  Date: 7/27/2023 Time: 2:48 PM

## 2023-07-27 NOTE — ASSESSMENT & PLAN NOTE
· POD#2 TURBT  · S/p R PCN placement yesterday by IR  · Irizarry catheter patent draining clear yellow urine, R PCN also draining clear yellow urine. · Afeb and VSS today. · Mild leukocytosis 13.69 expected post-op  · Hgb stable at 10.9   · Feeling much better this morning, denies any weakness. PT/OT to evaluate this morning. Plan:  · Tentative plan for discharge later today pending recommendations by PT/OT. · CM working to set up VNA to help with PCN and irizarry management. · PO Ceftin added for post-op infection prophylaxis. · Will re-evaluate later today for possible discharge. Augie Lindsay

## 2023-07-27 NOTE — ANESTHESIA POSTPROCEDURE EVALUATION
Post-Op Assessment Note    CV Status:  Stable    Pain management: adequate     Mental Status:  Awake   Hydration Status:  Stable   PONV Controlled:  Controlled   Airway Patency:  Patent      Post Op Vitals Reviewed: Yes      Staff: Anesthesiologist         No notable events documented.     /61 (07/27/23 1429)    Temp 99.3 °F (37.4 °C) (07/27/23 1429)    Pulse 80 (07/27/23 1429)   Resp 18 (07/27/23 1429)    SpO2 97 % (07/27/23 1429)

## 2023-07-27 NOTE — TREATMENT PLAN
Treatment Plan:      Discharge order cancelled. Patient requires VNA services for PCN, catheter management at home. Will discuss with CM  He is weak after his procedure and requires further observation. PT/OT evals ordered.      Francisca Manley PA-C

## 2023-07-27 NOTE — TELEPHONE ENCOUNTER
Post Op Note    Abdelrahman Lara is a 81 y.o. male s/p TURBT performed 07/26/2023. Abdelrahman Lara is a patient of Dr. Michell Garrison and is seen at the [facility] office.      Patient still inpatient - he is scheduled for PCNL placement today

## 2023-07-27 NOTE — PLAN OF CARE
Problem: Prexisting or High Potential for Compromised Skin Integrity  Goal: Skin integrity is maintained or improved  Description: INTERVENTIONS:  - Identify patients at risk for skin breakdown  - Assess and monitor skin integrity  - Assess and monitor nutrition and hydration status  - Monitor labs   - Assess for incontinence   - Turn and reposition patient  - Assist with mobility/ambulation  - Relieve pressure over bony prominences  - Avoid friction and shearing  - Provide appropriate hygiene as needed including keeping skin clean and dry  - Evaluate need for skin moisturizer/barrier cream  - Collaborate with interdisciplinary team   - Patient/family teaching  - Consider wound care consult   Outcome: Progressing     Problem: MOBILITY - ADULT  Goal: Maintain or return to baseline ADL function  Description: INTERVENTIONS:  -  Assess patient's ability to carry out ADLs; assess patient's baseline for ADL function and identify physical deficits which impact ability to perform ADLs (bathing, care of mouth/teeth, toileting, grooming, dressing, etc.)  - Assess/evaluate cause of self-care deficits   - Assess range of motion  - Assess patient's mobility; develop plan if impaired  - Assess patient's need for assistive devices and provide as appropriate  - Encourage maximum independence but intervene and supervise when necessary  - Involve family in performance of ADLs  - Assess for home care needs following discharge   - Consider OT consult to assist with ADL evaluation and planning for discharge  - Provide patient education as appropriate  Outcome: Progressing  Goal: Maintains/Returns to pre admission functional level  Description: INTERVENTIONS:  - Perform BMAT or MOVE assessment daily.   - Set and communicate daily mobility goal to care team and patient/family/caregiver. - Collaborate with rehabilitation services on mobility goals if consulted  - Perform Range of Motion 4 times a day.   - Reposition patient every 2 hours.  - Dangle patient 3 times a day  - Stand patient 3 times a day  - Ambulate patient 3 times a day  - Out of bed to chair 3 times a day   - Out of bed for meals 3 times a day  - Out of bed for toileting  - Record patient progress and toleration of activity level   Outcome: Progressing

## 2023-07-27 NOTE — BRIEF OP NOTE (RAD/CATH)
INTERVENTIONAL RADIOLOGY PROCEDURE NOTE    Date: 7/27/2023    Procedure:   Procedure Summary     Date: 07/27/23 Room / Location: [address] [place] [address] Interventional Radiology    Anesthesia Start: 4538 Anesthesia Stop:     Procedure: IR NEPHROSTOMY TUBE PLACEMENT Diagnosis: (right hydronephrosis secondary to bladder mass s/p TURBT)    Scheduled Providers: Ricardo Andino MD Responsible Provider: Ricardo Andino MD    Anesthesia Type: general ASA Status: 3          Preoperative diagnosis:   1. Other hydronephrosis    2. Malignant 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 (HCC)         Postoperative diagnosis: Same. Surgeon: Kassandra Hernandez MD     Assistant: None. No qualified resident was available. Blood loss: Minimal    Specimens: Purulent urine, sample sent for analysis. Findings:    Obstructed distal right ureter resulting in moderate right hydronephrosis. 8F percutaneous nephrostomy tube placement. Complications:   Small perinephric hematoma from initial inferior-most calyx access.     Anesthesia: general

## 2023-07-27 NOTE — PLAN OF CARE
Placed on list Problem: Prexisting or High Potential for Compromised Skin Integrity  Goal: Skin integrity is maintained or improved  Description: INTERVENTIONS:  - Identify patients at risk for skin breakdown  - Assess and monitor skin integrity  - Assess and monitor nutrition and hydration status  - Monitor labs   - Assess for incontinence   - Turn and reposition patient  - Assist with mobility/ambulation  - Relieve pressure over bony prominences  - Avoid friction and shearing  - Provide appropriate hygiene as needed including keeping skin clean and dry  - Evaluate need for skin moisturizer/barrier cream  - Collaborate with interdisciplinary team   - Patient/family teaching  - Consider wound care consult   7/27/2023 1135 by Karl Greene RN  Outcome: Progressing     Problem: MOBILITY - ADULT  Goal: Maintain or return to baseline ADL function  Description: INTERVENTIONS:  -  Assess patient's ability to carry out ADLs; assess patient's baseline for ADL function and identify physical deficits which impact ability to perform ADLs (bathing, care of mouth/teeth, toileting, grooming, dressing, etc.)  - Assess/evaluate cause of self-care deficits   - Assess range of motion  - Assess patient's mobility; develop plan if impaired  - Assess patient's need for assistive devices and provide as appropriate  - Encourage maximum independence but intervene and supervise when necessary  - Involve family in performance of ADLs  - Assess for home care needs following discharge   - Consider OT consult to assist with ADL evaluation and planning for discharge  - Provide patient education as appropriate  7/27/2023 1135 by Karl Greene RN  Outcome: Progressing    Goal: Maintains/Returns to pre admission functional level  Description: INTERVENTIONS:  - Perform BMAT or MOVE assessment daily.   - Set and communicate daily mobility goal to care team and patient/family/caregiver.    - Collaborate with rehabilitation services on mobility goals if consulted  - Out of bed for toileting  - Record patient progress and toleration of activity level   7/27/2023 1135 by Genesis De Guzman RN  Outcome: Progressing     Problem: GENITOURINARY - ADULT  Goal: Maintains or returns to baseline urinary function  Description: INTERVENTIONS:  - Assess urinary function  - Encourage oral fluids to ensure adequate hydration if ordered  - Administer IV fluids as ordered to ensure adequate hydration  - Administer ordered medications as needed  - Offer frequent toileting  - Follow urinary retention protocol if ordered  Outcome: Progressing  Goal: Absence of urinary retention  Description: INTERVENTIONS:  - Assess patient’s ability to void and empty bladder  - Monitor I/O  - Bladder scan as needed  - Discuss with physician/AP medications to alleviate retention as needed  - Discuss catheterization for long term situations as appropriate  Outcome: Progressing  Goal: Urinary catheter remains patent  Description: INTERVENTIONS:  - Assess patency of urinary catheter  - If patient has a chronic irizarry, consider changing catheter if non-functioning  - Follow guidelines for intermittent irrigation of non-functioning urinary catheter  Outcome: Progressing

## 2023-07-27 NOTE — CASE MANAGEMENT
Case Management Assessment & Discharge Planning Note    Patient name Ana Tran  Location [facility]  /E5  269 [id number]-* MRN [id number]  : 1940 Date 2023       Current Admission Date: 2023  Current Admission Diagnosis:Malignant 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 [facility])   Patient Active Problem List    Diagnosis Date Noted   • Coagulation disorder ([address]) 2023   • Malignant 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 ([address]) 06/15/2023   • Other hydronephrosis 06/15/2023   • Chronic cough 2023   • Gastroesophageal reflux disease without esophagitis 2023   • Sciatica, right side 2022   • Chronic bilateral low back pain with right-sided sciatica 08/15/2021   • DDD (degenerative disc disease), lumbar 08/15/2021   • Essential hypertension 2021   • Primary osteoarthritis of left hip 2020   • Osteoarthritis of lumbar spine 2020   • Stage 3b chronic kidney disease ([address]) 2018   • Frequent falls 2018   • Weakness 2018   • Cerebral artery occlusion 2017   • Labyrinthitis, unspecified ear 2017   • Pure hypercholesterolemia 2017   • Gait instability 2017   • Bilateral pseudophakia 2017   • Age-related cataract of right eye 2017   • Macular drusen 2017   • Posterior vitreous detachment of both eyes 2017   • Arthralgia 2014   • Chronic kidney disease, stage 3 ([address]) 2014   • Asymptomatic cholelithiasis 2013   • Benign prostatic hyperplasia 2013   • Coronary arteriosclerosis 2013   • Hiatal hernia 2013   • Anxiety 2013   • Paroxysmal atrial fibrillation ([address]) 2013   • Benign essential hypertension 2013   • Epilepsy ([address]) 2013   • Hyperlipidemia 2013      LOS (days): 0  Geometric Mean LOS (GMLOS) (days):   Days to GMLOS:     OBJECTIVE:              Current admission status: Outpatient Surgery       Preferred Pharmacy:   [id number] [address]uff, [address] [address]  Phone: [phone] Fax: [phone]    Primary Care Provider: Marina Valencia DO    Primary Insurance: Denis Faye [facility]  Secondary Insurance:     ASSESSMENT:  Nicole Proxies    There are no active Health Care Proxies on file. Advance Directives  Does patient have a [address]?: No  Does patient have Advance Directives?: No  Primary Contact: Alisa Edwards [phone]    Readmission Root Cause  30 Day Readmission: No    Patient Information  Admitted from[de-identified] Home  Mental Status: Alert  During Assessment patient was accompanied by: Not accompanied during assessment  Assessment information provided by[de-identified] Spouse  Primary Caregiver: Self  Support Systems: Spouse/significant other  What city do you live in?: [facility] entry access options.  Select all that apply.: Stairs  Number of steps to enter home.: 3  Do the steps have railings?: Yes  Type of Current Residence: 2 story home  Upon entering residence, is there a bedroom on the main floor (no further steps)?: No  A bedroom is located on the following floor levels of residence (select all that apply):: 2nd Floor  Upon entering residence, is there a bathroom on the main floor (no further steps)?: No  Indicate which floors of current residence have a bathroom (select all the apply):: 2nd Floor  Number of steps to 2nd floor from main floor: One Flight  Homeless/housing insecurity resource given?: N/A  Living Arrangements: Lives w/ Spouse/significant other  Is patient a ?: No    Activities of Daily Living Prior to Admission  Functional Status: Independent  Completes ADLs independently?: Yes  Ambulates independently?: Yes  Does patient use assisted devices?: Yes  Assisted Devices (DME) used: Theodoro Ngozi, Bedside Commode, Shower Chair, Other (Comment) (lift chair in the living)  Does patient currently own DME?: Yes  What DME does the patient currently own?: Bedside Commode, Straight Gricelda Diaz, Other (Comment), Shower Chair  Does patient have a history of Outpatient Therapy (PT/OT)?: Yes  Does the patient have a history of Short-Term Rehab?: No  Does patient have a history of HHC?: Yes  Does patient currently have [address]?: No    Patient Information Continued  Income Source: SSI/SSD  Does patient have prescription coverage?: Yes  Does patient have a history of substance abuse?: No  Does patient have a history of Mental Health Diagnosis?: No (anxiety)    Means of Transportation  Means of Transport to \[facility]\""[de-identified] Family transport        DISCHARGE DETAILS:    Discharge planning discussed with[de-identified] patient's wife     CM contacted family/caregiver?: Yes     Contacts  Patient Contacts: Ashley Velasquez  Relationship to Patient[de-identified] Family  Contact Method: Phone  Phone Number: [phone]  Reason/Outcome: Emergency Contact, Discharge Planning, Continuity of Care    Additional Comments: Patient is from home w/ spouse. Patient had a stroke in the past per spouse and usually has some confusion. Patient has a multi level home w/ 3 steps to enter. Bedroom and bathroom is on the second floor.     Wife wants to be at the hospital for the PT OT eval

## 2023-07-27 NOTE — ANESTHESIA PREPROCEDURE EVALUATION
Procedure:  IR NEPHROSTOMY TUBE PLACEMENT    Relevant Problems   CARDIO   (+) Benign essential hypertension   (+) Coronary arteriosclerosis   (+) Essential hypertension   (+) Hyperlipidemia   (+) Paroxysmal atrial fibrillation (HCC)   (+) Pure hypercholesterolemia      GI/HEPATIC   (+) Gastroesophageal reflux disease without esophagitis   (+) Hiatal hernia      /RENAL   (+) Benign prostatic hyperplasia   (+) Chronic kidney disease, stage 3 (HCC)   (+) Other hydronephrosis   (+) Stage 3b chronic kidney disease (HCC)      HEMATOLOGY   (+) Coagulation disorder (HCC)      MUSCULOSKELETAL   (+) Chronic bilateral low back pain with right-sided sciatica   (+) DDD (degenerative disc disease), lumbar   (+) Hiatal hernia   (+) Osteoarthritis of lumbar spine   (+) Primary osteoarthritis of left hip   (+) Sciatica, right side      NEURO/PSYCH   (+) Anxiety   (+) Chronic bilateral low back pain with right-sided sciatica   (+) Epilepsy (HCC)      Genitourinary   (+) Malignant 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 (HCC)        Physical Exam    Airway    Mallampati score: III  TM Distance: <3 FB       Dental   upper dentures,     Cardiovascular  Cardiovascular exam normal    Pulmonary  Pulmonary exam normal     Other Findings        Anesthesia Plan  ASA Score- 3     Anesthesia Type- general with ASA Monitors. Additional Monitors:   Airway Plan: ETT. Plan Factors-    Chart reviewed. Existing labs reviewed. Obstructive sleep apnea risk education given perioperatively. Induction- intravenous. Postoperative Plan-     Informed Consent- Anesthetic plan and risks discussed with patient.

## 2023-07-27 NOTE — DISCHARGE INSTRUCTIONS
Nephrostomy Tube Care     WHAT YOU NEED TO KNOW:   A nephrostomy tube is a catheter (thin plastic tube) that is inserted through your skin and into your kidney. The nephrostomy tube drains urine from your kidney into a collecting bag outside your body. You may need a nephrostomy tube when something is blocking the normal flow of urine. A nephrostomy tube may be used for a short or a long period of time. The nephrostomy tube comes out of your back, so you will need someone to help care for your nephrostomy tube. DISCHARGE INSTRUCTIONS:      How to clean the skin around the nephrostomy tube and change the bandage:  Since the nephrostomy tube comes out of your back, you will not be able to care for it by yourself. Ask someone to follow the general directions below to check and care for your nephrostomy tube. Gather the items you will need. Disposable (single use) under-pad, and a clean washcloth  Plain soap, warm water, and new medical gloves  Sterile gauze bandages  Clear adhesive dressing or medical tape  Skin barrier  Protective skin film  Trash bag  Remove the old bandage, and check the tube entry site. Have the patient lie on his side with the nephrostomy tube entry site facing up. Place the under-pad where it will catch drainage as you are working with the nephrostomy tube. Wash your hands with soap and water. Put on new medical gloves. Gently remove the old bandage, without pulling on the tube. Do this by holding the skin beside the tube with one hand. With the other hand, gently remove sticky tape and the skin barrier by pulling in the same direction as hair growth. Do not touch the side of the bandage that is placed over or around the tube. Throw the bandage and skin barrier away in a trash bag. Look for signs of infection, such as skin redness and swelling. Report any skin changes to healthcare providers. Clean the tube entry site.     Hold the tube in place to keep it from being pulled out while you are cleaning around it. You will need to clean the area twice. For the first cleaning, wet a new gauze bandage with soap and water. Begin at the entry site of the tube. Wipe the skin in circles, moving away from the entry site. Remove blood and any other material with the gauze. Do this as often as needed. Use a new gauze bandage each time you clean the area, moving away from the entry site. For the second cleaning, wet a new gauze bandage with water. Begin at the entry site of the tube. Wipe the skin in circles, moving away from the entry site. Use a new gauze bandage each time you clean the area, moving away from the entry site. Gently pat the skin with a clean washcloth to dry it. Apply the skin barrier and bandages. Roll up a bandage to make it thick, and place it under  the place where the tube enters the skin. Place it to support the tube, and stop it from kinking or bending. Tape the bandage in place, and apply more bandages if directed by a healthcare provider. Bring the tubing forward to the front and tape it to the skin. Do not stretch the tube tight, because this may pull the nephrostomy tube out. How often to change the bandage. Change the bandage around the tube, every other day. If your bandages  get dirty or wet, change them right away, and as often as needed. If your nephrostomy tube is to be used for a long period of time, the tube needs to be changed every 2 to 3 months. Healthcare providers will tell you when you need to make an appointment to have your tube changed. How to care for the urine drainage bag:   Ask if you need to measure and write down how much urine is in the bag before you empty it. Drain urine out of the drainage bag when it is ½ to ? full. Open the spout at the bottom of the bag to empty the urine into the toilet. You may need to detach the drainage bag from the nephrostomy tube to change it. . If so, attach a new drainage bag tightly to the nephrostomy tube. How to prevent problems with your nephrostomy tube:   Change bandages, directed. This helps to prevent infection. Throw away or clean your drainage bag as directed by your healthcare provider. Wipe the connecting ends of the drainage bag with alcohol before you reconnect the bag to the tube. This helps prevent infection. Keep the tube taped to your skin and connected to a drainage bag placed below the level of your kidneys. This helps prevent urine from backing up into your kidneys. You may wear a small drainage bag strapped to your leg to let you move around more easily. Check the catheter to be sure it is in place after you change your clothes or do other activities. Do not wear tight clothing over the tube. Place the tubing over your thigh rather than under it when you are sitting down. Be sure that nothing is pulling on the nephrostomy tube when you move around. Change positions if you see little or no urine in your drainage bag. Check to see if the urine tube is twisted or bent. Be sure that you are not sitting or lying on the tube. If there are no kinks and there is little or no urine in the drainage bag, tell your healthcare provider. Flush out the tube as directed. Some tubes get flushed one time a day with 10 mls of NSS You will be given a prescription for the flushes. To flush the nephrostomy tube, clean both connections with alcohol swap. Twist off the drainage bag tube and twist the saline syringe into the nephrostomy tube and flush briskly. Remove the syringe and twist the drainage bag tube back into the nephrostomy tube. Keep the site covered while you shower. Tape a piece of clear adhesive plastic over the dressing to keep it dry while you shower. Do not take tub baths.     Contact Interventional Radiology at [phone] Sarah PATIENTS: Contact Interventional Radiology at [phone]) Karen Roca PATIENTS: Contact Interventional Radiology at [phone]) if:  The skin around the nephrostomy tube is red, swollen, itches, or has a rash. You have a fever greater than 101 or chills. You have lower back or hip pain. There are changes in how your urine looks or smells. You have little or no urine draining from the nephrostomy tube. You have nausea and are vomiting. The black gisel on your tube has moved, or the tube is longer than when it was put in. You have questions or concerns about your condition or care. The nephrostomy tube comes out completely. There is blood, pus, or a bad smell coming from the place where the tube enters your skin. Urine is leaking around the tube. The following pharmacies carry the flush syringes. [facility]                     [facility]                    [address] [place] [address]  Phone [phone]            Phone [phone]  [address] [place] [address]. [address] [facility]                                 [phone]  Phone [phone]            Phone [phone]    [facility]                                                                         [facility] [phone]  [address] [place] [address].   [facility]   Phone [phone]

## 2023-07-27 NOTE — PROGRESS NOTES
Progress Note - Urology  Rima Costa 1940, 81 y.o. male MRN: [id number]    Unit/Bed#: E5 -01 Encounter: [id number]    * Malignant 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 ([address])  Assessment & Plan  · POD#1 TURBT  · Irizarry catheter patent draining clear yellow urine  · Afeb and VSS today. · Right ureteral orifice not appreciated during procedure. Concern for obstruction from possible ureteral lesion. Plan for right PCN today with IR  · Thorough discussion with patient and his wife at bedside this morning. They verbalize understanding and are agreeable with placement. · Keep NPO and Coumadin on hold for procedure today. · Will re-evaluate following his procedure to determine disposition planning. Subjective:   HPI: Patient denies any pain today. His irizarry catheter patent draining clear yellow urine. Wife at bedside this morning. Review of Systems   Constitutional: Negative for chills and fever. HENT: Negative. Respiratory: Negative. Cardiovascular: Negative. Gastrointestinal: Negative for abdominal pain, nausea and vomiting. Genitourinary: Negative for flank pain, hematuria, scrotal swelling, testicular pain and urgency. Musculoskeletal: Negative. Skin: Negative. Neurological: Negative. Objective:  Nursing Rounds: Plan discussed with Erica Reyes RN  Vitals: Blood pressure 117/59, pulse 76, temperature 98.2 °F (36.8 °C), resp. rate 16, height 6' (1.829 m), weight 84.6 kg (186 lb 8.2 oz), SpO2 96 %. ,Body mass index is 25.3 kg/m². Physical Exam  Vitals reviewed. Constitutional:       General: He is not in acute distress. Appearance: Normal appearance. He is not ill-appearing, toxic-appearing or diaphoretic. HENT:      Head: Normocephalic and atraumatic. Eyes:      Conjunctiva/sclera: Conjunctivae normal.   Cardiovascular:      Rate and Rhythm: Normal rate and regular rhythm. Pulmonary:      Effort: Pulmonary effort is normal. No respiratory distress. Abdominal:      General: There is no distension. Palpations: Abdomen is soft. Tenderness: There is no abdominal tenderness. There is no right CVA tenderness, left CVA tenderness, guarding or rebound. Musculoskeletal:         General: Normal range of motion. Cervical back: Normal range of motion. Skin:     General: Skin is warm and dry. Neurological:      General: No focal deficit present. Mental Status: He is alert and oriented to person, place, and time. Psychiatric:         Mood and Affect: Mood normal.         Behavior: Behavior normal.         Thought Content: Thought content normal.         Judgment: Judgment normal.       Imaging:  None today     Labs:  No results for input(s): "WBC" in the last 72 hours. No results for input(s): "HGB" in the last 72 hours. No results for input(s): "HCT" in the last 72 hours. No results for input(s): "CREATININE" in the last 72 hours.      Preop Urine Culture 50,000-59,000 cfu/ml    Mixed Contaminants X4              Specimen Collected: 07/10/23 10:34 Last Resulted: 07/12/23 10:46           History:    Past Medical History:   Diagnosis Date   • Ambulates with cane    • Anemia     last assessed: 05/23/2015   • GERD (gastroesophageal reflux disease)    • Hematuria    • Hypertension    • Intracerebral hemorrhage ([address])     last assessed: 04/21/2014; right   • Intracerebral hemorrhage ([address])     last assessed: 11/23/2017   • Mobility impaired    • Stroke syndrome     last assessed: 06/13/2013   • UTI (urinary tract infection)      Social History     Socioeconomic History   • Marital status: /Civil Union     Spouse name: None   • Number of children: None   • Years of education: None   • Highest education level: None   Occupational History   • None   Tobacco Use   • Smoking status: Every Day     Types: Cigars   • Smokeless tobacco: Never   • Tobacco comments:     Last cigar 7/24   Vaping Use   • Vaping Use: Never used   Substance and Sexual Activity   • Alcohol use: Yes     Comment: Social    • Drug use: No   • Sexual activity: Not Currently   Other Topics Concern   • None   Social History Narrative   • None     Social Determinants of Health     Financial Resource Strain: Low Risk  (5/1/2023)    Overall Financial Resource Strain (CARDIA)    • Difficulty of Paying Living Expenses: Not hard at all   Food Insecurity: Not on file   Transportation Needs: No Transportation Needs (5/1/2023)    PRAPARE - Transportation    • Lack of Transportation (Medical): No    • Lack of Transportation (Non-Medical): No   Physical Activity: Not on file   Stress: Not on file   Social Connections: Not on file   Intimate Partner Violence: Not on file   Housing Stability: Not on file     Past Surgical History:   Procedure Laterality Date   • BLADDER SURGERY     • NC CYSTOURETHROSCOPY W/DEST &/RMVL TUMOR LARGE N/A 7/26/2023    Procedure: (TURBT);   Surgeon: Arely Samson MD;  Location: [facility];  Service: Urology     Family History   Problem Relation Age of Onset   • Lung cancer Mother        Norma WalkerJosette joy  Date: 7/27/2023 Time: 12:04 PM

## 2023-07-28 ENCOUNTER — TRANSITIONAL CARE MANAGEMENT (OUTPATIENT)
Dept: FAMILY MEDICINE CLINIC | Facility: CLINIC | Age: 83
End: 2023-07-28

## 2023-07-28 VITALS
HEIGHT: 72 IN | WEIGHT: 186.51 LBS | OXYGEN SATURATION: 94 % | SYSTOLIC BLOOD PRESSURE: 125 MMHG | TEMPERATURE: 98.7 F | RESPIRATION RATE: 17 BRPM | BODY MASS INDEX: 25.26 KG/M2 | HEART RATE: 87 BPM | DIASTOLIC BLOOD PRESSURE: 69 MMHG

## 2023-07-28 LAB
ERYTHROCYTE [DISTWIDTH] IN BLOOD BY AUTOMATED COUNT: 13.3 % (ref 11.6–15.1)
HCT VFR BLD AUTO: 34.4 % (ref 36.5–49.3)
HGB BLD-MCNC: 10.9 G/DL (ref 12–17)
MCH RBC QN AUTO: 33 PG (ref 26.8–34.3)
MCHC RBC AUTO-ENTMCNC: 31.7 G/DL (ref 31.4–37.4)
MCV RBC AUTO: 104 FL (ref 82–98)
PLATELET # BLD AUTO: 213 THOUSANDS/UL (ref 149–390)
PMV BLD AUTO: 9.8 FL (ref 8.9–12.7)
RBC # BLD AUTO: 3.3 MILLION/UL (ref 3.88–5.62)
WBC # BLD AUTO: 13.69 THOUSAND/UL (ref 4.31–10.16)

## 2023-07-28 PROCEDURE — 97162 PT EVAL MOD COMPLEX 30 MIN: CPT

## 2023-07-28 PROCEDURE — 99238 HOSP IP/OBS DSCHRG MGMT 30/<: CPT

## 2023-07-28 PROCEDURE — 85027 COMPLETE CBC AUTOMATED: CPT | Performed by: INTERNAL MEDICINE

## 2023-07-28 PROCEDURE — NC001 PR NO CHARGE

## 2023-07-28 PROCEDURE — 97166 OT EVAL MOD COMPLEX 45 MIN: CPT

## 2023-07-28 RX ORDER — CEFUROXIME AXETIL 250 MG/1
500 TABLET ORAL EVERY 12 HOURS SCHEDULED
Status: DISCONTINUED | OUTPATIENT
Start: 2023-07-28 | End: 2023-07-28 | Stop reason: HOSPADM

## 2023-07-28 RX ADMIN — METOPROLOL TARTRATE 50 MG: 50 TABLET, FILM COATED ORAL at 08:07

## 2023-07-28 RX ADMIN — CEFUROXIME AXETIL 500 MG: 250 TABLET, FILM COATED ORAL at 10:42

## 2023-07-28 RX ADMIN — OXYCODONE AND ACETAMINOPHEN 1 TABLET: 5; 325 TABLET ORAL at 05:51

## 2023-07-28 RX ADMIN — SODIUM CHLORIDE, SODIUM LACTATE, POTASSIUM CHLORIDE, AND CALCIUM CHLORIDE 75 ML/HR: .6; .31; .03; .02 INJECTION, SOLUTION INTRAVENOUS at 05:43

## 2023-07-28 NOTE — PLAN OF CARE
Problem: Prexisting or High Potential for Compromised Skin Integrity  Goal: Skin integrity is maintained or improved  Description: INTERVENTIONS:  - Identify patients at risk for skin breakdown  - Assess and monitor skin integrity  - Assess and monitor nutrition and hydration status  - Monitor labs   - Assess for incontinence   - Turn and reposition patient  - Assist with mobility/ambulation  - Relieve pressure over bony prominences  - Avoid friction and shearing  - Provide appropriate hygiene as needed including keeping skin clean and dry  - Evaluate need for skin moisturizer/barrier cream  - Collaborate with interdisciplinary team   - Patient/family teaching  - Consider wound care consult   Outcome: Progressing     Problem: MOBILITY - ADULT  Goal: Maintain or return to baseline ADL function  Description: INTERVENTIONS:  -  Assess patient's ability to carry out ADLs; assess patient's baseline for ADL function and identify physical deficits which impact ability to perform ADLs (bathing, care of mouth/teeth, toileting, grooming, dressing, etc.)  - Assess/evaluate cause of self-care deficits   - Assess range of motion  - Assess patient's mobility; develop plan if impaired  - Assess patient's need for assistive devices and provide as appropriate  - Encourage maximum independence but intervene and supervise when necessary  - Involve family in performance of ADLs  - Assess for home care needs following discharge   - Consider OT consult to assist with ADL evaluation and planning for discharge  - Provide patient education as appropriate  Outcome: Progressing  Goal: Maintains/Returns to pre admission functional level  Description: INTERVENTIONS:  - Perform BMAT or MOVE assessment daily.   - Set and communicate daily mobility goal to care team and patient/family/caregiver. - Collaborate with rehabilitation services on mobility goals if consulted  - Perform Range of Motion 3 times a day.   - Reposition patient every 2 hours.  - Dangle patient 3 times a day  - Stand patient 3 times a day  - Ambulate patient 3 times a day  - Out of bed to chair 3 times a day   - Out of bed for meals 3 times a day  - Out of bed for toileting  - Record patient progress and toleration of activity level   Outcome: Progressing     Problem: GENITOURINARY - ADULT  Goal: Maintains or returns to baseline urinary function  Description: INTERVENTIONS:  - Assess urinary function  - Encourage oral fluids to ensure adequate hydration if ordered  - Administer IV fluids as ordered to ensure adequate hydration  - Administer ordered medications as needed  - Offer frequent toileting  - Follow urinary retention protocol if ordered  Outcome: Progressing  Goal: Absence of urinary retention  Description: INTERVENTIONS:  - Assess patient’s ability to void and empty bladder  - Monitor I/O  - Bladder scan as needed  - Discuss with physician/AP medications to alleviate retention as needed  - Discuss catheterization for long term situations as appropriate  Outcome: Progressing  Goal: Urinary catheter remains patent  Description: INTERVENTIONS:  - Assess patency of urinary catheter  - If patient has a chronic irizarry, consider changing catheter if non-functioning  - Follow guidelines for intermittent irrigation of non-functioning urinary catheter  Outcome: Progressing

## 2023-07-28 NOTE — DISCHARGE SUMMARY
Discharge Summary - Alexandre Nichole 81 y.o. male MRN: [id number]    Unit/Bed#: E5 -01 Encounter: [id number]    Admission Date: 7/26/2023     Discharge Date: 07/28/23    HPI: Alexandre Nichole is a 81 y.o. male who presented for TURBT by Dr. Obey De La Rosa      Procedure(s):  (TURBT)  Surgeon(s):  Landon Parham MD  7/26/2023    Hospital Course: Patient presented to [facility] on 7/26/2023 for scheduled procedure listed above. Dr. Obey De La Rosa performed the procedure. During his procedure he was unable to identify right ureteral orifice and was suspicious of obstruction from right ureteral lesion. He was unable to perform retrograde pyelograms or ureteroscopy. At the conclusion of the case patient was extubated in the OR and transferred to the PACU in stable condition. He was later transferred to the medical surgical unit where he continued his recovery overnight without complications. On POD#1 he had right PCN placed with IR given obstruction evident during his procedure. His urine remained clear yellow in his Irizarry catheter. He was scheduled for discharge however, patient request VNA services and complainted of weakness. CM was consulted to assist with VNA and OT/PT were consulted due to complaints of weakness. Patient was kept for 1 additional night. On POD#2 he was afebrile with stable vital signs. His urine remained clear in both his R PCN and irizarry catheter. OT recommended home therapy which patient elected for outpatient follow-up. In regards to VNA this was unable to be set up during his hospital stay due to patients location and lack of available services. Patient and his wife were taught how to maintain his R PCN and irizarry catheter at home. He will be discharged home with his right PCN to drainage and urethral Irizarry catheter to drainage. His Irizarry catheter will be removed in office early next week. He has office follow-up scheduled with Dr. Obey De La Rosa on 8/7/2023.       Discharge Diagnosis: Malignant 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 (HCC)    Condition at Discharge: good    Discharge Medications:  See after visit summary for reconciled discharge medications provided to patient and family. Patient was discharged home on home medications with the addition of Ceftin and Percocet. Discharge instructions/Information to patient and family:   See after visit summary for written and verbal information which has been provided to patient and family. Provisions for Follow-Up Care:  See after visit summary for information related to follow-up care and any pertinent home health orders. Disposition: Home    Planned Readmission: No    Discharge Statement   I spent 20 minutes discharging the patient. This time was spent on the day of discharge. I had direct contact with the patient on the day of discharge. Additional documentation is required if more than 30 minutes were spent on discharge.      Signature:   ANASTASIYA Antonio  Date: 7/28/2023 Time: 2:20 PM

## 2023-07-28 NOTE — PLAN OF CARE
Problem: Prexisting or High Potential for Compromised Skin Integrity  Goal: Skin integrity is maintained or improved  Description: INTERVENTIONS:  - Identify patients at risk for skin breakdown  - Assess and monitor skin integrity  - Assess and monitor nutrition and hydration status  - Monitor labs   - Assess for incontinence   - Turn and reposition patient  - Assist with mobility/ambulation  - Relieve pressure over bony prominences  - Avoid friction and shearing  - Provide appropriate hygiene as needed including keeping skin clean and dry  - Evaluate need for skin moisturizer/barrier cream  - Collaborate with interdisciplinary team   - Patient/family teaching  - Consider wound care consult   Outcome: Progressing     Problem: MOBILITY - ADULT  Goal: Maintain or return to baseline ADL function  Description: INTERVENTIONS:  -  Assess patient's ability to carry out ADLs; assess patient's baseline for ADL function and identify physical deficits which impact ability to perform ADLs (bathing, care of mouth/teeth, toileting, grooming, dressing, etc.)  - Assess/evaluate cause of self-care deficits   - Assess range of motion  - Assess patient's mobility; develop plan if impaired  - Assess patient's need for assistive devices and provide as appropriate  - Encourage maximum independence but intervene and supervise when necessary  - Involve family in performance of ADLs  - Assess for home care needs following discharge   - Consider OT consult to assist with ADL evaluation and planning for discharge  - Provide patient education as appropriate  Outcome: Progressing  Goal: Maintains/Returns to pre admission functional level  Description: INTERVENTIONS:  - Perform BMAT or MOVE assessment daily.   - Set and communicate daily mobility goal to care team and patient/family/caregiver.    - Collaborate with rehabilitation services on mobility goals if consulted  - Out of bed for toileting  - Record patient progress and toleration of activity level   Outcome: Progressing     Problem: GENITOURINARY - ADULT  Goal: Maintains or returns to baseline urinary function  Description: INTERVENTIONS:  - Assess urinary function  - Encourage oral fluids to ensure adequate hydration if ordered  - Administer IV fluids as ordered to ensure adequate hydration  - Administer ordered medications as needed  - Offer frequent toileting  - Follow urinary retention protocol if ordered  Outcome: Progressing  Goal: Absence of urinary retention  Description: INTERVENTIONS:  - Assess patient’s ability to void and empty bladder  - Monitor I/O  - Bladder scan as needed  - Discuss with physician/AP medications to alleviate retention as needed  - Discuss catheterization for long term situations as appropriate  Outcome: Progressing  Goal: Urinary catheter remains patent  Description: INTERVENTIONS:  - Assess patency of urinary catheter  - If patient has a chronic irizarry, consider changing catheter if non-functioning  - Follow guidelines for intermittent irrigation of non-functioning urinary catheter  Outcome: Progressing

## 2023-07-28 NOTE — CASE MANAGEMENT
Case Management Discharge Planning Note    Patient name Alberto Lenz  Location East 5 /E5  269 114-* MRN [id number]  : 1940 Date 2023       Current Admission Date: 2023  Current Admission Diagnosis:Malignant 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 [facility])   Patient Active Problem List    Diagnosis Date Noted   • Coagulation disorder ([address]) 2023   • Malignant 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 ([address]) 06/15/2023   • Other hydronephrosis 06/15/2023   • Chronic cough 2023   • Gastroesophageal reflux disease without esophagitis 2023   • Sciatica, right side 2022   • Chronic bilateral low back pain with right-sided sciatica 08/15/2021   • DDD (degenerative disc disease), lumbar 08/15/2021   • Essential hypertension 2021   • Primary osteoarthritis of left hip 2020   • Osteoarthritis of lumbar spine 2020   • Stage 3b chronic kidney disease ([address]) 2018   • Frequent falls 2018   • Weakness 2018   • Cerebral artery occlusion 2017   • Labyrinthitis, unspecified ear 2017   • Pure hypercholesterolemia 2017   • Gait instability 2017   • Bilateral pseudophakia 2017   • Age-related cataract of right eye 2017   • Macular drusen 2017   • Posterior vitreous detachment of both eyes 2017   • Arthralgia 2014   • Chronic kidney disease, stage 3 ([address]) 2014   • Asymptomatic cholelithiasis 2013   • Benign prostatic hyperplasia 2013   • Coronary arteriosclerosis 2013   • Hiatal hernia 2013   • Anxiety 2013   • Paroxysmal atrial fibrillation ([address]) 2013   • Benign essential hypertension 2013   • Epilepsy ([address]) 2013   • Hyperlipidemia 2013      LOS (days): 1  Geometric Mean LOS (GMLOS) (days): 3.70  Days to GMLOS:2.9     OBJECTIVE:  Risk of Unplanned Readmission Score: 11.48         Current admission status: Inpatient   Preferred Pharmacy: [id number] - [address]  [address] [facility] [address]  Phone: [phone] Fax: [phone]    Primary Care Provider: Nathaniel Rivas DO    Primary Insurance: Irena Miranda [facility] REP  Secondary Insurance:     DISCHARGE DETAILS:    Discharge planning discussed with[de-identified] Patient and  Ashley Moreno (Spouse) [phone] (H)  Freedom of Choice: Yes  Comments - Freedom of Choice: Pt to discharge home w/ plan for [facility] PT/OT/SN  CM contacted family/caregiver?: Yes (Spouse at bedside)  Were Treatment Team discharge recommendations reviewed with patient/caregiver?: Yes  Did patient/caregiver verbalize understanding of patient care needs?: Yes  Were patient/caregiver advised of the risks associated with not following Treatment Team discharge recommendations?: Yes    Contacts  Patient Contacts: Ashley Moreno (Spouse) [phone] (H)  Relationship to Patient[de-identified] Family  Contact Method: Phone  Phone Number: Ashley Moreno (Spouse) [phone] (H)  Reason/Outcome: Emergency Contact, Discharge Planning, Continuity of [address]         Is the patient interested in [facility] at discharge?: Yes  [facility] requested[de-identified] Occupational Therapy, Physical Therapy, [facility] Name[de-identified] Other  [facility] Provider[de-identified] PCP  [facility] Needed[de-identified] Gait/ADL Training, Evaluate Functional Status and Safety, Strengthening/Theraputic Exercises to Improve Function, Urinary Incontinence Catheter Management, Other (comment) (Nephrostomy tube)  Homebound Criteria Met[de-identified] Immunosuppressed  Supporting Clincal Findings[de-identified] Limited Endurance, Fatigues Easliy in [employer]    Treatment Team Recommendation: Home with [address], Home  Transport at Discharge : Family  Transfer Mode: Walker  Accompanied by: Significant other    Additional Comments: Patient clear to [address] today. CM met with Pt and spouse to discuss accepting VNAs.  At this time none are available due to the location of Pts home. CM following for availabel VNA and Urology is offering office visits to substitute if needed. CM following through discharge. UPDATE: [address] reserved for SN/PT/OT. Spouse and Pt updated and very relieved. CM following.

## 2023-07-28 NOTE — PHYSICAL THERAPY NOTE
PHYSICAL THERAPY EVALUATION          Patient Name: Willy ARELLANO Date: 7/28/2023  PT EVALUATION    81 y.o.    [id number]    Malignant 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 ([address]) [C67.2]    Past Medical History:   Diagnosis Date    Ambulates with cane     Anemia     last assessed: 05/23/2015    GERD (gastroesophageal reflux disease)     Hematuria     Hypertension     Intracerebral hemorrhage ([address])     last assessed: 04/21/2014; right    Intracerebral hemorrhage ([address])     last assessed: 11/23/2017    Mobility impaired     Stroke syndrome     last assessed: 06/13/2013    UTI (urinary tract infection)      Past Surgical History:   Procedure Laterality Date    BLADDER SURGERY      GA CYSTOURETHROSCOPY W/DEST &/RMVL TUMOR LARGE N/A 7/26/2023    Procedure: (TURBT); Surgeon: Cyril Callahan MD;  Location: [facility];  Service: Urology        07/28/23 0913   PT Last Visit   PT Visit Date 07/28/23   Note Type   Note type Evaluation   Pain Assessment   Pain Assessment Tool 0-10   Pain Score No Pain   Restrictions/Precautions   Other Precautions Multiple lines; Fall Risk   Home Living   Type of [address] [place] [address]  MultiLexlevel;Bed/bath upstairs;Stairs to enter with rails   Bathroom Shower/Tub Tub/shower unit   Bathroom Toilet Raised   Bathroom Equipment Grab bars in shower; Shower chair;Commode   Home Equipment Walker;Cane;Other (Comment)  (lift chair)   Additional Comments 3 PO. has full bathroom on first fl but stays on second fl   Prior Function   Level of [place] Independent with ADLs; Independent with functional mobility; Needs assistance with IADLS   Lives With Spouse   Receives Help From Family   IADLs Family/Friend/Other provides transportation; Family/Friend/Other provides meals; Family/Friend/Other provides medication management   Falls in the last 6 months 0   Vocational Retired   Comments indep at baseline w use of cane.  spouse recently ended her job to care for patient General   Additional Pertinent History pt admitted 7/26/23 for malignant neoplasm of urinary bladder now POD#1 TURBT. up in chair DOS orders. PMHx significant for CVA, anxiety, athralgia (martin of knees), afib, CKD, epilepsy, OA, chronic back pain   Family/Caregiver Present Yes   Cognition   Overall Cognitive Status WFL   Arousal/Participation Cooperative   Orientation Level Oriented X4   Memory Within functional limits   Following Commands Follows all commands and directions without difficulty   RLE Assessment   RLE Assessment WFL  (4+)   LLE Assessment   LLE Assessment WFL  (4+)   Coordination   Sensation WFL   Bed Mobility   Supine to Sit 5  Supervision   Additional items HOB elevated; Bedrails; Increased time required   Transfers   Sit to Stand 5  Supervision   Additional items Increased time required;Verbal cues; Other  (RW)   Stand to Sit 5  Supervision   Additional items Armrests; Increased time required; Other;Verbal cues  (RW)   Additional Comments cues for hand placement and safe use of AD sit>stand. cues for safety, direction stand>sit   Ambulation/Elevation   Gait pattern Forward Flexion; Wide BESS; Excessively slow; Inconsistent alvin   Gait Assistance 5  Supervision   Additional items Assist x 1   Assistive Device Rolling walker   Distance 160'   Stair Management Assistance 5  Supervision   Additional items Assist x 1; Increased time required   Stair Management Technique Two rails; Alternating pattern; Foreward   Number of Stairs 5   Ambulation/Elevation Additional Comments  steps   Balance   Static Standing Fair   Dynamic Standing Fair -   Ambulatory Fair -   Endurance Deficit   Endurance Deficit No  (however noted slower gait speeds and decreased foot clearance w fatigue)   Activity Tolerance   Activity Tolerance Patient tolerated treatment well   Medical Staff Made Aware Costa Gandhi OT   Nurse Made Aware yes   Assessment   Prognosis Good   Problem List Decreased strength;Decreased endurance; Impaired balance; Impaired judgement   Assessment Mignon Siemens is a 81 y.o. 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 POD#1 TURBT. PT was consulted and pt was seen on 7/28/2023 for mobility assessment and d/c planning. Pt presents w high fall risk, multiple lines. At baseline is indep for ADLs and ambulation w SPC. Pt is currently functioning at a supervision assistance  level for bed mobility, transfers, ambulation w RW and stair negotiation. Pt demonstrated no significant deficits of BLE strength, endurance ambulating community distances or balance w use of RW. Negotiating steps to simulate access to home environment. Required occ cues during session for proper technique. Otherwise patient appears to be functioning at baseline w exception of use of RW which wife states she has been trying to get patient to use at home pta. At this time PT recommendations for d/c are home w family support; may benefit from HHPT to help facilitate recovery post operatively. Barriers to Discharge None   Goals   Patient Goals go home today   Plan   PT Frequency   (d/c PT: maintain on restorative)   Recommendation   PT Discharge Recommendation Home with home health rehabilitation   [address] in Flat Bed Without Bedrails 3   Lying on Back to Sitting on Edge of Flat Bed Without Bedrails 3   Moving Bed to Chair 3   Standing Up From Chair Using Arms 3   Walk in Room 3   Climb 3-5 Stairs With Railing 3   Basic Mobility Inpatient Raw Score 18   Basic Mobility Standardized Score 41.05   Highest Level Of Mobility   JH-HLM Goal 6: Walk 10 steps or more   JH-HLM Achieved 7: Walk 25 feet or more   End of Consult   Patient Position at End of Consult Bedside chair; All needs within reach   History: co - morbidities, fall risk, use of assistive device, assist for iadl's, multiple lines  Exam: impairments in systems including musculoskeletal (strength), neuromuscular (balance, gait, transfers, motor function and sensation), am-pac, cognition  Clinical: stable/unpredictable  Complexity: moderate      Castro Costa, PT

## 2023-07-28 NOTE — UTILIZATION REVIEW
Initial Clinical Review    OP  PROCEDURE   7/26 CHANGED TO IP ADMISSION 7/27  S/P  IR PROCEDURE  WEAK  NEEDED PT/OT,  CLOSE  MONITORING    07/27/23 1924  Inpatient Admission  Once        Transfer Service: Urology    Question Answer Comment   Level of Care Med Surg    Estimated length of stay Inpatient Only Surgery        07/27/23 1923         Elective    OP     surgical procedure    Age/Sex: 81 y.o. male     Surgery Date:    7/26/23    Procedure: TURBT)  CYSTO  BILATERAL RETROGRADE PYELOGRAM W/ PSB  RIGHT USCOPE  Correction: no retrograde pyelograms, no ureteroscopy    Anesthesia:    general    Operative Findings: Bladder cancer extensive over trigone, right orifice, right lateral wall, at least 8 cm diameter. Unable to see right orifice, but there is suspicion of tumor in region of right ureter. Left ureter not involved       POD#1 Progress Note:   7/27    Continue  Post op care. Gracia catheter intact and draining clear yellow urine. Plan IR                Procedure. IR       IR PROCEDURE     Findings:     Obstructed distal right ureter resulting in moderate right hydronephrosis.      8F percutaneous nephrostomy tube placement.      Complications:   Small perinephric hematoma from initial inferior-most calyx access. D/C  Planned post  IR procedure. Too weak for d/c, needs further  Observation. Date:    7/28       Day 3: Has surpassed a 2nd midnight with active treatments and services, which include . Now  POD  #  2    TURBT and S/P  R  PCN  In IR  7/27. Gracia catheter intact and  draining clear yellow urine. Needs  PT/OT.       Admission Orders: Date/Time/Statement:   Admission Orders (From admission, onward)     Ordered        07/27/23 1923  Inpatient Admission  Once                      Orders Placed This Encounter   Procedures   • Inpatient Admission     Standing Status:   Standing     Number of Occurrences:   1     Order Specific Question:   Level of Care     Answer:   Med Surg [16]     Order Specific Question:   Estimated length of stay     Answer:   Inpatient Only Surgery     Vital Signs: /69 (BP Location: Right arm)   Pulse 87   Temp 98.7 °F (37.1 °C) (Oral)   Resp 17   Ht 6' (1.829 m)   Wt 84.6 kg (186 lb 8.2 oz)   SpO2 94%   BMI 25.30 kg/m²     Pertinent Labs/Diagnostic Test Results:   IR nephrostomy tube placement    (Results Pending)   IR nephrostomy tube check/change/reposition/reinsertion/upsize    (Results Pending)         Results from last 7 days   Lab Units 07/28/23  0543 07/27/23  2134   WBC Thousand/uL 13.69* 13.68*   HEMOGLOBIN g/dL 10.9* 11.1*   HEMATOCRIT % 34.4* 34.8*   PLATELETS Thousands/uL 213 196   NEUTROS ABS Thousands/µL  --  10.37*           Results from last 7 days   Lab Units 07/26/23  0759   PROTIME seconds 15.4*   INR  1.22*   PTT seconds 35               Results from last 7 days   Lab Units 07/27/23  1344   GRAM STAIN RESULT  2+ Polys  No bacteria seen   BODY FLUID CULTURE, STERILE  No growth                   Diet:   regular    Mobility: Up in chair  Q shift    DVT Prophylaxis:    SCD'S    Medications/Pain Control:   Scheduled Medications:  acetaminophen, 650 mg, Oral, Q6H [address]  cefuroxime, 500 mg, Oral, Q12H RADHA  LORazepam, 0.5 mg, Intravenous, Once  metoprolol tartrate, 50 mg, Oral, BID      Continuous IV Infusions:  lactated ringers, 75 mL/hr, Intravenous, Continuous      PRN Meds:  ALPRAZolam, 0.5 mg, Oral, TID PRN  ondansetron, 4 mg, Intravenous, Q6H PRN  oxybutynin, 5 mg, Oral, Q6H PRN  oxyCODONE-acetaminophen, 1 tablet, Oral, Q4H PRN        Network Utilization Review Department  ATTENTION: Please call with any questions or concerns to [phone] and carefully listen to the prompts so that you are directed to the right person.  All voicemails are confidential.  Cj Hinojosa all requests for admission clinical reviews, approved or denied determinations and any other requests to dedicated fax number below belonging to the campus where the patient is receiving treatment.  List of dedicated fax numbers for the Facilities:  [place] DENIALS (Administrative/Medical Necessity) [phone] [address] (Maternity/NICU/Pediatrics) [phone]   [address] [place] [address] [phone]   [facility] [phone]   [address] [facility] [address] [facility] [address] [phone]   [address] [address] [phone]

## 2023-07-28 NOTE — NURSING NOTE
Patient and spouse given discharge instructions and prescriptions are to be picked up at pharmacy. Gracia catheter switched to leg bag for discharge and extra supplies for catheter care given to patient. Catheter care education also given to patient and spouse for home care.

## 2023-07-28 NOTE — OCCUPATIONAL THERAPY NOTE
Occupational Therapy Evaluation     Patient Name: Ana Tran  VBFLI'J Date: 7/28/2023  Problem List  Principal Problem:    Malignant 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 ([address])  Active Problems:    Coagulation disorder [facility])    Past Medical History  Past Medical History:   Diagnosis Date    Ambulates with cane     Anemia     last assessed: 05/23/2015    GERD (gastroesophageal reflux disease)     Hematuria     Hypertension     Intracerebral hemorrhage ([address])     last assessed: 04/21/2014; right    Intracerebral hemorrhage ([address])     last assessed: 11/23/2017    Mobility impaired     Stroke syndrome     last assessed: 06/13/2013    UTI (urinary tract infection)      Past Surgical History  Past Surgical History:   Procedure Laterality Date    BLADDER SURGERY      [place] CYSTOURETHROSCOPY W/DEST &/RMVL TUMOR LARGE N/A 7/26/2023    Procedure: (TURBT); Surgeon: Gabrielle Guillen MD;  Location: [facility];  Service: Urology         07/28/23 0854   OT Last Visit   OT Visit Date 07/28/23   Note Type   Note type Evaluation   Additional Comments Pt greeted in supine and agreeable to skilled OT Evaluation. wife at bedside. Pain Assessment   Pain Assessment Tool 0-10   Pain Score No Pain   Restrictions/Precautions   Weight Bearing Precautions Per Order No   Other Precautions Multiple lines; Fall Risk   Home Living   Type of [address] [place] [address]  Multi-level; Work area in basement;Able to live on main level with bedroom/bathroom;Stairs to enter with rails;Bed/bath upstairs  (3STE)   Bathroom Shower/Tub Tub/shower unit   Bathroom Toilet Raised   Bathroom Equipment Grab bars in shower;Commode; Shower chair   Home Equipment Walker;Cane  (commode, lift chair.)   Additional Comments has full bath on the 1st and 2nd floor. Prior Function   Level of [place] Independent with ADLs; Independent with functional mobility; Needs assistance with IADLS   Lives With Spouse   Receives Help From Family   IADLs Family/Friend/Other provides transportation; Family/Friend/Other provides meals; Family/Friend/Other provides medication management   Falls in the last 6 months 0   Vocational Retired   Comments Prior to admission, pt lives with wife in a multi level home with 3 PO + 1 FOS to 2nd floor bedroom and bathroom. Full bath also on the first floor. Home has a tub shower with chair and grab bars. Raised toilets. owns a commode. Pt was I with ADLS and mobility with cane. Also owns a RW. Dep for IADLS. Does not drive, reports 0 falls in the past 6 months. ADL   Where Assessed Edge of bed   Eating Assistance 6  Modified independent   Grooming Assistance 6  Modified Independent   UB Bathing Assistance 5  Supervision/Setup   LB Bathing Assistance 5  Supervision/Setup   UB Dressing Assistance 5  Supervision/Setup   LB Dressing Assistance 5  Supervision/Setup   Toileting Assistance  5  Supervision/Setup   Bed Mobility   Supine to Sit 5  Supervision   Additional items HOB elevated; Bedrails; Increased time required   Additional Comments left seated up in chair, call light in reach. Transfers   Sit to Stand 5  Supervision   Stand to Sit 5  Supervision   Additional Comments cues for safety, pacing and hand placement. Functional Mobility   Functional Mobility 5  Supervision   Additional Comments RW, Household distances.    Additional items Rolling walker   Balance   Static Sitting Good   Dynamic Sitting Fair +   Static Standing Fair   Dynamic Standing Fair -   Ambulatory Fair -   Activity Tolerance   Activity Tolerance Patient tolerated treatment well   Medical Staff Made Aware PT Nicolette   Nurse Made Aware LG TANG Assessment   RUE Assessment WFL   LUE Assessment   LUE Assessment WFL   Hand Function   Gross Motor Coordination Functional   Fine Motor Coordination Functional   Psychosocial   Psychosocial (WDL) WDL   Cognition   Overall Cognitive Status Impaired   Arousal/Participation Cooperative   Attention Within functional limits   Orientation Level Oriented X4   Memory Within functional limits   Following Commands Follows all commands and directions without difficulty   Comments pleasant and cooperative. Assessment   Assessment Keny Clements is a 81 y.o. male seen for OT evaluation s/p admit to [facility] on 7/26/2023 w/ Malignant 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 ([address] Comorbidities affecting pt's functional performance at time of assessment include: HTN and CVA. Pt with active OT orders and activity orders for Activity as tolerated. Personal factors affecting pt at time of IE include:PO home environment, steps within home environment and functional decline . Prior to admission, pt lives with wife in a multi level home with 3 PO + 1 FOS to 2nd floor bedroom and bathroom. Full bath also on the first floor. Home has a tub shower with chair and grab bars. Raised toilets. owns a commode. Pt was I with ADLS and mobility with cane. Also owns a RW. Dep for IADLS. Does not drive, reports 0 falls in the past 6 months. Upon evaluation: Pt currently requires supervision for UB ADLs, supervision for LB ADLs, supervision for toileting, supervision for bed mobility, supervision for functional transfers, and supervision with RW mobility 2* the following deficits impacting occupational performance:weakness. Pt is currently at their functional baseline and no skilled OT is warranted at this time. From OT standpoint, recommendation at time of d/c would be home with home health therapy services. Goals   Patient Goals to go home asap   Plan   OT Frequency Eval only  (DC OT.)   Recommendation   OT Discharge Recommendation Home with home health rehabilitation   Additional Comments  The patient's raw score on the AM-PAC Daily Activity Inpatient Short Form is 20. A raw score of greater than or equal to 19 suggests the patient may benefit from discharge to home. Please refer to the recommendation of the Occupational Therapist for safe discharge planning.    AM-[facility] Daily Activity Inpatient   Lower Body Dressing 3   Bathing 3   Toileting 3   Upper Body Dressing 3   Grooming 4   Eating 4   Daily Activity Raw Score 20   Daily Activity Standardized Score (Calc for Raw Score >=11) 42.03   AM-PAC Applied Cognition Inpatient   Following a Speech/Presentation 4   Understanding Ordinary Conversation 4   Taking Medications 3   Remembering Where Things Are Placed or Put Away 3   Remembering List of 4-5 Errands 3   Taking Care of Complicated Tasks 3   Applied Cognition Raw Score 20   Applied Cognition Standardized Score 41.76   Carl Appel, OT

## 2023-07-28 NOTE — PROGRESS NOTES
Progress Note - Urology  Abdelrahman Lara 1940, 81 y.o. male MRN: [id number]    Unit/Bed#: E5 -01 Encounter: [id number]    * Malignant 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 ([address])  Assessment & Plan  · POD#2 TURBT  · S/p R PCN placement yesterday by IR  · Irizarry catheter patent draining clear yellow urine, R PCN also draining clear yellow urine. · Afeb and VSS today. · Mild leukocytosis 13.69 expected post-op  · Hgb stable at 10.9   · Feeling much better this morning, denies any weakness. PT/OT to evaluate this morning. Plan:  · Tentative plan for discharge later today pending recommendations by PT/OT. · CM working to set up VNA to help with PCN and irizarry management. · PO Ceftin added for post-op infection prophylaxis. · Will re-evaluate later today for possible discharge. .         Subjective: Patient is lying in bed resting comfortably reporting feeling much better compared to yesterday. Denies any weakness, fever, or chills. His pain is well controlled. Urethral Irizarry catheter continues to drain clear yellow urine to gravity. The right PCN is also draining clear yellow urine. Review of Systems   Constitutional: Negative for chills and fever. HENT: Negative for congestion and sore throat. Respiratory: Negative for cough and shortness of breath. Cardiovascular: Negative for chest pain and leg swelling. Gastrointestinal: Negative for abdominal pain, constipation, diarrhea, nausea and vomiting. Genitourinary: Negative for difficulty urinating, dysuria, flank pain, frequency, hematuria and urgency. Musculoskeletal: Negative for back pain and gait problem. Skin: Negative for wound. Allergic/Immunologic: Negative for immunocompromised state. Hematological: Does not bruise/bleed easily. Objective:  Nursing Rounds:   Vitals: Blood pressure 125/69, pulse 87, temperature 98.7 °F (37.1 °C), temperature source Oral, resp.  rate 17, height 6' (1.829 m), weight 84.6 kg (186 lb 8.2 oz), SpO2 94 %. ,Body mass index is 25.3 kg/m². Physical Exam  Vitals reviewed. Constitutional:       General: He is not in acute distress. Appearance: Normal appearance. He is not ill-appearing or toxic-appearing. HENT:      Head: Normocephalic and atraumatic. Eyes:      General: No scleral icterus. Conjunctiva/sclera: Conjunctivae normal.   Cardiovascular:      Rate and Rhythm: Normal rate and regular rhythm. Pulses: Normal pulses. Heart sounds: Normal heart sounds. Pulmonary:      Effort: Pulmonary effort is normal. No respiratory distress. Breath sounds: Normal breath sounds. Abdominal:      Palpations: Abdomen is soft. Tenderness: There is no abdominal tenderness. There is no right CVA tenderness or left CVA tenderness. Hernia: No hernia is present. Genitourinary:     Penis: Normal.       Testes: Normal.      Comments: Right PCN draining clear yellow urine to gravity. Urethral Gracia catheter also draining clear yellow urine to gravity. Musculoskeletal:      Cervical back: Normal range of motion. Right lower leg: No edema. Left lower leg: No edema. Skin:     General: Skin is warm and dry. Coloration: Skin is not jaundiced or pale. Neurological:      General: No focal deficit present. Mental Status: He is alert and oriented to person, place, and time. Mental status is at baseline. Gait: Gait normal.   Psychiatric:         Mood and Affect: Mood normal.         Behavior: Behavior normal.         Thought Content: Thought content normal.         Judgment: Judgment normal.         Imaging:       Labs:  Recent Labs     07/27/23 2134 07/28/23  0543   WBC 13.68* 13.69*       Recent Labs     07/27/23 2134 07/28/23  0543   HGB 11.1* 10.9*     Recent Labs     07/27/23 2134 07/28/23  0543   HCT 34.8* 34.4*     No results for input(s): "CREATININE" in the last 72 hours.       History:    Past Medical History:   Diagnosis Date   • Ambulates with cane    • Anemia     last assessed: 05/23/2015   • GERD (gastroesophageal reflux disease)    • Hematuria    • Hypertension    • Intracerebral hemorrhage ([address])     last assessed: 04/21/2014; right   • Intracerebral hemorrhage ([address])     last assessed: 11/23/2017   • Mobility impaired    • Stroke syndrome     last assessed: 06/13/2013   • UTI (urinary tract infection)      Social History     Socioeconomic History   • Marital status: /Civil Union     Spouse name: None   • Number of children: None   • Years of education: None   • Highest education level: None   Occupational History   • None   Tobacco Use   • Smoking status: Every Day     Types: Cigars   • Smokeless tobacco: Never   • Tobacco comments:     Last cigar 7/24   Vaping Use   • Vaping Use: Never used   Substance and Sexual Activity   • Alcohol use: Yes     Comment: Social    • Drug use: No   • Sexual activity: Not Currently   Other Topics Concern   • None   Social History Narrative   • None     Social Determinants of Health     Financial Resource Strain: Low Risk  (5/1/2023)    Overall Financial Resource Strain (CARDIA)    • Difficulty of Paying Living Expenses: Not hard at all   Food Insecurity: Not on file   Transportation Needs: No Transportation Needs (5/1/2023)    PRAPARE - Transportation    • Lack of Transportation (Medical): No    • Lack of Transportation (Non-Medical): No   Physical Activity: Not on file   Stress: Not on file   Social Connections: Not on file   Intimate Partner Violence: Not on file   Housing Stability: Not on file     Past Surgical History:   Procedure Laterality Date   • BLADDER SURGERY     • [place] CYSTOURETHROSCOPY W/DEST &/RMVL TUMOR LARGE N/A 7/26/2023    Procedure: (TURBT);   Surgeon: Adin Jacob MD;  Location: [facility] OR;  Service: Urology     Family History   Problem Relation Age of Onset   • Lung cancer Mother        Guille SheppardANASTASIYA  Date: 7/28/2023 Time: 11:26 AM

## 2023-07-29 ENCOUNTER — TELEPHONE (OUTPATIENT)
Dept: UROLOGY | Facility: MEDICAL CENTER | Age: 83
End: 2023-07-29

## 2023-07-29 NOTE — TELEPHONE ENCOUNTER
Patient wife called in stating that she noticed that patient has two different appt times scheduled for 7/31/2023 and stated that they live an hour away and prefer to have the 1:30 appt instead.

## 2023-07-30 ENCOUNTER — TELEPHONE (OUTPATIENT)
Dept: UROLOGY | Facility: MEDICAL CENTER | Age: 83
End: 2023-07-30

## 2023-07-30 LAB
BACTERIA SPEC BFLD CULT: NO GROWTH
GRAM STN SPEC: NORMAL
GRAM STN SPEC: NORMAL

## 2023-07-30 NOTE — TELEPHONE ENCOUNTER
attempted to contact [facility]. No answer. patient has appointment for Gracia catheter removal tomorrow. Recommend keeping appointment for early morning trial of void or risk urinary retention in the middle of the night over the weekend.

## 2023-07-30 NOTE — TELEPHONE ENCOUNTER
Viviana Schilder called in stating that she needs permission to remove patients catheter. Paged on call Reema Gonzales via .

## 2023-07-31 ENCOUNTER — TELEPHONE (OUTPATIENT)
Dept: UROLOGY | Facility: MEDICAL CENTER | Age: 83
End: 2023-07-31

## 2023-07-31 ENCOUNTER — PROCEDURE VISIT (OUTPATIENT)
Dept: UROLOGY | Facility: MEDICAL CENTER | Age: 83
End: 2023-07-31

## 2023-07-31 VITALS
DIASTOLIC BLOOD PRESSURE: 82 MMHG | HEIGHT: 72 IN | SYSTOLIC BLOOD PRESSURE: 122 MMHG | BODY MASS INDEX: 24.92 KG/M2 | WEIGHT: 184 LBS | HEART RATE: 79 BPM

## 2023-07-31 DIAGNOSIS — C67.2 MALIGNANT NEOPLASM OF LATERAL WALL OF URINARY BLADDER (HCC): Primary | ICD-10-CM

## 2023-07-31 DIAGNOSIS — C67.8 MALIGNANT NEOPLASM OF OVERLAPPING SITES OF BLADDER (HCC): Primary | ICD-10-CM

## 2023-07-31 PROCEDURE — 99024 POSTOP FOLLOW-UP VISIT: CPT

## 2023-07-31 NOTE — PROGRESS NOTES
7/31/2023    Morgan Dutta  1940  [id number]    Diagnosis  Chief Complaint    Malignant Neoplasm of Lateral Wall of Urinary Bladder         Patient presents for irizarry removal/void trial s/p TURBT with Dr. Ollie Anders on 7/26/23. Plan  Return this afternoon for PVR. Sx fu with Dr. Ollie Anders on 8/4/23 in [facility] office    Procedure Irizarry removal/voiding trial    Irizarry catheter removed after deflation of an intact balloon. Patient tolerated well. Encouraged patient to hydrate well and return this afternoon for post void residual.  he knows he may return early if uncomfortable and unable to urinate. Patient agrees to this plan. Showed pt's wife how to flush nephrostomy tube per her request.    Spoke to pt's wife this afternoon. She stated he is voided well and will not be returning for PVR. Pt and wife know to contact the office with any issues or concerns.     Vitals:    07/31/23 0842   BP: 122/82   Pulse: 79   Weight: 83.5 kg (184 lb)   Height: 6' (1.829 m)           Ellan Goldberg, RN

## 2023-07-31 NOTE — UTILIZATION REVIEW
NOTIFICATION OF ADMISSION DISCHARGE   This is a Notification of Discharge from [facility]. Please be advised that this patient has been discharge from our facility. Below you will find the admission and discharge date and time including the patient’s disposition. UTILIZATION REVIEW CONTACT:  Michael Chavis  Utilization   Network Utilization Review Department  Phone: [phone] x carefully listen to the prompts. All voicemails are confidential.  Email: [email or web address]     ADMISSION INFORMATION  PRESENTATION DATE: 7/26/2023  7:40 AM  OBERVATION ADMISSION DATE:   INPATIENT ADMISSION DATE: 7/27/23  7:23 PM   DISCHARGE DATE: 7/28/2023  3:41 PM   DISPOSITION:Home with Home Health Care    IMPORTANT INFORMATION:  Send all requests for admission clinical reviews, approved or denied determinations and any other requests to dedicated fax number below belonging to the campus where the patient is receiving treatment.  List of dedicated fax numbers:  [place] DENIALS (Administrative/Medical Necessity) [phone] [facility] [phone]   [facility] [phone]   [facility] [phone] [address] [phone]   [address] [place] [address] [phone]   [facility] [phone]   [address] [place] [address] [facility] [phone]   [address] [facility] [phone]   [address] [phone]   [address] [facility] [address] [facility] [phone]

## 2023-07-31 NOTE — TELEPHONE ENCOUNTER
Spoke to pt's wife.   They are on their way to the [facility] office for irizarry removal

## 2023-08-01 ENCOUNTER — TELEPHONE (OUTPATIENT)
Dept: FAMILY MEDICINE CLINIC | Facility: CLINIC | Age: 83
End: 2023-08-01

## 2023-08-01 ENCOUNTER — ANTICOAG VISIT (OUTPATIENT)
Dept: FAMILY MEDICINE CLINIC | Facility: CLINIC | Age: 83
End: 2023-08-01

## 2023-08-01 LAB — INR PPP: 1.3 (ref 0.84–1.19)

## 2023-08-02 DIAGNOSIS — C67.8 MALIGNANT NEOPLASM OF OVERLAPPING SITES OF BLADDER (HCC): Primary | ICD-10-CM

## 2023-08-02 PROCEDURE — 88307 TISSUE EXAM BY PATHOLOGIST: CPT | Performed by: STUDENT IN AN ORGANIZED HEALTH CARE EDUCATION/TRAINING PROGRAM

## 2023-08-02 PROCEDURE — 88341 IMHCHEM/IMCYTCHM EA ADD ANTB: CPT | Performed by: STUDENT IN AN ORGANIZED HEALTH CARE EDUCATION/TRAINING PROGRAM

## 2023-08-02 PROCEDURE — 88342 IMHCHEM/IMCYTCHM 1ST ANTB: CPT | Performed by: STUDENT IN AN ORGANIZED HEALTH CARE EDUCATION/TRAINING PROGRAM

## 2023-08-02 NOTE — TELEPHONE ENCOUNTER
Yes, he is back on his warfarin, last Monday 7/24 was 1.9, did not call to company b/c was off. This Monday 7/31 was 1.3 and he was back on his warfarin by then. Went back on the warfarin on 7/29 this Saturday was given 6 mg daily.

## 2023-08-03 NOTE — TELEPHONE ENCOUNTER
Called patient. Spoke to patient's spouse. Asked if they can do the at home INR  Test tomorrow and report to us so we can make adjustments appropriately. Patient will take his INR tomorrow.  Spouse says she will do it first thing in the morning and report back to us

## 2023-08-04 ENCOUNTER — OFFICE VISIT (OUTPATIENT)
Dept: UROLOGY | Facility: HOSPITAL | Age: 83
End: 2023-08-04
Payer: COMMERCIAL

## 2023-08-04 ENCOUNTER — TELEPHONE (OUTPATIENT)
Dept: UROLOGY | Facility: MEDICAL CENTER | Age: 83
End: 2023-08-04

## 2023-08-04 ENCOUNTER — TELEPHONE (OUTPATIENT)
Dept: HEMATOLOGY ONCOLOGY | Facility: CLINIC | Age: 83
End: 2023-08-04

## 2023-08-04 ENCOUNTER — ANTICOAG VISIT (OUTPATIENT)
Dept: FAMILY MEDICINE CLINIC | Facility: CLINIC | Age: 83
End: 2023-08-04

## 2023-08-04 ENCOUNTER — TELEPHONE (OUTPATIENT)
Dept: FAMILY MEDICINE CLINIC | Facility: CLINIC | Age: 83
End: 2023-08-04

## 2023-08-04 VITALS
DIASTOLIC BLOOD PRESSURE: 76 MMHG | WEIGHT: 186 LBS | BODY MASS INDEX: 25.23 KG/M2 | HEART RATE: 92 BPM | SYSTOLIC BLOOD PRESSURE: 120 MMHG | OXYGEN SATURATION: 98 %

## 2023-08-04 DIAGNOSIS — C67.2 MALIGNANT NEOPLASM OF LATERAL WALL OF URINARY BLADDER (HCC): ICD-10-CM

## 2023-08-04 DIAGNOSIS — N13.39 OTHER HYDRONEPHROSIS: ICD-10-CM

## 2023-08-04 DIAGNOSIS — N39.41 URGE INCONTINENCE: Primary | ICD-10-CM

## 2023-08-04 LAB
INR PPP: 1.3 (ref 0.84–1.19)
POST-VOID RESIDUAL VOLUME, ML POC: 20 ML
SL AMB  POCT GLUCOSE, UA: NORMAL
SL AMB LEUKOCYTE ESTERASE,UA: NORMAL
SL AMB POCT BILIRUBIN,UA: NORMAL
SL AMB POCT BLOOD,UA: NORMAL
SL AMB POCT CLARITY,UA: NORMAL
SL AMB POCT COLOR,UA: NORMAL
SL AMB POCT KETONES,UA: NORMAL
SL AMB POCT NITRITE,UA: NORMAL
SL AMB POCT PH,UA: 6
SL AMB POCT SPECIFIC GRAVITY,UA: 1.02
SL AMB POCT URINE PROTEIN: NORMAL
SL AMB POCT UROBILINOGEN: 0.2

## 2023-08-04 PROCEDURE — 51798 US URINE CAPACITY MEASURE: CPT | Performed by: UROLOGY

## 2023-08-04 PROCEDURE — 81002 URINALYSIS NONAUTO W/O SCOPE: CPT | Performed by: UROLOGY

## 2023-08-04 PROCEDURE — 99214 OFFICE O/P EST MOD 30 MIN: CPT | Performed by: UROLOGY

## 2023-08-04 RX ORDER — SODIUM CHLORIDE 9 MG/ML
10 INJECTION INTRAVENOUS DAILY
Qty: 300 ML | Refills: 0 | Status: SHIPPED | OUTPATIENT
Start: 2023-08-04 | End: 2023-12-02

## 2023-08-04 NOTE — TELEPHONE ENCOUNTER
Wife calling stating that the sodium chloride is not carried at her pharmacy. Please send script to [address] [facility].

## 2023-08-04 NOTE — TELEPHONE ENCOUNTER
Called and spoke with patient's spouse. Advised that medication was sent over to [facility] as requested.

## 2023-08-04 NOTE — TELEPHONE ENCOUNTER
I called Ruddy Loya in response to a referral that was received for patient to establish care with Hematology. Outreach was made to schedule a consultation. .    I left a voicemail explaining the reason for my call and advised patient to call [facility] at [phone]. Another attempt will be made to contact patient.

## 2023-08-04 NOTE — TELEPHONE ENCOUNTER
Per VM transcript in [place]:    "Yes, this is a message for Doctor Lisandro Dan. This is Leticia Alan calling about Bryan JOYA. We tested it today and it is still 1.3. Thank you.  Bye."

## 2023-08-04 NOTE — PATIENT INSTRUCTIONS
[phone]  Medical oncology, asked for consultation with the doctor regarding "muscle invasive bladder cancer"    Also call radiation oncology, [phone].   Ask for consultation with the doctor and radiation oncology

## 2023-08-04 NOTE — PROGRESS NOTES
HISTORY:    1. Follow-up, on July 26, 2023 underwent TUR of high-grade nodular invasive bladder cancer located on the right trigone over to right lateral wall up to 2 o'clock position. Large tumor, unclear if I was able to resect everything visible. Never saw the right orifice. Pathology showed:  Urinary Bladder, tumor right lateral and posterior wall:  - Invasive high grade papillary urothelial carcinoma. - Tumor invades the muscularis propria    2. Because of obstruction of right ureter which I could not see, he underwent a right percutaneous nephrostomy. 3.  CT showed severe right hydro with renal cortical Atrophy    Preop creatinine was 1.93, 1.34 in 2018    PET scan scheduled for 22 August         ASSESSMENT / PLAN:    Long talk regarding his extensive bladder cancer. If the PET scan shows no metastatic disease, he should be treated for locally advanced disease    His medical condition would not allow radical cystectomy. I would recommend medical oncology and radiation oncology consultations. He likely could be treated with those 2 modalities with tolerable side effects. The right nephrostomy stays in place for now, depending on our treatment plan, we will revisit the issue of whether that right percutaneous nephrostomy can be converted to an internal stent. The following portions of the patient's history were reviewed and updated as appropriate: allergies, current medications, past family history, past medical history, past social history, past surgical history and problem list.    Review of Systems      Objective:     Physical Exam  Constitutional:       Appearance: Normal appearance. Abdominal:      Comments: Right PERC neph site is clean   Neurological:      Mental Status: He is alert.            0   Lab Value Date/Time    PSA 1.5 05/22/2023 1055    PSA 1.6 04/27/2022 1300    PSA 1.2 08/29/2016 0000    PSA 0.9 05/22/2015 1300   ]  BUN   Date Value Ref Range Status   07/10/2023 32 (H) 5 - 25 mg/dL Final   05/22/2023 36 (H) 8 - 27 mg/dL Final     Creatinine   Date Value Ref Range Status   07/10/2023 1.93 (H) 0.60 - 1.30 mg/dL Final     Comment:     Standardized to IDMS reference method   08/29/2016 1.49 (H) 0.76 - 1.27 mg/dL Final     No components found for: "CBC"      Patient Active Problem List   Diagnosis   • Anxiety   • Arthralgia   • Asymptomatic cholelithiasis   • Paroxysmal atrial fibrillation (HCC)   • Benign essential hypertension   • Benign prostatic hyperplasia   • Bilateral pseudophakia   • Chronic kidney disease, stage 3 (HCC)   • Coronary arteriosclerosis   • Epilepsy ([address])   • Gait instability   • Hiatal hernia   • Hyperlipidemia   • Frequent falls   • Weakness   • Stage 3b chronic kidney disease (HCC)   • Age-related cataract of right eye   • Macular drusen   • Posterior vitreous detachment of both eyes   • Primary osteoarthritis of left hip   • Osteoarthritis of lumbar spine   • Essential hypertension   • Chronic bilateral low back pain with right-sided sciatica   • DDD (degenerative disc disease), lumbar   • Sciatica, right side   • Chronic cough   • Gastroesophageal reflux disease without esophagitis   • Cerebral artery occlusion   • Labyrinthitis, unspecified ear   • Pure hypercholesterolemia   • Malignant 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 ([address])   • Other hydronephrosis   • Coagulation disorder ([address])        Diagnoses and all orders for this visit:    Urge incontinence  -     POCT urine dip  -     POCT Measure PVR    Malignant 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 (HCC)  -     POCT urine dip  -     Comprehensive metabolic panel  -     CBC; Future  -     Ambulatory referral to Hematology / Oncology; Future  -     Ambulatory referral to Radiation Oncology; Future           Patient ID: Mignon Siemens is a 81 y.o. male.       Current Outpatient Medications:   •  ALPRAZolam (XANAX) 0.5 mg tablet, TAKE 1 TABLET (0.5 MG TOTAL) BY MOUTH 3 (THREE) TIMES A DAY AS NEEDED FOR ANXIETY., Disp: 90 tablet, Rfl: 0  •  Cholecalciferol (VITAMIN D3 PO), Take by mouth, Disp: , Rfl:   •  gabapentin (NEURONTIN) 100 mg capsule, TAKE 3 TABS DAILY IN AM, Disp: 270 capsule, Rfl: 3  •  Lancets MISC, by Does not apply route, Disp: , Rfl:   •  metoprolol tartrate (LOPRESSOR) 50 mg tablet, TAKE 1 TABLET BY MOUTH TWICE A DAY, Disp: 180 tablet, Rfl: 3  •  Multiple Vitamins-Minerals (PreserVision AREDS 2) CAPS, , Disp: , Rfl:   •  rosuvastatin (CRESTOR) 5 mg tablet, TAKE 1 TABLET BY MOUTH EVERY DAY, Disp: 90 tablet, Rfl: 3  •  sodium chloride, PF, 0.9 %, 10 mL by Intracatheter route daily Intracatheter flushing daily. May substitute prefilled syringe with normal saline 10 mL vials, 10 mL syringes, and 18 g blunt needles, Disp: 300 mL, Rfl: 3  •  sodium chloride, PF, 0.9 %, 10 mL by Intracatheter route daily for 120 doses Intracatheter flushing daily. May substitute prefilled syringe with normal saline 10 mL vials, 10 mL syringes, and 18 g blunt needles, Disp: 300 mL, Rfl: 3  •  warfarin (COUMADIN) 1 mg tablet, TAKE 1MG TAB EVERY OTHER DAY COMBINING WITH 5MG TAB, Disp: 135 tablet, Rfl: 1  •  warfarin (COUMADIN) 5 mg tablet, TAKE 1 TABLET BY MOUTH EVERY DAY, combine with 1mg tab as directed, Disp: 90 tablet, Rfl: 1    Past Medical History:   Diagnosis Date   • Ambulates with cane    • Anemia     last assessed: 05/23/2015   • GERD (gastroesophageal reflux disease)    • Hematuria    • Hypertension    • Intracerebral hemorrhage ([address])     last assessed: 04/21/2014; right   • Intracerebral hemorrhage ([address])     last assessed: 11/23/2017   • Mobility impaired    • Stroke syndrome     last assessed: 06/13/2013   • UTI (urinary tract infection)        Past Surgical History:   Procedure Laterality Date   • BLADDER SURGERY     • IR NEPHROSTOMY TUBE PLACEMENT  7/27/2023   • [place] CYSTOURETHROSCOPY W/DEST &/RMVL TUMOR LARGE N/A 7/26/2023    Procedure: (TURBT);   Surgeon: Georgina Fan MD;  Location: [facility]; Service: Urology       Social History

## 2023-08-06 ENCOUNTER — TELEPHONE (OUTPATIENT)
Dept: FAMILY MEDICINE CLINIC | Facility: CLINIC | Age: 83
End: 2023-08-06

## 2023-08-06 NOTE — TELEPHONE ENCOUNTER
Received message from Douglas on Friday regarding Ousmane's INR as follows:    Yes, this is a message for Doctor Will Kingsley. This is Evie Rise calling about Charlotte Plunk INR. We tested it today and it is still 1.3. Thank you.  Bye.

## 2023-08-07 ENCOUNTER — ANTICOAG VISIT (OUTPATIENT)
Dept: FAMILY MEDICINE CLINIC | Facility: CLINIC | Age: 83
End: 2023-08-07

## 2023-08-07 ENCOUNTER — OFFICE VISIT (OUTPATIENT)
Dept: FAMILY MEDICINE CLINIC | Facility: CLINIC | Age: 83
End: 2023-08-07
Payer: COMMERCIAL

## 2023-08-07 ENCOUNTER — APPOINTMENT (OUTPATIENT)
Dept: LAB | Facility: HOSPITAL | Age: 83
End: 2023-08-07
Payer: COMMERCIAL

## 2023-08-07 ENCOUNTER — TELEPHONE (OUTPATIENT)
Dept: FAMILY MEDICINE CLINIC | Facility: CLINIC | Age: 83
End: 2023-08-07

## 2023-08-07 VITALS
SYSTOLIC BLOOD PRESSURE: 132 MMHG | HEART RATE: 52 BPM | OXYGEN SATURATION: 99 % | TEMPERATURE: 97.6 F | WEIGHT: 186 LBS | DIASTOLIC BLOOD PRESSURE: 81 MMHG | HEIGHT: 72 IN | BODY MASS INDEX: 25.19 KG/M2

## 2023-08-07 DIAGNOSIS — I48.0 PAROXYSMAL ATRIAL FIBRILLATION (HCC): ICD-10-CM

## 2023-08-07 DIAGNOSIS — N18.32 STAGE 3B CHRONIC KIDNEY DISEASE (HCC): ICD-10-CM

## 2023-08-07 DIAGNOSIS — H61.23 IMPACTED CERUMEN OF BOTH EARS: ICD-10-CM

## 2023-08-07 DIAGNOSIS — D68.9 COAGULATION DISORDER (HCC): ICD-10-CM

## 2023-08-07 DIAGNOSIS — C67.2 MALIGNANT NEOPLASM OF LATERAL WALL OF URINARY BLADDER (HCC): Primary | ICD-10-CM

## 2023-08-07 DIAGNOSIS — R53.1 WEAKNESS: ICD-10-CM

## 2023-08-07 DIAGNOSIS — I10 ESSENTIAL HYPERTENSION: ICD-10-CM

## 2023-08-07 DIAGNOSIS — C67.2 MALIGNANT NEOPLASM OF LATERAL WALL OF URINARY BLADDER (HCC): ICD-10-CM

## 2023-08-07 LAB
ALBUMIN SERPL BCP-MCNC: 3.5 G/DL (ref 3.5–5)
ALP SERPL-CCNC: 82 U/L (ref 46–116)
ALT SERPL W P-5'-P-CCNC: 33 U/L (ref 12–78)
ANION GAP SERPL CALCULATED.3IONS-SCNC: 5 MMOL/L
AST SERPL W P-5'-P-CCNC: 28 U/L (ref 5–45)
BILIRUB SERPL-MCNC: 0.62 MG/DL (ref 0.2–1)
BUN SERPL-MCNC: 27 MG/DL (ref 5–25)
CALCIUM SERPL-MCNC: 9.3 MG/DL (ref 8.3–10.1)
CHLORIDE SERPL-SCNC: 111 MMOL/L (ref 96–108)
CO2 SERPL-SCNC: 24 MMOL/L (ref 21–32)
CREAT SERPL-MCNC: 1.76 MG/DL (ref 0.6–1.3)
ERYTHROCYTE [DISTWIDTH] IN BLOOD BY AUTOMATED COUNT: 13 % (ref 11.6–15.1)
GFR SERPL CREATININE-BSD FRML MDRD: 35 ML/MIN/1.73SQ M
GLUCOSE P FAST SERPL-MCNC: 96 MG/DL (ref 65–99)
HCT VFR BLD AUTO: 38.2 % (ref 36.5–49.3)
HGB BLD-MCNC: 12.5 G/DL (ref 12–17)
INR PPP: 1.9 (ref 0.84–1.19)
INR PPP: 1.9 (ref 0.84–1.19)
MCH RBC QN AUTO: 33.9 PG (ref 26.8–34.3)
MCHC RBC AUTO-ENTMCNC: 32.7 G/DL (ref 31.4–37.4)
MCV RBC AUTO: 104 FL (ref 82–98)
PLATELET # BLD AUTO: 301 THOUSANDS/UL (ref 149–390)
PMV BLD AUTO: 10.3 FL (ref 8.9–12.7)
POTASSIUM SERPL-SCNC: 5.2 MMOL/L (ref 3.5–5.3)
PROT SERPL-MCNC: 7.6 G/DL (ref 6.4–8.4)
RBC # BLD AUTO: 3.69 MILLION/UL (ref 3.88–5.62)
SODIUM SERPL-SCNC: 140 MMOL/L (ref 135–147)
WBC # BLD AUTO: 8.4 THOUSAND/UL (ref 4.31–10.16)

## 2023-08-07 PROCEDURE — 85027 COMPLETE CBC AUTOMATED: CPT

## 2023-08-07 PROCEDURE — 80053 COMPREHEN METABOLIC PANEL: CPT | Performed by: UROLOGY

## 2023-08-07 PROCEDURE — 99213 OFFICE O/P EST LOW 20 MIN: CPT | Performed by: NURSE PRACTITIONER

## 2023-08-07 PROCEDURE — 36415 COLL VENOUS BLD VENIPUNCTURE: CPT | Performed by: UROLOGY

## 2023-08-07 PROCEDURE — 69209 REMOVE IMPACTED EAR WAX UNI: CPT | Performed by: NURSE PRACTITIONER

## 2023-08-07 NOTE — ASSESSMENT & PLAN NOTE
Continue coumadin and metoprolol  INR today 1.9 on home test  Continue coumadin 6mg daily, recheck INR on 8/10/23 if remains stable will go back to weekly INR drawings

## 2023-08-07 NOTE — TELEPHONE ENCOUNTER
----- Message from Janes Love DO sent at 8/7/2023  5:41 PM EDT -----  Warfarin level is still a little low  Increase to 6mg alternating with 7mg every other day and recheck 8/14

## 2023-08-07 NOTE — ASSESSMENT & PLAN NOTE
Scheduled for PET scan 8/22/23  Schedule with oncology and radiation oncology for consultation  Following with urology- still with right nephrostomy tube Hyperlipidemia

## 2023-08-07 NOTE — PROGRESS NOTES
Assessment & Plan     1. Malignant 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 [facility])  Assessment & Plan:  Scheduled for PET scan 8/22/23  Schedule with oncology and radiation oncology for consultation  Following with urology- still with right nephrostomy tube      2. Weakness  Assessment & Plan:  continue with home PT/OT  Continue with walker/cane for safe ambulation      3. Essential hypertension  Assessment & Plan:  BP stable      4. Paroxysmal atrial fibrillation (HCC)  Assessment & Plan:  Continue coumadin and metoprolol  INR today 1.9 on home test  Continue coumadin 6mg daily, recheck INR on 8/10/23 if remains stable will go back to weekly INR drawings      5. Stage 3b chronic kidney disease [facility])  Assessment & Plan:  Lab Results   Component Value Date    EGFR 31 07/10/2023    EGFR 34 06/09/2023    EGFR 35 (L) 05/22/2023    CREATININE 1.93 (H) 07/10/2023    CREATININE 1.78 (H) 06/09/2023    CREATININE 1.89 (H) 05/22/2023   Had CMP drawn today,results pending      6. Coagulation disorder [facility])  Assessment & Plan:  INR today 1.9 take 6mg daily  Repeat on 8/10/23      7. Impacted cerumen of both ears       Subjective     Transitional Care Management Review:   Gladys Mendoza is a 81 y.o. male here for TCM follow up. During the TCM phone call patient stated:  TCM Call     Date and time call was made  7/28/2023  4:54 PM    Hospital care reviewed  Records reviewed    [facility]    Date of Admission  07/26/23    Date of discharge  07/28/23    Diagnosis  Malignant 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 ([address])    Disposition  Home    Were the patients medications reviewed and updated  Yes    Current Symptoms  None      TCM Call     Post hospital issues  None    Should patient be enrolled in anticoag monitoring? No    Scheduled for follow up?   Yes    Did you obtain your prescribed medications  Yes    Do you need help managing your prescriptions or medications  No    Is transportation to your appointment needed  No    I have advised the patient to call PCP with any new or worsening symptoms  serinaambar alonso schedule his appt next monday 7th    Comments  serina OLEA have to talk to DR Rosalba Jasso to see where to put him in he needs an afternoon appt        Here today for hospital follow up. Admitted to [facility] on 7/26/23 for scheduled procedure with Dr Ever Pearson. Underwent a TURBT. Unable to perform retrograde pyelograms or ureteroscopy. Has right PCN placed with on 7/27 IR given obstruction evident during the procedure. Discharged to home with VNA services. He saw urlogy on 7/31/23 for irizarry catheter removal, wife instructed how to flush nephrostomy tube. Urinating without difficulty currently. Feels he is emptying his bladder completely, denies hematuria. 8/4/23 appt with urology- Follow-up high-grade nodular invasive bladder cancer located on the right trigone over to right lateral and posterior wall. Invasive high grade papillary urothelial carcinoma. Tumor invades the muscularis propria     He is scheduled for PET scan on 8/22/23- if no metastatic disease will treat for locally advanced disease. Pending appointments with oncology, urology reccommended medical oncology and radiation oncology consultations. He likely could be treated with those 2 modalities with tolerable side effects.     The right nephrostomy stays in place for now, depending on treatment plan    INR today 1.9 at home- continue on 6mg once daily and repeat on Thursday 8/10. Then we will get back to weekly. Review of Systems   Constitutional: Negative for appetite change, fatigue and fever. HENT: Positive for hearing loss. Respiratory: Negative. Cardiovascular: Negative. Gastrointestinal: Negative. Negative for abdominal pain, blood in stool, constipation, diarrhea, nausea and vomiting. Genitourinary: Negative for frequency and hematuria.    Musculoskeletal: Positive for arthralgias and back pain. Neurological: Positive for weakness. Psychiatric/Behavioral: Positive for sleep disturbance. The patient is nervous/anxious. Objective     /81   Pulse (!) 52   Temp 97.6 °F (36.4 °C) (Tympanic)   Ht 6' (1.829 m)   Wt 84.4 kg (186 lb)   SpO2 99%   BMI 25.23 kg/m²      Physical Exam  Vitals and nursing note reviewed. Constitutional:       General: He is not in acute distress. Appearance: Normal appearance. He is normal weight. HENT:      Right Ear: There is impacted cerumen. Left Ear: There is impacted cerumen. Nose: No congestion or rhinorrhea. Mouth/Throat:      Mouth: Mucous membranes are moist.      Pharynx: Oropharynx is clear. No posterior oropharyngeal erythema. Eyes:      Extraocular Movements: Extraocular movements intact. Conjunctiva/sclera: Conjunctivae normal.      Pupils: Pupils are equal, round, and reactive to light. Cardiovascular:      Rate and Rhythm: Rhythm irregular. Heart sounds: Normal heart sounds. No murmur heard. Pulmonary:      Effort: No respiratory distress. Breath sounds: Normal breath sounds. No stridor. No wheezing, rhonchi or rales. Chest:      Chest wall: No tenderness. Abdominal:      General: Abdomen is flat. Bowel sounds are normal.      Palpations: Abdomen is soft. Tenderness: There is no abdominal tenderness. Comments: Right nephrostomy tube present   Musculoskeletal:      Cervical back: Normal range of motion. No tenderness. Right lower leg: No edema. Left lower leg: No edema. Lymphadenopathy:      Cervical: No cervical adenopathy. Skin:     Capillary Refill: Capillary refill takes less than 2 seconds. Findings: No rash. Neurological:      Mental Status: He is alert and oriented to person, place, and time.       Comments: Forgetful at times     Psychiatric:         Behavior: Behavior normal.       Medications have been reviewed by provider in current encounter    [facility]

## 2023-08-07 NOTE — ASSESSMENT & PLAN NOTE
Lab Results   Component Value Date    EGFR 31 07/10/2023    EGFR 34 06/09/2023    EGFR 35 (L) 05/22/2023    CREATININE 1.93 (H) 07/10/2023    CREATININE 1.78 (H) 06/09/2023    CREATININE 1.89 (H) 05/22/2023   Had CMP drawn today,results pending

## 2023-08-07 NOTE — TELEPHONE ENCOUNTER
Pt under care of: Dr Lidia Arnold    Pt left voicemail stating: Pt wife is calling and requesting call back with information for pt to schedule appts with hematology oncology and radiation oncology    Pt can be reached at: [phone] Daniel(WIfe)

## 2023-08-09 ENCOUNTER — TELEPHONE (OUTPATIENT)
Dept: HEMATOLOGY ONCOLOGY | Facility: CLINIC | Age: 83
End: 2023-08-09

## 2023-08-09 PROBLEM — H61.23 IMPACTED CERUMEN OF BOTH EARS: Status: ACTIVE | Noted: 2023-08-09

## 2023-08-09 NOTE — PROGRESS NOTES
Ear cerumen removal    Date/Time: 8/7/2023 1:45 PM    Performed by: ANASTASIYA Mccray  Authorized by: ANASTASIYA Mccray  [place] Protocol:  Consent: Verbal consent obtained. Risks and benefits: risks, benefits and alternatives were discussed  Consent given by: patient  Patient understanding: patient states understanding of the procedure being performed      Patient location:  Clinic  Procedure details:     Location:  L ear and R ear    Procedure type: irrigation only      Approach:  External    Equipment used:  Large syringe, water, hydrogen peroxide  Post-procedure details:     Complication:  None    Hearing quality:  Improved    Patient tolerance of procedure:   Tolerated well, no immediate complications  Comments:      TM visualized after successful irrigation of canals bilateral. TM intact, no erythema, no dizziness, patient ambulated with cane out of office

## 2023-08-09 NOTE — TELEPHONE ENCOUNTER
I called Richard Cole in response to a referral that was received for patient to establish care with Medical Oncology. Outreach was made to schedule a consultation. Patient declined scheduling. Patients wife states that the patient already has an appointment scheduled with a oncology doctor. The referral has been closed.

## 2023-08-10 NOTE — TELEPHONE ENCOUNTER
Attempted to call Kenny Shaikh and was placed on hold for 5 min - may try later - patient is scheduled for a PET scan 8/22 unsure why they want us to cancel appointment

## 2023-08-10 NOTE — TELEPHONE ENCOUNTER
Deepa Shankar from [facility] called again stating patient wants to have it done at their facility. The authorization has be withdrawn for them to be able to do it there. Any questions call her. She(Ofe) can be reached at [phone] .  She is available from 8 am til 4 pm.

## 2023-08-10 NOTE — TELEPHONE ENCOUNTER
Yoly Chambers from [address] [place] [address]     The patient is under their care and they want to give the patient a PET scan but there is already one that Dr. Ollie Anders wants to do and they would like Dr. Ollie Anders to withdraw his so they can do theirs.      Please review and advice     Yoly Chambers can be reached at [phone]

## 2023-08-11 ENCOUNTER — TELEPHONE (OUTPATIENT)
Facility: HOSPITAL | Age: 83
End: 2023-08-11

## 2023-08-14 ENCOUNTER — TELEPHONE (OUTPATIENT)
Dept: FAMILY MEDICINE CLINIC | Facility: CLINIC | Age: 83
End: 2023-08-14

## 2023-08-14 ENCOUNTER — ANTICOAG VISIT (OUTPATIENT)
Dept: FAMILY MEDICINE CLINIC | Facility: CLINIC | Age: 83
End: 2023-08-14

## 2023-08-14 LAB — INR PPP: 1.7 (ref 0.84–1.19)

## 2023-08-14 NOTE — TELEPHONE ENCOUNTER
Amara Neal sent a message regarding the following: This message is being sent by Elena Escalera on behalf of Kathy Martínez.     I was just wondering if stephen should get a pneumonia shot, with all he will be going through with chemo and radiation and cancer    Please advise.

## 2023-08-14 NOTE — TELEPHONE ENCOUNTER
Again Attempted to call Nitza Moy and was placed on hold for 5 min - may try later - patient is scheduled for a PET scan 8/22 unsure why they want us to cancel appointment

## 2023-08-22 ENCOUNTER — ANTICOAG VISIT (OUTPATIENT)
Dept: FAMILY MEDICINE CLINIC | Facility: CLINIC | Age: 83
End: 2023-08-22

## 2023-08-22 ENCOUNTER — ANTICOAG VISIT (OUTPATIENT)
Dept: FAMILY MEDICINE CLINIC | Facility: CLINIC | Age: 83
End: 2023-08-22
Payer: COMMERCIAL

## 2023-08-22 ENCOUNTER — TELEPHONE (OUTPATIENT)
Dept: FAMILY MEDICINE CLINIC | Facility: CLINIC | Age: 83
End: 2023-08-22

## 2023-08-22 LAB
INR PPP: 2.6 (ref 0.84–1.19)
SL AMB POCT INR: 2.6

## 2023-08-22 PROCEDURE — 85610 PROTHROMBIN TIME: CPT

## 2023-08-22 NOTE — TELEPHONE ENCOUNTER
Called and spoke to patient's wife. Advised on patient's INR results, that warfarin level is good. Continue same dose and recheck in 2 weeks.

## 2023-08-22 NOTE — TELEPHONE ENCOUNTER
----- Message from Jenna Flanagan DO sent at 8/22/2023 12:31 PM EDT -----  Your warfarin level is good  Continue same dose and recheck in 2 weeks.

## 2023-08-28 ENCOUNTER — ANTICOAG VISIT (OUTPATIENT)
Dept: FAMILY MEDICINE CLINIC | Facility: CLINIC | Age: 83
End: 2023-08-28

## 2023-08-28 LAB — INR PPP: 2.6 (ref 0.84–1.19)

## 2023-09-06 ENCOUNTER — TELEPHONE (OUTPATIENT)
Dept: FAMILY MEDICINE CLINIC | Facility: CLINIC | Age: 83
End: 2023-09-06

## 2023-09-06 ENCOUNTER — ANTICOAG VISIT (OUTPATIENT)
Dept: FAMILY MEDICINE CLINIC | Facility: CLINIC | Age: 83
End: 2023-09-06
Payer: COMMERCIAL

## 2023-09-06 LAB — SL AMB POCT INR: 2.8

## 2023-09-06 PROCEDURE — 85610 PROTHROMBIN TIME: CPT

## 2023-09-06 NOTE — TELEPHONE ENCOUNTER
----- Message from Alessandra Knapp DO sent at 9/6/2023  8:10 AM EDT -----  Warfarin level is good. Continue same dose and recheck 1 week.

## 2023-09-08 ENCOUNTER — TELEPHONE (OUTPATIENT)
Age: 83
End: 2023-09-08

## 2023-09-10 DIAGNOSIS — F41.9 ANXIETY: ICD-10-CM

## 2023-09-11 RX ORDER — ALPRAZOLAM 0.5 MG/1
0.5 TABLET ORAL 3 TIMES DAILY PRN
Qty: 90 TABLET | Refills: 0 | Status: SHIPPED | OUTPATIENT
Start: 2023-09-11

## 2023-09-12 ENCOUNTER — ANTICOAG VISIT (OUTPATIENT)
Dept: FAMILY MEDICINE CLINIC | Facility: CLINIC | Age: 83
End: 2023-09-12
Payer: COMMERCIAL

## 2023-09-12 ENCOUNTER — TELEPHONE (OUTPATIENT)
Dept: FAMILY MEDICINE CLINIC | Facility: CLINIC | Age: 83
End: 2023-09-12

## 2023-09-12 ENCOUNTER — ANTICOAG VISIT (OUTPATIENT)
Dept: FAMILY MEDICINE CLINIC | Facility: CLINIC | Age: 83
End: 2023-09-12

## 2023-09-12 LAB
INR PPP: 3 (ref 0.84–1.19)
SL AMB POCT INR: 3

## 2023-09-12 PROCEDURE — 85610 PROTHROMBIN TIME: CPT

## 2023-09-12 NOTE — TELEPHONE ENCOUNTER
----- Message from Ricarda Gaytan DO sent at 9/12/2023  1:16 PM EDT -----  Regarding: warfarin  Warfarin level is good.  Continue same dose, repeat in 1 week  ----- Message -----  From: Alina Sena  Sent: 9/11/2023   5:58 PM EDT  To: Ricarda Gaytan DO

## 2023-09-12 NOTE — TELEPHONE ENCOUNTER
Called Pt. Spoke to patient's spouse and patient's spouse and patient dvised on results. Warfarin level is good.  Continue same dose, repeat in 1 week

## 2023-09-18 ENCOUNTER — ANTICOAG VISIT (OUTPATIENT)
Dept: FAMILY MEDICINE CLINIC | Facility: CLINIC | Age: 83
End: 2023-09-18
Payer: COMMERCIAL

## 2023-09-18 ENCOUNTER — TELEPHONE (OUTPATIENT)
Dept: FAMILY MEDICINE CLINIC | Facility: CLINIC | Age: 83
End: 2023-09-18

## 2023-09-18 LAB — SL AMB POCT INR: 2.6

## 2023-09-18 PROCEDURE — 85610 PROTHROMBIN TIME: CPT

## 2023-09-18 NOTE — TELEPHONE ENCOUNTER
Called Pt. Spoke to patient's spouse, and Pt advised on results.  INR results are to continue same dose recheck 1 week

## 2023-09-18 NOTE — TELEPHONE ENCOUNTER
Called patient spoke to patient's spouse and advised on inr results to continue same dose and recheck 1 week

## 2023-09-19 ENCOUNTER — TELEPHONE (OUTPATIENT)
Dept: FAMILY MEDICINE CLINIC | Facility: CLINIC | Age: 83
End: 2023-09-19

## 2023-09-19 NOTE — TELEPHONE ENCOUNTER
Pt spouse is calling because she is having trouble bathing him and is wondering if a visiting nurse would be able to come help with that. Pt spouse is also calling because with the tube out of kidneys needing to be flushed out of with saline solutions, pt is looking to get another prescription for the naproxime bags as she is almost out.     [facility] [phone]    Please advice

## 2023-09-27 ENCOUNTER — ANTICOAG VISIT (OUTPATIENT)
Dept: FAMILY MEDICINE CLINIC | Facility: CLINIC | Age: 83
End: 2023-09-27

## 2023-09-27 ENCOUNTER — TELEPHONE (OUTPATIENT)
Dept: FAMILY MEDICINE CLINIC | Facility: CLINIC | Age: 83
End: 2023-09-27

## 2023-09-27 LAB — INR PPP: 1.8 (ref 0.84–1.19)

## 2023-09-27 NOTE — TELEPHONE ENCOUNTER
Patient completed his INR today and advised results were 1.8, recevied results and provided to provider to make necessary changes and will call and update patient  W/ result note

## 2023-09-28 ENCOUNTER — TELEPHONE (OUTPATIENT)
Dept: FAMILY MEDICINE CLINIC | Facility: CLINIC | Age: 83
End: 2023-09-28

## 2023-09-28 ENCOUNTER — ANTICOAG VISIT (OUTPATIENT)
Dept: FAMILY MEDICINE CLINIC | Facility: CLINIC | Age: 83
End: 2023-09-28
Payer: COMMERCIAL

## 2023-09-28 DIAGNOSIS — N13.39 OTHER HYDRONEPHROSIS: ICD-10-CM

## 2023-09-28 DIAGNOSIS — Z43.6 ATTENTION TO NEPHROSTOMY (HCC): Primary | ICD-10-CM

## 2023-09-28 DIAGNOSIS — N17.9 AKI (ACUTE KIDNEY INJURY) (HCC): ICD-10-CM

## 2023-09-28 LAB — SL AMB POCT INR: 1.8

## 2023-09-28 PROCEDURE — 85610 PROTHROMBIN TIME: CPT

## 2023-09-28 RX ORDER — SODIUM CHLORIDE 9 MG/ML
10 INJECTION INTRAVENOUS DAILY
Qty: 300 ML | Refills: 3 | Status: SHIPPED | OUTPATIENT
Start: 2023-09-28 | End: 2024-01-26

## 2023-09-28 NOTE — TELEPHONE ENCOUNTER
Kylah Ngo from [facility] is calling to inform us that pt was hospitalized with septic this past week. Pt is currently home with home care. Pt will need to be seen within the next week by PCP. Kylah Ngo said that he will need a referral with Nephrologist Dr. Ankur Tucker. Kylah Ngo said pt can not be seen with Nephrologist unless there is a referral put in. Kylah Ngo said if there are any more questions to please call her at [phone]. Kylah Ngo said that he will need a script to be faxed over to [facility] for 10cc normal saline flush.     [facility] Fax: [phone]

## 2023-09-28 NOTE — TELEPHONE ENCOUNTER
Patient aware on results, patient's spouse saw mychart message from provider to them on Mychart who is the one who does his medications    Increase warfarin to 8mg for 2 days then 7mg daily until next check on 10/4   Written by Alessandra Knapp DO on 9/27/2023 10:54 PM EDT  Seen by proxy Rubén Saavedra on 9/28/2023  7:33 AM

## 2023-09-28 NOTE — TELEPHONE ENCOUNTER
----- Message from Chelo Zavala DO sent at 9/27/2023 10:54 PM EDT -----  Increase warfarin to 8mg for 2 days then 7mg daily until next check on 10/4

## 2023-09-29 ENCOUNTER — TELEPHONE (OUTPATIENT)
Dept: NEPHROLOGY | Facility: CLINIC | Age: 83
End: 2023-09-29

## 2023-09-29 NOTE — TELEPHONE ENCOUNTER
New Patient Intake Form   Patient Details   [facility]     1940     [id number]     Insurance Information   Name of [address] [place] [address]   Does the patient need an insurance referral? yes   If patient has Pitney Radha, please ask if they will be using their Pitney Radha. Appointment Information   Who is calling to schedule? If not patient, what is callers name? Spouse   Referring Provider  Dr. Anh Nair   Reason for Appt (Diagnosis) LELE    Does Patient have labs/urine done at [facility]? If not, where do they go? List the date of last lab / urine  *Please try to get labs 2 years back if not at  yes   Has patient been hospitalized recently? If yes, list name and location of hospital they were in no   Has patient been seen by a Nephrologist before? If yes, list name, location and phone number no   Has the patient had renal imaging done? If so, list the most recent date and type of imaging yes CT 6/2023   Does patient have a history of Kidney Stones? no   Appointment Details   Is there a referral on file?  yes    Appointment Date 2/8    Location  [facility]   Miscellaneous

## 2023-10-02 ENCOUNTER — TELEPHONE (OUTPATIENT)
Age: 83
End: 2023-10-02

## 2023-10-02 NOTE — TELEPHONE ENCOUNTER
Estefany Mccann from [facility] called stating she needs nephrostomy tubes  Flushing supplies and dressing changes order . She stated his wife told her he was having some blood clots with pink urine but she just checked and all is cleared now. Any questions call her.          Estefany Mccann can be reached at [phone]    Fax number [phone]

## 2023-10-03 ENCOUNTER — TELEPHONE (OUTPATIENT)
Dept: FAMILY MEDICINE CLINIC | Facility: CLINIC | Age: 83
End: 2023-10-03

## 2023-10-03 ENCOUNTER — ANTICOAG VISIT (OUTPATIENT)
Dept: FAMILY MEDICINE CLINIC | Facility: CLINIC | Age: 83
End: 2023-10-03

## 2023-10-03 DIAGNOSIS — I48.21 PERMANENT ATRIAL FIBRILLATION (HCC): ICD-10-CM

## 2023-10-03 DIAGNOSIS — Z79.01 ANTICOAGULATED: ICD-10-CM

## 2023-10-03 LAB — INR PPP: 2.3 (ref 0.84–1.19)

## 2023-10-03 RX ORDER — WARFARIN SODIUM 5 MG/1
TABLET ORAL
Qty: 90 TABLET | Refills: 1 | Status: SHIPPED | OUTPATIENT
Start: 2023-10-03

## 2023-10-03 NOTE — TELEPHONE ENCOUNTER
----- Message from Enedina Garcia DO sent at 10/3/2023 11:46 AM EDT -----  Continue same dose and recheck 1 week

## 2023-10-03 NOTE — TELEPHONE ENCOUNTER
flush nephrostomy 10cc saline daily  change 4x4 drain dressing gauze every other day or when soiled    pt should schedule his tube exchange in IR he is due in the next few weeks, last saw them 7/27 and rec was 2-3 month exchange.

## 2023-10-04 NOTE — TELEPHONE ENCOUNTER
Call placed to [facility] at [facility]. She did not answer. LMOM in detail informing her that orders were received from [facility] and are being faxed to facility. Also inform her that patient needs to be scheduled in IR soon for tube exchange. Office number was provided for RN to call back with questions or concerns.

## 2023-10-08 PROBLEM — H61.23 IMPACTED CERUMEN OF BOTH EARS: Status: RESOLVED | Noted: 2023-08-09 | Resolved: 2023-10-08

## 2023-10-09 ENCOUNTER — TELEPHONE (OUTPATIENT)
Dept: FAMILY MEDICINE CLINIC | Facility: CLINIC | Age: 83
End: 2023-10-09

## 2023-10-09 ENCOUNTER — ANTICOAG VISIT (OUTPATIENT)
Dept: FAMILY MEDICINE CLINIC | Facility: CLINIC | Age: 83
End: 2023-10-09
Payer: COMMERCIAL

## 2023-10-09 DIAGNOSIS — Z79.01 ANTICOAGULATED: ICD-10-CM

## 2023-10-09 LAB — SL AMB POCT INR: 3.2

## 2023-10-09 PROCEDURE — 85610 PROTHROMBIN TIME: CPT

## 2023-10-09 RX ORDER — WARFARIN SODIUM 1 MG/1
TABLET ORAL
Qty: 180 TABLET | Refills: 1 | Status: SHIPPED | OUTPATIENT
Start: 2023-10-09

## 2023-10-09 NOTE — PROGRESS NOTES
Called Pt. Spoke to patient's spouse, who is on medical communication consent and manages patient's medications, and   advised on results.  Hold 1 day and restart 7 mg daily, and recheck INR in 1 week

## 2023-10-09 NOTE — TELEPHONE ENCOUNTER
Yes, This is Georges Levels, [phone]. I'm trying to get a refill for [place]ora [place]ora, [id number] for warfarin, 1 milligram. They say they can't refill it until November sometime. We need it now, so if you could call [facility], I would appreciate it. So for some reason I can't get on my chart anymore and that's normally how I would do this. So I don't know what's going on, but all right, Thank you. Bye. Pt states that he is supposed to be taking 7 mg and does not have any 1mg left. Wife states that this may have changed as his INR today was a 3.2 but she is not wanting to cut the 5 mg tabs to give him 7mg because it is difficult. She would like a new rx for the 1mg tabs for her to give him. Pharmacy says she can not get them until November. Please advise.

## 2023-10-09 NOTE — TELEPHONE ENCOUNTER
Called Pt. Spoke to patient's spouse, who is on medical communication consent and manages patient's medications, and   advised on results.  Hold 1 day and restart 7 mg daily, and recheck INR in 1 week   Patient's spouse advised 1 mg warfarin sent to [id number]- [facility] - [address].

## 2023-10-12 NOTE — TELEPHONE ENCOUNTER
Pt's wife called and stated that the patient will be done with chemo on Friday this week and he has an other on 10/19/23. She wants to know if he will be tested to see if the cancer is gone or if it will be too soon. And she stated that she lives away and she doesn't want to waste the trip if it is just a consultation.      Pt's wife can be reached at [phone]

## 2023-10-13 NOTE — TELEPHONE ENCOUNTER
Patient's wife would like to know if the visit on 10/19/23 can be a virtual call instead of coming into the office. He just finished chemo. She wants to know when patient is to do the cysto to find out if all the cancer is gone.      Patient can be reached at [phone]

## 2023-10-13 NOTE — TELEPHONE ENCOUNTER
Hiro Yarbrough MD  You3 minutes ago (11:00 AM)       Next appt should be cysto 8 wk after finish all chemo and radiation oncology. So, if last chemo is about now, cysto mid Dec.  Does not need visit before the   Call placed to patient and spoke with his wife. I reviewed the recommendations of Dr. Flaco Sexton with her. Appointment for 10- has been cancelled. Pt needs cysto on or around 12-. Wife is requesting [place] office due to location. Unable to schedule cysto at [facility] at this time. Will reach out to nurse to see what we can arrange for cysto at [place] on 12-. Wife is aware we are working on appointment and will contact her with date and time.

## 2023-10-13 NOTE — TELEPHONE ENCOUNTER
Called and spoke with Jacqueline Cancino with [facility] in [facility] [place] and scheduled for Cysto on 12/15 @ 1:00 at the [facility]

## 2023-10-16 LAB — SL AMB POCT INR: 4.4

## 2023-10-17 ENCOUNTER — TELEPHONE (OUTPATIENT)
Dept: FAMILY MEDICINE CLINIC | Facility: CLINIC | Age: 83
End: 2023-10-17

## 2023-10-17 NOTE — RESULT ENCOUNTER NOTE
INR is high at 4.4, please have Donna Paiz hold coumadin today then take 1/2 tab tomorrow.  Then resume 7mg daily, recheck in 1 week

## 2023-10-17 NOTE — TELEPHONE ENCOUNTER
----- Message from Melany Velarde, [facility] sent at 10/17/2023  8:03 AM EDT -----  INR is high at 4.4, please have Kassidy Reynoso hold coumadin today then take 1/2 tab tomorrow.  Then resume 7mg daily, recheck in 1 week

## 2023-10-17 NOTE — TELEPHONE ENCOUNTER
Spoke to spouse Vivian Dorman , and per provider Nedra Martínez has been Advise to hold one day , then 1/2 a pill for two days and resume regular dose on 7 MG. Per wife she notice results when high and she skip dosage last night , today 10/17/2023 and tomorrow  10/18/2023 will do 1/2 tablet, then will resume regular dosage. Repeat PT INR in a week from yesterday.

## 2023-10-18 ENCOUNTER — ANTICOAG VISIT (OUTPATIENT)
Dept: FAMILY MEDICINE CLINIC | Facility: CLINIC | Age: 83
End: 2023-10-18

## 2023-10-24 ENCOUNTER — ANTICOAG VISIT (OUTPATIENT)
Dept: FAMILY MEDICINE CLINIC | Facility: CLINIC | Age: 83
End: 2023-10-24
Payer: COMMERCIAL

## 2023-10-24 ENCOUNTER — TELEPHONE (OUTPATIENT)
Dept: FAMILY MEDICINE CLINIC | Facility: CLINIC | Age: 83
End: 2023-10-24

## 2023-10-24 LAB — SL AMB POCT INR: 2.7

## 2023-10-24 PROCEDURE — 85610 PROTHROMBIN TIME: CPT

## 2023-10-28 DIAGNOSIS — E78.2 MIXED HYPERLIPIDEMIA: ICD-10-CM

## 2023-10-28 RX ORDER — ROSUVASTATIN CALCIUM 5 MG/1
TABLET, COATED ORAL
Qty: 90 TABLET | Refills: 3 | Status: SHIPPED | OUTPATIENT
Start: 2023-10-28

## 2023-10-30 ENCOUNTER — ANTICOAG VISIT (OUTPATIENT)
Dept: FAMILY MEDICINE CLINIC | Facility: CLINIC | Age: 83
End: 2023-10-30

## 2023-10-30 ENCOUNTER — TELEPHONE (OUTPATIENT)
Dept: FAMILY MEDICINE CLINIC | Facility: CLINIC | Age: 83
End: 2023-10-30

## 2023-10-30 LAB — INR PPP: 3.4 (ref 0.84–1.19)

## 2023-10-30 NOTE — TELEPHONE ENCOUNTER
I spoke with Lester Grider and she states that [facility] used [facility] previously as they were the only ones that would come to their location. She would like to continue using them. Their number is [phone]. We will need to call to get their fax during normal business hours. Jong Suazo states the Ousmane's INR was 3.4 today - she just received the results.

## 2023-11-02 DIAGNOSIS — C67.2 MALIGNANT NEOPLASM OF LATERAL WALL OF URINARY BLADDER (HCC): Primary | ICD-10-CM

## 2023-11-02 NOTE — TELEPHONE ENCOUNTER
I started home health orders for patient but was unable to complete. Are you able to complete them? I have them pended. We will need more info from Dr. Criselda Lees as to what the orders are for. Thank you!

## 2023-11-03 NOTE — TELEPHONE ENCOUNTER
Spoke with wife desmond about home health order request for . Per spouse patientt has nephrostomy tube and difficult to keep up with cleaning and dressing changes, if possible for a nurse to come and help. Patient has appointment with urology today and has been advised to follow up with the about home health order, if any issues to please contact out office.

## 2023-11-07 ENCOUNTER — TELEPHONE (OUTPATIENT)
Dept: FAMILY MEDICINE CLINIC | Facility: CLINIC | Age: 83
End: 2023-11-07

## 2023-11-07 LAB — SL AMB POCT INR: 2.2

## 2023-11-07 NOTE — TELEPHONE ENCOUNTER
Called spoke to patient's spouse advised patient due for INR.  Patient's spouse said they completed it and sent it in, completed, awaiting faxed results

## 2023-11-08 DIAGNOSIS — F41.9 ANXIETY: ICD-10-CM

## 2023-11-08 RX ORDER — ALPRAZOLAM 0.5 MG/1
0.5 TABLET ORAL 3 TIMES DAILY PRN
Qty: 90 TABLET | Refills: 0 | Status: SHIPPED | OUTPATIENT
Start: 2023-11-08 | End: 2023-11-10 | Stop reason: SDUPTHER

## 2023-11-09 ENCOUNTER — TELEPHONE (OUTPATIENT)
Dept: FAMILY MEDICINE CLINIC | Facility: CLINIC | Age: 83
End: 2023-11-09

## 2023-11-10 ENCOUNTER — ANTICOAG VISIT (OUTPATIENT)
Dept: FAMILY MEDICINE CLINIC | Facility: CLINIC | Age: 83
End: 2023-11-10
Payer: COMMERCIAL

## 2023-11-10 DIAGNOSIS — F41.9 ANXIETY: ICD-10-CM

## 2023-11-10 PROCEDURE — 85610 PROTHROMBIN TIME: CPT

## 2023-11-10 RX ORDER — ALPRAZOLAM 0.5 MG/1
0.5 TABLET ORAL 3 TIMES DAILY PRN
Qty: 90 TABLET | Refills: 0 | Status: SHIPPED | OUTPATIENT
Start: 2023-11-10

## 2023-11-10 NOTE — TELEPHONE ENCOUNTER
Refills have been requested for the following medications:         ALPRAZolam (XANAX) 0.5 mg tablet Roxy Santos]     Preferred pharmacy: [facility][id number] - [facility] - [address].      This message is being sent by Minus Boast on behalf of Enma Sepulveda

## 2023-11-14 ENCOUNTER — ANTICOAG VISIT (OUTPATIENT)
Dept: FAMILY MEDICINE CLINIC | Facility: CLINIC | Age: 83
End: 2023-11-14
Payer: COMMERCIAL

## 2023-11-14 ENCOUNTER — TELEPHONE (OUTPATIENT)
Dept: FAMILY MEDICINE CLINIC | Facility: CLINIC | Age: 83
End: 2023-11-14

## 2023-11-14 LAB — SL AMB POCT INR: 3.1

## 2023-11-14 PROCEDURE — 85610 PROTHROMBIN TIME: CPT

## 2023-11-14 NOTE — TELEPHONE ENCOUNTER
Called Pt. LVM for patient and patient's spouse, and advised on INR results.  Hold warfarin/coumadin for 1 day and then restart the same dose and recheck in 1 week

## 2023-11-15 ENCOUNTER — TELEPHONE (OUTPATIENT)
Dept: FAMILY MEDICINE CLINIC | Facility: CLINIC | Age: 83
End: 2023-11-15

## 2023-11-15 ENCOUNTER — TELEPHONE (OUTPATIENT)
Age: 83
End: 2023-11-15

## 2023-11-15 DIAGNOSIS — R39.9 UTI SYMPTOMS: Primary | ICD-10-CM

## 2023-11-15 NOTE — TELEPHONE ENCOUNTER
Patient's wife called today stating that she cleans his nephrostomy tube every day and yesterday and today it is producing very dark and chunky discharge. They do not have a nephrologist set up yet that she knows of and she isn't sure what to do. She would rather not take him to the ER "we haven't gotten our flu shots yet and I don't want to catch something sitting and waiting there". Should they come in for an appointment here or what would your recommendation be for them? Please advise.

## 2023-11-15 NOTE — TELEPHONE ENCOUNTER
She needs to contact his urologist or nephrologist- [facility]   [address] [address]   [address] [facility], [address] [place] [address]   [phone]

## 2023-11-15 NOTE — TELEPHONE ENCOUNTER
Called crystal and notifed her that urine studies were placed she can  a specimen cup and have him pee in cup and then take to lab

## 2023-11-15 NOTE — TELEPHONE ENCOUNTER
Pt's wife called stated that the urine has a strong odor and cloudy. Pt has a hx of sepsis.     Please review     ,Rigoberto Lawler [phone]

## 2023-11-15 NOTE — TELEPHONE ENCOUNTER
SUSY left at 12:09      Yes, this is Buzz Ortiz, [phone]. I'm calling about Eustacio Hodgkin. I was wondering if we could either get him in or you could give him some antibiotics. He his urine that comes from his tube is really smelling bad and it's starting to get red again and it's got clumps of stuff coming out of it and I don't know if we ever got enough nephrologist for him. So anyhow, call me back. I have Doctor Hodan Williamson call me back [phone]. Thank you.

## 2023-11-22 ENCOUNTER — TELEPHONE (OUTPATIENT)
Dept: FAMILY MEDICINE CLINIC | Facility: CLINIC | Age: 83
End: 2023-11-22

## 2023-11-24 ENCOUNTER — ANTICOAG VISIT (OUTPATIENT)
Dept: FAMILY MEDICINE CLINIC | Facility: CLINIC | Age: 83
End: 2023-11-24
Payer: COMMERCIAL

## 2023-11-24 ENCOUNTER — TELEPHONE (OUTPATIENT)
Dept: FAMILY MEDICINE CLINIC | Facility: CLINIC | Age: 83
End: 2023-11-24

## 2023-11-24 LAB — SL AMB POCT INR: 2.3

## 2023-11-24 PROCEDURE — 85610 PROTHROMBIN TIME: CPT | Performed by: NURSE PRACTITIONER

## 2023-11-24 NOTE — PROGRESS NOTES
Called Pt.  Spoke to patient's spouse, and advised on results from [address] [place] [address]- INR is stable, continue current dosing and repeat in 1 week

## 2023-11-24 NOTE — TELEPHONE ENCOUNTER
Called Pt.  Spoke to patient's spouse, and advised on results from [facility]- INR is stable, continue current dosing and repeat in 1 week

## 2023-11-27 ENCOUNTER — ANTICOAG VISIT (OUTPATIENT)
Dept: FAMILY MEDICINE CLINIC | Facility: CLINIC | Age: 83
End: 2023-11-27
Payer: COMMERCIAL

## 2023-11-27 ENCOUNTER — TELEPHONE (OUTPATIENT)
Dept: FAMILY MEDICINE CLINIC | Facility: CLINIC | Age: 83
End: 2023-11-27

## 2023-11-27 LAB — SL AMB POCT INR: 2.3

## 2023-11-27 PROCEDURE — 85610 PROTHROMBIN TIME: CPT

## 2023-11-27 NOTE — TELEPHONE ENCOUNTER
Spoke to patient's spouse and advised Warfarin level is good   Continue same dose and recheck in 1 week

## 2023-11-27 NOTE — TELEPHONE ENCOUNTER
----- Message from Meera Muller DO sent at 11/27/2023  4:30 PM EST -----  Warfarin level is good  Continue same dose and recheck in 1 week

## 2023-11-28 ENCOUNTER — TELEPHONE (OUTPATIENT)
Dept: FAMILY MEDICINE CLINIC | Facility: CLINIC | Age: 83
End: 2023-11-28

## 2023-12-04 LAB — INR PPP: 3.3 (ref 0.84–1.19)

## 2023-12-05 ENCOUNTER — TELEPHONE (OUTPATIENT)
Dept: FAMILY MEDICINE CLINIC | Facility: CLINIC | Age: 83
End: 2023-12-05

## 2023-12-05 ENCOUNTER — ANTICOAG VISIT (OUTPATIENT)
Dept: FAMILY MEDICINE CLINIC | Facility: CLINIC | Age: 83
End: 2023-12-05
Payer: COMMERCIAL

## 2023-12-05 ENCOUNTER — ANTICOAG VISIT (OUTPATIENT)
Dept: FAMILY MEDICINE CLINIC | Facility: CLINIC | Age: 83
End: 2023-12-05

## 2023-12-05 LAB — SL AMB POCT INR: 3.3

## 2023-12-05 PROCEDURE — 85610 PROTHROMBIN TIME: CPT

## 2023-12-05 NOTE — TELEPHONE ENCOUNTER
----- Message from Golden Rushing DO sent at 12/5/2023  8:44 AM EST -----  Hold warfarin for 1 day then alternate 6mg/7mg every other day and recheck in 1 week

## 2023-12-05 NOTE — PROGRESS NOTES
Please call patient Melida Young  Hold warfarin for 1 day then alternate 6mg/7mg every other day.    Recheck 1 week

## 2023-12-05 NOTE — TELEPHONE ENCOUNTER
Called Pt. Spoke to patient's spouse, and advised on results.      Patient aware on results, saw mychart message from provider to them on Mychart      Hold warfarin for 1 day then alternate 6mg/7mg every other day and recheck in 1 week   Written by Farhan Coffey DO on 12/5/2023  8:44 AM EST  Seen by proxy Marty Cheek on 12/5/2023  9:07 AM

## 2023-12-14 ENCOUNTER — TELEPHONE (OUTPATIENT)
Dept: FAMILY MEDICINE CLINIC | Facility: CLINIC | Age: 83
End: 2023-12-14

## 2023-12-15 ENCOUNTER — TELEPHONE (OUTPATIENT)
Age: 83
End: 2023-12-15

## 2023-12-15 ENCOUNTER — PROCEDURE VISIT (OUTPATIENT)
Dept: UROLOGY | Facility: HOSPITAL | Age: 83
End: 2023-12-15
Payer: COMMERCIAL

## 2023-12-15 VITALS
OXYGEN SATURATION: 98 % | SYSTOLIC BLOOD PRESSURE: 122 MMHG | BODY MASS INDEX: 23.33 KG/M2 | WEIGHT: 172 LBS | DIASTOLIC BLOOD PRESSURE: 70 MMHG | HEART RATE: 75 BPM

## 2023-12-15 DIAGNOSIS — N13.39 OTHER HYDRONEPHROSIS: ICD-10-CM

## 2023-12-15 DIAGNOSIS — C67.2 MALIGNANT NEOPLASM OF LATERAL WALL OF URINARY BLADDER (HCC): Primary | ICD-10-CM

## 2023-12-15 PROCEDURE — 52000 CYSTOURETHROSCOPY: CPT | Performed by: UROLOGY

## 2023-12-15 PROCEDURE — 99213 OFFICE O/P EST LOW 20 MIN: CPT | Performed by: UROLOGY

## 2023-12-15 NOTE — PROGRESS NOTES
HISTORY:    1 follow-up bladder cancer, muscle invasive cancer in July 2023. See TUR bladder tumor report and pathology    Extensive counseling done regarding options or therapy, general consensus was that patient's other medical problems would make radical cystectomy too risky, and chemotherapy radiation therapy was recommended. Patient shows to have treatment at [facility], and he underwent gemcitabine chemotherapy and external beam radiation therapy at [facility], finishing in October 2023.    2.  Right hydronephrosis, obstructed kidney. Right percutaneous nephrostomy is placed at [facility] in July 2023. Patient underwent exchange of the tube at [facility], last time was in November 2023. Patient's wife tells me he is planned to have that exchange again in January. He saw medical oncology at [facility], Dr. Je Mccurdy, and interventional radiologist was Dr. Ida Tao. Cystoscopy     Date/Time  12/15/2023 1:00 PM     Performed by  Ld Martinez MD   Authorized by  Ld Martinez MD         Procedure Details:  Procedure type: cystoscopy    Patient tolerance: Patient tolerated the procedure well with no immediate complications    Additional Procedure Details:      Patient presents for cystoscopy. I have discussed the reasons for doing the exam, and the potential risks and complications. Patient expressed understanding, and signed informed consent document. The patient was carefully  positioned supine on the examining table. Sterile preparation was performed on the urethra. Xylocaine jelly was instilled and left  Indwelling for the procedure. The 13 [place] flexible cystoscope was passed with the following findings:      Urethra: No stenosis    Prostate:  lateral lobes moderate enlargement some obstruction                  Bladder: Cloudy urine, inflamed bladder, lots of bladder spasms which limits visualization.   After irrigation, I did not see any obvious tumors, but visualization somewhat limited     Residual urine: Minimal    Patient tolerated the procedure well and was escorted from the examining table. ASSESSMENT / PLAN:    1. Urine cytology next week. 2.  If cytology negative, next cystoscopy will be in 3 months. 3.  I will relay this info to [facility] interventional radiology and asked them to see if the right PERC neph can be internalized. Wife knows also to pass this on to IR.     The following portions of the patient's history were reviewed and updated as appropriate: allergies, current medications, past family history, past medical history, past social history, past surgical history, and problem list.    Review of Systems      Objective:     Physical Exam      0   Lab Value Date/Time    PSA 1.5 05/22/2023 1055    PSA 1.6 04/27/2022 1300    PSA 1.2 08/29/2016 0000    PSA 0.9 05/22/2015 1300   ]  BUN   Date Value Ref Range Status   08/07/2023 27 (H) 5 - 25 mg/dL Final   05/22/2023 36 (H) 8 - 27 mg/dL Final     Creatinine   Date Value Ref Range Status   08/07/2023 1.76 (H) 0.60 - 1.30 mg/dL Final     Comment:     Standardized to IDMS reference method   08/29/2016 1.49 (H) 0.76 - 1.27 mg/dL Final     No components found for: "CBC"      Patient Active Problem List   Diagnosis    Anxiety    Arthralgia    Asymptomatic cholelithiasis    Paroxysmal atrial fibrillation (HCC)    Benign essential hypertension    Benign prostatic hyperplasia    Bilateral pseudophakia    Chronic kidney disease, stage 3 (HCC)    Coronary arteriosclerosis    Epilepsy ([address])    Gait instability    Hiatal hernia    Hyperlipidemia    Frequent falls    Weakness    Stage 3b chronic kidney disease ([address])    Age-related cataract of right eye    Macular drusen    Posterior vitreous detachment of both eyes    Primary osteoarthritis of left hip    Osteoarthritis of lumbar spine    Essential hypertension    Chronic bilateral low back pain with right-sided sciatica DDD (degenerative disc disease), lumbar    Sciatica, right side    Chronic cough    Gastroesophageal reflux disease without esophagitis    Cerebral artery occlusion    Labyrinthitis, unspecified ear    Pure hypercholesterolemia    Malignant 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 male. Current Outpatient Medications:     ALPRAZolam (XANAX) 0.5 mg tablet, Take 1 tablet (0.5 mg total) by mouth 3 (three) times a day as needed for anxiety, Disp: 90 tablet, Rfl: 0    BD PosiFlush 0.9 % SOLN, 10 ML BY INTRACATHETER ROUTE DAILY INTRACATHETER FLUSHING DAILY. , Disp: 300 mL, Rfl: 3    Cholecalciferol (VITAMIN D3 PO), Take by mouth, Disp: , Rfl:     gabapentin (NEURONTIN) 100 mg capsule, TAKE 3 TABS DAILY IN AM, Disp: 270 capsule, Rfl: 3    Lancets MISC, by Does not apply route, Disp: , Rfl:     metoprolol tartrate (LOPRESSOR) 50 mg tablet, TAKE 1 TABLET BY MOUTH TWICE A DAY, Disp: 180 tablet, Rfl: 3    Multiple Vitamins-Minerals (PreserVision AREDS 2) CAPS, , Disp: , Rfl:     rosuvastatin (CRESTOR) 5 mg tablet, TAKE 1 TABLET BY MOUTH EVERY DAY, Disp: 90 tablet, Rfl: 3    sodium chloride, PF, 0.9 %, 10 mL by Intracatheter route daily for 120 doses Intracatheter flushing daily.  May substitute prefilled syringe with normal saline 10 mL vials, 10 mL syringes, and 18 g blunt needles, Disp: 300 mL, Rfl: 3    warfarin (COUMADIN) 1 mg tablet, Take 2 tabs po daily, combining with 5mg tab po daily, Disp: 180 tablet, Rfl: 1    warfarin (COUMADIN) 5 mg tablet, TAKE 1 TABLET BY MOUTH EVERY DAY, COMBINE WITH 1MG TAB AS DIRECTED (Patient taking differently: TAKE 1 TABLET BY MOUTH EVERY DAY, combine with 1-2mg tab as directed (alternates between 6mg and 7mg QOD)), Disp: 90 tablet, Rfl: 1    Past Medical History:   Diagnosis Date    Ambulates with cane     Anemia     last assessed: 05/23/2015    GERD (gastroesophageal reflux disease)     Hematuria     Hypertension     Intracerebral hemorrhage ([address])     last assessed: 04/21/2014; right    Intracerebral hemorrhage ([address])     last assessed: 11/23/2017    Mobility impaired     Stroke syndrome     last assessed: 06/13/2013    UTI (urinary tract infection)        Past Surgical History:   Procedure Laterality Date    BLADDER SURGERY      IR NEPHROSTOMY TUBE PLACEMENT  7/27/2023    [place] CYSTOURETHROSCOPY W/DEST &/RMVL TUMOR LARGE N/A 7/26/2023    Procedure: (TURBT);   Surgeon: Shasha Shearer MD;  Location: [facility];  Service: Urology       Social History

## 2023-12-15 NOTE — TELEPHONE ENCOUNTER
Wife of the patient was double checking that he had an appt today. She stated that they were at the office and the door was locked. Reached out to the office, they were in a meeting.

## 2023-12-18 DIAGNOSIS — F41.9 ANXIETY: ICD-10-CM

## 2023-12-19 ENCOUNTER — TELEPHONE (OUTPATIENT)
Dept: FAMILY MEDICINE CLINIC | Facility: CLINIC | Age: 83
End: 2023-12-19

## 2023-12-19 ENCOUNTER — ANTICOAG VISIT (OUTPATIENT)
Dept: FAMILY MEDICINE CLINIC | Facility: CLINIC | Age: 83
End: 2023-12-19
Payer: COMMERCIAL

## 2023-12-19 ENCOUNTER — NURSE TRIAGE (OUTPATIENT)
Age: 83
End: 2023-12-19

## 2023-12-19 LAB — SL AMB POCT INR: 2.3

## 2023-12-19 PROCEDURE — 85610 PROTHROMBIN TIME: CPT

## 2023-12-19 RX ORDER — ALPRAZOLAM 0.5 MG/1
0.5 TABLET ORAL 3 TIMES DAILY PRN
Qty: 90 TABLET | Refills: 0 | Status: SHIPPED | OUTPATIENT
Start: 2023-12-19

## 2023-12-19 NOTE — TELEPHONE ENCOUNTER
"Answer Assessment - Initial Assessment Questions  1. REASON FOR CALL or QUESTION: \"What is your reason for calling today?\" or \"How can I best help you?\" or \"What question do you have that I can help answer?\"        Patients spouse calling to ask if she could drop urine sample off at the lab to have urine cytology done ordered by Dr. Carcamo.     Advised patients spouse she could pick proper container up from lab to have sample done and dropped off. Patients spouse has container from doctor and will proceed w/testing collection.    Protocols used: Information Only Call - No Triage-ADULT-[place]    "
yes

## 2023-12-26 ENCOUNTER — TELEPHONE (OUTPATIENT)
Dept: FAMILY MEDICINE CLINIC | Facility: CLINIC | Age: 83
End: 2023-12-26

## 2023-12-26 ENCOUNTER — ANTICOAG VISIT (OUTPATIENT)
Dept: FAMILY MEDICINE CLINIC | Facility: CLINIC | Age: 83
End: 2023-12-26
Payer: COMMERCIAL

## 2023-12-26 LAB
CYTOLOGIST CVX/VAG CYTO: NORMAL
DX ICD CODE: NORMAL
PATH REPORT.FINAL DX SPEC: NORMAL
PATH REPORT.GROSS SPEC: NORMAL
PATH REPORT.SITE OF ORIGIN SPEC: NORMAL
PATHOLOGIST NAME: NORMAL
SL AMB POCT INR: 3.5

## 2023-12-26 PROCEDURE — 85610 PROTHROMBIN TIME: CPT

## 2023-12-26 NOTE — PROGRESS NOTES
Called Pt. Spoke to patient's spouse, and   advised on results. Hold for 2 days then continue same dose recheck 1 wk

## 2024-01-02 ENCOUNTER — NURSE TRIAGE (OUTPATIENT)
Age: 84
End: 2024-01-02

## 2024-01-02 NOTE — TELEPHONE ENCOUNTER
"Answer Assessment - Initial Assessment Questions  1. REASON FOR CALL or QUESTION: \"What is your reason for calling today?\" or \"How can I best help you?\" or \"What question do you have that I can help answer?\"      Pt's wife calling to see if we could fax urine cytology to cancer doctor as he has an appt with them this week. Faxed to [phone]    Protocols used: Information Only Call - No Triage-ADULT-[place]    "

## 2024-01-04 ENCOUNTER — ANTICOAG VISIT (OUTPATIENT)
Dept: FAMILY MEDICINE CLINIC | Facility: CLINIC | Age: 84
End: 2024-01-04

## 2024-01-04 ENCOUNTER — TELEPHONE (OUTPATIENT)
Dept: FAMILY MEDICINE CLINIC | Facility: CLINIC | Age: 84
End: 2024-01-04

## 2024-01-04 LAB — INR PPP: 1.5 (ref 0.84–1.19)

## 2024-01-04 NOTE — TELEPHONE ENCOUNTER
----- Message from Chantel Santos DO sent at 1/4/2024 12:49 PM EST -----  The warfarin level is low  Please increase to 7mg every day except 6mg Tuesday and Thursday and recheck in 1 week

## 2024-01-04 NOTE — TELEPHONE ENCOUNTER
Call pt and left a message to call back with INR results regards to dr garibay message     6mg on Tuesday and Thursday   7mg the Monday, wednesday, Friday,Saturday, Sunday

## 2024-01-05 ENCOUNTER — ANTICOAG VISIT (OUTPATIENT)
Dept: FAMILY MEDICINE CLINIC | Facility: CLINIC | Age: 84
End: 2024-01-05
Payer: COMMERCIAL

## 2024-01-05 LAB — SL AMB POCT INR: 1.5

## 2024-01-05 PROCEDURE — 85610 PROTHROMBIN TIME: CPT

## 2024-01-05 NOTE — TELEPHONE ENCOUNTER
The warfarin level is low  Please increase to 7mg every day except 6mg Tuesday and Thursday and recheck in 1 week   Written by Chantel Santos DO on 1/4/2024 12:49 PM EST  Seen by proxy Daniel Caballero on 1/4/2024  2:39 PM   [] : The components of the vaccine(s) to be administered today are listed in the plan of care. The disease(s) for which the vaccine(s) are intended to prevent and the risks have been discussed with the caretaker.  The risks are also included in the appropriate vaccination information statements which have been provided to the patient's caregiver.  The caregiver has given consent to vaccinate. [FreeTextEntry1] : need for immunization

## 2024-01-05 NOTE — PROGRESS NOTES
Called and spoke to patient's spouse Daniel Caballero who is on patient's medical communication consent, advised INR results increase 7 mg everyday except 6 mg Tuesday and Thursday and recheck in 1 week

## 2024-01-08 ENCOUNTER — ANTICOAG VISIT (OUTPATIENT)
Dept: FAMILY MEDICINE CLINIC | Facility: CLINIC | Age: 84
End: 2024-01-08
Payer: COMMERCIAL

## 2024-01-08 ENCOUNTER — TELEPHONE (OUTPATIENT)
Dept: FAMILY MEDICINE CLINIC | Facility: CLINIC | Age: 84
End: 2024-01-08

## 2024-01-08 LAB — SL AMB POCT INR: 2

## 2024-01-08 PROCEDURE — 85610 PROTHROMBIN TIME: CPT

## 2024-01-08 NOTE — TELEPHONE ENCOUNTER
Called patient's spouse. Spoke to patient's spouse, and advised on results. Warfarin level is good. Continue same dose and recheck 1 week

## 2024-01-08 NOTE — TELEPHONE ENCOUNTER
----- Message from Chantel Santos DO sent at 1/8/2024  4:02 PM EST -----  Regarding: warfarin  Warfarin level is good. Continue same dose and recheck 1 week  ----- Message -----  From: Renetta Ernst  Sent: 1/8/2024   3:41 PM EST  To: Chantel Santos DO

## 2024-01-15 LAB — INR PPP: 3.4 (ref 0.84–1.19)

## 2024-01-16 ENCOUNTER — TELEPHONE (OUTPATIENT)
Dept: FAMILY MEDICINE CLINIC | Facility: CLINIC | Age: 84
End: 2024-01-16

## 2024-01-16 ENCOUNTER — ANTICOAG VISIT (OUTPATIENT)
Dept: FAMILY MEDICINE CLINIC | Facility: CLINIC | Age: 84
End: 2024-01-16

## 2024-01-16 DIAGNOSIS — I10 PRIMARY HYPERTENSION: ICD-10-CM

## 2024-01-16 RX ORDER — METOPROLOL TARTRATE 50 MG/1
TABLET, FILM COATED ORAL
Qty: 180 TABLET | Refills: 3 | Status: SHIPPED | OUTPATIENT
Start: 2024-01-16

## 2024-01-16 NOTE — TELEPHONE ENCOUNTER
Called patient's spouse. Spoke to patient's spouse, and advised on results. Hold for 2 days and then 7 mg Monday Wednesday Friday and then 6 mg other days

## 2024-01-22 ENCOUNTER — TELEPHONE (OUTPATIENT)
Dept: FAMILY MEDICINE CLINIC | Facility: CLINIC | Age: 84
End: 2024-01-22

## 2024-01-22 NOTE — TELEPHONE ENCOUNTER
----- Message from Chantel Santos DO sent at 1/22/2024  1:13 PM EST -----  Warfarin level is good. Continue same dose and recheck in 1 week

## 2024-01-26 ENCOUNTER — NURSE TRIAGE (OUTPATIENT)
Age: 84
End: 2024-01-26

## 2024-01-26 NOTE — TELEPHONE ENCOUNTER
Nephrostomy tube questions.   On 01/12/2024, they saw IR at [facility], they could not get scope in due to a blockage from possible swelling or cancer, client was instructed to reach out to urology due to this.    Explained that I do not see notes from [facility] IR on 01/12/24  Client states they will call [phone], and request records be sent to urology, gave central fax.      Client has been getting fever, last night 102. Client refused ED.  Currently temperature is 98.8.  Trying to increase fluid intake. Reviewed bladder irritants to avoid, and to stay hydrated with at least 64 oz. Of water/day as long as no fluid restrictions. ED precautions reviewed as well.

## 2024-01-26 NOTE — TELEPHONE ENCOUNTER
Will need notes to provide appropriate clinical advice. Appears at prior visit discussion was had to have IR attempt internalization of right PCN. If they were unable to do this PCN should be maintained and patient should follow up as scheduled. Agree with ED for fevers.

## 2024-01-29 ENCOUNTER — ANTICOAG VISIT (OUTPATIENT)
Dept: FAMILY MEDICINE CLINIC | Facility: CLINIC | Age: 84
End: 2024-01-29
Payer: COMMERCIAL

## 2024-01-29 ENCOUNTER — ANTICOAG VISIT (OUTPATIENT)
Dept: FAMILY MEDICINE CLINIC | Facility: CLINIC | Age: 84
End: 2024-01-29

## 2024-01-29 ENCOUNTER — TELEPHONE (OUTPATIENT)
Dept: FAMILY MEDICINE CLINIC | Facility: CLINIC | Age: 84
End: 2024-01-29

## 2024-01-29 LAB
INR PPP: 4.1 (ref 0.84–1.19)
SL AMB POCT INR: 4.1

## 2024-01-29 PROCEDURE — 85610 PROTHROMBIN TIME: CPT

## 2024-01-29 NOTE — TELEPHONE ENCOUNTER
Called Pt. Spoke to patient's spouse, and advised on results.   Hold warfarin for 2 days and decrease to 6mg daily and recheck 1 week

## 2024-02-08 ENCOUNTER — TELEPHONE (OUTPATIENT)
Dept: FAMILY MEDICINE CLINIC | Facility: CLINIC | Age: 84
End: 2024-02-08

## 2024-02-08 ENCOUNTER — TELEPHONE (OUTPATIENT)
Dept: NEPHROLOGY | Facility: CLINIC | Age: 84
End: 2024-02-08

## 2024-02-08 LAB — SL AMB POCT INR: 2.2

## 2024-02-08 NOTE — TELEPHONE ENCOUNTER
Spouse said he completed his INR this Monday she said it was good this time   Patient's INR was 2.2 on Monday Feb 5th, 2024

## 2024-02-08 NOTE — TELEPHONE ENCOUNTER
Pt wife called and was asking what appointment was for, I advised PCP placed a referral for pt to see one of our providers for LELE, Pt wife stated pt has appointment to see Urology in April that they will follow with Urology and go from there, I advised pt wife that there are no available appointments for QO until June is released, she stated they will cxl today's appointment.

## 2024-02-12 ENCOUNTER — ANTICOAG VISIT (OUTPATIENT)
Dept: FAMILY MEDICINE CLINIC | Facility: CLINIC | Age: 84
End: 2024-02-12
Payer: COMMERCIAL

## 2024-02-12 PROCEDURE — 85610 PROTHROMBIN TIME: CPT

## 2024-02-13 ENCOUNTER — ANTICOAG VISIT (OUTPATIENT)
Dept: FAMILY MEDICINE CLINIC | Facility: CLINIC | Age: 84
End: 2024-02-13

## 2024-02-13 LAB — INR PPP: 2.5 (ref 0.84–1.19)

## 2024-02-14 ENCOUNTER — ANTICOAG VISIT (OUTPATIENT)
Dept: FAMILY MEDICINE CLINIC | Facility: CLINIC | Age: 84
End: 2024-02-14
Payer: COMMERCIAL

## 2024-02-14 ENCOUNTER — TELEPHONE (OUTPATIENT)
Dept: FAMILY MEDICINE CLINIC | Facility: CLINIC | Age: 84
End: 2024-02-14

## 2024-02-14 LAB — SL AMB POCT INR: 2.5

## 2024-02-14 PROCEDURE — 85610 PROTHROMBIN TIME: CPT

## 2024-02-14 NOTE — TELEPHONE ENCOUNTER
Called and Spoke to patient's spouse, and advised on results.   Your warfarin level is good. Continue the same dose and recheck in 1 week.

## 2024-02-22 DIAGNOSIS — F41.9 ANXIETY: ICD-10-CM

## 2024-02-23 RX ORDER — ALPRAZOLAM 0.5 MG/1
0.5 TABLET ORAL 3 TIMES DAILY PRN
Qty: 90 TABLET | Refills: 0 | Status: SHIPPED | OUTPATIENT
Start: 2024-02-23

## 2024-02-27 ENCOUNTER — ANTICOAG VISIT (OUTPATIENT)
Dept: FAMILY MEDICINE CLINIC | Facility: CLINIC | Age: 84
End: 2024-02-27
Payer: COMMERCIAL

## 2024-02-27 ENCOUNTER — TELEPHONE (OUTPATIENT)
Dept: FAMILY MEDICINE CLINIC | Facility: CLINIC | Age: 84
End: 2024-02-27

## 2024-02-27 ENCOUNTER — ANTICOAG VISIT (OUTPATIENT)
Dept: FAMILY MEDICINE CLINIC | Facility: CLINIC | Age: 84
End: 2024-02-27

## 2024-02-27 LAB
INR PPP: 2.6 (ref 0.84–1.19)
SL AMB POCT INR: 2.6

## 2024-02-27 PROCEDURE — 85610 PROTHROMBIN TIME: CPT

## 2024-02-27 NOTE — PROGRESS NOTES
Called and Spoke to patient's spouse, and advised on results.        Your warfarin level is good  Continue same dose and recheck in 1 week

## 2024-02-27 NOTE — TELEPHONE ENCOUNTER
----- Message from Chantel Santos DO sent at 2/27/2024 12:54 PM EST -----  Your warfarin level is good  Continue same dose and recheck in 1 week

## 2024-03-04 ENCOUNTER — TELEPHONE (OUTPATIENT)
Dept: FAMILY MEDICINE CLINIC | Facility: CLINIC | Age: 84
End: 2024-03-04

## 2024-03-04 ENCOUNTER — ANTICOAG VISIT (OUTPATIENT)
Dept: FAMILY MEDICINE CLINIC | Facility: CLINIC | Age: 84
End: 2024-03-04
Payer: COMMERCIAL

## 2024-03-04 LAB — SL AMB POCT INR: 2.8

## 2024-03-04 PROCEDURE — 85610 PROTHROMBIN TIME: CPT

## 2024-03-04 NOTE — TELEPHONE ENCOUNTER
Called and Spoke to patient's spouse CaballeroDaniel, and  advised on results. Continue same dose recheck 1 wk

## 2024-03-07 DIAGNOSIS — N13.39 OTHER HYDRONEPHROSIS: ICD-10-CM

## 2024-03-12 LAB — INR PPP: 3 (ref 0.84–1.19)

## 2024-03-13 ENCOUNTER — TELEPHONE (OUTPATIENT)
Dept: FAMILY MEDICINE CLINIC | Facility: CLINIC | Age: 84
End: 2024-03-13

## 2024-03-13 ENCOUNTER — ANTICOAG VISIT (OUTPATIENT)
Dept: FAMILY MEDICINE CLINIC | Facility: CLINIC | Age: 84
End: 2024-03-13

## 2024-03-13 NOTE — TELEPHONE ENCOUNTER
----- Message from Chantel Santos DO sent at 3/13/2024  1:09 AM EDT -----  Warfarin level is good  Continue same dose  Recheck in 1 week

## 2024-03-20 ENCOUNTER — OFFICE VISIT (OUTPATIENT)
Dept: FAMILY MEDICINE CLINIC | Facility: CLINIC | Age: 84
End: 2024-03-20
Payer: COMMERCIAL

## 2024-03-20 ENCOUNTER — ANTICOAG VISIT (OUTPATIENT)
Dept: FAMILY MEDICINE CLINIC | Facility: CLINIC | Age: 84
End: 2024-03-20

## 2024-03-20 VITALS
DIASTOLIC BLOOD PRESSURE: 76 MMHG | BODY MASS INDEX: 22.89 KG/M2 | HEIGHT: 72 IN | WEIGHT: 169 LBS | SYSTOLIC BLOOD PRESSURE: 124 MMHG | OXYGEN SATURATION: 90 % | TEMPERATURE: 97.2 F | HEART RATE: 68 BPM

## 2024-03-20 DIAGNOSIS — I10 ESSENTIAL HYPERTENSION: ICD-10-CM

## 2024-03-20 DIAGNOSIS — I48.0 PAROXYSMAL ATRIAL FIBRILLATION (HCC): ICD-10-CM

## 2024-03-20 DIAGNOSIS — N18.32 STAGE 3B CHRONIC KIDNEY DISEASE (HCC): ICD-10-CM

## 2024-03-20 DIAGNOSIS — C67.2 MALIGNANT NEOPLASM OF LATERAL WALL OF URINARY BLADDER (HCC): ICD-10-CM

## 2024-03-20 DIAGNOSIS — D68.9 COAGULATION DISORDER (HCC): ICD-10-CM

## 2024-03-20 DIAGNOSIS — H61.23 BILATERAL IMPACTED CERUMEN: Primary | ICD-10-CM

## 2024-03-20 LAB
INR PPP: 3.3 (ref 0.84–1.19)
INR PPP: 3.3 (ref 0.84–1.19)
SL AMB POCT INR: 3.3

## 2024-03-20 PROCEDURE — 69209 REMOVE IMPACTED EAR WAX UNI: CPT | Performed by: NURSE PRACTITIONER

## 2024-03-20 PROCEDURE — 85610 PROTHROMBIN TIME: CPT

## 2024-03-20 PROCEDURE — 99214 OFFICE O/P EST MOD 30 MIN: CPT | Performed by: NURSE PRACTITIONER

## 2024-03-21 ENCOUNTER — NURSE TRIAGE (OUTPATIENT)
Age: 84
End: 2024-03-21

## 2024-03-21 ENCOUNTER — TELEPHONE (OUTPATIENT)
Dept: FAMILY MEDICINE CLINIC | Facility: CLINIC | Age: 84
End: 2024-03-21

## 2024-03-21 NOTE — ASSESSMENT & PLAN NOTE
Lab Results   Component Value Date    EGFR 35 08/07/2023    EGFR 31 07/10/2023    EGFR 34 06/09/2023    CREATININE 1.76 (H) 08/07/2023    CREATININE 1.93 (H) 07/10/2023    CREATININE 1.78 (H) 06/09/2023   Continue to monitor- follows with nephrology

## 2024-03-21 NOTE — PROGRESS NOTES
Name: Francis Caballero      : 1940      MRN: [id number]  Encounter Provider: ANASTASIYA Masters  Encounter Date: 3/20/2024   Encounter department: [facility]    Assessment & Plan     1. Bilateral impacted cerumen  -     Ear cerumen removal    2. Malignant 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 (HCC)  Assessment & Plan:  Continues with urology and rad/onc        3. Paroxysmal atrial fibrillation (HCC)  Assessment & Plan:  Continue coumadin and metoprolol        4. Coagulation disorder (HCC)  Assessment & Plan:  Continues on coumadin  Recheck 1 week      5. Stage 3b chronic kidney disease (HCC)  Assessment & Plan:  Lab Results   Component Value Date    EGFR 35 2023    EGFR 31 07/10/2023    EGFR 34 2023    CREATININE 1.76 (H) 2023    CREATININE 1.93 (H) 07/10/2023    CREATININE 1.78 (H) 2023   Continue to monitor- follows with nephrology      6. Essential hypertension  Assessment & Plan:  BP stable             Subjective      Here today for ear cleaning, cannot hear well. No drainage from ears, no pain in ears        Review of Systems   Constitutional:  Negative for appetite change, fatigue and fever.   HENT:  Positive for hearing loss. Negative for ear discharge and ear pain.    Respiratory: Negative.     Cardiovascular: Negative.    Gastrointestinal: Negative.  Negative for abdominal pain, blood in stool, constipation, diarrhea, nausea and vomiting.   Genitourinary:  Negative for frequency and hematuria.   Musculoskeletal:  Positive for arthralgias and back pain.   Neurological:  Positive for weakness.   Psychiatric/Behavioral:  Positive for sleep disturbance. The patient is nervous/anxious.        Current Outpatient Medications on File Prior to Visit   Medication Sig   • ALPRAZolam (XANAX) 0.5 mg tablet TAKE 1 TABLET (0.5 MG TOTAL) BY MOUTH 3 (THREE) TIMES A DAY AS NEEDED FOR ANXIETY.   • BD PosiFlush 0.9 % SOLN 10 ML BY INTRACATHETER ROUTE DAILY   DOSES FOR INTRACATHETER FLUSHING DAILY.   • Cholecalciferol (VITAMIN D3 PO) Take by mouth   • gabapentin (NEURONTIN) 100 mg capsule TAKE 3 TABS DAILY IN AM   • Lancets MISC by Does not apply route   • metoprolol tartrate (LOPRESSOR) 50 mg tablet TAKE 1 TABLET BY MOUTH TWICE A DAY   • Multiple Vitamins-Minerals (PreserVision AREDS 2) CAPS    • rosuvastatin (CRESTOR) 5 mg tablet TAKE 1 TABLET BY MOUTH EVERY DAY   • sodium chloride, PF, 0.9 % 10 mL by Intracatheter route daily for 120 doses Intracatheter flushing daily. May substitute prefilled syringe with normal saline 10 mL vials, 10 mL syringes, and 18 g blunt needles   • warfarin (COUMADIN) 1 mg tablet Take 2 tabs po daily, combining with 5mg tab po daily   • warfarin (COUMADIN) 5 mg tablet TAKE 1 TABLET BY MOUTH EVERY DAY, COMBINE WITH 1MG TAB AS DIRECTED (Patient taking differently: TAKE 1 TABLET BY MOUTH EVERY DAY, combine with 1-2mg tab as directed (alternates between 6mg and 7mg QOD))       Objective     /76   Pulse 68   Temp (!) 97.2 °F (36.2 °C) (Tympanic)   Ht 6' (1.829 m)   Wt 76.7 kg (169 lb)   SpO2 90%   BMI 22.92 kg/m²     Physical Exam  Vitals reviewed.   Constitutional:       General: He is not in acute distress.     Appearance: He is normal weight.   HENT:      Head: Normocephalic.      Right Ear: External ear normal. There is impacted cerumen.      Left Ear: External ear normal. There is impacted cerumen.   Eyes:      Conjunctiva/sclera: Conjunctivae normal.      Pupils: Pupils are equal, round, and reactive to light.   Cardiovascular:      Rate and Rhythm: Normal rate and regular rhythm.   Pulmonary:      Effort: No respiratory distress.      Breath sounds: Normal breath sounds.   Skin:     Findings: No rash.   Neurological:      Mental Status: He is alert and oriented to person, place, and time.     Ear cerumen removal    Date/Time: 3/20/2024 2:00 PM    Performed by: ANASTASIYA Masters  Authorized by: Marlene Gaitan  ANASTASIYA  Universal Protocol:  Consent: Verbal consent obtained.  Risks and benefits: risks, benefits and alternatives were discussed  Consent given by: patient  Patient understanding: patient states understanding of the procedure being performed  Patient identity confirmed: verbally with patient    Patient location:  Clinic  Procedure details:     Local anesthetic:  None    Location:  L ear and R ear    Procedure type: irrigation only      Approach:  External    Equipment used:  Large syringe, peroxide, basin  Post-procedure details:     Complication:  None    Hearing quality:  Normal    Patient tolerance of procedure:  Tolerated well, no immediate complications      ANASTASIYA Masters

## 2024-03-21 NOTE — TELEPHONE ENCOUNTER
Patient  had IR exchange right neph tube on 3/8 at [place] - [facility] [place]

## 2024-03-21 NOTE — TELEPHONE ENCOUNTER
Appears PCN was just exchanged this month. Will be discussed and ordered at upcoming visit on 4/5/24

## 2024-03-21 NOTE — TELEPHONE ENCOUNTER
----- Message from Chantel Santos DO sent at 3/20/2024  7:52 PM EDT -----  His warfarin level is slightly high, hold warfarin for 2 days then restart same dose.   Recheck 1 week

## 2024-03-21 NOTE — TELEPHONE ENCOUNTER
[facility] called and needs a script for regular nephrostomy tube changes.     Nephrostomy tube change q 6-8 wks and as needed.     Fax# [phone]  Call if have questions- [phone]

## 2024-03-25 ENCOUNTER — TELEPHONE (OUTPATIENT)
Dept: UROLOGY | Facility: MEDICAL CENTER | Age: 84
End: 2024-03-25

## 2024-03-25 NOTE — TELEPHONE ENCOUNTER
Call received again from [facility].  They still have not received the fax to change Nephrostomy tube.      Nephrostomy tube change q 6-8 wks and as needed.      Fax# [phone]  Call if have questions- [phone]

## 2024-03-25 NOTE — TELEPHONE ENCOUNTER
Multiple encounters on patient. Orders were printed and faxed to [facility].

## 2024-03-26 ENCOUNTER — ANTICOAG VISIT (OUTPATIENT)
Dept: FAMILY MEDICINE CLINIC | Facility: CLINIC | Age: 84
End: 2024-03-26

## 2024-03-26 LAB — INR PPP: 1.6 (ref 0.84–1.19)

## 2024-03-26 NOTE — PROGRESS NOTES
1st Attempt    Left vm to call the office for INR results and to discuss. See Drea for INR results document and notes.

## 2024-03-27 ENCOUNTER — ANTICOAG VISIT (OUTPATIENT)
Dept: FAMILY MEDICINE CLINIC | Facility: CLINIC | Age: 84
End: 2024-03-27

## 2024-03-27 LAB — SL AMB POCT INR: 1.6

## 2024-03-28 ENCOUNTER — TELEPHONE (OUTPATIENT)
Age: 84
End: 2024-03-28

## 2024-03-28 NOTE — TELEPHONE ENCOUNTER
Patient is having follow up Cysto with Dr Carcamo on 4/5  for follow up bladder muscle invasive cancer - he had treatment for gemcitabine chemo treatment and radiation at [facility]. He has right hydronephrosis with nephrostomy tube place ment  7/23 and Cysto with Dr Mejía 12/15/23 - Please advise if any lab test needed prior

## 2024-03-28 NOTE — TELEPHONE ENCOUNTER
Pt's wife Daniel called.  Confirmed Cysto appt on 4/5/24.  Daniel requesting call to discuss is pt will need to have any lab work done before his Cysto appt.  Daniel sts ok to leave message if she does not answer.  Please review.    Pt call back-spouse: Daniel  [phone]

## 2024-03-29 DIAGNOSIS — I48.21 PERMANENT ATRIAL FIBRILLATION (HCC): ICD-10-CM

## 2024-03-29 DIAGNOSIS — Z79.01 ANTICOAGULATED: ICD-10-CM

## 2024-03-29 RX ORDER — WARFARIN SODIUM 5 MG/1
TABLET ORAL
Qty: 90 TABLET | Refills: 1 | Status: SHIPPED | OUTPATIENT
Start: 2024-03-29

## 2024-04-01 ENCOUNTER — ANTICOAG VISIT (OUTPATIENT)
Dept: FAMILY MEDICINE CLINIC | Facility: CLINIC | Age: 84
End: 2024-04-01
Payer: COMMERCIAL

## 2024-04-01 LAB — SL AMB POCT INR: 2.5

## 2024-04-01 PROCEDURE — 85610 PROTHROMBIN TIME: CPT | Performed by: FAMILY MEDICINE

## 2024-04-05 ENCOUNTER — PROCEDURE VISIT (OUTPATIENT)
Dept: UROLOGY | Facility: HOSPITAL | Age: 84
End: 2024-04-05
Payer: COMMERCIAL

## 2024-04-05 VITALS
HEART RATE: 38 BPM | SYSTOLIC BLOOD PRESSURE: 160 MMHG | HEIGHT: 72 IN | DIASTOLIC BLOOD PRESSURE: 80 MMHG | WEIGHT: 170 LBS | BODY MASS INDEX: 23.03 KG/M2 | OXYGEN SATURATION: 88 %

## 2024-04-05 DIAGNOSIS — C67.2 MALIGNANT NEOPLASM OF LATERAL WALL OF URINARY BLADDER (HCC): Primary | ICD-10-CM

## 2024-04-05 DIAGNOSIS — N13.39 OTHER HYDRONEPHROSIS: ICD-10-CM

## 2024-04-05 LAB
SL AMB  POCT GLUCOSE, UA: NORMAL
SL AMB LEUKOCYTE ESTERASE,UA: NORMAL
SL AMB POCT BILIRUBIN,UA: NORMAL
SL AMB POCT BLOOD,UA: NORMAL
SL AMB POCT CLARITY,UA: NORMAL
SL AMB POCT COLOR,UA: YELLOW
SL AMB POCT KETONES,UA: NORMAL
SL AMB POCT NITRITE,UA: NORMAL
SL AMB POCT PH,UA: 5
SL AMB POCT SPECIFIC GRAVITY,UA: 1.01
SL AMB POCT URINE PROTEIN: NORMAL
SL AMB POCT UROBILINOGEN: 0.2

## 2024-04-05 PROCEDURE — 99213 OFFICE O/P EST LOW 20 MIN: CPT | Performed by: UROLOGY

## 2024-04-05 PROCEDURE — 81002 URINALYSIS NONAUTO W/O SCOPE: CPT | Performed by: UROLOGY

## 2024-04-05 PROCEDURE — 52000 CYSTOURETHROSCOPY: CPT | Performed by: UROLOGY

## 2024-04-05 NOTE — PROGRESS NOTES
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    He underwent  chemotherapy and radiation therapy at [facility], finishing October 2023.    2.  Right hydronephrosis, right percutaneous nephrostomy placed at  [facility] July 2023, that was changed at [place] March 2024, they will manage that.     Cystoscopy     Date/Time  4/5/2024 10:30 AM     Performed by  Tha Carcamo MD   Authorized by  Tha Carcamo MD         Procedure Details:  Procedure type: cystoscopy    Patient tolerance: Patient tolerated the procedure well with no immediate complications    Additional Procedure Details:      Patient presents for cystoscopy.  I have discussed the reasons for doing the exam, and the potential risks and complications.  Patient expressed understanding, and signed informed consent document.    The patient was carefully  positioned supine on the examining table.  Sterile preparation was performed on the urethra.  Xylocaine jelly was instilled and left  Indwelling for the procedure.  The 15 [place] flexible cystoscope was passed with the following findings:      Urethra: No stricture    Prostate:  lateral lobes moderately large, some obstruction                  Bladder: Severely inflamed, bleeds easily with passage of the scope and irrigation.  Some sloughing mucosa but no obvious tumor     Residual urine: Minimal    Patient tolerated the procedure well and was escorted from the examining table.             ASSESSMENT / PLAN:    1.  Cytology taken today    2.  I will ask IR to do a full study of the ureter and see if that ureteral except an internal stent.    Will order a PET scan.  Cystoscopy 3 months.  May need bladder biopsy at some point    The following portions of the patient's history were reviewed and updated as appropriate: allergies, current medications, past family history, past medical history, past social history, past surgical  "history, and problem list.    Review of Systems      Objective:     Physical Exam      0   Lab Value Date/Time    PSA 1.5 05/22/2023 1055    PSA 1.6 04/27/2022 1300    PSA 1.2 08/29/2016 0000    PSA 0.9 05/22/2015 1300   ]  BUN   Date Value Ref Range Status   08/07/2023 27 (H) 5 - 25 mg/dL Final   05/22/2023 36 (H) 8 - 27 mg/dL Final     Creatinine   Date Value Ref Range Status   08/07/2023 1.76 (H) 0.60 - 1.30 mg/dL Final     Comment:     Standardized to IDMS reference method   08/29/2016 1.49 (H) 0.76 - 1.27 mg/dL Final     No components found for: \"CBC\"      Patient Active Problem List   Diagnosis    Anxiety    Arthralgia    Asymptomatic cholelithiasis    Paroxysmal atrial fibrillation (HCC)    Benign essential hypertension    Benign prostatic hyperplasia    Bilateral pseudophakia    Coronary arteriosclerosis    History of epilepsy    Gait instability    Hiatal hernia    Hyperlipidemia    Frequent falls    Weakness    Stage 3b chronic kidney disease (HCC)    Age-related cataract of right eye    Macular drusen    Posterior vitreous detachment of both eyes    Primary osteoarthritis of left hip    Osteoarthritis of lumbar spine    Essential hypertension    Chronic bilateral low back pain with right-sided sciatica    DDD (degenerative disc disease), lumbar    Sciatica, right side    Chronic cough    Gastroesophageal reflux disease without esophagitis    Cerebral artery occlusion    Labyrinthitis, unspecified ear    Pure hypercholesterolemia    Malignant 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 (HCC)    Other hydronephrosis    Coagulation disorder (HCC)        Diagnoses and all orders for this visit:    Malignant 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 (HCC)  -     POCT urine dip    Other orders  -     Cystoscopy           Patient ID: Francis Caballero is a 84 y.o. male.      Current Outpatient Medications:     ALPRAZolam (XANAX) 0.5 mg tablet, TAKE 1 TABLET (0.5 MG TOTAL) BY MOUTH 3 (THREE) TIMES A DAY AS NEEDED " FOR ANXIETY., Disp: 90 tablet, Rfl: 0    BD PosiFlush 0.9 % SOLN, 10 ML BY INTRACATHETER ROUTE DAILY  DOSES FOR INTRACATHETER FLUSHING DAILY., Disp: 300 mL, Rfl: 3    Cholecalciferol (VITAMIN D3 PO), Take by mouth, Disp: , Rfl:     gabapentin (NEURONTIN) 100 mg capsule, TAKE 3 TABS DAILY IN AM, Disp: 270 capsule, Rfl: 3    Lancets MISC, by Does not apply route, Disp: , Rfl:     metoprolol tartrate (LOPRESSOR) 50 mg tablet, TAKE 1 TABLET BY MOUTH TWICE A DAY, Disp: 180 tablet, Rfl: 3    Multiple Vitamins-Minerals (PreserVision AREDS 2) CAPS, , Disp: , Rfl:     rosuvastatin (CRESTOR) 5 mg tablet, TAKE 1 TABLET BY MOUTH EVERY DAY, Disp: 90 tablet, Rfl: 3    sodium chloride, PF, 0.9 %, 10 mL by Intracatheter route daily for 120 doses Intracatheter flushing daily. May substitute prefilled syringe with normal saline 10 mL vials, 10 mL syringes, and 18 g blunt needles, Disp: 300 mL, Rfl: 3    warfarin (COUMADIN) 1 mg tablet, Take 2 tabs po daily, combining with 5mg tab po daily, Disp: 180 tablet, Rfl: 1    warfarin (COUMADIN) 5 mg tablet, TAKE 1 TABLET BY MOUTH EVERY DAY, combine with 1-2mg tab as directed (alternates between 6mg and 7mg QOD), Disp: 90 tablet, Rfl: 1    Past Medical History:   Diagnosis Date    Ambulates with cane     Anemia     last assessed: 05/23/2015    GERD (gastroesophageal reflux disease)     Hematuria     Hypertension     Intracerebral hemorrhage (HCC)     last assessed: 04/21/2014; right    Intracerebral hemorrhage (HCC)     last assessed: 11/23/2017    Mobility impaired     Stroke syndrome     last assessed: 06/13/2013    UTI (urinary tract infection)        Past Surgical History:   Procedure Laterality Date    BLADDER SURGERY      IR NEPHROSTOMY TUBE PLACEMENT  7/27/2023    TX CYSTOURETHROSCOPY W/DEST &/RMVL TUMOR LARGE N/A 7/26/2023    Procedure: (TURBT);  Surgeon: Tha Carcamo MD;  Location: [facility];  Service: Urology       Social History

## 2024-04-05 NOTE — PATIENT INSTRUCTIONS
When you go to interventional radiology at [place], show them this note.  Please do a full antegrade study of the ureter and see if we can convert from an external percutaneous to an internal stent.  If possible, I would prefer an internal stent, and not a nephroureteral.  I would then change the stent at intervals through cystoscopy in the OR.

## 2024-04-08 DIAGNOSIS — Z79.01 ANTICOAGULATED: ICD-10-CM

## 2024-04-08 LAB
CYTOLOGIST CVX/VAG CYTO: NORMAL
DX ICD CODE: NORMAL
PATH REPORT.FINAL DX SPEC: NORMAL
PATH REPORT.GROSS SPEC: NORMAL
PATH REPORT.SITE OF ORIGIN SPEC: NORMAL
PATHOLOGIST NAME: NORMAL

## 2024-04-08 RX ORDER — WARFARIN SODIUM 1 MG/1
TABLET ORAL
Qty: 180 TABLET | Refills: 1 | Status: SHIPPED | OUTPATIENT
Start: 2024-04-08

## 2024-04-09 ENCOUNTER — TELEPHONE (OUTPATIENT)
Dept: FAMILY MEDICINE CLINIC | Facility: CLINIC | Age: 84
End: 2024-04-09

## 2024-04-09 ENCOUNTER — ANTICOAG VISIT (OUTPATIENT)
Dept: FAMILY MEDICINE CLINIC | Facility: CLINIC | Age: 84
End: 2024-04-09

## 2024-04-09 ENCOUNTER — ANTICOAG VISIT (OUTPATIENT)
Dept: FAMILY MEDICINE CLINIC | Facility: CLINIC | Age: 84
End: 2024-04-09
Payer: COMMERCIAL

## 2024-04-09 LAB
INR PPP: 2.2 (ref 0.84–1.19)
SL AMB POCT INR: 2.2

## 2024-04-09 PROCEDURE — 85610 PROTHROMBIN TIME: CPT | Performed by: FAMILY MEDICINE

## 2024-04-09 NOTE — TELEPHONE ENCOUNTER
----- Message from Chantel Santos DO sent at 4/9/2024  2:23 PM EDT -----  Regarding: FW: warfarin  Warfarin level is good. Continue same dose recheck 1 week

## 2024-04-15 ENCOUNTER — ANTICOAG VISIT (OUTPATIENT)
Dept: FAMILY MEDICINE CLINIC | Facility: CLINIC | Age: 84
End: 2024-04-15

## 2024-04-15 LAB — INR PPP: 1.8 (ref 0.84–1.19)

## 2024-04-16 ENCOUNTER — TELEPHONE (OUTPATIENT)
Dept: FAMILY MEDICINE CLINIC | Facility: CLINIC | Age: 84
End: 2024-04-16

## 2024-04-16 NOTE — TELEPHONE ENCOUNTER
----- Message from Chantel Santos DO sent at 4/15/2024  6:05 PM EDT -----  Increase warfarin to 7mg Monday, Wednesday and Friday.  Take 6mg all other days and recheck on Friday    Spoke with patient. She is aware of this US result and recommendation for f/u appt this fall with the doctor for a possible biopsy. She knows the doctor will discuss further with the doctor. Will have PSR contact her to get scheduled.  She states her understanding. No further questions.

## 2024-04-17 DIAGNOSIS — F41.9 ANXIETY: ICD-10-CM

## 2024-04-18 RX ORDER — ALPRAZOLAM 0.5 MG/1
0.5 TABLET ORAL 3 TIMES DAILY PRN
Qty: 90 TABLET | Refills: 0 | Status: SHIPPED | OUTPATIENT
Start: 2024-04-18

## 2024-04-22 ENCOUNTER — TELEPHONE (OUTPATIENT)
Dept: FAMILY MEDICINE CLINIC | Facility: CLINIC | Age: 84
End: 2024-04-22

## 2024-04-22 ENCOUNTER — HOSPITAL ENCOUNTER (OUTPATIENT)
Dept: RADIOLOGY | Age: 84
Discharge: HOME | End: 2024-04-22
Payer: COMMERCIAL

## 2024-04-22 DIAGNOSIS — C67.2 MALIGNANT NEOPLASM OF LATERAL WALL OF URINARY BLADDER (HCC): ICD-10-CM

## 2024-04-22 LAB — GLUCOSE SERPL-MCNC: 82 MG/DL (ref 65–140)

## 2024-04-22 PROCEDURE — 78815 PET IMAGE W/CT SKULL-THIGH: CPT

## 2024-04-22 PROCEDURE — 82948 REAGENT STRIP/BLOOD GLUCOSE: CPT

## 2024-04-22 PROCEDURE — A9552 F18 FDG: HCPCS

## 2024-04-22 NOTE — TELEPHONE ENCOUNTER
----- Message from ANASTASIYA Masters sent at 4/22/2024  2:27 PM EDT -----  INR stable 2.0 - Continue current dose of coumadin, repeat in 1 week

## 2024-04-23 ENCOUNTER — TELEPHONE (OUTPATIENT)
Age: 84
End: 2024-04-23

## 2024-04-23 ENCOUNTER — TELEPHONE (OUTPATIENT)
Dept: UROLOGY | Facility: MEDICAL CENTER | Age: 84
End: 2024-04-23

## 2024-04-23 NOTE — TELEPHONE ENCOUNTER
Telephone discussion with patient's wife regarding the recent PET scan.    See patient's records regarding invasive bladder cancer, TUR bladder cancer done by us.  Then he underwent chemo and radiation done at [facility].  Indwelling right percutaneous nephrostomy because of severe obstruction at the bladder by the cancer.    The PET scan does not show any definite evidence of cancer elsewhere in the body.  There is thickening of the bladder wall in the region where the tumor was resected and also inflammatory or dilation changes of the ureter.    I spoke with the wife and said if we were to pursue this, it would require general anesthesia, taking deeper samples of bladder wall, trying to access that lower ureter.    Wife will discuss with patient, but her initial thought is that he does not want any more surgery and would like to put this off.    Another option is to repeat the imaging in several months and wife will talk with the patient about that.

## 2024-04-23 NOTE — TELEPHONE ENCOUNTER
Pts wife called, sts that pt completed PET CT and seen results in Mychart.  Wife is requesting call back to discuss results.    Pt  call back: [phone]

## 2024-04-26 ENCOUNTER — ANTICOAG VISIT (OUTPATIENT)
Dept: FAMILY MEDICINE CLINIC | Facility: CLINIC | Age: 84
End: 2024-04-26

## 2024-04-26 NOTE — PROGRESS NOTES
---- Message from ANASTASIYA Masters sent at 4/22/2024  2:27 PM EDT -----  INR stable 2.0 - Continue current dose of coumadin, repeat in 1 week

## 2024-04-30 ENCOUNTER — ANTICOAG VISIT (OUTPATIENT)
Dept: FAMILY MEDICINE CLINIC | Facility: CLINIC | Age: 84
End: 2024-04-30

## 2024-04-30 ENCOUNTER — TELEPHONE (OUTPATIENT)
Dept: FAMILY MEDICINE CLINIC | Facility: CLINIC | Age: 84
End: 2024-04-30

## 2024-04-30 NOTE — TELEPHONE ENCOUNTER
----- Message from ANASTASIYA Masters sent at 4/29/2024  5:13 PM EDT -----  Stable, continue current dosing regimen, repeat in 1 year

## 2024-04-30 NOTE — PROGRESS NOTES
I call pt and talk to wife and she is advised     Continue same dose and recheck 1 week   CM     Due 05/07/2024

## 2024-05-01 NOTE — TELEPHONE ENCOUNTER
Patients spouse, Daniel calling requesting we fax last OV notes to [facility] for patients upcoming appt on Friday.    Faxed 4/5/24 OV notes per request to [facility] Fax # [phone]

## 2024-05-08 ENCOUNTER — TELEPHONE (OUTPATIENT)
Dept: FAMILY MEDICINE CLINIC | Facility: CLINIC | Age: 84
End: 2024-05-08

## 2024-05-08 NOTE — TELEPHONE ENCOUNTER
----- Message from Chantel Santos DO sent at 5/7/2024 10:47 PM EDT -----  Continue same dose of warfarin and recheck in 1 week

## 2024-05-13 ENCOUNTER — TELEPHONE (OUTPATIENT)
Age: 84
End: 2024-05-13

## 2024-05-13 NOTE — TELEPHONE ENCOUNTER
Wife calling in regarding INR results. States that results today were 7.3. she needs to know what dosage to give tonight. Called over to office. And they advised to sent directly to PCP. They state they will call her before the end of the day. Wife verbalized understanding.

## 2024-05-16 ENCOUNTER — TELEPHONE (OUTPATIENT)
Dept: FAMILY MEDICINE CLINIC | Facility: CLINIC | Age: 84
End: 2024-05-16

## 2024-05-17 ENCOUNTER — ANTICOAG VISIT (OUTPATIENT)
Dept: FAMILY MEDICINE CLINIC | Facility: CLINIC | Age: 84
End: 2024-05-17

## 2024-05-17 ENCOUNTER — TELEPHONE (OUTPATIENT)
Dept: FAMILY MEDICINE CLINIC | Facility: CLINIC | Age: 84
End: 2024-05-17

## 2024-05-17 LAB — INR PPP: 2.1 (ref 0.84–1.19)

## 2024-05-17 NOTE — TELEPHONE ENCOUNTER
----- Message from Chantel Santos DO sent at 5/17/2024  1:13 PM EDT -----  Warfarin back to normal level  Restart dosing 7mg Monday, Wednesday Friday and 6mg other days  Recheck 5/23

## 2024-05-20 ENCOUNTER — ANTICOAG VISIT (OUTPATIENT)
Dept: FAMILY MEDICINE CLINIC | Facility: CLINIC | Age: 84
End: 2024-05-20

## 2024-05-20 LAB — INR PPP: 2.6 (ref 0.84–1.19)

## 2024-05-21 ENCOUNTER — TELEPHONE (OUTPATIENT)
Dept: FAMILY MEDICINE CLINIC | Facility: CLINIC | Age: 84
End: 2024-05-21

## 2024-05-21 NOTE — TELEPHONE ENCOUNTER
----- Message from Chantel Santos DO sent at 5/20/2024  5:32 PM EDT -----  Warfarin level is good. Continue same dose  7mg Monday, Wednesday and Friday  6mg all other days  Recheck 5/28 next week, Tuesday instead of Monday since we are out of the office for the holiday

## 2024-05-28 ENCOUNTER — TELEPHONE (OUTPATIENT)
Dept: FAMILY MEDICINE CLINIC | Facility: CLINIC | Age: 84
End: 2024-05-28

## 2024-05-29 ENCOUNTER — ANTICOAG VISIT (OUTPATIENT)
Dept: FAMILY MEDICINE CLINIC | Facility: CLINIC | Age: 84
End: 2024-05-29

## 2024-05-29 LAB — INR PPP: 4 (ref 0.84–1.19)

## 2024-06-03 ENCOUNTER — ANTICOAG VISIT (OUTPATIENT)
Dept: FAMILY MEDICINE CLINIC | Facility: CLINIC | Age: 84
End: 2024-06-03

## 2024-06-03 ENCOUNTER — TELEPHONE (OUTPATIENT)
Dept: FAMILY MEDICINE CLINIC | Facility: CLINIC | Age: 84
End: 2024-06-03

## 2024-06-03 LAB
INR PPP: 4.4 (ref 0.84–1.19)
SL AMB POCT INR: 4.4

## 2024-06-03 NOTE — TELEPHONE ENCOUNTER
I CALL PT AND LEFT A MESSAGE TO CALL OFFICE FOR HIS INR RESULTS         HOLD FOR 2 DAYS AND INCREASE 6MG DAILY   RECHECK IN I WEEK

## 2024-06-07 ENCOUNTER — OFFICE VISIT (OUTPATIENT)
Dept: FAMILY MEDICINE CLINIC | Facility: CLINIC | Age: 84
End: 2024-06-07
Payer: COMMERCIAL

## 2024-06-07 VITALS
HEIGHT: 72 IN | SYSTOLIC BLOOD PRESSURE: 134 MMHG | OXYGEN SATURATION: 100 % | WEIGHT: 170 LBS | TEMPERATURE: 97.2 F | HEART RATE: 59 BPM | BODY MASS INDEX: 23.03 KG/M2 | DIASTOLIC BLOOD PRESSURE: 83 MMHG

## 2024-06-07 DIAGNOSIS — G40.919 INTRACTABLE EPILEPSY WITHOUT STATUS EPILEPTICUS, UNSPECIFIED EPILEPSY TYPE (HCC): ICD-10-CM

## 2024-06-07 DIAGNOSIS — Z00.00 MEDICARE ANNUAL WELLNESS VISIT, SUBSEQUENT: Primary | ICD-10-CM

## 2024-06-07 DIAGNOSIS — I10 ESSENTIAL HYPERTENSION: ICD-10-CM

## 2024-06-07 DIAGNOSIS — F41.9 ANXIETY: ICD-10-CM

## 2024-06-07 PROCEDURE — G0439 PPPS, SUBSEQ VISIT: HCPCS | Performed by: FAMILY MEDICINE

## 2024-06-07 RX ORDER — ALPRAZOLAM 0.5 MG/1
0.5 TABLET ORAL 3 TIMES DAILY PRN
Qty: 90 TABLET | Refills: 0 | Status: SHIPPED | OUTPATIENT
Start: 2024-06-07

## 2024-06-07 NOTE — PROGRESS NOTES
Ambulatory Visit  Name: Francis Caballero      : 1940      MRN: [id number]  Encounter Provider: Chantel Santos DO  Encounter Date: 2024   Encounter department: [facility]    Assessment & Plan   1. Medicare annual wellness visit, subsequent  2. Essential hypertension  3. Anxiety  -     ALPRAZolam (XANAX) 0.5 mg tablet; Take 1 tablet (0.5 mg total) by mouth 3 (three) times a day as needed for anxiety  4. Intractable epilepsy without status epilepticus, unspecified epilepsy type (HCC)       Preventive health issues were discussed with patient, and age appropriate screening tests were ordered as noted in patient's After Visit Summary. Personalized health advice and appropriate referrals for health education or preventive services given if needed, as noted in patient's After Visit Summary.    History of Present Illness     Pt presents for MAW visit today. Denies acute complaints or concerns.     Requests medication refills.       Patient Care Team:  Chantel Santos DO as PCP - General  DO Asiya Peacock DO (Nephrology)    Review of Systems   Constitutional:  Negative for chills and fever.   HENT:  Negative for ear pain and sore throat.    Eyes:  Negative for pain and visual disturbance.   Respiratory:  Negative for cough and shortness of breath.    Cardiovascular:  Negative for chest pain and palpitations.   Gastrointestinal:  Negative for abdominal pain, constipation and vomiting.   Genitourinary:  Negative for dysuria and hematuria.   Musculoskeletal:  Negative for arthralgias and back pain.   Skin:  Negative for color change and rash.   Neurological:  Negative for seizures and syncope.   All other systems reviewed and are negative.    Medical History Reviewed by provider this encounter:  Tobacco  Allergies  Meds  Problems  Med Hx  Surg Hx  Fam Hx       Annual Wellness Visit Questionnaire   Francis is here for his Subsequent Wellness visit. Last Medicare Wellness visit  information reviewed, patient interviewed and updates made to the record today.      Health Risk Assessment:   Patient rates overall health as fair. Patient feels that their physical health rating is slightly worse. Patient is satisfied with their life. Eyesight was rated as slightly worse. Hearing was rated as slightly worse. Patient feels that their emotional and mental health rating is same. Patients states they are never, rarely angry. Patient states they are often unusually tired/fatigued. Pain experienced in the last 7 days has been none. Patient states that he has experienced weight loss or gain in last 6 months.     Depression Screening:   PHQ-2 Score: 0      Fall Risk Screening:   In the past year, patient has experienced: no history of falling in past year      Home Safety:  Patient does not have trouble with stairs inside or outside of their home. Patient has working smoke alarms and has working carbon monoxide detector. Home safety hazards include: none.     Nutrition:   Current diet is Regular.     Medications:   Patient is currently taking over-the-counter supplements. OTC medications include: see medication list. Patient is able to manage medications.     Activities of Daily Living (ADLs)/Instrumental Activities of Daily Living (IADLs):   Walk and transfer into and out of bed and chair?: Yes  Dress and groom yourself?: Yes    Bathe or shower yourself?: Yes    Feed yourself? Yes  Do your laundry/housekeeping?: Yes  Manage your money, pay your bills and track your expenses?: Yes  Make your own meals?: Yes    Do your own shopping?: Yes    ADL comments: my wife does most everything, but if I had to I could    Durable Medical Equipment Suppliers  cane    Previous Hospitalizations:   Any hospitalizations or ED visits within the last 12 months?: Yes    How many hospitalizations have you had in the last year?: 1-2    Advance Care Planning:   Living will: No    Durable POA for healthcare: No    Advanced  directive: No      PREVENTIVE SCREENINGS      Cardiovascular Screening:    General: Screening Not Indicated and History Lipid Disorder      Diabetes Screening:     General: Screening Current      Prostate Cancer Screening:    General: Screening Not Indicated      Abdominal Aortic Aneurysm (AAA) Screening:    Risk factors include: tobacco use        Lung Cancer Screening:     General: Screening Not Indicated    Screening, Brief Intervention, and Referral to Treatment (SBIRT)    Screening  Typical number of drinks in a day: 1  Typical number of drinks in a week: 7  Interpretation: Low risk drinking behavior.    AUDIT-C Screenin) How often did you have a drink containing alcohol in the past year? 4 or more times a week  2) How many drinks did you have on a typical day when you were drinking in the past year? 1 to 2  3) How often did you have 6 or more drinks on one occasion in the past year? never    AUDIT-C Score: 4  Interpretation: Score 4-12 (male): POSITIVE screen for alcohol misuse    AUDIT Screenin) How often during the last year have you found that you were not able to stop drinking once you had started? 0 - never  5) How often during the last year have you failed to do what was normally expected from you because of drinking? 0 - never  6) How often during the last year have you needed a first drink in the morning to get yourself going after a heavy drinking session? 0 - never  7) How often during the last year have you had a feeling of guilt or remorse after drinking? 0 - never  8) How often during the last year have you been unable to remember what happened the night before because you had been drinking? 0 - never  9) Have you or someone else been injured as a result of your drinking? 0 - no  10) Has a relative or friend or a doctor or another health worker been concerned about your drinking or suggested you cut down? 0 - no    AUDIT Score: 4  Interpretation: Low risk alcohol consumption    Single  Item Drug Screening:  How often have you used an illegal drug (including marijuana) or a prescription medication for non-medical reasons in the past year? never    Single Item Drug Screen Score: 0  Interpretation: Negative screen for possible drug use disorder    SDOH Risk Assessment  Social determinants of health (SDOH) risk assesment tool was completed. The tool at a minimum covered housing stability, food insecurity, transportation needs, and utility difficulty. Patient had at risk responses for the following SDOH domains: food insecurity.     Social Determinants of Health     Financial Resource Strain: Low Risk  (5/1/2023)    Overall Financial Resource Strain (CARDIA)     Difficulty of Paying Living Expenses: Not hard at all   Food Insecurity: No Food Insecurity (6/7/2024)    Hunger Vital Sign     Worried About Running Out of Food in the Last Year: Never true     Ran Out of Food in the Last Year: Never true   Transportation Needs: No Transportation Needs (6/7/2024)    PRAPARE - Transportation     Lack of Transportation (Medical): No     Lack of Transportation (Non-Medical): No   Housing Stability: Low Risk  (6/7/2024)    Housing Stability Vital Sign     Unable to Pay for Housing in the Last Year: No     Number of Times Moved in the Last Year: 0     Homeless in the Last Year: No   Utilities: Not At Risk (6/7/2024)    [facility] Utilities     Threatened with loss of utilities: No     No results found.    Objective     /83   Pulse 59   Temp (!) 97.2 °F (36.2 °C) (Tympanic)   Ht 6' (1.829 m)   Wt 77.1 kg (170 lb)   SpO2 100%   BMI 23.06 kg/m²     Physical Exam  Vitals and nursing note reviewed.   Constitutional:       General: He is not in acute distress.     Appearance: Normal appearance. He is well-developed and normal weight. He is not ill-appearing or toxic-appearing.   HENT:      Head: Normocephalic and atraumatic.      Right Ear: Ear canal and external ear normal. There is no impacted cerumen.      Left  Ear: Ear canal and external ear normal. There is no impacted cerumen.      Nose: Nose normal.      Mouth/Throat:      Mouth: Mucous membranes are moist.      Pharynx: Oropharynx is clear.   Eyes:      Extraocular Movements: Extraocular movements intact.      Conjunctiva/sclera: Conjunctivae normal.      Pupils: Pupils are equal, round, and reactive to light.   Cardiovascular:      Rate and Rhythm: Normal rate and regular rhythm.      Heart sounds: No murmur heard.  Pulmonary:      Effort: Pulmonary effort is normal. No respiratory distress.      Breath sounds: Normal breath sounds.   Musculoskeletal:         General: No swelling. Normal range of motion.      Cervical back: Neck supple. No rigidity or tenderness.   Lymphadenopathy:      Cervical: No cervical adenopathy.   Skin:     General: Skin is warm and dry.      Capillary Refill: Capillary refill takes less than 2 seconds.      Findings: No rash.   Neurological:      General: No focal deficit present.      Mental Status: He is alert and oriented to person, place, and time.   Psychiatric:         Mood and Affect: Mood normal.         Behavior: Behavior normal.       Administrative Statements

## 2024-06-07 NOTE — PATIENT INSTRUCTIONS
Medicare Preventive Visit Patient Instructions  Thank you for completing your Welcome to Medicare Visit or Medicare Annual Wellness Visit today. Your next wellness visit will be due in one year (6/8/2025).  The screening/preventive services that you may require over the next 5-10 years are detailed below. Some tests may not apply to you based off risk factors and/or age. Screening tests ordered at today's visit but not completed yet may show as past due. Also, please note that scanned in results may not display below.  Preventive Screenings:  Service Recommendations Previous Testing/Comments   Colorectal Cancer Screening  Colonoscopy    Fecal Occult Blood Test (FOBT)/Fecal Immunochemical Test (FIT)  Fecal DNA/Cologuard Test  Flexible Sigmoidoscopy Age: 45-75 years old   Colonoscopy: every 10 years (May be performed more frequently if at higher risk)  OR  FOBT/FIT: every 1 year  OR  Cologuard: every 3 years  OR  Sigmoidoscopy: every 5 years  Screening may be recommended earlier than age 45 if at higher risk for colorectal cancer. Also, an individualized decision between you and your healthcare provider will decide whether screening between the ages of 76-85 would be appropriate. Colonoscopy: Not on file  FOBT/FIT: Not on file  Cologuard: Not on file  Sigmoidoscopy: Not on file          Prostate Cancer Screening Individualized decision between patient and health care provider in men between ages of 55-69   Medicare will cover every 12 months beginning on the day after your 50th birthday PSA: 1.5 ng/mL     Screening Not Indicated     Hepatitis C Screening Once for adults born between 1945 and 1965  More frequently in patients at high risk for Hepatitis C Hep C Antibody: Not on file        Diabetes Screening 1-2 times per year if you're at risk for diabetes or have pre-diabetes Fasting glucose: 96 mg/dL (8/7/2023)  A1C: No results in last 5 years (No results in last 5 years)  Screening Current   Cholesterol Screening  Once every 5 years if you don't have a lipid disorder. May order more often based on risk factors. Lipid panel: 05/22/2023  Screening Not Indicated  History Lipid Disorder      Other Preventive Screenings Covered by Medicare:  Abdominal Aortic Aneurysm (AAA) Screening: covered once if your at risk. You're considered to be at risk if you have a family history of AAA or a male between the age of 65-75 who smoking at least 100 cigarettes in your lifetime.  Lung Cancer Screening: covers low dose CT scan once per year if you meet all of the following conditions: (1) Age 55-77; (2) No signs or symptoms of lung cancer; (3) Current smoker or have quit smoking within the last 15 years; (4) You have a tobacco smoking history of at least 20 pack years (packs per day x number of years you smoked); (5) You get a written order from a healthcare provider.  Glaucoma Screening: covered annually if you're considered high risk: (1) You have diabetes OR (2) Family history of glaucoma OR (3)  aged 50 and older OR (4)  American aged 65 and older  Osteoporosis Screening: covered every 2 years if you meet one of the following conditions: (1) Have a vertebral abnormality; (2) On glucocorticoid therapy for more than 3 months; (3) Have primary hyperparathyroidism; (4) On osteoporosis medications and need to assess response to drug therapy.  HIV Screening: covered annually if you're between the age of 15-65. Also covered annually if you are younger than 15 and older than 65 with risk factors for HIV infection. For pregnant patients, it is covered up to 3 times per pregnancy.    Immunizations:  Immunization Recommendations   Influenza Vaccine Annual influenza vaccination during flu season is recommended for all persons aged >= 6 months who do not have contraindications   Pneumococcal Vaccine   * Pneumococcal conjugate vaccine = PCV13 (Prevnar 13), PCV15 (Vaxneuvance), PCV20 (Prevnar 20)  * Pneumococcal polysaccharide  vaccine = PPSV23 (Pneumovax) Adults 19-65 yo with certain risk factors or if 65+ yo  If never received any pneumonia vaccine: recommend Prevnar 20 (PCV20)  Give PCV20 if previously received 1 dose of PCV13 or PPSV23   Hepatitis B Vaccine 3 dose series if at intermediate or high risk (ex: diabetes, end stage renal disease, liver disease)   Respiratory syncytial virus (RSV) Vaccine - COVERED BY MEDICARE PART D  * RSVPreF3 (Arexvy) CDC recommends that adults 60 years of age and older may receive a single dose of RSV vaccine using shared clinical decision-making (SCDM)   Tetanus (Td) Vaccine - COST NOT COVERED BY MEDICARE PART B Following completion of primary series, a booster dose should be given every 10 years to maintain immunity against tetanus. Td may also be given as tetanus wound prophylaxis.   Tdap Vaccine - COST NOT COVERED BY MEDICARE PART B Recommended at least once for all adults. For pregnant patients, recommended with each pregnancy.   Shingles Vaccine (Shingrix) - COST NOT COVERED BY MEDICARE PART B  2 shot series recommended in those 19 years and older who have or will have weakened immune systems or those 50 years and older     Health Maintenance Due:  There are no preventive care reminders to display for this patient.  Immunizations Due:  There are no preventive care reminders to display for this patient.  Advance Directives   What are advance directives?  Advance directives are legal documents that state your wishes and plans for medical care. These plans are made ahead of time in case you lose your ability to make decisions for yourself. Advance directives can apply to any medical decision, such as the treatments you want, and if you want to donate organs.   What are the types of advance directives?  There are many types of advance directives, and each state has rules about how to use them. You may choose a combination of any of the following:  Living will:  This is a written record of the treatment  you want. You can also choose which treatments you do not want, which to limit, and which to stop at a certain time. This includes surgery, medicine, IV fluid, and tube feedings.   Durable power of  for healthcare (DPAHC):  This is a written record that states who you want to make healthcare choices for you when you are unable to make them for yourself. This person, called a proxy, is usually a family member or a friend. You may choose more than 1 proxy.  Do not resuscitate (DNR) order:  A DNR order is used in case your heart stops beating or you stop breathing. It is a request not to have certain forms of treatment, such as CPR. A DNR order may be included in other types of advance directives.  Medical directive:  This covers the care that you want if you are in a coma, near death, or unable to make decisions for yourself. You can list the treatments you want for each condition. Treatment may include pain medicine, surgery, blood transfusions, dialysis, IV or tube feedings, and a ventilator (breathing machine).  Values history:  This document has questions about your views, beliefs, and how you feel and think about life. This information can help others choose the care that you would choose.  Why are advance directives important?  An advance directive helps you control your care. Although spoken wishes may be used, it is better to have your wishes written down. Spoken wishes can be misunderstood, or not followed. Treatments may be given even if you do not want them. An advance directive may make it easier for your family to make difficult choices about your care.   Cigarette Smoking and Your Health   Risks to your health if you smoke:  Nicotine and other chemicals found in tobacco damage every cell in your body. Even if you are a light smoker, you have an increased risk for cancer, heart disease, and lung disease. If you are pregnant or have diabetes, smoking increases your risk for complications.  "  Benefits to your health if you stop smoking:   You decrease respiratory symptoms such as coughing, wheezing, and shortness of breath.   You reduce your risk for cancers of the lung, mouth, throat, kidney, bladder, pancreas, stomach, and cervix. If you already have cancer, you increase the benefits of chemotherapy. You also reduce your risk for cancer returning or a second cancer from developing.   You reduce your risk for heart disease, blood clots, heart attack, and stroke.   You reduce your risk for lung infections, and diseases such as pneumonia, asthma, chronic bronchitis, and emphysema.  Your circulation improves. More oxygen can be delivered to your body. If you have diabetes, you lower your risk for complications, such as kidney, artery, and eye diseases. You also lower your risk for nerve damage. Nerve damage can lead to amputations, poor vision, and blindness.  You improve your body's ability to heal and to fight infections.  For more information and support to stop smoking:   [employer]  Phone: 3- 246 - 146-6926  Web Address: www.Open Me  Alcohol Use and Your Health    Drinking too much can harm your health.  Excessive alcohol use leads to about 88,000 death in the [place] each year, and shortens the life of those who diet by almost 30 years.  Further, excessive drinking cost the economy $249 billion in 2010.  Most excessive drinkers are not alcohol dependent.    Excessive alcohol use has immediate effects that increase the risk of many harmful health conditions.  These are most often the result of binge drinking.  Over time, excessive alcohol use can lead to the development of chronic diseases and other series health problems.    What is considered a \"drink\"?        Excessive alcohol use includes:  Binge Drinking: For women, 4 or more drinks consumed on one occasion. For men, 5 or more drinks consumed on one occasion.  Heavy Drinking: For women, 8 or more drinks per week. For men, 15 or " more drinks per week  Any alcohol used by pregnant women  Any alcohol used by those under the age of 21 years    If you choose to drink, do so in moderation:  Do not drink at all if you are under the age of 21, or if you are or may be pregnant, or have health problems that could be made worse by drinking.  For women, up to 1 drink per day  For men, up to 2 drinks a day    No one should begin drinking or drink more frequently based on potential health benefits    Short-Term Health Risks:  Injuries: motor vehicle crashes, falls, drownings, burns  Violence: homicide, suicide, sexual assault, intimate partner violence  Alcohol poisoning  Reproductive health: risky sexual behaviors, unintended prengnacy, sexually transmitted diseases, miscarriage, stillbirth, fetal alcohol syndrome    Long-Term Health Risks:  Chronic diseases: high blood pressure, heart disease, stroke, liver disease, digestive problems  Cancers: breast, mouth and throat, liver, colon  Learning and memory problems: dementia, poor school performance  Mental health: depression, anxiety, insomnia  Social problems: lost productivity, family problems, unemployment  Alcohol dependence    For support and more information:  Substance Abuse and Mental Health Services Administration  [address]  [place] [address]  Web Address: http://www.samhsa.gov    Alcoholics Anonymous        Web Address: http://www.aa.org    https://www.cdc.gov/alcohol/fact-sheets/alcohol-use.htm     © Copyright [employer].M.A[employer]C. 2018 Information is for End User's use only and may not be sold, redistributed or otherwise used for commercial purposes. All illustrations and images included in CareNotes® are the copyrighted property of A.D.A[employer]M., Inc. or [employer]

## 2024-06-18 ENCOUNTER — TELEPHONE (OUTPATIENT)
Dept: FAMILY MEDICINE CLINIC | Facility: CLINIC | Age: 84
End: 2024-06-18

## 2024-06-18 ENCOUNTER — ANTICOAG VISIT (OUTPATIENT)
Dept: FAMILY MEDICINE CLINIC | Facility: CLINIC | Age: 84
End: 2024-06-18

## 2024-06-18 ENCOUNTER — TELEPHONE (OUTPATIENT)
Age: 84
End: 2024-06-18

## 2024-06-18 LAB — INR PPP: 4.2 (ref 0.84–1.19)

## 2024-06-18 NOTE — TELEPHONE ENCOUNTER
MD INR called letting us know critical result for INR. INR was 4.2 was taken 6/17/24     Called office clinical line and spoke with someone and relayed the message.

## 2024-06-21 ENCOUNTER — TELEPHONE (OUTPATIENT)
Dept: UROLOGY | Facility: AMBULATORY SURGERY CENTER | Age: 84
End: 2024-06-21

## 2024-06-21 DIAGNOSIS — C67.2 MALIGNANT NEOPLASM OF LATERAL WALL OF URINARY BLADDER (HCC): Primary | ICD-10-CM

## 2024-06-21 NOTE — TELEPHONE ENCOUNTER
I have ordered CT renal protocol and BMP for patient to have done.  Patient should also have urine testing performed including urine cytology.  Cystoscopy as planned.  ER precautions.  Thank you

## 2024-06-21 NOTE — TELEPHONE ENCOUNTER
Patient known to practice for MIBC diagnosed 7/2023, he underwent chemo and radiation at [place] 10/2023. Had right nephrostomy tube due right hydro.     The hematuria had not been reported, patient of Dr Carcamo but switched to Dr Mckee due to him seeing pts in [place]. Patient can be rescheduled with Dr Carcamo if he has sooner.     Providers please advise if any testing needed for patient r/t reports of gross hematuria x2 months

## 2024-06-21 NOTE — TELEPHONE ENCOUNTER
LVM to let PT know that his 7/2 apt has been rescheduled to 7/30 at 7:30 with a 7:00 arrival time.    Please book as we can not

## 2024-06-21 NOTE — TELEPHONE ENCOUNTER
Spoke with pt wife regarding cysto appt being r/s for 7/30 @ 730a. Pt wife is really upset and concerned due to pt has been having dark blood in urine for approx 2 months now and doesn't want to wait longer for pt to be seen. Pt wife sts that if sooner appt in [place] that she would be willing to bring him to that location as well. Pt requesting call back from clinical staff to discuss further.    Pt call back- [phone] Daniel (spouse)

## 2024-06-21 NOTE — TELEPHONE ENCOUNTER
I don't see anything sooner w/Carlos Alberto, especially with him out beginning of July.  Anything you see?

## 2024-06-24 ENCOUNTER — PROCEDURE VISIT (OUTPATIENT)
Dept: UROLOGY | Facility: MEDICAL CENTER | Age: 84
End: 2024-06-24
Payer: COMMERCIAL

## 2024-06-24 ENCOUNTER — ANTICOAG VISIT (OUTPATIENT)
Dept: FAMILY MEDICINE CLINIC | Facility: CLINIC | Age: 84
End: 2024-06-24
Payer: COMMERCIAL

## 2024-06-24 VITALS
DIASTOLIC BLOOD PRESSURE: 70 MMHG | BODY MASS INDEX: 23.03 KG/M2 | OXYGEN SATURATION: 97 % | WEIGHT: 170 LBS | HEIGHT: 72 IN | SYSTOLIC BLOOD PRESSURE: 120 MMHG | HEART RATE: 88 BPM

## 2024-06-24 DIAGNOSIS — R31.0 GROSS HEMATURIA: Primary | ICD-10-CM

## 2024-06-24 DIAGNOSIS — N39.41 URGE INCONTINENCE: ICD-10-CM

## 2024-06-24 DIAGNOSIS — C67.2 MALIGNANT NEOPLASM OF LATERAL WALL OF URINARY BLADDER (HCC): ICD-10-CM

## 2024-06-24 LAB
INR PPP: 2.7 (ref 0.84–1.19)
SL AMB  POCT GLUCOSE, UA: ABNORMAL
SL AMB LEUKOCYTE ESTERASE,UA: ABNORMAL
SL AMB POCT BILIRUBIN,UA: ABNORMAL
SL AMB POCT BLOOD,UA: ABNORMAL
SL AMB POCT CLARITY,UA: ABNORMAL
SL AMB POCT COLOR,UA: ABNORMAL
SL AMB POCT KETONES,UA: ABNORMAL
SL AMB POCT NITRITE,UA: POSITIVE
SL AMB POCT PH,UA: 5.5
SL AMB POCT SPECIFIC GRAVITY,UA: 1.02
SL AMB POCT URINE PROTEIN: ABNORMAL
SL AMB POCT UROBILINOGEN: 1

## 2024-06-24 PROCEDURE — 81003 URINALYSIS AUTO W/O SCOPE: CPT | Performed by: UROLOGY

## 2024-06-24 PROCEDURE — 85610 PROTHROMBIN TIME: CPT | Performed by: FAMILY MEDICINE

## 2024-06-24 PROCEDURE — 52000 CYSTOURETHROSCOPY: CPT | Performed by: UROLOGY

## 2024-06-24 PROCEDURE — 99214 OFFICE O/P EST MOD 30 MIN: CPT | Performed by: UROLOGY

## 2024-06-24 NOTE — PATIENT INSTRUCTIONS
[facility]  Tell them I said it did not have to be fasting    Think about if you want us to do the surgery.  It would be at [facility], you will be fully asleep.  We will put a scope in the bladder, cauterize and trim out any active tumor that was bleeding.  However I cannot tell you how long it would control the bleeding because the bladder has active cancer in it.    Call our office, have them tell Dr. Carcamo's nurse what your decision is.

## 2024-06-24 NOTE — PROGRESS NOTES
HISTORY:    Follow-up for bladder cancer.    See prior notes    Current problem is persistent hematuria for several weeks.  Urine is dark cranberry to maroon in color.    See our prior notes regarding bladder cancer treated with chemo and radiation, this was done at [facility].  Because of traveling issues, patient and family prefer to get their treatment there.    Our last interaction was a PET scan in April 2024 which showed recurrent or persistent cancer in the bladder, likely in distal ureter.  I spoke to the wife on the telephone and told them that surgery to do cystoscopy, cautery biopsy TUR of tumor would be done to try to control bleeding.    He was in [facility] for 3 days in middle of May, although we do not have any records of that hospitalization or imaging.    Interventional radiology internalized his right ureter and remove the nephrostomy.  So, he has a right internal stent which will need to be exchanged at some point, probably 3 to 4 months after placement.  That would make it sometime in mid August or later    Cystoscopy today was done:     Cystoscopy     Date/Time  6/24/2024 1:00 PM     Performed by  Tha Carcamo MD   Authorized by  Tha Carcamo MD         Procedure Details:  Procedure type: cystoscopy    Patient tolerance: Patient tolerated the procedure well with no immediate complications    Additional Procedure Details:      Patient presents for cystoscopy.  I have discussed the reasons for doing the exam, and the potential risks and complications.  Patient expressed understanding, and signed informed consent document.    The patient was carefully  positioned supine on the examining table.  Sterile preparation was performed on the urethra.  Xylocaine jelly was instilled and left  Indwelling for the procedure.  The 15 [place] flexible cystoscope was passed with the following findings:      Urethra: No stricture    Prostate:  lateral lobes significant enlargement obstructing   normal appearance , without tenderness upon palpation , no deformities , trachea midline , Thyroid normal size , no thyroid nodules , no masses , no JVD , thyroid nontender , normal appearance , without tenderness upon palpation , no deformities , trachea midline , Thyroid normal size , no thyroid nodules , no masses , no JVD , thyroid nontender                 Bladder: Bladder poor visualization due to cloudy and bloody urine.  I flushed it out 3 times, still could not see very well.  I suspect there is necrotic tumor which is sloughing off the cloudy material.  I can see the internal stent.     Residual urine: 100 mL    Patient tolerated the procedure well and was escorted from the examining table.             ASSESSMENT / PLAN:    1.  Difficult situation with persistent or recurrent cancer in the bladder and likely distal ureter.  He was never a candidate for cystectomy, that was the reason chemoradiation was done.    2.  I recommend he consider cystoscopy, TUR of tumor cautery etc.  This would be palliative, I do not think there is any way to resect his tumor cystoscopically.  Also, there is likely tumor in the distal ureter with a stent in place.  That is not something that can be resected transurethrally.    3.  Check labs tomorrow.    4.  They will let me know this week if they want to proceed with a procedure as I described above.  If not, he would have follow-up in the [place] office in about 6 weeks to plan the next stent exchange    The following portions of the patient's history were reviewed and updated as appropriate: allergies, current medications, past family history, past medical history, past social history, past surgical history, and problem list.    Review of Systems      Objective:     Physical Exam  Constitutional:       Comments: Frail, difficult ambulation after his stroke   Neurological:      Mental Status: He is alert.           0   Lab Value Date/Time    PSA 1.5 05/22/2023 1055    PSA 1.6 04/27/2022 1300    PSA 1.2 08/29/2016 0000    PSA 0.9 05/22/2015 1300   ]  BUN   Date Value Ref Range Status   08/07/2023 27 (H) 5 - 25 mg/dL Final   05/22/2023 36 (H) 8 - 27 mg/dL Final     Creatinine   Date Value Ref Range Status   08/07/2023 1.76 (H) 0.60 - 1.30 mg/dL Final     Comment:     Standardized to IDMS reference method   08/29/2016  "1.49 (H) 0.76 - 1.27 mg/dL Final     No components found for: \"CBC\"      Patient Active Problem List   Diagnosis    Anxiety    Arthralgia    Asymptomatic cholelithiasis    Paroxysmal atrial fibrillation (HCC)    Benign essential hypertension    Benign prostatic hyperplasia    Bilateral pseudophakia    Coronary arteriosclerosis    History of epilepsy    Gait instability    Hiatal hernia    Hyperlipidemia    Frequent falls    Weakness    Stage 3b chronic kidney disease (HCC)    Age-related cataract of right eye    Macular drusen    Posterior vitreous detachment of both eyes    Primary osteoarthritis of left hip    Osteoarthritis of lumbar spine    Essential hypertension    Chronic bilateral low back pain with right-sided sciatica    DDD (degenerative disc disease), lumbar    Sciatica, right side    Chronic cough    Gastroesophageal reflux disease without esophagitis    Cerebral artery occlusion    Labyrinthitis, unspecified ear    Pure hypercholesterolemia    Malignant 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 (HCC)    Other hydronephrosis    Coagulation disorder (HCC)    Intractable epilepsy without status epilepticus, unspecified epilepsy type (HCC)        Diagnoses and all orders for this visit:    Gross hematuria  -     POCT urine dip auto non-scope  -     Comprehensive metabolic panel  -     CBC; Future  -     CBC    Malignant 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 (HCC)    Urge incontinence    Other orders  -     Cystoscopy           Patient ID: Francis Caballero is a 84 y.o. male.      Current Outpatient Medications:     ALPRAZolam (XANAX) 0.5 mg tablet, Take 1 tablet (0.5 mg total) by mouth 3 (three) times a day as needed for anxiety, Disp: 90 tablet, Rfl: 0    Lancets MISC, by Does not apply route, Disp: , Rfl:     metoprolol tartrate (LOPRESSOR) 50 mg tablet, TAKE 1 TABLET BY MOUTH TWICE A DAY, Disp: 180 tablet, Rfl: 3    Multiple Vitamins-Minerals (PreserVision AREDS 2) CAPS, , Disp: , Rfl:     " rosuvastatin (CRESTOR) 5 mg tablet, TAKE 1 TABLET BY MOUTH EVERY DAY, Disp: 90 tablet, Rfl: 3    warfarin (COUMADIN) 1 mg tablet, TAKE 2 TABLETS DAILY -COMBINE WITH 5MG, Disp: 180 tablet, Rfl: 1    warfarin (COUMADIN) 5 mg tablet, TAKE 1 TABLET BY MOUTH EVERY DAY, combine with 1-2mg tab as directed (alternates between 6mg and 7mg QOD), Disp: 90 tablet, Rfl: 1    Past Medical History:   Diagnosis Date    Ambulates with cane     Anemia     last assessed: 05/23/2015    GERD (gastroesophageal reflux disease)     Hematuria     Hypertension     Intracerebral hemorrhage (HCC)     last assessed: 04/21/2014; right    Intracerebral hemorrhage (HCC)     last assessed: 11/23/2017    Mobility impaired     Stroke syndrome     last assessed: 06/13/2013    UTI (urinary tract infection)        Past Surgical History:   Procedure Laterality Date    BLADDER SURGERY      IR NEPHROSTOMY TUBE PLACEMENT  7/27/2023    AZ CYSTOURETHROSCOPY W/DEST &/RMVL TUMOR LARGE N/A 7/26/2023    Procedure: (TURBT);  Surgeon: Tha Carcamo MD;  Location: [facility];  Service: Urology       Social History

## 2024-06-24 NOTE — H&P (VIEW-ONLY)
HISTORY:    Follow-up for bladder cancer.    See prior notes    Current problem is persistent hematuria for several weeks.  Urine is dark cranberry to maroon in color.    See our prior notes regarding bladder cancer treated with chemo and radiation, this was done at [facility].  Because of traveling issues, patient and family prefer to get their treatment there.    Our last interaction was a PET scan in April 2024 which showed recurrent or persistent cancer in the bladder, likely in distal ureter.  I spoke to the wife on the telephone and told them that surgery to do cystoscopy, cautery biopsy TUR of tumor would be done to try to control bleeding.    He was in [facility] for 3 days in middle of May, although we do not have any records of that hospitalization or imaging.    Interventional radiology internalized his right ureter and remove the nephrostomy.  So, he has a right internal stent which will need to be exchanged at some point, probably 3 to 4 months after placement.  That would make it sometime in mid August or later    Cystoscopy today was done:     Cystoscopy     Date/Time  6/24/2024 1:00 PM     Performed by  Tha Carcamo MD   Authorized by  Tha Carcamo MD         Procedure Details:  Procedure type: cystoscopy    Patient tolerance: Patient tolerated the procedure well with no immediate complications    Additional Procedure Details:      Patient presents for cystoscopy.  I have discussed the reasons for doing the exam, and the potential risks and complications.  Patient expressed understanding, and signed informed consent document.    The patient was carefully  positioned supine on the examining table.  Sterile preparation was performed on the urethra.  Xylocaine jelly was instilled and left  Indwelling for the procedure.  The 15 [place]inese flexible cystoscope was passed with the following findings:      Urethra: No stricture    Prostate:  lateral lobes significant enlargement obstructing                   Bladder: Bladder poor visualization due to cloudy and bloody urine.  I flushed it out 3 times, still could not see very well.  I suspect there is necrotic tumor which is sloughing off the cloudy material.  I can see the internal stent.     Residual urine: 100 mL    Patient tolerated the procedure well and was escorted from the examining table.             ASSESSMENT / PLAN:    1.  Difficult situation with persistent or recurrent cancer in the bladder and likely distal ureter.  He was never a candidate for cystectomy, that was the reason chemoradiation was done.    2.  I recommend he consider cystoscopy, TUR of tumor cautery etc.  This would be palliative, I do not think there is any way to resect his tumor cystoscopically.  Also, there is likely tumor in the distal ureter with a stent in place.  That is not something that can be resected transurethrally.    3.  Check labs tomorrow.    4.  They will let me know this week if they want to proceed with a procedure as I described above.  If not, he would have follow-up in the [place] office in about 6 weeks to plan the next stent exchange    The following portions of the patient's history were reviewed and updated as appropriate: allergies, current medications, past family history, past medical history, past social history, past surgical history, and problem list.    Review of Systems      Objective:     Physical Exam  Constitutional:       Comments: Frail, difficult ambulation after his stroke   Neurological:      Mental Status: He is alert.           0   Lab Value Date/Time    PSA 1.5 05/22/2023 1055    PSA 1.6 04/27/2022 1300    PSA 1.2 08/29/2016 0000    PSA 0.9 05/22/2015 1300   ]  BUN   Date Value Ref Range Status   08/07/2023 27 (H) 5 - 25 mg/dL Final   05/22/2023 36 (H) 8 - 27 mg/dL Final     Creatinine   Date Value Ref Range Status   08/07/2023 1.76 (H) 0.60 - 1.30 mg/dL Final     Comment:     Standardized to IDMS reference method   08/29/2016  "1.49 (H) 0.76 - 1.27 mg/dL Final     No components found for: \"CBC\"      Patient Active Problem List   Diagnosis    Anxiety    Arthralgia    Asymptomatic cholelithiasis    Paroxysmal atrial fibrillation (HCC)    Benign essential hypertension    Benign prostatic hyperplasia    Bilateral pseudophakia    Coronary arteriosclerosis    History of epilepsy    Gait instability    Hiatal hernia    Hyperlipidemia    Frequent falls    Weakness    Stage 3b chronic kidney disease (HCC)    Age-related cataract of right eye    Macular drusen    Posterior vitreous detachment of both eyes    Primary osteoarthritis of left hip    Osteoarthritis of lumbar spine    Essential hypertension    Chronic bilateral low back pain with right-sided sciatica    DDD (degenerative disc disease), lumbar    Sciatica, right side    Chronic cough    Gastroesophageal reflux disease without esophagitis    Cerebral artery occlusion    Labyrinthitis, unspecified ear    Pure hypercholesterolemia    Malignant 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 (HCC)    Other hydronephrosis    Coagulation disorder (HCC)    Intractable epilepsy without status epilepticus, unspecified epilepsy type (HCC)        Diagnoses and all orders for this visit:    Gross hematuria  -     POCT urine dip auto non-scope  -     Comprehensive metabolic panel  -     CBC; Future  -     CBC    Malignant 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 (HCC)    Urge incontinence    Other orders  -     Cystoscopy           Patient ID: Francis Caballero is a 84 y.o. male.      Current Outpatient Medications:     ALPRAZolam (XANAX) 0.5 mg tablet, Take 1 tablet (0.5 mg total) by mouth 3 (three) times a day as needed for anxiety, Disp: 90 tablet, Rfl: 0    Lancets MISC, by Does not apply route, Disp: , Rfl:     metoprolol tartrate (LOPRESSOR) 50 mg tablet, TAKE 1 TABLET BY MOUTH TWICE A DAY, Disp: 180 tablet, Rfl: 3    Multiple Vitamins-Minerals (PreserVision AREDS 2) CAPS, , Disp: , Rfl:     " rosuvastatin (CRESTOR) 5 mg tablet, TAKE 1 TABLET BY MOUTH EVERY DAY, Disp: 90 tablet, Rfl: 3    warfarin (COUMADIN) 1 mg tablet, TAKE 2 TABLETS DAILY -COMBINE WITH 5MG, Disp: 180 tablet, Rfl: 1    warfarin (COUMADIN) 5 mg tablet, TAKE 1 TABLET BY MOUTH EVERY DAY, combine with 1-2mg tab as directed (alternates between 6mg and 7mg QOD), Disp: 90 tablet, Rfl: 1    Past Medical History:   Diagnosis Date    Ambulates with cane     Anemia     last assessed: 05/23/2015    GERD (gastroesophageal reflux disease)     Hematuria     Hypertension     Intracerebral hemorrhage (HCC)     last assessed: 04/21/2014; right    Intracerebral hemorrhage (HCC)     last assessed: 11/23/2017    Mobility impaired     Stroke syndrome     last assessed: 06/13/2013    UTI (urinary tract infection)        Past Surgical History:   Procedure Laterality Date    BLADDER SURGERY      IR NEPHROSTOMY TUBE PLACEMENT  7/27/2023    [place] CYSTOURETHROSCOPY W/DEST &/RMVL TUMOR LARGE N/A 7/26/2023    Procedure: (TURBT);  Surgeon: Tha Carcamo MD;  Location: [facility];  Service: Urology       Social History

## 2024-06-25 ENCOUNTER — TELEPHONE (OUTPATIENT)
Dept: FAMILY MEDICINE CLINIC | Facility: CLINIC | Age: 84
End: 2024-06-25

## 2024-06-25 NOTE — TELEPHONE ENCOUNTER
----- Message from Chantel Santos DO sent at 6/24/2024  5:30 PM EDT -----  Warfarin level low  Increase to 6mg alternating with 5mg every other day.  Recheck 1 week

## 2024-06-26 ENCOUNTER — TELEPHONE (OUTPATIENT)
Dept: UROLOGY | Facility: MEDICAL CENTER | Age: 84
End: 2024-06-26

## 2024-06-26 DIAGNOSIS — R31.0 GROSS HEMATURIA: Primary | ICD-10-CM

## 2024-06-26 LAB
ALBUMIN SERPL-MCNC: 4.1 G/DL (ref 3.7–4.7)
ALP SERPL-CCNC: 101 IU/L (ref 44–121)
ALT SERPL-CCNC: 9 IU/L (ref 0–44)
AST SERPL-CCNC: 16 IU/L (ref 0–40)
BACTERIA UR CULT: NORMAL
BILIRUB SERPL-MCNC: 0.8 MG/DL (ref 0–1.2)
BUN SERPL-MCNC: 36 MG/DL (ref 8–27)
BUN/CREAT SERPL: 23 (ref 10–24)
CALCIUM SERPL-MCNC: 9.3 MG/DL (ref 8.6–10.2)
CHLORIDE SERPL-SCNC: 108 MMOL/L (ref 96–106)
CO2 SERPL-SCNC: 20 MMOL/L (ref 20–29)
CREAT SERPL-MCNC: 1.57 MG/DL (ref 0.76–1.27)
EGFR: 43 ML/MIN/1.73
ERYTHROCYTE [DISTWIDTH] IN BLOOD BY AUTOMATED COUNT: 12.2 % (ref 11.6–15.4)
GLOBULIN SER-MCNC: 2.9 G/DL (ref 1.5–4.5)
GLUCOSE SERPL-MCNC: 130 MG/DL (ref 70–99)
HCT VFR BLD AUTO: 25.1 % (ref 37.5–51)
HGB BLD-MCNC: 8.3 G/DL (ref 13–17.7)
Lab: NORMAL
MCH RBC QN AUTO: 33.7 PG (ref 26.6–33)
MCHC RBC AUTO-ENTMCNC: 33.1 G/DL (ref 31.5–35.7)
MCV RBC AUTO: 102 FL (ref 79–97)
PLATELET # BLD AUTO: 335 X10E3/UL (ref 150–450)
POTASSIUM SERPL-SCNC: 5.7 MMOL/L (ref 3.5–5.2)
PROT SERPL-MCNC: 7 G/DL (ref 6–8.5)
RBC # BLD AUTO: 2.46 X10E6/UL (ref 4.14–5.8)
SODIUM SERPL-SCNC: 142 MMOL/L (ref 134–144)
WBC # BLD AUTO: 7.9 X10E3/UL (ref 3.4–10.8)

## 2024-06-26 NOTE — TELEPHONE ENCOUNTER
----- Message from Tha Carcamo MD sent at 6/26/2024 10:09 AM EDT -----  Saw patient yesterday, bad bladder cancer, they are thinking whether they want me to do a procedure in the OR to cauterize, resect etc.  However he has had bleeding on and off for a long time.  Quite anemic on his CBC.  Fragile patient with prior stroke.  Please call them, mainly the wife, and tell them no matter what they decide about a cystoscopy, he should have transfusion and hopefully could be done at the [facility], [place].  They hate traveling up here.  They are [place] people, but we have no way of ordering things at that facility.  ----- Message -----  From: Rossi Saul MA  Sent: 6/24/2024   1:13 PM EDT  To: Tha Carcamo MD

## 2024-06-26 NOTE — TELEPHONE ENCOUNTER
Call placed to patient's wife and reviewed the MD recommendations with her. She is aware and would like to schedule infusion as soon as possible at [facility].     Call placed to  infusion center. They did not answer, but left message for team to call the office back in regards to scheduling. Left direct number for call back.     Will await date and time of infusion and then contact the wife with date.     Wife also stated that they would like to proceed with surgery as discussed in the office.

## 2024-06-26 NOTE — TELEPHONE ENCOUNTER
Spoke with pt wife regarding lab results. Pt wife is very worried and is requesting a call back from clinical staff to review results and talk about next plan of care for pt. Please review.    Pt call back- [phone]

## 2024-06-26 NOTE — TELEPHONE ENCOUNTER
Call received from infusion center and spoke with Maico. Pt will need the following ordered to proceed with transfusion.     Verbal and written consent for blood transfusion.   Type and screen and ABO RH compatibly factor ordered.     Will send message to the provider team to review and place orders.     Call placed to wife and spoke with her. Updated her with the information. She understands and will await a call back from the office once everything is settled.     Wife would also like provider team to review his other labs as she is very concerned with these results.

## 2024-06-27 ENCOUNTER — HOSPITAL ENCOUNTER (OUTPATIENT)
Dept: INFUSION CENTER | Facility: HOSPITAL | Age: 84
Discharge: HOME | End: 2024-06-27

## 2024-06-27 ENCOUNTER — PREP FOR PROCEDURE (OUTPATIENT)
Dept: UROLOGY | Facility: MEDICAL CENTER | Age: 84
End: 2024-06-27

## 2024-06-27 ENCOUNTER — TELEPHONE (OUTPATIENT)
Dept: UROLOGY | Facility: MEDICAL CENTER | Age: 84
End: 2024-06-27

## 2024-06-27 DIAGNOSIS — C67.8 MALIGNANT NEOPLASM OF OVERLAPPING SITES OF BLADDER (HCC): Primary | ICD-10-CM

## 2024-06-27 DIAGNOSIS — D50.0 BLOOD LOSS ANEMIA: Primary | ICD-10-CM

## 2024-06-27 PROBLEM — D64.9 ANEMIA: Status: ACTIVE | Noted: 2024-06-27

## 2024-06-27 NOTE — TELEPHONE ENCOUNTER
Call placed to  infusion center and spoke with RN. She advised that verbal consent is enough as long as it is documented on the consent form.     Consent form printed out and given to MD.    MD and RN will contact patient and wife today to obtain consent.

## 2024-06-27 NOTE — TELEPHONE ENCOUNTER
Telephone call.  Wife who speaks to the patient says he does want to have the surgery as discussed in my prior note.    See prior notes, chemoradiation for invasive bladder cancer last year.  Not healthy enough for cystectomy.  Imaging suggest tumor in bladder and perhaps in distal right ureter.  Has internal stent, the prior nephrostomy was internalized by [facility] interventional radiology.    They know that cystoscopy cautery TUR BT is palliative to hopefully reduce the amount of hematuria.  The stent was internalized just last month, I do not think it needs exchange right now.    I first treated patient when I was at [facility] office in hospital.  They do not like to drive to [place], so hopefully in the future his treatment, such as stent exchange at the 3-month point can be done at the [facility]    They consent to the blood transfusion, verbal consent is done today, witnessed by Johanny Harmon RN.  Blood transfusion was scheduled for July 3.    Surgery will be scheduled as well

## 2024-06-27 NOTE — TELEPHONE ENCOUNTER
Received call from Daniel looking for an update from Dr. Carcamo, began reviewed his note when Daniel states she was receiving call from him now, she disconnected and took the doctors call.

## 2024-06-27 NOTE — TELEPHONE ENCOUNTER
Tha Carcamo MD       Telephone call.  Wife who speaks to the patient says he does want to have the surgery as discussed in my prior note.     They consent to the blood transfusion, verbal consent is done today, witnessed by Johanny Harmon RN.     Surgery will be scheduled as well          Wife is aware of infusion center appointment on 7-3-2024 at 10am. She will have patient at the lab on Monday for required lab work. She had no further questions at this time.

## 2024-07-01 ENCOUNTER — APPOINTMENT (OUTPATIENT)
Dept: LAB | Facility: HOSPITAL | Age: 84
End: 2024-07-01
Payer: COMMERCIAL

## 2024-07-01 ENCOUNTER — PREP FOR PROCEDURE (OUTPATIENT)
Dept: UROLOGY | Facility: MEDICAL CENTER | Age: 84
End: 2024-07-01

## 2024-07-01 DIAGNOSIS — R31.0 GROSS HEMATURIA: ICD-10-CM

## 2024-07-01 DIAGNOSIS — Z01.810 PRE-OPERATIVE CARDIOVASCULAR EXAMINATION: ICD-10-CM

## 2024-07-01 DIAGNOSIS — C67.8 MALIGNANT NEOPLASM OF OVERLAPPING SITES OF BLADDER (HCC): ICD-10-CM

## 2024-07-01 DIAGNOSIS — Z79.01 LONG TERM (CURRENT) USE OF ANTICOAGULANTS: ICD-10-CM

## 2024-07-01 DIAGNOSIS — Z01.812 PRE-OPERATIVE LABORATORY EXAMINATION: ICD-10-CM

## 2024-07-01 DIAGNOSIS — R31.0 GROSS HEMATURIA: Primary | ICD-10-CM

## 2024-07-01 DIAGNOSIS — Z00.00 GENERAL MEDICAL EXAM: ICD-10-CM

## 2024-07-01 LAB
ABO GROUP BLD: NORMAL
APTT PPP: 43 SECONDS (ref 23–37)
ATRIAL RATE: 75 BPM
QRS AXIS: 12 DEGREES
QRSD INTERVAL: 82 MS
QT INTERVAL: 356 MS
QTC INTERVAL: 395 MS
RH BLD: POSITIVE
T WAVE AXIS: 88 DEGREES
VENTRICULAR RATE: 74 BPM

## 2024-07-01 PROCEDURE — 86900 BLOOD TYPING SEROLOGIC ABO: CPT | Performed by: UROLOGY

## 2024-07-01 PROCEDURE — 86901 BLOOD TYPING SEROLOGIC RH(D): CPT

## 2024-07-01 PROCEDURE — 85730 THROMBOPLASTIN TIME PARTIAL: CPT

## 2024-07-01 PROCEDURE — 86901 BLOOD TYPING SEROLOGIC RH(D): CPT | Performed by: UROLOGY

## 2024-07-01 PROCEDURE — 36415 COLL VENOUS BLD VENIPUNCTURE: CPT

## 2024-07-01 PROCEDURE — 86900 BLOOD TYPING SEROLOGIC ABO: CPT

## 2024-07-01 PROCEDURE — 93010 ELECTROCARDIOGRAM REPORT: CPT | Performed by: INTERNAL MEDICINE

## 2024-07-01 PROCEDURE — 86850 RBC ANTIBODY SCREEN: CPT | Performed by: UROLOGY

## 2024-07-01 NOTE — TELEPHONE ENCOUNTER
"Spoke with patient's wife Daniel and confirmed surgery date of: 7/12  Type of surgery: TURBT, w/ clot evac   Operating physician: Dr. Simon  Location of surgery: [facility]     Verbally went over prep with patient's wife on: 7/1  NPO  Bowel prep? No  Hospital calls afternoon prior with arrival time -Calls Friday afternoon for Monday surgeries  Patient needs ride to and from surgery (outpatient)   Pre-op testing to be done 2 weeks prior to surgery  EKG, T/S as per procedure pass  Dr. Carcamo ordered other testing - getting it all done today   Blood thinners:   Warfarin (7 days)  Called over to PCP office and got direct fax number to send med hold to (listed below)    Emailed to patient's wife on: 7/1  Copy of packet scanned into Media on: 7/1  Labs in electronic chart   Soap prep in packet    Consent: on admit    Medication Suspension of: wafarin - 7 days  Ordering provider: Dr. Chantel Santos  Faxed Medication Suspension form on: 7/1  Best fax number: [phone]  Tried faxing x5 times \"no response\"  Called back again, spoke with a different girl, gave me [phone] - that went through     "

## 2024-07-02 ENCOUNTER — LAB REQUISITION (OUTPATIENT)
Dept: LAB | Facility: HOSPITAL | Age: 84
End: 2024-07-02
Payer: COMMERCIAL

## 2024-07-02 DIAGNOSIS — C67.2 MALIGNANT NEOPLASM OF LATERAL WALL OF URINARY BLADDER (HCC): Primary | ICD-10-CM

## 2024-07-02 DIAGNOSIS — D64.9 ANEMIA, UNSPECIFIED TYPE: ICD-10-CM

## 2024-07-02 DIAGNOSIS — Z00.00 ENCOUNTER FOR GENERAL ADULT MEDICAL EXAMINATION WITHOUT ABNORMAL FINDINGS: ICD-10-CM

## 2024-07-02 LAB
ABO GROUP BLD: NORMAL
BLD GP AB SCN SERPL QL: NEGATIVE
RH BLD: POSITIVE
SL AMB POCT INR: 2.7
SPECIMEN EXPIRATION DATE: NORMAL

## 2024-07-02 RX ORDER — SODIUM CHLORIDE 9 MG/ML
20 INJECTION, SOLUTION INTRAVENOUS ONCE
Status: CANCELLED | OUTPATIENT
Start: 2024-07-03

## 2024-07-03 ENCOUNTER — PATIENT MESSAGE (OUTPATIENT)
Dept: FAMILY MEDICINE CLINIC | Facility: CLINIC | Age: 84
End: 2024-07-03

## 2024-07-03 ENCOUNTER — HOSPITAL ENCOUNTER (OUTPATIENT)
Dept: INFUSION CENTER | Facility: HOSPITAL | Age: 84
Discharge: HOME | End: 2024-07-03
Payer: COMMERCIAL

## 2024-07-03 VITALS
RESPIRATION RATE: 18 BRPM | OXYGEN SATURATION: 100 % | HEART RATE: 65 BPM | SYSTOLIC BLOOD PRESSURE: 132 MMHG | DIASTOLIC BLOOD PRESSURE: 67 MMHG | TEMPERATURE: 97.3 F

## 2024-07-03 DIAGNOSIS — C67.2 MALIGNANT NEOPLASM OF LATERAL WALL OF URINARY BLADDER (HCC): Primary | ICD-10-CM

## 2024-07-03 DIAGNOSIS — D64.9 ANEMIA, UNSPECIFIED TYPE: ICD-10-CM

## 2024-07-03 LAB
ABO GROUP BLD: NORMAL
BLD GP AB SCN SERPL QL: NEGATIVE
RH BLD: POSITIVE
SPECIMEN EXPIRATION DATE: NORMAL

## 2024-07-03 PROCEDURE — P9016 RBC LEUKOCYTES REDUCED: HCPCS

## 2024-07-03 PROCEDURE — 86901 BLOOD TYPING SEROLOGIC RH(D): CPT | Performed by: UROLOGY

## 2024-07-03 PROCEDURE — 86850 RBC ANTIBODY SCREEN: CPT | Performed by: UROLOGY

## 2024-07-03 PROCEDURE — 86923 COMPATIBILITY TEST ELECTRIC: CPT

## 2024-07-03 PROCEDURE — 36430 TRANSFUSION BLD/BLD COMPNT: CPT

## 2024-07-03 PROCEDURE — 86900 BLOOD TYPING SEROLOGIC ABO: CPT | Performed by: UROLOGY

## 2024-07-03 RX ORDER — SODIUM CHLORIDE 9 MG/ML
20 INJECTION, SOLUTION INTRAVENOUS ONCE
Status: DISCONTINUED | OUTPATIENT
Start: 2024-07-03 | End: 2024-07-06 | Stop reason: HOSPADM

## 2024-07-03 NOTE — PRE-PROCEDURE INSTRUCTIONS
Pre-Surgery Instructions:   Medication Instructions    ALPRAZolam (XANAX) 0.5 mg tablet Uses PRN- OK to take day of surgery    metoprolol tartrate (LOPRESSOR) 50 mg tablet Take day of surgery.    Multiple Vitamins-Minerals (PreserVision AREDS 2) CAPS Hold day of surgery.    rosuvastatin (CRESTOR) 5 mg tablet Take night before surgery    warfarin (COUMADIN) 1 mg tablet Instructions provided by MD HOLD STARTING 7/5 PER DR SHEARER    warfarin (COUMADIN) 5 mg tablet Instructions provided by MD HOLD STARTING 7/5 PER DR SHEARER, RESUME DAY AFTER SX      Medication instructions for day surgery reviewed. Please use only a sip of water to take your instructed medications. Avoid all over the counter vitamins, supplements and NSAIDS for one week prior to surgery per anesthesia guidelines. Tylenol is ok to take as needed.     You will receive a call one business day prior to surgery with an arrival time and hospital directions. If your surgery is scheduled on a Monday, the hospital will be calling you on the Friday prior to your surgery. If you have not heard from anyone by 8pm, please call the hospital supervisor through the hospital  at [phone]. ([place] [phone] or [place] [phone]).    Do not eat or drink anything after midnight the night before your surgery, including candy, mints, lifesavers, or chewing gum. Do not drink alcohol 24hrs before your surgery. Try not to smoke at least 24hrs before your surgery.       Follow the pre surgery showering instructions as listed in the “My Surgical Experience Booklet” or otherwise provided by your surgeon's office. Do not use a blade to shave the surgical area 1 week before surgery. It is okay to use a clean electric clippers up to 24 hours before surgery. Do not apply any lotions, creams, including makeup, cologne, deodorant, or perfumes after showering on the day of your surgery. Do not use dry shampoo, hair spray, hair gel, or any type of hair products.     No  contact lenses, eye make-up, or artificial eyelashes. Remove nail polish, including gel polish, and any artificial, gel, or acrylic nails if possible. Remove all jewelry including rings and body piercing jewelry.     Wear causal clothing that is easy to take on and off. Consider your type of surgery.    Keep any valuables, jewelry, piercings at home. Please bring any specially ordered equipment (sling, braces) if indicated.    Arrange for a responsible person to drive you to and from the hospital on the day of your surgery. Please confirm the visitor policy for the day of your procedure when you receive your phone call with an arrival time.     Call the surgeon's office with any new illnesses, exposures, or additional questions prior to surgery.    Please reference your “My Surgical Experience Booklet” for additional information to prepare for your upcoming surgery.

## 2024-07-03 NOTE — PLAN OF CARE
Problem: Potential for Falls  Goal: Patient will remain free of falls  Description: INTERVENTIONS:  - Educate patient/family on patient safety including physical limitations  - Instruct patient to call for assistance with activity   - Keep Call bell within reach  - Keep bed low and locked with side rails adjusted as appropriate  - Keep care items and personal belongings within reach  - Consider moving patient to room near nurses station  Outcome: Progressing

## 2024-07-03 NOTE — PROGRESS NOTES
Francis Caballero  tolerated treatment well with no complications.      Francis Caballero is aware of no future appt for this blood therapy plan  AVS printed and given to Francis Caballero:  Yes

## 2024-07-04 LAB
ABO GROUP BLD BPU: NORMAL
BPU ID: NORMAL
CROSSMATCH: NORMAL
UNIT DISPENSE STATUS: NORMAL
UNIT PRODUCT CODE: NORMAL
UNIT PRODUCT VOLUME: 350 ML
UNIT RH: NORMAL

## 2024-07-05 NOTE — PATIENT COMMUNICATION
Patient wife called and spoke to her regarding holding the warfarin starting today 7/5 and restarting day after procedure if all good with urology.

## 2024-07-10 ENCOUNTER — ANESTHESIA EVENT (OUTPATIENT)
Dept: PERIOP | Facility: HOSPITAL | Age: 84
End: 2024-07-10
Payer: COMMERCIAL

## 2024-07-12 ENCOUNTER — ANESTHESIA (OUTPATIENT)
Dept: PERIOP | Facility: HOSPITAL | Age: 84
End: 2024-07-12
Payer: COMMERCIAL

## 2024-07-12 ENCOUNTER — TELEPHONE (OUTPATIENT)
Dept: UROLOGY | Facility: MEDICAL CENTER | Age: 84
End: 2024-07-12

## 2024-07-12 ENCOUNTER — HOSPITAL ENCOUNTER (INPATIENT)
Facility: HOSPITAL | Age: 84
LOS: 11 days | Discharge: HOME | DRG: 853 | End: 2024-07-23
Attending: EMERGENCY MEDICINE | Admitting: PSYCHIATRY & NEUROLOGY
Payer: COMMERCIAL

## 2024-07-12 ENCOUNTER — APPOINTMENT (EMERGENCY)
Dept: RADIOLOGY | Facility: HOSPITAL | Age: 84
DRG: 853 | End: 2024-07-12
Payer: COMMERCIAL

## 2024-07-12 ENCOUNTER — HOSPITAL ENCOUNTER (OUTPATIENT)
Facility: HOSPITAL | Age: 84
Setting detail: OUTPATIENT SURGERY
Discharge: HOME | DRG: 853 | End: 2024-07-12
Attending: UROLOGY | Admitting: UROLOGY
Payer: COMMERCIAL

## 2024-07-12 ENCOUNTER — APPOINTMENT (OUTPATIENT)
Dept: RADIOLOGY | Facility: HOSPITAL | Age: 84
DRG: 853 | End: 2024-07-12
Payer: COMMERCIAL

## 2024-07-12 VITALS
TEMPERATURE: 97.4 F | RESPIRATION RATE: 18 BRPM | SYSTOLIC BLOOD PRESSURE: 148 MMHG | BODY MASS INDEX: 22.42 KG/M2 | WEIGHT: 165.34 LBS | OXYGEN SATURATION: 98 % | DIASTOLIC BLOOD PRESSURE: 82 MMHG | HEART RATE: 98 BPM

## 2024-07-12 DIAGNOSIS — C67.8 MALIGNANT NEOPLASM OF OVERLAPPING SITES OF BLADDER (HCC): Primary | ICD-10-CM

## 2024-07-12 DIAGNOSIS — D64.9 ANEMIA: Primary | ICD-10-CM

## 2024-07-12 DIAGNOSIS — Z86.03 H/O TRANSURETHRAL RESECTION OF BLADDER TUMOR (TURBT): ICD-10-CM

## 2024-07-12 DIAGNOSIS — Z98.890 H/O TRANSURETHRAL RESECTION OF BLADDER TUMOR (TURBT): ICD-10-CM

## 2024-07-12 DIAGNOSIS — R31.0 GROSS HEMATURIA: ICD-10-CM

## 2024-07-12 DIAGNOSIS — N17.9 AKI (ACUTE KIDNEY INJURY) (HCC): ICD-10-CM

## 2024-07-12 DIAGNOSIS — Z79.01 ANTICOAGULATED: ICD-10-CM

## 2024-07-12 PROBLEM — I63.9 CVA (CEREBRAL VASCULAR ACCIDENT) (HCC): Status: ACTIVE | Noted: 2024-07-12

## 2024-07-12 PROBLEM — F17.200 SMOKER: Status: ACTIVE | Noted: 2024-07-12

## 2024-07-12 PROBLEM — G47.33 OSA (OBSTRUCTIVE SLEEP APNEA): Status: ACTIVE | Noted: 2024-07-12

## 2024-07-12 PROBLEM — I34.0 MITRAL REGURGITATION: Status: ACTIVE | Noted: 2024-07-12

## 2024-07-12 LAB
ABO GROUP BLD: NORMAL
ABO GROUP BLD: NORMAL
ALBUMIN SERPL BCG-MCNC: 3.1 G/DL (ref 3.5–5)
ALP SERPL-CCNC: 67 U/L (ref 34–104)
ALT SERPL W P-5'-P-CCNC: 6 U/L (ref 7–52)
ANION GAP SERPL CALCULATED.3IONS-SCNC: 6 MMOL/L (ref 4–13)
ANISOCYTOSIS BLD QL SMEAR: PRESENT
APTT PPP: 34 SECONDS (ref 23–37)
AST SERPL W P-5'-P-CCNC: 10 U/L (ref 13–39)
BACTERIA UR QL AUTO: ABNORMAL /HPF
BASOPHILS # BLD MANUAL: 0 THOUSAND/UL (ref 0–0.1)
BASOPHILS NFR MAR MANUAL: 0 % (ref 0–1)
BILIRUB SERPL-MCNC: 0.63 MG/DL (ref 0.2–1)
BILIRUB UR QL STRIP: NEGATIVE
BLD GP AB SCN SERPL QL: NEGATIVE
BLD GP AB SCN SERPL QL: NEGATIVE
BUN SERPL-MCNC: 22 MG/DL (ref 5–25)
CALCIUM ALBUM COR SERPL-MCNC: 8.6 MG/DL (ref 8.3–10.1)
CALCIUM SERPL-MCNC: 7.9 MG/DL (ref 8.4–10.2)
CHLORIDE SERPL-SCNC: 111 MMOL/L (ref 96–108)
CLARITY UR: ABNORMAL
CO2 SERPL-SCNC: 21 MMOL/L (ref 21–32)
COLOR UR: ABNORMAL
CREAT SERPL-MCNC: 1.66 MG/DL (ref 0.6–1.3)
EOSINOPHIL # BLD MANUAL: 0 THOUSAND/UL (ref 0–0.4)
EOSINOPHIL NFR BLD MANUAL: 0 % (ref 0–6)
ERYTHROCYTE [DISTWIDTH] IN BLOOD BY AUTOMATED COUNT: 14.9 % (ref 11.6–15.1)
GFR SERPL CREATININE-BSD FRML MDRD: 37 ML/MIN/1.73SQ M
GIANT PLATELETS BLD QL SMEAR: PRESENT
GLUCOSE SERPL-MCNC: 97 MG/DL (ref 65–140)
GLUCOSE UR STRIP-MCNC: NEGATIVE MG/DL
HCT VFR BLD AUTO: 18.7 % (ref 36.5–49.3)
HGB BLD-MCNC: 5.8 G/DL (ref 12–17)
HGB UR QL STRIP.AUTO: ABNORMAL
INR PPP: 1.22 (ref 0.84–1.19)
INR PPP: 1.51 (ref 0.84–1.19)
KETONES UR STRIP-MCNC: NEGATIVE MG/DL
LACTATE SERPL-SCNC: 1.4 MMOL/L (ref 0.5–2)
LACTATE SERPL-SCNC: 2.9 MMOL/L (ref 0.5–2)
LEUKOCYTE ESTERASE UR QL STRIP: ABNORMAL
LYMPHOCYTES # BLD AUTO: 0.29 THOUSAND/UL (ref 0.6–4.47)
LYMPHOCYTES # BLD AUTO: 2 % (ref 14–44)
MACROCYTES BLD QL AUTO: PRESENT
MCH RBC QN AUTO: 31 PG (ref 26.8–34.3)
MCHC RBC AUTO-ENTMCNC: 31 G/DL (ref 31.4–37.4)
MCV RBC AUTO: 100 FL (ref 82–98)
MONOCYTES # BLD AUTO: 0.1 THOUSAND/UL (ref 0–1.22)
MONOCYTES NFR BLD: 1 % (ref 4–12)
NEUTROPHILS # BLD MANUAL: 9.25 THOUSAND/UL (ref 1.85–7.62)
NEUTS BAND NFR BLD MANUAL: 21 % (ref 0–8)
NEUTS SEG NFR BLD AUTO: 75 % (ref 43–75)
NITRITE UR QL STRIP: NEGATIVE
NON-SQ EPI CELLS URNS QL MICRO: ABNORMAL /HPF
PH UR STRIP.AUTO: 6.5 [PH]
PLATELET # BLD AUTO: 230 THOUSANDS/UL (ref 149–390)
PLATELET BLD QL SMEAR: ADEQUATE
PMV BLD AUTO: 9.1 FL (ref 8.9–12.7)
POIKILOCYTOSIS BLD QL SMEAR: PRESENT
POLYCHROMASIA BLD QL SMEAR: PRESENT
POTASSIUM SERPL-SCNC: 4.2 MMOL/L (ref 3.5–5.3)
PROT SERPL-MCNC: 5.7 G/DL (ref 6.4–8.4)
PROT UR STRIP-MCNC: ABNORMAL MG/DL
PROTHROMBIN TIME: 15.6 SECONDS (ref 11.6–14.5)
PROTHROMBIN TIME: 18 SECONDS (ref 11.6–14.5)
RBC # BLD AUTO: 1.87 MILLION/UL (ref 3.88–5.62)
RBC #/AREA URNS AUTO: ABNORMAL /HPF
RBC MORPH BLD: PRESENT
RH BLD: POSITIVE
RH BLD: POSITIVE
SODIUM SERPL-SCNC: 138 MMOL/L (ref 135–147)
SP GR UR STRIP.AUTO: 1.02 (ref 1–1.03)
SPECIMEN EXPIRATION DATE: NORMAL
SPECIMEN EXPIRATION DATE: NORMAL
UROBILINOGEN UR STRIP-ACNC: <2 MG/DL
VARIANT LYMPHS # BLD AUTO: 1 %
WBC # BLD AUTO: 9.64 THOUSAND/UL (ref 4.31–10.16)
WBC #/AREA URNS AUTO: ABNORMAL /HPF
WBC CLUMPS # UR AUTO: PRESENT /UL

## 2024-07-12 PROCEDURE — 0TBC8ZZ EXCISION OF BLADDER NECK, VIA NATURAL OR ARTIFICIAL OPENING ENDOSCOPIC: ICD-10-PCS | Performed by: UROLOGY

## 2024-07-12 PROCEDURE — 85610 PROTHROMBIN TIME: CPT | Performed by: UROLOGY

## 2024-07-12 PROCEDURE — 96361 HYDRATE IV INFUSION ADD-ON: CPT

## 2024-07-12 PROCEDURE — 86850 RBC ANTIBODY SCREEN: CPT

## 2024-07-12 PROCEDURE — 74177 CT ABD & PELVIS W/CONTRAST: CPT

## 2024-07-12 PROCEDURE — 36415 COLL VENOUS BLD VENIPUNCTURE: CPT | Performed by: EMERGENCY MEDICINE

## 2024-07-12 PROCEDURE — 85027 COMPLETE CBC AUTOMATED: CPT | Performed by: EMERGENCY MEDICINE

## 2024-07-12 PROCEDURE — 0TCC8ZZ EXTIRPATION OF MATTER FROM BLADDER NECK, VIA NATURAL OR ARTIFICIAL OPENING ENDOSCOPIC: ICD-10-PCS | Performed by: UROLOGY

## 2024-07-12 PROCEDURE — 74420 UROGRAPHY RTRGR +-KUB: CPT

## 2024-07-12 PROCEDURE — 83605 ASSAY OF LACTIC ACID: CPT | Performed by: EMERGENCY MEDICINE

## 2024-07-12 PROCEDURE — 80053 COMPREHEN METABOLIC PANEL: CPT | Performed by: EMERGENCY MEDICINE

## 2024-07-12 PROCEDURE — 30233N1 TRANSFUSION OF NONAUTOLOGOUS RED BLOOD CELLS INTO PERIPHERAL VEIN, PERCUTANEOUS APPROACH: ICD-10-PCS | Performed by: INTERNAL MEDICINE

## 2024-07-12 PROCEDURE — 85007 BL SMEAR W/DIFF WBC COUNT: CPT | Performed by: EMERGENCY MEDICINE

## 2024-07-12 PROCEDURE — 88307 TISSUE EXAM BY PATHOLOGIST: CPT | Performed by: PATHOLOGY

## 2024-07-12 PROCEDURE — 86850 RBC ANTIBODY SCREEN: CPT | Performed by: UROLOGY

## 2024-07-12 PROCEDURE — 52234 CYSTOSCOPY AND TREATMENT: CPT | Performed by: UROLOGY

## 2024-07-12 PROCEDURE — P9016 RBC LEUKOCYTES REDUCED: HCPCS

## 2024-07-12 PROCEDURE — 87154 CUL TYP ID BLD PTHGN 6+ TRGT: CPT | Performed by: EMERGENCY MEDICINE

## 2024-07-12 PROCEDURE — 87077 CULTURE AEROBIC IDENTIFY: CPT | Performed by: EMERGENCY MEDICINE

## 2024-07-12 PROCEDURE — 99291 CRITICAL CARE FIRST HOUR: CPT | Performed by: PSYCHIATRY & NEUROLOGY

## 2024-07-12 PROCEDURE — 52332 CYSTOSCOPY AND TREATMENT: CPT | Performed by: UROLOGY

## 2024-07-12 PROCEDURE — 99285 EMERGENCY DEPT VISIT HI MDM: CPT

## 2024-07-12 PROCEDURE — 86920 COMPATIBILITY TEST SPIN: CPT

## 2024-07-12 PROCEDURE — 87186 SC STD MICRODIL/AGAR DIL: CPT | Performed by: EMERGENCY MEDICINE

## 2024-07-12 PROCEDURE — 87040 BLOOD CULTURE FOR BACTERIA: CPT | Performed by: EMERGENCY MEDICINE

## 2024-07-12 PROCEDURE — 99291 CRITICAL CARE FIRST HOUR: CPT | Performed by: EMERGENCY MEDICINE

## 2024-07-12 PROCEDURE — 87086 URINE CULTURE/COLONY COUNT: CPT | Performed by: EMERGENCY MEDICINE

## 2024-07-12 PROCEDURE — 85610 PROTHROMBIN TIME: CPT | Performed by: EMERGENCY MEDICINE

## 2024-07-12 PROCEDURE — 88342 IMHCHEM/IMCYTCHM 1ST ANTB: CPT | Performed by: PATHOLOGY

## 2024-07-12 PROCEDURE — 88341 IMHCHEM/IMCYTCHM EA ADD ANTB: CPT | Performed by: PATHOLOGY

## 2024-07-12 PROCEDURE — C2625 STENT, NON-COR, TEM W/DEL SY: HCPCS | Performed by: UROLOGY

## 2024-07-12 PROCEDURE — 86900 BLOOD TYPING SEROLOGIC ABO: CPT

## 2024-07-12 PROCEDURE — 86901 BLOOD TYPING SEROLOGIC RH(D): CPT

## 2024-07-12 PROCEDURE — 86900 BLOOD TYPING SEROLOGIC ABO: CPT | Performed by: UROLOGY

## 2024-07-12 PROCEDURE — 96375 TX/PRO/DX INJ NEW DRUG ADDON: CPT

## 2024-07-12 PROCEDURE — 96365 THER/PROPH/DIAG IV INF INIT: CPT

## 2024-07-12 PROCEDURE — 36430 TRANSFUSION BLD/BLD COMPNT: CPT

## 2024-07-12 PROCEDURE — 81001 URINALYSIS AUTO W/SCOPE: CPT | Performed by: EMERGENCY MEDICINE

## 2024-07-12 PROCEDURE — 93005 ELECTROCARDIOGRAM TRACING: CPT

## 2024-07-12 PROCEDURE — 86901 BLOOD TYPING SEROLOGIC RH(D): CPT | Performed by: UROLOGY

## 2024-07-12 PROCEDURE — 85730 THROMBOPLASTIN TIME PARTIAL: CPT | Performed by: UROLOGY

## 2024-07-12 PROCEDURE — 0T7C8DZ DILATION OF BLADDER NECK WITH INTRALUMINAL DEVICE, VIA NATURAL OR ARTIFICIAL OPENING ENDOSCOPIC: ICD-10-PCS | Performed by: UROLOGY

## 2024-07-12 DEVICE — INLAY OPTIMA URETERAL STENT W/O GUIDEWIRE
Type: IMPLANTABLE DEVICE | Site: URETER | Status: FUNCTIONAL
Brand: BARD® INLAY OPTIMA® URETERAL STENT

## 2024-07-12 RX ORDER — PRAVASTATIN SODIUM 40 MG
40 TABLET ORAL
Status: DISCONTINUED | OUTPATIENT
Start: 2024-07-13 | End: 2024-07-23 | Stop reason: HOSPADM

## 2024-07-12 RX ORDER — CHLORHEXIDINE GLUCONATE ORAL RINSE 1.2 MG/ML
15 SOLUTION DENTAL EVERY 12 HOURS SCHEDULED
Status: DISCONTINUED | OUTPATIENT
Start: 2024-07-12 | End: 2024-07-13

## 2024-07-12 RX ORDER — ALPRAZOLAM 0.5 MG/1
0.5 TABLET ORAL 3 TIMES DAILY PRN
Status: DISCONTINUED | OUTPATIENT
Start: 2024-07-12 | End: 2024-07-17

## 2024-07-12 RX ORDER — MAGNESIUM HYDROXIDE 1200 MG/15ML
LIQUID ORAL AS NEEDED
Status: DISCONTINUED | OUTPATIENT
Start: 2024-07-12 | End: 2024-07-12 | Stop reason: HOSPADM

## 2024-07-12 RX ORDER — PHENYLEPHRINE HCL IN 0.9% NACL 1 MG/10 ML
SYRINGE (ML) INTRAVENOUS AS NEEDED
Status: DISCONTINUED | OUTPATIENT
Start: 2024-07-12 | End: 2024-07-12

## 2024-07-12 RX ORDER — SENNOSIDES 8.6 MG
650 CAPSULE ORAL EVERY 8 HOURS PRN
Qty: 30 TABLET | Refills: 0 | Status: SHIPPED | OUTPATIENT
Start: 2024-07-12

## 2024-07-12 RX ORDER — ACETAMINOPHEN 325 MG/1
650 TABLET ORAL EVERY 6 HOURS PRN
Status: DISCONTINUED | OUTPATIENT
Start: 2024-07-12 | End: 2024-07-12 | Stop reason: HOSPADM

## 2024-07-12 RX ORDER — LIDOCAINE HYDROCHLORIDE 20 MG/ML
1 JELLY TOPICAL DAILY PRN
Status: DISCONTINUED | OUTPATIENT
Start: 2024-07-12 | End: 2024-07-23 | Stop reason: HOSPADM

## 2024-07-12 RX ORDER — SODIUM CHLORIDE, SODIUM LACTATE, POTASSIUM CHLORIDE, CALCIUM CHLORIDE 600; 310; 30; 20 MG/100ML; MG/100ML; MG/100ML; MG/100ML
125 INJECTION, SOLUTION INTRAVENOUS CONTINUOUS
Status: DISCONTINUED | OUTPATIENT
Start: 2024-07-12 | End: 2024-07-12 | Stop reason: HOSPADM

## 2024-07-12 RX ORDER — ONDANSETRON 2 MG/ML
INJECTION INTRAMUSCULAR; INTRAVENOUS AS NEEDED
Status: DISCONTINUED | OUTPATIENT
Start: 2024-07-12 | End: 2024-07-12

## 2024-07-12 RX ORDER — FENTANYL CITRATE 50 UG/ML
INJECTION, SOLUTION INTRAMUSCULAR; INTRAVENOUS AS NEEDED
Status: DISCONTINUED | OUTPATIENT
Start: 2024-07-12 | End: 2024-07-12

## 2024-07-12 RX ORDER — FENTANYL CITRATE/PF 50 MCG/ML
25 SYRINGE (ML) INJECTION
Status: DISCONTINUED | OUTPATIENT
Start: 2024-07-12 | End: 2024-07-12 | Stop reason: HOSPADM

## 2024-07-12 RX ORDER — ROCURONIUM BROMIDE 10 MG/ML
INJECTION, SOLUTION INTRAVENOUS AS NEEDED
Status: DISCONTINUED | OUTPATIENT
Start: 2024-07-12 | End: 2024-07-12

## 2024-07-12 RX ORDER — LIDOCAINE HYDROCHLORIDE 20 MG/ML
INJECTION, SOLUTION EPIDURAL; INFILTRATION; INTRACAUDAL; PERINEURAL AS NEEDED
Status: DISCONTINUED | OUTPATIENT
Start: 2024-07-12 | End: 2024-07-12

## 2024-07-12 RX ORDER — ACETAMINOPHEN 325 MG/1
975 TABLET ORAL ONCE
Status: COMPLETED | OUTPATIENT
Start: 2024-07-12 | End: 2024-07-12

## 2024-07-12 RX ORDER — CEFAZOLIN SODIUM 2 G/50ML
2000 SOLUTION INTRAVENOUS ONCE
Status: COMPLETED | OUTPATIENT
Start: 2024-07-12 | End: 2024-07-12

## 2024-07-12 RX ORDER — ONDANSETRON 2 MG/ML
4 INJECTION INTRAMUSCULAR; INTRAVENOUS ONCE AS NEEDED
Status: DISCONTINUED | OUTPATIENT
Start: 2024-07-12 | End: 2024-07-12 | Stop reason: HOSPADM

## 2024-07-12 RX ORDER — OXYBUTYNIN CHLORIDE 5 MG/1
5 TABLET ORAL 3 TIMES DAILY PRN
Status: DISCONTINUED | OUTPATIENT
Start: 2024-07-12 | End: 2024-07-14

## 2024-07-12 RX ORDER — PROPOFOL 10 MG/ML
INJECTION, EMULSION INTRAVENOUS AS NEEDED
Status: DISCONTINUED | OUTPATIENT
Start: 2024-07-12 | End: 2024-07-12

## 2024-07-12 RX ADMIN — NOREPINEPHRINE BITARTRATE 5 MCG/MIN: 1 INJECTION, SOLUTION, CONCENTRATE INTRAVENOUS at 22:11

## 2024-07-12 RX ADMIN — ONDANSETRON 4 MG: 2 INJECTION INTRAMUSCULAR; INTRAVENOUS at 13:30

## 2024-07-12 RX ADMIN — SUGAMMADEX 200 MG: 100 INJECTION, SOLUTION INTRAVENOUS at 13:41

## 2024-07-12 RX ADMIN — Medication 100 MCG: at 13:20

## 2024-07-12 RX ADMIN — SODIUM CHLORIDE, SODIUM LACTATE, POTASSIUM CHLORIDE, AND CALCIUM CHLORIDE: .6; .31; .03; .02 INJECTION, SOLUTION INTRAVENOUS at 13:55

## 2024-07-12 RX ADMIN — FENTANYL CITRATE 100 MCG: 50 INJECTION INTRAMUSCULAR; INTRAVENOUS at 13:13

## 2024-07-12 RX ADMIN — SODIUM CHLORIDE 1000 ML: 0.9 INJECTION, SOLUTION INTRAVENOUS at 19:33

## 2024-07-12 RX ADMIN — CEFAZOLIN SODIUM 2000 MG: 2 SOLUTION INTRAVENOUS at 13:16

## 2024-07-12 RX ADMIN — PROPOFOL 50 MG: 10 INJECTION, EMULSION INTRAVENOUS at 13:44

## 2024-07-12 RX ADMIN — Medication 150 MCG: at 13:33

## 2024-07-12 RX ADMIN — PIPERACILLIN SODIUM AND TAZOBACTAM SODIUM 4.5 G: 36; 4.5 INJECTION, POWDER, LYOPHILIZED, FOR SOLUTION INTRAVENOUS at 21:20

## 2024-07-12 RX ADMIN — LIDOCAINE HYDROCHLORIDE 80 MG: 20 INJECTION, SOLUTION EPIDURAL; INFILTRATION; INTRACAUDAL at 13:13

## 2024-07-12 RX ADMIN — IOHEXOL 85 ML: 350 INJECTION, SOLUTION INTRAVENOUS at 20:58

## 2024-07-12 RX ADMIN — Medication 100 MCG: at 13:18

## 2024-07-12 RX ADMIN — Medication 150 MCG: at 13:26

## 2024-07-12 RX ADMIN — SODIUM CHLORIDE, SODIUM LACTATE, POTASSIUM CHLORIDE, AND CALCIUM CHLORIDE 125 ML/HR: .6; .31; .03; .02 INJECTION, SOLUTION INTRAVENOUS at 10:49

## 2024-07-12 RX ADMIN — ROCURONIUM BROMIDE 50 MG: 10 INJECTION, SOLUTION INTRAVENOUS at 13:13

## 2024-07-12 RX ADMIN — METOPROLOL TARTRATE 25 MG: 25 TABLET, FILM COATED ORAL at 12:12

## 2024-07-12 RX ADMIN — ACETAMINOPHEN 650 MG: 325 TABLET, FILM COATED ORAL at 15:37

## 2024-07-12 RX ADMIN — SODIUM CHLORIDE 1000 ML: 0.9 INJECTION, SOLUTION INTRAVENOUS at 20:22

## 2024-07-12 RX ADMIN — ACETAMINOPHEN 975 MG: 325 TABLET, FILM COATED ORAL at 19:36

## 2024-07-12 RX ADMIN — PROPOFOL 150 MG: 10 INJECTION, EMULSION INTRAVENOUS at 13:13

## 2024-07-12 NOTE — ED PROVIDER NOTES
History  Chief Complaint  Patient presents with    Fever  Pt had bladder procedure today and blood transfusion on Thursday. EMS states pt febrile, pale, cool/clammy, dizziness, and weak since procedure today. Denies SOB, chest pain. EMS gave 1L NSS    Patient is an 84-year-old male presenting for evaluation of generalized malaise and fever.  Had TURBT procedure with urology earlier today was discharged home.  Upon arrival home began to develop fever and malaise.  Per EMS patient was pale cool and clammy gave 1 L normal saline.  Patient is denying any shortness of breath chest pain abdominal pain nausea vomiting or any other complaints          Prior to Admission Medications  Prescriptions  Last Dose  Informant  Patient Reported?  Taking?  ALPRAZolam (XANAX) 0.5 mg tablet  No  No  Sig: Take 1 tablet (0.5 mg total) by mouth 3 (three) times a day as needed for anxiety  Lancets MISC  Self, Spouse/Significant Other  Yes  No  Sig: by Does not apply route  Multiple Vitamins-Minerals (PreserVision AREDS 2) CAPS  Self, Spouse/Significant Other  Yes  No  acetaminophen (TYLENOL) 650 mg CR tablet  No  No  Sig: Take 1 tablet (650 mg total) by mouth every 8 (eight) hours as needed for mild pain  metoprolol tartrate (LOPRESSOR) 50 mg tablet  No  No  Sig: TAKE 1 TABLET BY MOUTH TWICE A DAY  rosuvastatin (CRESTOR) 5 mg tablet  Self, Spouse/Significant Other  No  No  Sig: TAKE 1 TABLET BY MOUTH EVERY DAY  warfarin (COUMADIN) 1 mg tablet  No  No  Sig: TAKE 2 TABLETS DAILY -COMBINE WITH 5MG  warfarin (COUMADIN) 5 mg tablet  No  No  Sig: TAKE 1 TABLET BY MOUTH EVERY DAY, combine with 1-2mg tab as directed (alternates between 6mg and 7mg QOD)  Facility-Administered Medications: None      Past Medical History:  Diagnosis  Date    Ambulates with cane    Anemia  last assessed: 05/23/2015    GERD (gastroesophageal reflux disease)    Hematuria    Hyperlipidemia    Hypertension    Intracerebral hemorrhage (HCC)  last assessed: 04/21/2014;  right    Intracerebral hemorrhage (HCC)  last assessed: 11/23/2017    Mobility impaired    Stroke syndrome  last assessed: 06/13/2013    UTI (urinary tract infection)      Past Surgical History:  Procedure  Laterality  Date    BLADDER SURGERY    IR NEPHROSTOMY TUBE PLACEMENT  7/27/2023    AK CYSTOURETHROSCOPY W/DEST &/RMVL TUMOR LARGE  N/A  7/26/2023  Procedure: (TURBT);  Surgeon: Tha Carcamo MD;  Location: [facility];  Service: Urology      Family History  Problem  Relation  Age of Onset    Lung cancer  Mother    I have reviewed and agree with the history as documented.    E-Cigarette/Vaping    E-Cigarette Use  Never User    E-Cigarette/Vaping Substances    Nicotine  No    THC  No    CBD  No    Flavoring  No    Other  No    Unknown  No    Social History    Tobacco Use    Smoking status:  Every Day  Types:  Cigars    Smokeless tobacco:  Never    Tobacco comments:  Last cigar 7/10.24  Vaping Use    Vaping status:  Never Used  Substance Use Topics    Alcohol use:  Yes  Comment: Social     Drug use:  No       Review of Systems   Constitutional:  Positive for fatigue and fever. Negative for chills.   HENT:  Negative for ear pain and sore throat.    Eyes:  Negative for pain and visual disturbance.   Respiratory:  Negative for cough and shortness of breath.    Cardiovascular:  Negative for chest pain and palpitations.   Gastrointestinal:  Negative for abdominal pain and vomiting.   Genitourinary:  Negative for dysuria and hematuria.   Musculoskeletal:  Negative for arthralgias and back pain.   Skin:  Negative for color change and rash.   Neurological:  Negative for seizures and syncope.   All other systems reviewed and are negative.      Physical Exam  ED Triage Vitals  Temperature  Pulse  Respirations  Blood Pressure  SpO2  07/12/24 1904  07/12/24 1904  07/12/24 1904  07/12/24 1904  07/12/24 1904  (!) 101.9 °F (38.8 °C)  (!) 129  17  102/51  97 %  Temp Source  Heart Rate Source  Patient Position - Orthostatic VS  BP  Location  FiO2 (%)  07/12/24 1904  07/12/24 1904  07/12/24 1945  07/12/24 1904  --  Oral  Monitor  Lying  Left arm  Pain Score  07/12/24 1904  No Pain        Orthostatic Vital Signs  Vitals:  07/12/24 2000 07/12/24 2030  07/12/24 2120  07/12/24 2200  BP:  (!) 85/49  (!) 95/48  (S) (!) 86/50  (S) (!) 72/44  Pulse:  (!) 132  (!) 152  (!) 137  (!) 118  Patient Position - Orthostatic VS:  Lying  Lying  Sitting  Sitting      Physical Exam  Vitals and nursing note reviewed.   Constitutional:       General: He is not in acute distress.     Appearance: He is well-developed. He is ill-appearing and diaphoretic.   HENT:      Head: Normocephalic and atraumatic.      Mouth/Throat:      Mouth: Mucous membranes are moist.   Eyes:      Conjunctiva/sclera: Conjunctivae normal.   Cardiovascular:      Rate and Rhythm: Tachycardia present. Rhythm irregular.   Pulmonary:      Effort: Pulmonary effort is normal. No respiratory distress.      Breath sounds: Normal breath sounds.   Abdominal:      Palpations: Abdomen is soft.      Tenderness: There is no abdominal tenderness. There is no guarding or rebound.   Musculoskeletal:         General: Normal range of motion.      Cervical back: Normal range of motion and neck supple.      Right lower leg: No edema.      Left lower leg: No edema.   Skin:     General: Skin is warm.      Coloration: Skin is pale.   Neurological:      General: No focal deficit present.      Mental Status: He is alert.         ED Medications  Medications  norepinephrine (LEVOPHED) 4 mg (STANDARD CONCENTRATION) IV in sodium chloride 0.9% 250 mL (has no administration in time range)  sodium chloride 0.9 % bolus 1,000 mL (0 mL Intravenous Stopped 7/12/24 2021)  acetaminophen (TYLENOL) tablet 975 mg (975 mg Oral Given 7/12/24 1936)  sodium chloride 0.9 % bolus 1,000 mL (0 mL Intravenous Stopped 7/12/24 2156)  piperacillin-tazobactam (ZOSYN) 4.5 g in sodium chloride 0.9 % 100 mL IVPB (0 g Intravenous Stopped 7/12/24  2155)  iohexol (OMNIPAQUE) 350 MG/ML injection (SINGLE-DOSE) 85 mL (85 mL Intravenous Given 7/12/24 2058)      Diagnostic Studies  Results Reviewed     Procedure  Component  Value  Units  Date/Time  RBC Morphology Reflex Test [[id number]]  Collected: 07/12/24 1932  Lab Status: Final result  Specimen: Blood from Arm, Left  Updated: 07/12/24 2201  Urine Microscopic [[id number]]  (Abnormal)  Collected: 07/12/24 2031  Lab Status: Final result  Specimen: Urine, Clean Catch  Updated: 07/12/24 2153  RBC, UA  Innumerable  /hpf  WBC, UA  Innumerable  /hpf  Epithelial Cells  None Seen  /hpf  Bacteria, UA  None Seen  /hpf  WBC Clumps  Present  Urine culture [[id number]]  Collected: 07/12/24 2031  Lab Status: In process  Specimen: Urine, Clean Catch  Updated: 07/12/24 2153  UA w Reflex to Microscopic w Reflex to Culture [[id number]]  (Abnormal)  Collected: 07/12/24 2031  Lab Status: Final result  Specimen: Urine, Clean Catch  Updated: 07/12/24 2149  Color, UA  Dark Red  Clarity, UA  Extra Turbid  Specific Gravity, UA  1.025  pH, UA  6.5  Leukocytes, UA  Moderate  Nitrite, UA  Negative  Protein, UA  300 (3+)  mg/dl  Glucose, UA  Negative  mg/dl  Ketones, UA  Negative  mg/dl  Urobilinogen, UA  <2.0  mg/dl  Bilirubin, UA  Negative  Occult Blood, UA  Large  Manual Differential(PHLEBS Do Not Order) [[id number]]  (Abnormal)  Collected: 07/12/24 1932  Lab Status: Final result  Specimen: Blood from Arm, Left  Updated: 07/12/24 2131  Segmented %  75  %  Bands %  21  %  Lymphocytes %  2  %  Monocytes %  1  %  Eosinophils %  0  %  Basophils %  0  %  Atypical Lymphocytes %  1  %  Absolute Neutrophils  9.25  Thousand/uL  Absolute Lymphocytes  0.29  Thousand/uL  Absolute Monocytes  0.10  Thousand/uL  Absolute Eosinophils  0.00  Thousand/uL  Absolute Basophils  0.00  Thousand/uL  Total Counted  --  RBC Morphology  Present  Platelet Estimate  Adequate  Giant  PLTs  Present  Anisocytosis  Present  Macrocytes  Present  Poikilocytes  Present  Polychromasia  Present  CBC and differential [[id number]]  (Abnormal)  Collected: 07/12/24 1932  Lab Status: Final result  Specimen: Blood from Arm, Left  Updated: 07/12/24 2131  WBC  9.64  Thousand/uL  RBC  1.87  Million/uL  Hemoglobin  5.8  g/dL  Hematocrit  18.7  %  MCV  100  fL  MCH  31.0  pg  MCHC  31.0  g/dL  RDW  14.9  %  MPV  9.1  fL  Platelets  230  Thousands/uL  Narrative:    This is an appended report.  These results have been appended to a previously verified report.  Lactic acid, plasma (w/reflex if result > 2.0) [[id number]]  (Abnormal)  Collected: 07/12/24 1932  Lab Status: Final result  Specimen: Blood from Arm, Left  Updated: 07/12/24 2020  LACTIC ACID  2.9  mmol/L  Narrative:    Result may be elevated if tourniquet was used during collection.  Lactic acid 2 Hours [[id number]]  Lab Status: No result  Specimen: Blood  Comprehensive metabolic panel [[id number]]  (Abnormal)  Collected: 07/12/24 1932  Lab Status: Final result  Specimen: Blood from Arm, Left  Updated: 07/12/24 2020  Sodium  138  mmol/L  Potassium  4.2  mmol/L  Chloride  111  mmol/L  CO2  21  mmol/L  ANION GAP  6  mmol/L  BUN  22  mg/dL  Creatinine  1.66  mg/dL  Glucose  97  mg/dL  Calcium  7.9  mg/dL  Corrected Calcium  8.6  mg/dL  AST  10  U/L  ALT  6  U/L  Alkaline Phosphatase  67  U/L  Total Protein  5.7  g/dL  Albumin  3.1  g/dL  Total Bilirubin  0.63  mg/dL  eGFR  37  ml/min/1.73sq m  Narrative:    National Kid[employer] Foundation guidelines for Chronic Kidney Disease (CKD):     Stage 1 with normal or high GFR (GFR > 90 mL/min/1.73 square meters)    Stage 2 Mild CKD (GFR = 60-89 mL/min/1.73 square meters)    Stage 3A Moderate CKD (GFR = 45-59 mL/min/1.73 square meters)    Stage 3B Moderate CKD (GFR = 30-44 mL/min/1.73 square meters)    Stage 4 Severe CKD (GFR = 15-29 mL/min/1.73 square meters)    Stage 5 End Stage CKD (GFR <15 mL/min/1.73 square  meters)  Note: GFR calculation is accurate only with a steady state creatinine  Blood culture #2 [[id number]]  Collected: 07/12/24 1932  Lab Status: In process  Specimen: Blood from Arm, Left  Updated: 07/12/24 1952  Blood culture #1 [[id number]]  Collected: 07/12/24 1932  Lab Status: In process  Specimen: Blood from Arm, Left  Updated: 07/12/24 1952               CT abdomen pelvis with contrast    (Results Pending)        Procedures  Procedures      ED Course               Identification of Seniors at Risk     Flowsheet Row  Most Recent Value  (ISAR) Identification of Seniors at Risk  Before the illness or injury that brought you to the Emergency, did you need someone to help you on a regular basis?  0 Filed at: 07/12/2024 1902  In the last 24 hours, have you needed more help than usual?  0 Filed at: 07/12/2024 1902  Have you been hospitalized for one or more nights during the past 6 months?  0 Filed at: 07/12/2024 1902  In general, do you see well?  0 Filed at: 07/12/2024 1902  In general, do you have serious problems with your memory?  0 Filed at: 07/12/2024 1902  Do you take more than three different medications every day?  1 Filed at: 07/12/2024 1902  ISAR Score  1 Filed at: 07/12/2024 1902              Initial Sepsis Screening   Row Name  07/12/24 8476  Is the patient's history suggestive of a new or worsening infection?  Yes (Proceed)  -BK  Suspected source of infection  urinary tract infection  -BK  Indicate SIRS criteria  Tachycardia > 90 bpm;Leukocytosis (WBC > 60556 IJL) OR Leukopenia (WBC <4000 IJL) OR Bandemia (WBC >10% bands)  -BK  Are two or more of the above signs & symptoms of infection both present and new to the patient?  Yes (Proceed)  -BK  Assess for evidence of organ dysfunction: Are any of the below criteria present within 6 hours of suspected infection and SIRS criteria that are NOT considered to be chronic conditions?  SBP < 90  -BK  Date of presentation of septic shock  07/12/24  -BK  Time of  "presentation of septic shock  2000  -BK  Fluid Resuscitation:  30 ml/kg IV fluid bolus will be given based on actual body weight  -BK  Is the patient is persistently hypotensive in the hour after fluid bolus administration? If yes, patient meets criteria for vasopressor use.  YES  -BK  Sepsis Note: Click \"NEXT\" below (NOT \"close\") to generate sepsis note based on above information.  YES (proceed by clicking \"NEXT\")  -BK    User Key   (r) = Recorded By, (t) = Taken By, (c) = Cosigned By  Initials  Name  Provider Type  JERED León DO  Physician      Default Flowsheet Data (Last 720 Hours)     Sepsis Reassess   Row Name  07/12/24 2158  Repeat Volume Status and Tissue Perfusion Assessment Performed  Date of Reassessment:  07/12/24  -  Time of Reassessment:  2100 -BK  Sepsis Reassessment Note: Click \"NEXT\" below (NOT \"close\") to generate sepsis reassessment note.  YES (proceed by clicking \"NEXT\")  -BK  Repeat Volume Status and Tissue Perfusion Assessment Performed  --    User Key   (r) = Recorded By, (t) = Taken By, (c) = Cosigned By  Initials  Name  Provider Type  JERED León,   Physician      SBIRT 22yo+     Flowsheet Row  Most Recent Value  Initial Alcohol Screen: US AUDIT-C   1. How often do you have a drink containing alcohol?  0 Filed at: 07/12/2024 1902  2. How many drinks containing alcohol do you have on a typical day you are drinking?   0 Filed at: 07/12/2024 1902  3a. Male UNDER 65: How often do you have five or more drinks on one occasion?  0 Filed at: 07/12/2024 1902  3b. FEMALE Any Age, or MALE 65+: How often do you have 4 or more drinks on one occassion?  0 Filed at: 07/12/2024 1902  Audit-C Score  0 Filed at: 07/12/2024 1902  LANETTE: How many times in the past year have you...  Used an illegal drug or used a prescription medication for non-medical reasons?  Never Filed at: 07/12/2024 1902              Medical Decision Making  Patient is an 84-year-old male presenting for " evaluation of fever and malaise    Differential: Sepsis from UTI versus postprocedural intra-abdominal versus bowel perforation    Plan: Will obtain labs CT abdomen pelvis give fluids for hypotension monitor and reassess.  Patient to be admitted    Patient received 2 L normal saline with persistence of hypotension.  Shock likely multifactorial infectious and hypovolemic.  Will initiate empiric antibiotics.  Labs notable for hemoglobin of 5.8.  Had transfusion last week preprocedurally.  Hemoglobin at that time was 8.  Patient has not had a signs of bleeding.  Does take warfarin.  Will transfuse 2 units PRBC.  Levophed initiated pending blood for hemodynamic support    CT pending.  Discussed with ICU attending who accepts patient for admission.        Amount and/or Complexity of Data Reviewed  Labs: ordered.  Radiology: ordered.    Risk  OTC drugs.  Prescription drug management.  Decision regarding hospitalization.          Disposition  Final diagnoses:  Anemia  H/O transurethral resection of bladder tumor (TURBT)    Time reflects when diagnosis was documented in both MDM as applicable and the Disposition within this note     Time  User  Action  Codes  Description  Comment  7/12/2024  9:56 PM  Germán Schmidt  [D64.9]  Anemia  7/12/2024  9:56 PM  Germán Schmidt  Add  [Z98.890,  Z86.03]  H/O transurethral resection of bladder tumor (TURBT)      ED Disposition     ED Disposition  Admit  Condition  Stable  Date/Time  Fri Jul 12, 2024 2156  Comment      Follow-up Information   None      Patient's Medications  Discharge Prescriptions  No medications on file    No discharge procedures on file.    PDMP Review        Value  Time  User  PDMP Reviewed   Yes  6/7/2024  2:46 PM  Chantel Santos DO         ED Provider  Attending physically available and evaluated Francis Caballero. I managed the patient along with the ED Attending.    Electronically Signed by           Germán Schmidt DO  07/14/24 6190

## 2024-07-12 NOTE — TELEPHONE ENCOUNTER
Had right ureteral stent exchange and TURBT of tumor at bladder neck.  Only a small amount of tumor at bladder neck and right trigone. Most of bladder normal in appearance.  Will need follow up for right ureteral stent exchange in 3-4 months with management per Dr Carcamo.

## 2024-07-12 NOTE — OP NOTE
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 clot evacuation  CYSTOSCOPY EVACUATION OF CLOTS    Specimen(s):  ID Type Source Tests Collected by Time Destination   1 : bladder neck Tissue Urinary Bladder TISSUE EXAM Craig Simon MD 2024 1341        Estimated Blood Loss:   Minimal    Drains:  Nephrostomy Right 8 Fr. (Active)   Number of days: 351       Urethral Catheter Latex 22 Fr. (Active)   Number of days: 352       Urethral Catheter Non-latex 20 Fr. 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  The patient was then placed in the lithotomy position and prepped and draped using standard sterile technique.  All pressure points were carefully padded.  A surgical time-out occurred, antibiotics were administered, and thromboembolism prophylaxis was given.  A 26 F saline resectoscope was inserted into the urethra after dilation of the urethral meatus to 28 F using standard urethral sounds. The urethra and bladder were carefully inspected.     Cystoscopy findings:    Urethra:  Normal  Prostate:  Normal  Bladder:  Lesion identified, characteristics below  Ureteral  orifices: JJ stent protruding from right ureteral orifice, normal orthotopic left ureteral orifice   Urachus:   Normal    Sessile erythematous tumor along posterior bladder neck and right trigone.      Bladder tumor resection:    Using a 26-Irish Olympus continuous flow resectoscope, all abnormal lesions noted above were resected and sent for pathology.  Resection craters and surrounding urothelium was electrofulgurated and hemostasis was achieved.  Each section was submitted for pathology as documented above.  The bladder was then observed multiple cycles of filling and emptying hemostasis was deemed to be excellent prior to terminating the procedure.    The JJ stent in the right ureter was removed through the urethra with a flexible grasper.  A sensor wire was passed through the stent and up in to the right collecting system as confirmed on fluoroscopy.  A retrograde pyelogram showed mild fullness of the right renal collecting system.  A new 6-Irish x 26 cm JJ stent was then passed over the wire with a curl in the upper pole of the kidney and a complete curl in the bladder.      The bladder was again examined at the end of the case and the left ureteral orifice was visualized with clear efflux.    A 20-Irish 2 way Gracia was placed and the patient was awakened from anesthesia and brought to the recovery room in stable condition.     Patient Disposition:  PACU       Plan;  If urine is clear in the PACU will remove Gracia and void trial prior to discharge home.      Will need right JJ stent exchange in 3-4 months.        SIGNATURE: Craig Simon MD  DATE: July 12, 2024  TIME: 1:54 PM

## 2024-07-12 NOTE — ANESTHESIA PREPROCEDURE EVALUATION
Procedure:  (TURBT) cautery bleeding, clot evacuation (Bladder)  CYSTOSCOPY EVACUATION OF CLOTS (Bladder)    Relevant Problems   CARDIO   (+) Benign essential hypertension   (+) Coronary arteriosclerosis   (+) Essential hypertension   (+) Hyperlipidemia   (+) Mitral regurgitation   (+) Paroxysmal atrial fibrillation (HCC)   (+) Pure hypercholesterolemia      GI/HEPATIC   (+) Gastroesophageal reflux disease without esophagitis   (+) Hiatal hernia      /RENAL   (+) Benign prostatic hyperplasia   (+) Other hydronephrosis   (+) Stage 3b chronic kidney disease (HCC)      HEMATOLOGY   (+) Anemia   (+) Coagulation disorder (HCC) (Coumadin use)      MUSCULOSKELETAL   (+) Chronic bilateral low back pain with right-sided sciatica   (+) DDD (degenerative disc disease), lumbar   (+) Hiatal hernia   (+) Osteoarthritis of lumbar spine   (+) Primary osteoarthritis of left hip      NEURO/PSYCH   (+) Anxiety   (+) CVA (cerebral vascular accident) (HCC)   (+) Chronic bilateral low back pain with right-sided sciatica   (+) Intractable epilepsy without status epilepticus, unspecified epilepsy type (HCC) (Seizures 2011 with CVA)      PULMONARY   (+) JORGE LUIS (obstructive sleep apnea)   (+) Smoker      Care Coordination   (+) Frequent falls   (+) Gait instability      Oncology   (+) Malignant 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 (HCC)      Rheumatology   (+) Posterior vitreous detachment of both eyes      Other   (+) Chronic anticoagulation (Coumadin held)      OSH cards ote excerpt: [id number] LVEF 60%, mild MR, mild TR    Stress 2011: normal perfusion EF 60%       Physical Exam    Airway    Mallampati score: II         Dental       Cardiovascular  Rhythm: irregular, Rate: normal    Pulmonary   Breath sounds clear to auscultation    Other Findings        Anesthesia Plan  ASA Score- 3     Anesthesia Type- general with ASA Monitors.         Additional Monitors:     Airway Plan: LMA.    Comment: Beeta blocker ordered in SDS.       Plan  Factors-Exercise tolerance (METS): <4 METS.    Chart reviewed.   Existing labs reviewed.     Patient is a current smoker.  Patient instructed to abstain from smoking on day of procedure. Patient did not smoke on day of surgery.    Obstructive sleep apnea risk education given perioperatively.        Induction- intravenous.    Postoperative Plan- Plan for postoperative opioid use.     Perioperative Resuscitation Plan - Level 1 - Full Code.       Informed Consent- Anesthetic plan and risks discussed with patient.

## 2024-07-12 NOTE — INTERVAL H&P NOTE
H&P reviewed. After examining the patient I find no changes in the patients condition since the H&P had been written.    Vitals:    07/12/24 1032   BP: 140/83   Pulse: 102   Resp: 16   Temp: 97.9 °F (36.6 °C)   SpO2: 100%

## 2024-07-12 NOTE — DISCHARGE INSTR - AVS FIRST PAGE
Francis, janet right ureteral stent was exchanged.    Some bladder tumor around the bladder neck was resected and fulgurated.     You can restart your warfarin in 2 days if the urine is not too bloody.      You will need your right stent exchanged in another 3-4 months.

## 2024-07-12 NOTE — ANESTHESIA POSTPROCEDURE EVALUATION
Post-Op Assessment Note    Pain Score: 0    Pain management: adequate       Mental Status:  Sleepy and arousable   Hydration Status:  Stable   PONV Controlled:  None   Airway Patency:  Patent  Airway: intubated     Post Op Vitals Reviewed: Yes    No anethesia notable event occurred.    Staff: CRNA               BP      Temp      Pulse     Resp      SpO2

## 2024-07-12 NOTE — ANESTHESIA POSTPROCEDURE EVALUATION
Post-Op Assessment Note    CV Status:  Stable    Pain management: adequate       Mental Status:  Alert and awake   Hydration Status:  Euvolemic   PONV Controlled:  Controlled   Airway Patency:  Patent  Two or more mitigation strategies used for obstructive sleep apnea   Post Op Vitals Reviewed: Yes    No anethesia notable event occurred.    Staff: Anesthesiologist     Reason for prolonged intubation > 24 hours:  .Reason for prolonged intubation > 48 hours:  .          BP      Temp      Pulse     Resp      SpO2

## 2024-07-13 ENCOUNTER — APPOINTMENT (INPATIENT)
Dept: RADIOLOGY | Facility: HOSPITAL | Age: 84
DRG: 853 | End: 2024-07-13
Payer: COMMERCIAL

## 2024-07-13 LAB
ABO GROUP BLD BPU: NORMAL
ABO GROUP BLD BPU: NORMAL
AMMONIA PLAS-SCNC: 16 UMOL/L (ref 18–72)
ANION GAP SERPL CALCULATED.3IONS-SCNC: 8 MMOL/L (ref 4–13)
BPU ID: NORMAL
BPU ID: NORMAL
BUN SERPL-MCNC: 20 MG/DL (ref 5–25)
CALCIUM SERPL-MCNC: 7.5 MG/DL (ref 8.4–10.2)
CHLORIDE SERPL-SCNC: 113 MMOL/L (ref 96–108)
CO2 SERPL-SCNC: 17 MMOL/L (ref 21–32)
CREAT SERPL-MCNC: 1.7 MG/DL (ref 0.6–1.3)
CROSSMATCH: NORMAL
CROSSMATCH: NORMAL
ERYTHROCYTE [DISTWIDTH] IN BLOOD BY AUTOMATED COUNT: 15.8 % (ref 11.6–15.1)
FLUAV RNA RESP QL NAA+PROBE: NEGATIVE
FLUBV RNA RESP QL NAA+PROBE: NEGATIVE
GFR SERPL CREATININE-BSD FRML MDRD: 36 ML/MIN/1.73SQ M
GLUCOSE SERPL-MCNC: 104 MG/DL (ref 65–140)
HCT VFR BLD AUTO: 20.9 % (ref 36.5–49.3)
HCT VFR BLD AUTO: 22.6 % (ref 36.5–49.3)
HCT VFR BLD AUTO: 25 % (ref 36.5–49.3)
HCT VFR BLD AUTO: 26.2 % (ref 36.5–49.3)
HGB BLD-MCNC: 6.4 G/DL (ref 12–17)
HGB BLD-MCNC: 7.1 G/DL (ref 12–17)
HGB BLD-MCNC: 7.9 G/DL (ref 12–17)
HGB BLD-MCNC: 8.3 G/DL (ref 12–17)
KLEBSIELLA SP DNA BLD POS QL NAA+NON-PRB: DETECTED
MAGNESIUM SERPL-MCNC: 1.5 MG/DL (ref 1.9–2.7)
MCH RBC QN AUTO: 30.2 PG (ref 26.8–34.3)
MCHC RBC AUTO-ENTMCNC: 30.6 G/DL (ref 31.4–37.4)
MCV RBC AUTO: 99 FL (ref 82–98)
PHOSPHATE SERPL-MCNC: 2.6 MG/DL (ref 2.3–4.1)
PLATELET # BLD AUTO: 165 THOUSANDS/UL (ref 149–390)
PMV BLD AUTO: 9.4 FL (ref 8.9–12.7)
POTASSIUM SERPL-SCNC: 4.6 MMOL/L (ref 3.5–5.3)
RBC # BLD AUTO: 2.12 MILLION/UL (ref 3.88–5.62)
RSV RNA RESP QL NAA+PROBE: NEGATIVE
SARS-COV-2 RNA RESP QL NAA+PROBE: NEGATIVE
SODIUM SERPL-SCNC: 138 MMOL/L (ref 135–147)
UNIT DISPENSE STATUS: NORMAL
UNIT DISPENSE STATUS: NORMAL
UNIT PRODUCT CODE: NORMAL
UNIT PRODUCT CODE: NORMAL
UNIT PRODUCT VOLUME: 350 ML
UNIT PRODUCT VOLUME: 350 ML
UNIT RH: NORMAL
UNIT RH: NORMAL
WBC # BLD AUTO: 20.79 THOUSAND/UL (ref 4.31–10.16)

## 2024-07-13 PROCEDURE — 83735 ASSAY OF MAGNESIUM: CPT

## 2024-07-13 PROCEDURE — 85018 HEMOGLOBIN: CPT

## 2024-07-13 PROCEDURE — 85014 HEMATOCRIT: CPT

## 2024-07-13 PROCEDURE — 85027 COMPLETE CBC AUTOMATED: CPT

## 2024-07-13 PROCEDURE — 80048 BASIC METABOLIC PNL TOTAL CA: CPT

## 2024-07-13 PROCEDURE — 0241U HB NFCT DS VIR RESP RNA 4 TRGT: CPT | Performed by: PSYCHIATRY & NEUROLOGY

## 2024-07-13 PROCEDURE — P9016 RBC LEUKOCYTES REDUCED: HCPCS

## 2024-07-13 PROCEDURE — NC001 PR NO CHARGE: Performed by: INTERNAL MEDICINE

## 2024-07-13 PROCEDURE — 99223 1ST HOSP IP/OBS HIGH 75: CPT | Performed by: UROLOGY

## 2024-07-13 PROCEDURE — 84100 ASSAY OF PHOSPHORUS: CPT

## 2024-07-13 PROCEDURE — 71045 X-RAY EXAM CHEST 1 VIEW: CPT

## 2024-07-13 PROCEDURE — 82140 ASSAY OF AMMONIA: CPT | Performed by: PSYCHIATRY & NEUROLOGY

## 2024-07-13 PROCEDURE — 99233 SBSQ HOSP IP/OBS HIGH 50: CPT | Performed by: INTERNAL MEDICINE

## 2024-07-13 RX ORDER — VANCOMYCIN HYDROCHLORIDE 750 MG/150ML
10 INJECTION, SOLUTION INTRAVENOUS DAILY PRN
Status: DISCONTINUED | OUTPATIENT
Start: 2024-07-14 | End: 2024-07-14

## 2024-07-13 RX ORDER — CALCIUM GLUCONATE 20 MG/ML
2 INJECTION, SOLUTION INTRAVENOUS ONCE
Status: COMPLETED | OUTPATIENT
Start: 2024-07-13 | End: 2024-07-13

## 2024-07-13 RX ORDER — MAGNESIUM SULFATE HEPTAHYDRATE 40 MG/ML
2 INJECTION, SOLUTION INTRAVENOUS ONCE
Status: COMPLETED | OUTPATIENT
Start: 2024-07-13 | End: 2024-07-13

## 2024-07-13 RX ORDER — AMOXICILLIN 250 MG
1 CAPSULE ORAL
Status: DISCONTINUED | OUTPATIENT
Start: 2024-07-13 | End: 2024-07-23 | Stop reason: HOSPADM

## 2024-07-13 RX ADMIN — VANCOMYCIN HYDROCHLORIDE 2000 MG: 1 INJECTION, POWDER, LYOPHILIZED, FOR SOLUTION INTRAVENOUS at 01:06

## 2024-07-13 RX ADMIN — CALCIUM GLUCONATE 2 G: 20 INJECTION, SOLUTION INTRAVENOUS at 07:37

## 2024-07-13 RX ADMIN — MAGNESIUM SULFATE HEPTAHYDRATE 2 G: 40 INJECTION, SOLUTION INTRAVENOUS at 06:33

## 2024-07-13 RX ADMIN — PIPERACILLIN SODIUM AND TAZOBACTAM SODIUM 4.5 G: 36; 4.5 INJECTION, POWDER, LYOPHILIZED, FOR SOLUTION INTRAVENOUS at 08:50

## 2024-07-13 RX ADMIN — PRAVASTATIN SODIUM 40 MG: 40 TABLET ORAL at 17:36

## 2024-07-13 RX ADMIN — ALPRAZOLAM 0.5 MG: 0.5 TABLET ORAL at 04:46

## 2024-07-13 RX ADMIN — PIPERACILLIN SODIUM AND TAZOBACTAM SODIUM 4.5 G: 36; 4.5 INJECTION, POWDER, LYOPHILIZED, FOR SOLUTION INTRAVENOUS at 01:50

## 2024-07-13 RX ADMIN — CHLORHEXIDINE GLUCONATE 15 ML: 1.2 SOLUTION ORAL at 08:01

## 2024-07-13 RX ADMIN — CEFTRIAXONE SODIUM 2000 MG: 10 INJECTION, POWDER, FOR SOLUTION INTRAVENOUS at 14:49

## 2024-07-13 RX ADMIN — ALPRAZOLAM 0.5 MG: 0.5 TABLET ORAL at 21:12

## 2024-07-13 RX ADMIN — CHLORHEXIDINE GLUCONATE 15 ML: 1.2 SOLUTION ORAL at 00:50

## 2024-07-13 RX ADMIN — OXYBUTYNIN CHLORIDE 5 MG: 5 TABLET ORAL at 20:00

## 2024-07-13 RX ADMIN — OXYBUTYNIN CHLORIDE 5 MG: 5 TABLET ORAL at 00:50

## 2024-07-13 NOTE — SEPSIS NOTE
"  Sepsis Note   Francis Caballero 84 y.o. male MRN: [id number]  Unit/Bed#: ED 08 Encounter: [id number]       Initial Sepsis Screening       Row Name 07/12/24 2157                Is the patient's history suggestive of a new or worsening infection? Yes (Proceed)  -BK        Suspected source of infection urinary tract infection  -BK        Indicate SIRS criteria Tachycardia > 90 bpm;Leukocytosis (WBC > 76713 IJL) OR Leukopenia (WBC <4000 IJL) OR Bandemia (WBC >10% bands)  -BK        Are two or more of the above signs & symptoms of infection both present and new to the patient? Yes (Proceed)  -BK        Assess for evidence of organ dysfunction: Are any of the below criteria present within 6 hours of suspected infection and SIRS criteria that are NOT considered to be chronic conditions? SBP < 90  -BK        Date of presentation of septic shock 07/12/24  -BK        Time of presentation of septic shock 2000  -BK        Fluid Resuscitation: 30 ml/kg IV fluid bolus will be given based on actual body weight  -BK        Is the patient is persistently hypotensive in the hour after fluid bolus administration? If yes, patient meets criteria for vasopressor use. YES  -BK        Sepsis Note: Click \"NEXT\" below (NOT \"close\") to generate sepsis note based on above information. YES (proceed by clicking \"NEXT\")  -BK                  User Key  (r) = Recorded By, (t) = Taken By, (c) = Cosigned By      Initials Name Provider Type    JERED León,  Physician                        There is no height or weight on file to calculate BMI.  Wt Readings from Last 1 Encounters:   07/12/24 75 kg (165 lb 5.5 oz)        Ideal body weight: 77.6 kg (171 lb 1.2 oz)    "

## 2024-07-13 NOTE — H&P
[facility]  H&P: Critical Care  Name: Francis Caballero 84 y.o. male I MRN: [id number]  Unit/Bed#: MICU 05 I Date of Admission: 7/12/2024   Date of Service: 7/13/2024 I Hospital Day: 1      Assessment & Plan   Neuro:   Diagnosis: Anxiety  Plan:   Continue home alprazolam 0.5 mg 3 times daily as needed    Diagnosis: History of prior CVA  Plan:   Documented spontaneous bleed 2/9/2011 of the right hemisphere  Pt reports stroke in 2000, no deficits, does not follow w/ neurologist    Diagnosis: History of epilepsy without status epilepticus  Plan:   Patient is not currently on any kind of outpatient AED  Reports no knowledge of history of epilepsy or seizures, cannot remember last neurology appointment    CV:   Diagnosis: Shock  Plan:   Hemorrhagic versus septic  On arrival patient's hemoglobin noted to be 5.8, has source of active bleeding in the form of hematuria, has source of possible infection in the form of cystitis/status post TURBT procedure  Received 2 L volume resuscitation without improvement of hypotension in the emergency department  Placed on Levophed, continue Levophed/wean as tolerated  Blood cultures pending  Urology following/Gracia catheter placed for CBI  Antibiotics as detailed below    Diagnosis: History of atrial fibrillation  Plan:   Holding home metoprolol in setting of hypotension  Holding home warfarin in the setting of hematuria    Diagnosis: Hyperlipidemia, history of mitral regurgitation and tricuspid regurgitation  Plan:   Continue home statin    Diagnosis: Hypertension  Plan:   Holding home metoprolol in setting of hypotension    Pulm:  No active issues    GI:   No active issues, bowel regimen as needed    :   Diagnosis: Hematuria in setting of 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 Mild to moderate right hydroureter. Previously seen right-sided hydronephrosis is intervally improved  Urology aware/following, patient had no intraoperative complications  Urology placed a three-way Gracia catheter with plan for continuous bladder irrigation  Follow I/Os    Diagnosis: History of CKD  Plan:   Baseline creatinine 1.67, EGFR at last nephrology visit 40  Creatinine in the emergency department 1.66  Trend BMP/creatinine  Trend I/Os    F/E/N:    F: Reassess following blood product administration, got 2 L in the ED  E: Replete as needed  N: N.p.o.    Heme/Onc:   Diagnosis: History of malignant neoplasm of the urinary bladder  Plan:   See     Diagnosis: DVT prophylaxis  Plan:   SCD foot pumps  Holding pharmacologic anticoagulation at this time due to bleeding    Endo:   No active issues    ID:   Diagnosis: Cystitis  Plan:   WBC on arrival to the emergency department not elevated  Urinalysis, moderate leukocytes, innumerable whites, innumerable RBCs  Lactic elevated 2.9, decreased to 1.4 on 2-hour redraw  CT abdomen/pelvis, Small amount of air in the right renal collecting system as well as right-sided perinephric fat stranding could be related to recent instrumentation and/or infection, Mild to moderate circumferential bladder wall thickening. Air within the bladder lumen, possibly related to recent instrumentation/intervention; superimposed cystitis considered in the appropriate clinical setting   Zosyn, every 12  Vancomycin, every 12  Pharmacy consult  Blood cultures pending    MSK/Skin:   Diagnosis: Pressure ulcer prophylaxis  Plan:   Out of bed and as tolerated/PT OT where appropriate    Disposition: Critical care       History of Present Illness     HPI: Francis Caballero is a 84 y.o. male with past medical history of malignant neoplasm of the  bladder, hypertension, hyperlipidemia, paroxysmal atrial fibrillation, who presents with complaint of hematuria.     Patient had a TURBT procedure and R Ureteral Stent Exchange performed by urology, no documented complications, upon returning home having episodes of hematuria, EMS called, finding patient febrile, pale, cool/clammy, dizzy, weak.  Given 1 L normal saline with EMS.  Of note, patient was transfused 1 unit of PRBCs on Thursday (x 2 days ago) for preprocedural purposes.  Has a recent history of blood loss anemia.     The patient is chronically anticoagulated on warfarin, affirming some abdominal pain and a burning sensation/desire to pee.     On arrival to the emergency department, hypotensive despite volume resuscitation with 2 L, some concern for infection based on CT abdomen pelvis, given Zosyn, started on Levophed for hypotension and also transfused 1 unit of PRBCs for hemoglobin of 5.8.     Patient admitted to the ICU for further surveillance/investigation of possible septic versus hemorrhagic shock, further evaluation of hematuria following procedure with urology, continued resuscitation and blood pressure management.    History obtained from chart review.      Historical Information   Past Medical History:  No date: Ambulates with cane  No date: Anemia      Comment:  last assessed: 05/23/2015  No date: GERD (gastroesophageal reflux disease)  No date: Hematuria  No date: Hyperlipidemia  No date: Hypertension  No date: Intracerebral hemorrhage (HCC)      Comment:  last assessed: 04/21/2014; right  No date: Intracerebral hemorrhage (HCC)      Comment:  last assessed: 11/23/2017  No date: Mobility impaired  No date: Stroke syndrome      Comment:  last assessed: 06/13/2013  No date: UTI (urinary tract infection) Past Surgical History:  No date: BLADDER SURGERY  7/27/2023: IR NEPHROSTOMY TUBE PLACEMENT  7/26/2023: WY CYSTOURETHROSCOPY W/DEST &/RMVL TUMOR LARGE; N/A      Comment:  Procedure: (TURBT);   Surgeon: Tha Carcamo MD;                 Location: [facility];  Service: Urology   Current Outpatient Medications   Medication Instructions    acetaminophen (TYLENOL) 650 mg, Oral, Every 8 hours PRN    ALPRAZolam (XANAX) 0.5 mg, Oral, 3 times daily PRN    Lancets MISC Does not apply    metoprolol tartrate (LOPRESSOR) 50 mg tablet TAKE 1 TABLET BY MOUTH TWICE A DAY    Multiple Vitamins-Minerals (PreserVision AREDS 2) CAPS No dose, route, or frequency recorded.    rosuvastatin (CRESTOR) 5 mg tablet TAKE 1 TABLET BY MOUTH EVERY DAY    warfarin (COUMADIN) 1 mg tablet TAKE 2 TABLETS DAILY -COMBINE WITH 5MG    warfarin (COUMADIN) 5 mg tablet TAKE 1 TABLET BY MOUTH EVERY DAY, combine with 1-2mg tab as directed (alternates between 6mg and 7mg QOD)    No Known Allergies   Social History     Tobacco Use    Smoking status: Every Day     Types: Cigars    Smokeless tobacco: Never    Tobacco comments:     Last cigar 7/10.24   Vaping Use    Vaping status: Never Used   Substance Use Topics    Alcohol use: Yes     Comment: Social     Drug use: No    Family History   Problem Relation Age of Onset    Lung cancer Mother           Objective                            Vitals I/O      Most Recent Min/Max in 24hrs   Temp 98.1 °F (36.7 °C) Temp  Min: 96.9 °F (36.1 °C)  Max: 101.9 °F (38.8 °C)   Pulse (!) 111 Pulse  Min: 78  Max: 152   Resp (!) 23 Resp  Min: 13  Max: 31   /55 BP  Min: 72/44  Max: 156/84   O2 Sat 100 % SpO2  Min: 97 %  Max: 100 %      Intake/Output Summary (Last 24 hours) at 7/13/2024 0145  Last data filed at 7/13/2024 0128  Gross per 24 hour   Intake 1730.61 ml   Output --   Net 1730.61 ml       Diet NPO    Invasive Monitoring           Physical Exam   Physical Exam  Eyes:      Extraocular Movements: Extraocular movements intact.      Pupils: Pupils are equal, round, and reactive to light.   Skin:     General: Skin is cool and not mottled extremities.      Coloration: Skin is pale.      Findings: No rash.   HENT:       Head: Normocephalic and atraumatic.      Nose: No congestion or rhinorrhea.      Mouth/Throat:      Mouth: Mucous membranes are moist.   Cardiovascular:      Rate and Rhythm: Tachycardia present. Rhythm irregular.      Heart sounds: No murmur heard.     No friction rub. No gallop.   Musculoskeletal:         General: No swelling, tenderness or signs of injury.      Right lower leg: No edema.      Left lower leg: No edema.   Abdominal: General: There is no distension.      Palpations: Abdomen is soft.      Tenderness: There is no abdominal tenderness. There is no guarding.   Constitutional:       General: He is not in acute distress.     Appearance: He is well-developed and well-nourished. He is ill-appearing, toxic-appearing and diaphoretic.      Interventions: He is not restrained.  Pulmonary:      Effort: Pulmonary effort is normal. No accessory muscle usage, respiratory distress or accessory muscle usage.      Breath sounds: Normal breath sounds. No wheezing or rhonchi.   Secretions are normal.Psychiatric:         Mood and Affect:  mood and affect abnormal.  Neurological:      General: No focal deficit present.      Mental Status: He is alert and oriented to person, place and time. Mental status is at baseline.   Genitourinary/Anorectal:     Comments: Patient spontaneously voiding dark red urine consistent with gross hematuria, voids about 20 to 30 cc of hematuria at a time           Diagnostic Studies      EKG: n/a  Imaging:   CT Abd/Pelvis  IMPRESSION:     Interval removal of previously seen bladder mass as well as interval placement of right-sided nephroureteral stent, this appears appropriately positioned. Mild to moderate right hydroureter. Previously seen right-sided hydronephrosis is intervally   improved. Small amount of air in the right renal collecting system as well as right-sided perinephric fat stranding could be related to recent instrumentation and/or infection.     Partially distended bladder.  Mild to moderate circumferential bladder wall thickening. Air within the bladder lumen, possibly related to recent instrumentation/intervention; superimposed cystitis considered in the appropriate clinical setting.   Correlation with the patient's symptoms, laboratory values, and urinalysis recommended.     Cardiomegaly, cholelithiasis, renal cysts, colonic diverticulosis without evidence of acute diverticulitis, small hiatal hernia, enlarged prostate, right-sided inguinal hernia containing fat and several small bowel loops; no discrete evidence of bowel   obstruction.     Other findings as above.     I have personally reviewed pertinent reports.       Medications:  Scheduled PRN   chlorhexidine, 15 mL, Q12H RADHA  piperacillin-tazobactam, 4.5 g, Q8H  pravastatin, 40 mg, Daily With Dinner  vancomycin, 25 mg/kg, Once      ALPRAZolam, 0.5 mg, TID PRN  lidocaine, 1 Application, Daily PRN  oxybutynin, 5 mg, TID PRN  [START ON 7/14/2024] vancomycin, 10 mg/kg, Daily PRN       Continuous    norepinephrine, 1-30 mcg/min, Last Rate: Stopped (07/13/24 0025)         Labs:    CBC    Recent Labs     07/12/24 1932   WBC 9.64   HGB 5.8*   HCT 18.7*      BANDSPCT 21*     BMP    Recent Labs     07/12/24 1932   SODIUM 138   K 4.2   *   CO2 21   AGAP 6   BUN 22   CREATININE 1.66*   CALCIUM 7.9*       Coags    Recent Labs     07/12/24  1027 07/12/24  2242   INR 1.22* 1.51*   PTT 34  --         Additional Electrolytes  No recent results       Blood Gas    No recent results  No recent results LFTs  Recent Labs     07/12/24 1932   ALT 6*   AST 10*   ALKPHOS 67   ALB 3.1*   TBILI 0.63       Infectious  No recent results  Glucose  Recent Labs     07/12/24 1932   GLUC 97               Elgin Tena DO

## 2024-07-13 NOTE — QUICK NOTE
Called patients wife, updating them about ongoing treatment plan and any patient updates.      Maryanne Washington,   Internal Medicine Residency PGY-1  [facility]  Available on [employer]

## 2024-07-13 NOTE — PROGRESS NOTES
[facility]  Progress Note: Critical Care  Name: Francis Caballero 84 y.o. male I MRN: [id number]  Unit/Bed#: MICU 05 I Date of Admission: 7/12/2024   Date of Service: 7/13/2024 I Hospital Day: 1    Assessment & Plan   Neuro:   Diagnosis: Anxiety  Plan:   Can Continue home alprazolam 0.5 mg 3 times daily if needed     Diagnosis: History of prior CVA  Plan:   Documented spontaneous bleed 2/9/2011 of the right hemisphere  Pt reports stroke in 2000, no deficits, does not follow w/ neurologist     Diagnosis: History of epilepsy without status epilepticus  Plan:   Patient is not currently on any kind of outpatient AED  Reports no knowledge of history of epilepsy or seizures, cannot remember last neurology appointment     CV:   Diagnosis: Shock--resolving  Plan:   Likely Hemorrhagic versus septic  On arrival patient's hemoglobin noted to be 5.8, has source of active bleeding in the form of hematuria, has source of possible infection in the form of cystitis/status post TURBT procedure  Received 2 L volume resuscitation without improvement of hypotension in the emergency department  S/P 3 unit PRBCs, Hgb 5.8 -> 7.1; calcium 2g given; f.u h/h 1200hrs  Levophed weaned  Repeat CBC  Blood cultures pending  Urology following/Gracia catheter placed for irrigation  Antibiotics as detailed below     Diagnosis: History of atrial fibrillation  Plan:   Holding home metoprolol in setting of hypotension, may restart tomorrow  Holding home warfarin in the setting of hematuria     Diagnosis: Hyperlipidemia, history of mitral regurgitation and tricuspid regurgitation  Plan:   Continue home statin     Diagnosis: Hx of Hypertension  Plan:   Holding home metoprolol in setting of hypotension     Pulm:  No active issues     GI:   No active issues, bowel regimen as needed     :   Diagnosis: Hematuria in setting of 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 Mild to moderate right hydroureter. Previously seen right-sided hydronephrosis is intervally improved  Urology aware/following, patient had no intraoperative complications, appreciate recs  Follow I/Os  Follow urine output color / consistency     Diagnosis: History of CKD  Plan:   Baseline creatinine 1.67, EGFR at last nephrology visit 40  Creatinine in the emergency department 1.66  Trend BMP/creatinine  Trend I/Os     F/E/N:    F: Reassess following blood product administration, got 2 L in the ED  E: Replete as needed  N: N.p.o. will restart diet if no plans for procedure     Heme/Onc:   Diagnosis: History of malignant neoplasm of the urinary bladder  Plan:   See      Diagnosis: DVT prophylaxis  Plan:   SCD foot pumps  Holding pharmacologic anticoagulation at this time due to bleeding     Endo:   No active issues     ID:   Diagnosis: Leukocytosis  Plan:   WBC elevated 22k  Urinalysis, moderate leukocytes, innumerable whites, innumerable RBCs  Lactic elevated 2.9, decreased to 1.4 on 2-hour redraw  CT abdomen/pelvis, Small amount of air in the right renal collecting system as well as right-sided perinephric fat stranding could be related to recent instrumentation and/or infection, Mild to moderate circumferential bladder wall thickening. Air within the bladder lumen, possibly related to recent instrumentation/intervention; superimposed cystitis considered in the appropriate clinical setting  Dc Zosyn, every 12; start cefepime 7/13  Vancomycin, every 12  Pharmacy consult  Blood cultures pending     MSK/Skin:   Diagnosis: Pressure ulcer prophylaxis  Plan:   Out of bed and as tolerated/PT OT where appropriate      Disposition: Med Surg pending  f/u h/h     ICU Core Measures     A: Assess, Prevent, and Manage Pain Has pain been assessed? Yes  Need for changes to pain regimen? No   B: Both SAT/SAT  N/A   C: Choice of Sedation RASS Goal: 0 Alert and Calm  Need for changes to sedation or analgesia regimen? No   D: Delirium CAM-ICU: Negative   E: Early Mobility  Plan for early mobility? Yes   F: Family Engagement Plan for family engagement today? Yes       Antibiotic Review: Awaiting culture results.     Review of Invasive Devices:    Irizarry Plan: Irizarry placed by urology. Removal plans per their team        Prophylaxis:  VTE VTE covered by:    None       Stress Ulcer  not ordered        Significant 24hr Events     24hr events: Overnight, urology saw, placed irizarry, manual bladder irrigation, urine = light pink s/p irizarry, in the ED was gross hematuria, minimal volumes, pressor requirement decreased, CXR, no air but vascular congestion, Got x1 unit PRBCs, Hgb 5.8 -> 7.1. no n/v/d. No sob cp.      Subjective     Review of Systems: See HPI for Review of Systems     Objective                            Vitals I/O      Most Recent Min/Max in 24hrs   Temp 99 °F (37.2 °C) Temp  Min: 96.9 °F (36.1 °C)  Max: 101.9 °F (38.8 °C)   Pulse 101 Pulse  Min: 78  Max: 152   Resp (!) 39 Resp  Min: 13  Max: 39   BP 92/51 BP  Min: 72/44  Max: 156/84   O2 Sat 99 % SpO2  Min: 97 %  Max: 100 %      Intake/Output Summary (Last 24 hours) at 7/13/2024 1017  Last data filed at 7/13/2024 1001  Gross per 24 hour   Intake 2566.02 ml   Output 1575 ml   Net 991.02 ml       Diet NPO    Invasive Monitoring           Physical Exam   Physical Exam  Eyes:      Extraocular Movements: Extraocular movements intact.      Pupils: Pupils are equal, round, and reactive to light.   Skin:     General: Skin is warm and dry.      Comments: Improvement in skin color since admin or 1 U prbcs, pt more alert appearing as well   HENT:      Head: Normocephalic and atraumatic.      Nose: No congestion or rhinorrhea.       Mouth/Throat:      Mouth: Mucous membranes are moist.   Neck:      Vascular: No JVD.   Cardiovascular:      Rate and Rhythm: Normal rate. Rhythm irregular.      Heart sounds: No murmur heard.     No friction rub. No gallop.   Musculoskeletal:         General: No swelling, tenderness or signs of injury.      Right lower leg: No edema.      Left lower leg: No edema.   Abdominal:      Palpations: Abdomen is soft.      Tenderness: There is no abdominal tenderness. There is no guarding.   Constitutional:       General: He is not in acute distress.     Appearance: He is not ill-appearing or toxic-appearing.      Interventions: He is not restrained.  Pulmonary:      Effort: No accessory muscle usage or accessory muscle usage.      Breath sounds: No wheezing, rhonchi or rales.   Neurological:      General: No focal deficit present.      Mental Status: He is oriented to person, place and time. Mental status is at baseline. He is calm.   Genitourinary/Anorectal:     Comments: Light Pink urine w/ manual irrigation  Gracia present.          Diagnostic Studies      EKG: n/a  Imaging: no new imaging I have personally reviewed pertinent reports.       Medications:  Scheduled PRN   chlorhexidine, 15 mL, Q12H RADHA  piperacillin-tazobactam, 4.5 g, Q8H  pravastatin, 40 mg, Daily With Dinner      ALPRAZolam, 0.5 mg, TID PRN  lidocaine, 1 Application, Daily PRN  oxybutynin, 5 mg, TID PRN  [START ON 7/14/2024] vancomycin, 10 mg/kg, Daily PRN       Continuous    norepinephrine, 1-30 mcg/min, Last Rate: Stopped (07/13/24 0025)         Labs:    CBC    Recent Labs     07/12/24 1932 07/13/24  0149 07/13/24  0453   WBC 9.64  --  20.79*   HGB 5.8* 7.1* 6.4*   HCT 18.7* 22.6* 20.9*     --  165   BANDSPCT 21*  --   --      BMP    Recent Labs     07/12/24 1932 07/13/24  0453   SODIUM 138 138   K 4.2 4.6   * 113*   CO2 21 17*   AGAP 6 8   BUN 22 20   CREATININE 1.66* 1.70*   CALCIUM 7.9* 7.5*       Coags    Recent Labs      07/12/24  1027 07/12/24  2242   INR 1.22* 1.51*   PTT 34  --         Additional Electrolytes  Recent Labs     07/13/24  0453   MG 1.5*   PHOS 2.6          Blood Gas    No recent results  No recent results LFTs  Recent Labs     07/12/24 1932   ALT 6*   AST 10*   ALKPHOS 67   ALB 3.1*   TBILI 0.63       Infectious  No recent results  Glucose  Recent Labs     07/12/24 1932 07/13/24  0453   GLUC 97 104               Maryanne Washington DO

## 2024-07-13 NOTE — PLAN OF CARE
Problem: Prexisting or High Potential for Compromised Skin Integrity  Goal: Skin integrity is maintained or improved  Description: INTERVENTIONS:  - Identify patients at risk for skin breakdown  - Assess and monitor skin integrity  - Assess and monitor nutrition and hydration status  - Monitor labs   - Assess for incontinence   - Turn and reposition patient  - Assist with mobility/ambulation  - Relieve pressure over bony prominences  - Avoid friction and shearing  - Provide appropriate hygiene as needed including keeping skin clean and dry  - Evaluate need for skin moisturizer/barrier cream  - Collaborate with interdisciplinary team   - Patient/family teaching  - Consider wound care consult   Outcome: Progressing     Problem: GENITOURINARY - ADULT  Goal: Maintains or returns to baseline urinary function  Description: INTERVENTIONS:  - Assess urinary function  - Encourage oral fluids to ensure adequate hydration if ordered  - Administer IV fluids as ordered to ensure adequate hydration  - Administer ordered medications as needed  - Offer frequent toileting  - Follow urinary retention protocol if ordered  Outcome: Progressing  Goal: Absence of urinary retention  Description: INTERVENTIONS:  - Assess patient’s ability to void and empty bladder  - Monitor I/O  - Bladder scan as needed  - Discuss with physician/AP medications to alleviate retention as needed  - Discuss catheterization for long term situations as appropriate  Outcome: Progressing  Goal: Urinary catheter remains patent  Description: INTERVENTIONS:  - Assess patency of urinary catheter  - If patient has a chronic irizarry, consider changing catheter if non-functioning  - Follow guidelines for intermittent irrigation of non-functioning urinary catheter  Outcome: Progressing     Problem: MUSCULOSKELETAL - ADULT  Goal: Maintain or return mobility to safest level of function  Description: INTERVENTIONS:  - Assess patient's ability to carry out ADLs; assess  patient's baseline for ADL function and identify physical deficits which impact ability to perform ADLs (bathing, care of mouth/teeth, toileting, grooming, dressing, etc.)  - Assess/evaluate cause of self-care deficits   - Assess range of motion  - Assess patient's mobility  - Assess patient's need for assistive devices and provide as appropriate  - Encourage maximum independence but intervene and supervise when necessary  - Involve family in performance of ADLs  - Assess for home care needs following discharge   - Consider OT consult to assist with ADL evaluation and planning for discharge  - Provide patient education as appropriate  Outcome: Progressing  Goal: Maintain proper alignment of affected body part  Description: INTERVENTIONS:  - Support, maintain and protect limb and body alignment  - Provide patient/ family with appropriate education  Outcome: Progressing

## 2024-07-13 NOTE — PROGRESS NOTES
Francis Caballero is a 84 y.o. male who is currently ordered Vancomycin IV with management by the Pharmacy Consult service.  Relevant clinical data and objective / subjective history reviewed.  Vancomycin Assessment:  Indication and Goal AUC/Trough: Bacteremia (goal -600, trough >10), -600, trough >10  Clinical Status: improving  Micro:   pending  Renal Function:  SCr: 1.66 mg/dL  CrCl: 35.8 mL/min  Renal replacement: Not on dialysis  Days of Therapy: 1  Current Dose: 750mg daily prn when trough is less than 15  Vancomycin Plan:  New Dosinmg daily prn when trough is less than 15  Estimated AUC: pulse dosing mcg*hr/mL  Estimated Trough: pulse dosing mcg/mL  Next Level: 24 at 0600  Renal Function Monitoring: Daily BMP and UOP  Pharmacy will continue to follow closely for s/sx of nephrotoxicity, infusion reactions and appropriateness of therapy.  BMP and CBC will be ordered per protocol. We will continue to follow the patient’s culture results and clinical progress daily.    Chay Prince, Pharmacist

## 2024-07-13 NOTE — ED ATTENDING ATTESTATION
7/12/2024  I, Tha León DO, saw and evaluated the patient. I have discussed the patient with the resident/non-physician practitioner and agree with the resident's/non-physician practitioner's findings, Plan of Care, and MDM as documented in the resident's/non-physician practitioner's note, except where noted. All available labs and Radiology studies were reviewed.  I was present for key portions of any procedure(s) performed by the resident/non-physician practitioner and I was immediately available to provide assistance.       At this point I agree with the current assessment done in the Emergency Department.  I have conducted an independent evaluation of this patient a history and physical is as follows:    84 yom with prostate cancer, TURP earlier today, presents with fatigue and fever.  On arrival the patient is tachycardic and febrile.  Looks pale.  Has no complaints.  He denies hematuria vomiting.  Denies abdominal pain.  At risk for sepsis given recent procedure, will send labs cultures start IV fluids    Patient becoming hypotensive hemoglobin found to be 5.8 still likely component of infection IV antibiotics started, will transfuse packed red blood cells CT scan fluid resuscitation        ED Course  ED Course as of 07/13/24 1318   Fri Jul 12, 2024 1956 Not repsonsive to fluids, giving blood. Reassessed now, will start levophed   MDM:    Shock  -multifactorial, distributive from sepsis, possibly hemorrhagic  -IV antibiotics started blood transfusing will admit to the ICU      Critical Care Time  CriticalCare Time    Date/Time: 7/12/2024 11:18 PM    Performed by: Tha León DO  Authorized by: Tha León DO    Critical care provider statement:     Critical care time (minutes):  42    Critical care time was exclusive of:  Separately billable procedures and treating other patients and teaching time    Critical care was necessary to treat or prevent imminent or life-threatening  deterioration of the following conditions:  Sepsis    Critical care was time spent personally by me on the following activities:  Blood draw for specimens, obtaining history from patient or surrogate, re-evaluation of patient's condition, review of old charts, evaluation of patient's response to treatment, examination of patient, ordering and review of laboratory studies, ordering and performing treatments and interventions, development of treatment plan with patient or surrogate and ordering and review of radiographic studies

## 2024-07-13 NOTE — PROGRESS NOTES
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  Patient had no documented complications and was discharged home where he had episodes of hematuria and symptoms of feeling cold clammy weak.  Transported to [facility] where he was found to have a hemoglobin initially of 5.8.  Status post 2 packed red blood cells with hemoglobin of 7.1 down to 6.4 this a.m. received additional 1 packed red blood cell with follow-up hemoglobin at noon at 7.1.  Status post 1 L normal saline solution.  Patient initially admitted to MICU given low MAP's and initially on levo up to 5.  Concern for hypovolemic/hemorrhagic versus distributive/septic shock.  Started on Zosyn/vancomycin. Discontinued levo at 0025 HRS.  Patient seen and evaluated by urology who placed 20 Citizen of [place] three-way Gracia catheter.  Plans for manual irrigation with 240 cc at bedside and manually irrigated every 4 hours.  Worsening hematuria this a.m. with dark \"punch colored\" appearance of urine.  Patient denied any melena or bright red stools.  No other obvious sources of bleeding.  Per urology note, may consider OR if hemoglobin continues to trend downward/does not respond to fluids and was kept NPO.  Will remain n.p.o. despite stable on hemoglobin this a.m./will need to follow-up with urology recommendations.    Barriers to discharge:   Hematuria, on antibiotics, anemia    Follow-ups:  H/h @1700hrs  20 Citizen [place] in place, manual irrigation with 240 cc at bedside and manually irrigated every 4 hours  F/u urology if going to OR, will need a diet  On Vanc, Cefepime d/c Zosyn, if improving wbc/no concern infx dc abx  Bladder Cancer--will need oupt heme-onc " referral per previous urology notes  Hold warfarin / dvt ppx while bleeding     Consults: IP CONSULT TO CASE MANAGEMENT  IP CONSULT TO PHARMACY    Recommended to review admission imaging for incidental findings and document in discharge navigator: Chart reviewed, no known incidental findings noted at this time.      Discharge Plan: Anticipate discharge in 24-48 hrs to home.  Gracia Plan: Continue for accurate I/O monitoring for 48 hours    PT Recommendations: Home with home health rehabilitation  OT Recommendations: Home with home health rehabilitation      Patient seen and evaluated by Critical Care today and deemed to be appropriate for transfer to Med Surg. Spoke to Dr. Goldstein  from [facility] to accept transfer. Critical care can be contacted via [employer] with any questions or concerns.

## 2024-07-13 NOTE — CONSULTS
Consults: UROLOGY  Francis Caballero 84 y.o. male [id number]   [facility]  Encounter: [id number]        Assessment  & Plan  :  84-year-old male postop 1 cystoscopy TURBT and ureteral stent exchange presenting with shock hemorrhagic versus septic:    -Continue medical optimization per primary team.  Pressor support, wean off pressors as indicated.  Transfuse as needed.  Patient presenting with hemoglobin of 5.8.  Has history of chronic anemia on outpatient did receive blood transfusion preoperatively Thursday 7/3.   -Patient presenting with fevers.  Urine cultures currently pending.  Adjust antibiotics based on culture data, preoperative urine culture revealed mixed contaminants  -Urethral Gracia catheter placed at bedside by urology patient with 20 French three-way Gracia catheter.  Manual irrigation with 240 cc at bedside revealed immediate clearing to light pink-tinged.  Continue with manual irrigation every 4 hours.  -Degree of hematuria is mild.  And anticipated postoperatively.  -Maintain Gracia catheter for now.  Consider Gracia catheter removal once patient is on medical surgical unit and hematuria mildly improved.  But can anticipate intermittent hematuria with ureteral stent in place chronically.  No clot burden noted on CT imaging  -Ureteral stent in proper position on CT scan.  With mild hydronephrosis improved from prior .bladder wall thickening.  With perirenal and perivesical stranding and gas may be related to recent surgical intervention/inflammation superimposed infection cannot be ruled out.  -Patient to hold anticoagulation postoperatively for at least an additional 48 hours from recent TURBT  -Oxybutynin urethral lidocaine for bladder spasm/discomfort discomfort  -Urology will continue to follow      Addendum: Reevaluated 7 hours after initial eval.  Urine orange red/punch in color manual irrigation again reveals immediate clearing. Hematuria mild at this time or surgical intervention.  Continue with resuscitation currently receiving blood transfusion.  Urology will continue to follow hemoglobin trend.  May consider repeat OR if hemoglobin continues to trend downward and patient not responding to transfusions.  Keep n.p.o. for now.  See  attending attestation for further [place]    Subjective :    Francis Caballero  is a 84 y.o. male well-known to our practice with a history of high-grade muscle invasive bladder cancer status post chemoradiation therapy.  With recurrence currently undergoing surveillance with cystoscopies.  Had recent repeat cystoscopy TURBT and ureteral stent exchange on 7/12.  Patient Isaura presented to the emergency room postop day 0 due to fever postoperatively.  Upon evaluation in the emergency room patient noticed to be febrile Tmax of 101.9 tachycardic tachypneic and developed hypotension requiring fluid resuscitation and pressor support.  Now admitted to critical care.  Additional evaluation revealed elevated creatinine 1.66, lactic acidosis of 2.9.  No white blood cell count.  Anemic 5.8 being transfused.  CT scan revealed interval removal of previously seen bladder mass as well as interval replacement of right-sided nephroureteral stent this appears appropriately positioned mild to moderate hydroureter previously seen right-sided hydronephrosis intervally improved some air in right-sided collecting system as well as right-sided perinephric fat stranding could correlate to recent instrumentation or infection.  Partially distended bladder mild to moderate circumferential bladder wall thickening air within the lumen may be related to recent instrumentation or super imposed infection not excluded.  Correlate to urinalysis and additional evaluations.              No Known Allergies   Current Outpatient Medications   Medication Instructions    acetaminophen (TYLENOL) 650 mg, Oral, Every 8 hours PRN    ALPRAZolam (XANAX) 0.5 mg, Oral, 3 times daily PRN    Lancets MISC Does not apply     metoprolol tartrate (LOPRESSOR) 50 mg tablet TAKE 1 TABLET BY MOUTH TWICE A DAY    Multiple Vitamins-Minerals (PreserVision AREDS 2) CAPS No dose, route, or frequency recorded.    rosuvastatin (CRESTOR) 5 mg tablet TAKE 1 TABLET BY MOUTH EVERY DAY    warfarin (COUMADIN) 1 mg tablet TAKE 2 TABLETS DAILY -COMBINE WITH 5MG    warfarin (COUMADIN) 5 mg tablet TAKE 1 TABLET BY MOUTH EVERY DAY, combine with 1-2mg tab as directed (alternates between 6mg and 7mg QOD)      Past Medical History:   Diagnosis Date    Ambulates with cane     Anemia     last assessed: 05/23/2015    GERD (gastroesophageal reflux disease)     Hematuria     Hyperlipidemia     Hypertension     Intracerebral hemorrhage (HCC)     last assessed: 04/21/2014; right    Intracerebral hemorrhage (HCC)     last assessed: 11/23/2017    Mobility impaired     Stroke syndrome     last assessed: 06/13/2013    UTI (urinary tract infection)      Past Surgical History:   Procedure Laterality Date    BLADDER SURGERY      IR NEPHROSTOMY TUBE PLACEMENT  7/27/2023    OK CYSTOURETHROSCOPY W/DEST &/RMVL TUMOR LARGE N/A 7/26/2023    Procedure: (TURBT);  Surgeon: Tha Carcamo MD;  Location: [facility];  Service: Urology     Family History   Problem Relation Age of Onset    Lung cancer Mother      Social History     Socioeconomic History    Marital status: /Civil Union     Spouse name: None    Number of children: None    Years of education: None    Highest education level: None   Occupational History    None   Tobacco Use    Smoking status: Every Day     Types: Cigars    Smokeless tobacco: Never    Tobacco comments:     Last cigar 7/10.24   Vaping Use    Vaping status: Never Used   Substance and Sexual Activity    Alcohol use: Yes     Comment: Social     Drug use: No    Sexual activity: Not Currently   Other Topics Concern    None   Social History Narrative    None     Social Determinants of Health     Financial Resource Strain: Low Risk  (5/1/2023)    Overall  Financial Resource Strain (CARDIA)     Difficulty of Paying Living Expenses: Not hard at all   Food Insecurity: No Food Insecurity (6/7/2024)    Hunger Vital Sign     Worried About Running Out of Food in the Last Year: Never true     Ran Out of Food in the Last Year: Never true   Transportation Needs: No Transportation Needs (6/7/2024)    PRAPARE - Transportation     Lack of Transportation (Medical): No     Lack of Transportation (Non-Medical): No   Physical Activity: Not on file   Stress: Not on file   Social Connections: Not on file   Intimate Partner Violence: Not on file   Housing Stability: Low Risk  (6/7/2024)    Housing Stability Vital Sign     Unable to Pay for Housing in the Last Year: No     Number of Times Moved in the Last Year: 0     Homeless in the Last Year: No        Review of Systems     Objective   BEDSIDE PROCEDURE  Indwelling Catheter PROCEDURE NOTE    Pre-operative Diagnosis: Hematuria, recent TURBT        Post-operative Diagnosis: Hematuria, recent TURBT    INDICATION   84-year-old male status post TURBT today presenting with hemoglobin of 5.8.  Gracia catheter placed for evaluation of gross hematuria.    TIME OUT: Correct patient identity.    PROCEDURE   Consent: Patient was agreeable. Formal  Informed consent however, not applicable.    Under sterile conditions the patient was positioned. Betadine solution and sterile drapes were utilized.  Non- Petroleum based gel was used to lubricate urethral meatus insertion site.  Utilized 20 [place] three-way Gracia catheter advanced to the hub.  Manual irrigation revealed immediate return of urine and confirming position within urinary bladder.  Approximately 10 to 15 cc inflated to balloon.  No clots obtained with manual irrigation with additional 240 cc.  Urine light pink in color.  Patient tolerated well.  Reporting some bladder spasms post placement.    Findings  60 ml of punch colored urine.    Complications:  None; patient tolerated the procedure  well.            Condition: stable    Plan  Maintain Gracia catheter for now.  Manual irrigation q. 4 see plan above        Physical Exam  Constitutional:       General: He is not in acute distress.     Appearance: He is normal weight. He is not ill-appearing, toxic-appearing or diaphoretic.   HENT:      Head: Normocephalic and atraumatic.      Right Ear: External ear normal.      Left Ear: External ear normal.      Nose: Nose normal.      Mouth/Throat:      Pharynx: Oropharynx is clear.   Eyes:      General: No scleral icterus.     Conjunctiva/sclera: Conjunctivae normal.   Cardiovascular:      Rate and Rhythm: Tachycardia present. Rhythm irregular.      Pulses: Normal pulses.      Heart sounds: No murmur heard.     No friction rub. No gallop.   Pulmonary:      Effort: Pulmonary effort is normal. No respiratory distress.      Breath sounds: No wheezing.   Abdominal:      General: Bowel sounds are normal. There is no distension.      Tenderness: There is no abdominal tenderness.   Genitourinary:     Comments: Phallus circumcised, three-way Gracia catheter inserted without difficulty  Musculoskeletal:         General: Normal range of motion.      Cervical back: Normal range of motion.   Skin:     General: Skin is warm and dry.   Neurological:      General: No focal deficit present.      Mental Status: He is alert and oriented to person, place, and time.              Imaging:  CT ABDOMEN AND PELVIS WITH IV CONTRAST     INDICATION: turbt today anemic now hypotensive.     COMPARISON: CT abdomen pelvis dated 6/9/2023     TECHNIQUE: CT examination of the abdomen and pelvis was performed. Multiplanar 2D reformatted images were created from the source data.     This examination, like all CT scans performed in the [facility], was performed utilizing techniques to minimize radiation dose exposure, including the use of iterative reconstruction and automated exposure control. Radiation dose length   product (DLP)  for this visit: 316.13 mGy-cm     IV Contrast: 85 mL of iohexol (OMNIPAQUE)  Enteric Contrast: Not administered.     FINDINGS:     ABDOMEN     LOWER CHEST: The heart is enlarged. Mild atelectasis noted dependently in the bilateral lower lung fields.     LIVER/BILIARY TREE: Unremarkable.     GALLBLADDER: Cholelithiasis without findings of acute cholecystitis.     SPLEEN: Unremarkable.     PANCREAS: Unremarkable.     ADRENAL GLANDS: Unremarkable.     KIDNEYS/URETERS: Renal cortical scarring on the left. Left kidney otherwise appears grossly unremarkable. Exophytic 1.1 cm cyst in the right kidney upper pole. There has been interval removal of the previously seen bladder mass as well as interval   placement of a right-sided nephroureteral stent which appears appropriately positioned. Mild to moderate hydroureter. Previously seen right-sided hydronephrosis is intervally improved. Renal cortical scarring and mild atrophy on the right. Small amount   of air in the right renal collecting system as well as right-sided perinephric fat stranding, could be related to recent instrumentation and/or infection.     STOMACH AND BOWEL: Scattered colonic diverticulosis, no discrete evidence of acute diverticulitis. No evidence of bowel obstruction. Small hiatal hernia. Otherwise grossly unremarkable.     APPENDIX: No findings to suggest appendicitis.     ABDOMINOPELVIC CAVITY: No significant ascites. No pneumoperitoneum. No discrete lymphadenopathy.     VESSELS: Moderate atherosclerosis, no aortic aneurysm.     PELVIS     REPRODUCTIVE ORGANS: Enlarged prostate indents the bladder base. Prostate volume estimated at 58 mL.     URINARY BLADDER: Partially distended bladder. Mild to moderate circumferential bladder wall thickening. Air within the bladder lumen, possibly related to recent instrumentation/intervention, superimposed cystitis considered in the appropriate clinical   setting.     ABDOMINAL WALL/INGUINAL REGIONS: Moderate sized  right inguinal hernia containing fat and multiple small bowel loops. Body wall edema     BONES: Multilevel degenerative changes of the spine. Generalized osteopenia.        IMPRESSION:     Interval removal of previously seen bladder mass as well as interval placement of right-sided nephroureteral stent, this appears appropriately positioned. Mild to moderate right hydroureter. Previously seen right-sided hydronephrosis is intervally   improved. Small amount of air in the right renal collecting system as well as right-sided perinephric fat stranding could be related to recent instrumentation and/or infection.     Partially distended bladder. Mild to moderate circumferential bladder wall thickening. Air within the bladder lumen, possibly related to recent instrumentation/intervention; superimposed cystitis considered in the appropriate clinical setting.   Correlation with the patient's symptoms, laboratory values, and urinalysis recommended.     Cardiomegaly, cholelithiasis, renal cysts, colonic diverticulosis without evidence of acute diverticulitis, small hiatal hernia, enlarged prostate, right-sided inguinal hernia containing fat and several small bowel loops; no discrete evidence of bowel   obstruction.     Other findings as above.    Labs:  Lab Results   Component Value Date    SODIUM 138 07/12/2024    K 4.2 07/12/2024     (H) 07/12/2024    CO2 21 07/12/2024    BUN 22 07/12/2024    CREATININE 1.66 (H) 07/12/2024    GLUC 97 07/12/2024    CALCIUM 7.9 (L) 07/12/2024         Lab Results   Component Value Date    WBC 9.64 07/12/2024    HGB 5.8 (LL) 07/12/2024    HCT 18.7 (L) 07/12/2024     (H) 07/12/2024     07/12/2024         VTE Pharmacologic Prophylaxis: Reason for no pharmacologic prophylaxis due to acute  blood loss anemia  VTE Mechanical Prophylaxis: sequential compression device     Gina Perez PA-C

## 2024-07-13 NOTE — UTILIZATION REVIEW
Initial Clinical Review    Admission: Date/Time/Statement:   Admission Orders (From admission, onward)       Ordered        07/12/24 2222  INPATIENT ADMISSION  Once                          Orders Placed This Encounter   Procedures    INPATIENT ADMISSION     Standing Status:   Standing     Number of Occurrences:   1     Order Specific Question:   Level of Care     Answer:   Critical Care [15]     Order Specific Question:   Estimated length of stay     Answer:   More than 2 Midnights     Order Specific Question:   Certification     Answer:   I certify that inpatient services are medically necessary for this patient for a duration of greater than two midnights. See H&P and MD Progress Notes for additional information about the patient's course of treatment.     ED Arrival Information       Expected   -    Arrival   7/12/2024 18:56    Acuity   Emergent              Means of arrival   Walk-In    Escorted by   Self    Service   Hospitalist    Admission type   Emergency              Arrival complaint   -             Chief Complaint   Patient presents with    Fever     Pt had bladder procedure today and blood transfusion on Thursday. EMS states pt febrile, pale, cool/clammy, dizziness, and weak since procedure today. Denies SOB, chest pain. EMS gave 1L NSS       Initial Presentation: 84 y.o. male presents to ED from home.  Had a  TURBT  and R  ureteral stent exchange earlier  in the day, discharged  home without  complications. On returning home, had episodes of hematuria.  Ems found patient  febrile, pale, cool/clammy, dizzy and weak.  Given  1  L  NS  by  EMS.  Transfused  with  1  U PRBC  on  7/10  for  pre procedure purpose.   Hemoglobin  5.8  on ED arrival.   On ED arrival, became hypotensive despite  2 L IVF, started on   levophed and transfused 1 U PRBC.  Ct scan shows  concern for infection.    PMH is  epilepsy, CVA,  malignant neoplasm of bladder,  HTN, PAF.   Admit  Ip ICU LOC  with Shock, hemorrhagic  vs  septic   and plan is  monitor labs,  levophed,  blood cultures,  urology consult,  folety/CBI,  IVF, NESTOR  and continue home meds.      SURGERY DATE: 7/12/2024   Procedure(s):  (TURBT) cautery bleeding. clot evacuation  CYSTOSCOPY EVACUATION OF CLOTS  Operative Findings:  Sessile tumor along bladder neck and right trigone  Right JJ stent, no significant hydronephrosis on retrograde pyleogram         Date:   7/13   Day 2:   Urology consult  postop 1 cystoscopy TURBT and ureteral stent exchange presenting with shock hemorrhagic versus septic:   Maintain irizarry catheter for now.  Urine now  orange red/punch  color,  manually irrigated with clear return.   May need return to OR.  Hold  AC.  Remain on  NESTOR.    Date:   7/14  Day 3: Has surpassed a 2nd midnight with active treatments and services.  Transitioned out of  ICU  7/13.   Irizarry catheter intact  and urine turning more mariya, represents old blood.   Monitor labs, renal function turning upwards. Need  accurate  I & O.  Can d/c  uretral catheter when creatinine improves.  Continue to hold  AC.  Now  S/P   3  U PRBC,  hemoglobin  8.0.   Weaned off pressors. Continue  NESTOR  and F/U c ultures.          ED Triage Vitals [07/12/24 1904]   Temperature Pulse Respirations Blood Pressure SpO2 Pain Score   (!) 101.9 °F (38.8 °C) (!) 129 17 102/51 97 % No Pain     Weight (last 2 days)       Date/Time Weight    07/13/24 0600 64.3 (141.76)            Vital Signs (last 3 days)       Date/Time Temp Pulse Resp BP MAP (mmHg) SpO2 O2 Device Patient Position - Orthostatic VS Omari Coma Scale Score Pain    07/13/24 0800 -- 103 27 87/54 66 99 % None (Room air) -- 15 No Pain    07/13/24 0720 99 °F (37.2 °C) 106 22 110/53 77 99 % -- -- -- --    07/13/24 0630 -- 106 23 94/50 -- 100 % -- -- -- --    07/13/24 0619 99.2 °F (37.3 °C) 109 28 95/54 -- -- -- -- -- --    07/13/24 0615 -- 113 28 95/54 70 100 % -- -- -- --    07/13/24 8020 -- 122 30 115/58 79 98 % -- -- -- --    07/13/24 6180 -- -- -- -- --  -- -- -- 15 No Pain    07/13/24 0400 98.5 °F (36.9 °C) 104 21 95/51 69 100 % None (Room air) Sitting -- --    07/13/24 0300 -- 110 27 103/54 73 100 % -- -- -- --    07/13/24 0200 -- 108 27 100/50 71 100 % -- -- -- --    07/13/24 0128 98.1 °F (36.7 °C) 111 23 108/55 -- 100 % -- -- -- --    07/13/24 0100 -- 122 25 111/68 83 100 % -- -- -- --    07/13/24 0015 -- 123 23 103/51 -- 99 % -- -- -- --    07/13/24 0000 98.1 °F (36.7 °C) 127 24 113/56 -- -- -- -- -- --    07/12/24 2352 98.7 °F (37.1 °C) 135 31 111/54 -- 99 % None (Room air) -- -- --    07/12/24 2315 -- -- -- -- -- -- -- -- 14 No Pain    07/12/24 2310 98.1 °F (36.7 °C) 129 24 106/56 74 -- -- -- -- --    07/12/24 2235 98.1 °F (36.7 °C) 108 20 95/53 -- -- -- -- -- --    07/12/24 2230 100.7 °F (38.2 °C) 120 20 101/57 -- 98 % None (Room air) -- -- --    07/12/24 2225 98.3 °F (36.8 °C) -- -- 98/55 -- -- -- -- -- --    07/12/24 2220 -- 110 20 88/55 67 97 % None (Room air) Sitting -- --    07/12/24 2215 -- 120 22 83/51 63 98 % None (Room air) Sitting -- --    07/12/24 2200 -- 118 21 72/44  54  97 % None (Room air) Sitting -- --    07/12/24 2120 -- 137 25 86/50  62 99 % None (Room air) Sitting -- --    07/12/24 2030 -- 152 26 95/48 68 97 % None (Room air) Lying -- --    07/12/24 2000 -- 132 22 85/49 61 97 % None (Room air) Lying -- No Pain    07/12/24 1945 -- 128 24 89/53 65 97 % None (Room air) Lying -- No Pain    07/12/24 1904 101.9 °F (38.8 °C) 129 17 102/51 71 97 % None (Room air) -- -- No Pain              Pertinent Labs/Diagnostic Test Results:   Radiology:  XR chest portable ICU   Final Interpretation by Diego Nagel MD (07/13 0542)      No acute cardiopulmonary disease. Cardiomegaly.                  Workstation performed: [id number]         CT abdomen pelvis with contrast   Final Interpretation by Jermaine De La Cruz DO (07/12 2238)      Interval removal of previously seen bladder mass as well as interval placement of right-sided nephroureteral  stent, this appears appropriately positioned. Mild to moderate right hydroureter. Previously seen right-sided hydronephrosis is intervally    improved. Small amount of air in the right renal collecting system as well as right-sided perinephric fat stranding could be related to recent instrumentation and/or infection.      Partially distended bladder. Mild to moderate circumferential bladder wall thickening. Air within the bladder lumen, possibly related to recent instrumentation/intervention; superimposed cystitis considered in the appropriate clinical setting.    Correlation with the patient's symptoms, laboratory values, and urinalysis recommended.      Cardiomegaly, cholelithiasis, renal cysts, colonic diverticulosis without evidence of acute diverticulitis, small hiatal hernia, enlarged prostate, right-sided inguinal hernia containing fat and several small bowel loops; no discrete evidence of bowel    obstruction.      Other findings as above.      The study was marked in EPIC for immediate notification.      Workstation performed: [id number]                 Results from last 7 days   Lab Units 07/13/24  0255   SARS-COV-2  Negative     Results from last 7 days   Lab Units 07/13/24  0453 07/13/24  0149 07/12/24  1932   WBC Thousand/uL 20.79*  --  9.64   HEMOGLOBIN g/dL 6.4* 7.1* 5.8*   HEMATOCRIT % 20.9* 22.6* 18.7*   PLATELETS Thousands/uL 165  --  230   BANDS PCT %  --   --  21*         Results from last 7 days   Lab Units 07/13/24 0453 07/12/24 1932   SODIUM mmol/L 138 138   POTASSIUM mmol/L 4.6 4.2   CHLORIDE mmol/L 113* 111*   CO2 mmol/L 17* 21   ANION GAP mmol/L 8 6   BUN mg/dL 20 22   CREATININE mg/dL 1.70* 1.66*   EGFR ml/min/1.73sq m 36 37   CALCIUM mg/dL 7.5* 7.9*   MAGNESIUM mg/dL 1.5*  --    PHOSPHORUS mg/dL 2.6  --      Results from last 7 days   Lab Units 07/13/24  0633 07/12/24  1932   AST U/L  --  10*   ALT U/L  --  6*   ALK PHOS U/L  --  67   TOTAL PROTEIN g/dL  --  5.7*   ALBUMIN g/dL  --   3.1*   TOTAL BILIRUBIN mg/dL  --  0.63   AMMONIA umol/L 16*  --          Results from last 7 days   Lab Units 07/13/24  0453 07/12/24  1932   GLUCOSE RANDOM mg/dL 104 97           Results from last 7 days   Lab Units 07/12/24  2242 07/12/24  1027   PROTIME seconds 18.0* 15.6*   INR  1.51* 1.22*   PTT seconds  --  34             Results from last 7 days   Lab Units 07/12/24  2222 07/12/24  1932   LACTIC ACID mmol/L 1.4 2.9*                         Results from last 7 days   Lab Units 07/13/24  0557 07/13/24  0555   UNIT PRODUCT CODE  [id number]  [id number] [id number]  [id number]   UNIT NUMBER  [id number]  [id number]  [id number]   UNITABO  A  A A  A   UNITRH  POS  POS POS  POS   CROSSMATCH  Compatible  Compatible Compatible  Compatible   UNIT DISPENSE STATUS  Issued  Crossmatched Presumed Trans  Presumed Trans   UNIT PRODUCT VOL mL 350  350 350  350                         Results from last 7 days   Lab Units 07/12/24 2031   CLARITY UA  Extra Turbid   COLOR UA  Dark Red   SPEC GRAV UA  1.025   PH UA  6.5   GLUCOSE UA mg/dl Negative   KETONES UA mg/dl Negative   BLOOD UA  Large*   PROTEIN UA mg/dl 300 (3+)*   NITRITE UA  Negative   BILIRUBIN UA  Negative   UROBILINOGEN UA (BE) mg/dl <2.0   LEUKOCYTES UA  Moderate*   WBC UA /hpf Innumerable*   RBC UA /hpf Innumerable*   BACTERIA UA /hpf None Seen   EPITHELIAL CELLS WET PREP /hpf None Seen     Results from last 7 days   Lab Units 07/13/24  0255   INFLUENZA A PCR  Negative   INFLUENZA B PCR  Negative   RSV PCR  Negative                             Results from last 7 days   Lab Units 07/12/24  1932   BLOOD CULTURE  Received in Microbiology Lab. Culture in Progress.  Received in Microbiology Lab. Culture in Progress.                   ED Treatment-Medication Administration from 07/12/2024 1856 to 07/12/2024 2301         Date/Time Order Dose Route Action     07/12/2024 1933 sodium chloride 0.9 % bolus 1,000 mL 1,000 mL Intravenous New  Bag     07/12/2024 1936 acetaminophen (TYLENOL) tablet 975 mg 975 mg Oral Given     07/12/2024 2022 sodium chloride 0.9 % bolus 1,000 mL 1,000 mL Intravenous New Bag     07/12/2024 2120 piperacillin-tazobactam (ZOSYN) 4.5 g in sodium chloride 0.9 % 100 mL IVPB 4.5 g Intravenous New Bag     07/12/2024 2058 iohexol (OMNIPAQUE) 350 MG/ML injection (SINGLE-DOSE) 85 mL 85 mL Intravenous Given     07/12/2024 2211 norepinephrine (LEVOPHED) 4 mg (STANDARD CONCENTRATION) IV in sodium chloride 0.9% 250 mL 5 mcg/min Intravenous New Bag                Admitting Diagnosis: Anemia [D64.9]  Fever [R50.9]  H/O transurethral resection of bladder tumor (TURBT) [Z98.890, Z86.03]  Age/Sex: 84 y.o. male  Admission Orders:  Scheduled Medications:  chlorhexidine, 15 mL, Mouth/Throat, Q12H RADHA  piperacillin-tazobactam, 4.5 g, Intravenous, Q8H  pravastatin, 40 mg, Oral, Daily With Dinner      Continuous IV Infusions:  norepinephrine, 1-30 mcg/min, Intravenous, Titrated      PRN Meds:  ALPRAZolam, 0.5 mg, Oral, TID PRN  lidocaine, 1 Application, Urethral, Daily PRN  oxybutynin, 5 mg, Oral, TID PRN  [START ON 7/14/2024] vancomycin, 10 mg/kg, Intravenous, Daily PRN        IP CONSULT TO CASE MANAGEMENT  IP CONSULT TO PHARMACY    Network Utilization Review Department  ATTENTION: Please call with any questions or concerns to [phone] and carefully listen to the prompts so that you are directed to the right person. All voicemails are confidential.   For Discharge needs, contact Care Management DC Support Team at [phone] opt. [phone]  Send all requests for admission clinical reviews, approved or denied determinations and any other requests to dedicated fax number below belonging to the campus where the patient is receiving treatment. List of dedicated fax numbers for the Facilities:  FACILITY NAME UR FAX NUMBER   ADMISSION DENIALS (Administrative/Medical Necessity) [phone]   DISCHARGE SUPPORT TEAM (NETWORK) [phone]   PARENT CHILD  [facility] (Maternity/NICU/Pediatrics) [phone]   [facility] [phone]   [facility] [phone]   [facility] [phone]   [facility] [phone]   [facility] [phone]   [facility] [phone]   [facility] [phone]   [facility] [phone]   [facility] [phone]   [facility] [phone]   [facility] [phone]   [facility] [phone]

## 2024-07-13 NOTE — SEPSIS NOTE
"  Sepsis Note   Francis Caballero 84 y.o. male MRN: [id number]  Unit/Bed#: ED 08 Encounter: [id number]       Initial Sepsis Screening       Row Name 07/12/24 2157                Is the patient's history suggestive of a new or worsening infection? Yes (Proceed)  -BK        Suspected source of infection urinary tract infection  -BK        Indicate SIRS criteria Tachycardia > 90 bpm;Leukocytosis (WBC > 97017 IJL) OR Leukopenia (WBC <4000 IJL) OR Bandemia (WBC >10% bands)  -BK        Are two or more of the above signs & symptoms of infection both present and new to the patient? Yes (Proceed)  -BK        Assess for evidence of organ dysfunction: Are any of the below criteria present within 6 hours of suspected infection and SIRS criteria that are NOT considered to be chronic conditions? SBP < 90  -BK        Date of presentation of septic shock 07/12/24  -BK        Time of presentation of septic shock 2000 -BK        Fluid Resuscitation: 30 ml/kg IV fluid bolus will be given based on actual body weight  -BK        Is the patient is persistently hypotensive in the hour after fluid bolus administration? If yes, patient meets criteria for vasopressor use. YES  -BK        Sepsis Note: Click \"NEXT\" below (NOT \"close\") to generate sepsis note based on above information. YES (proceed by clicking \"NEXT\")  -BK                  User Key  (r) = Recorded By, (t) = Taken By, (c) = Cosigned By      Initials Name Provider Type    JERED León DO Physician                    Default Flowsheet Data (Last 720 Hours)       Sepsis Reassess       Row Name 07/12/24 2158                   Repeat Volume Status and Tissue Perfusion Assessment Performed    Date of Reassessment: 07/12/24  -BK        Time of Reassessment: 2100 -BK        Sepsis Reassessment Note: Click \"NEXT\" below (NOT \"close\") to generate sepsis reassessment note. YES (proceed by clicking \"NEXT\")  -BK        Repeat Volume Status and Tissue Perfusion Assessment Performed " --                  User Key  (r) = Recorded By, (t) = Taken By, (c) = Cosigned By      Initials Name Provider Type    JERED León DO Physician                    There is no height or weight on file to calculate BMI.  Wt Readings from Last 1 Encounters:   07/12/24 75 kg (165 lb 5.5 oz)        Ideal body weight: 77.6 kg (171 lb 1.2 oz)

## 2024-07-13 NOTE — PROGRESS NOTES
Francis Caballero is a 84 y.o. male who is currently ordered Vancomycin IV with management by the Pharmacy Consult service.  Relevant clinical data and objective / subjective history reviewed.  Vancomycin Assessment:  Indication and Goal AUC/Trough: Bacteremia (goal -600, trough >10), -600, trough >10  Clinical Status: improving  Micro:   pending  Renal Function:  SCr: 1.66 mg/dL  CrCl: 35.8 mL/min  Renal replacement: Not on dialysis  Days of Therapy: 1  Current Dose: 750mg daily prn when trough is less than 15  Vancomycin Plan:  New Dosinmg daily prn when trough is less than 15  Estimated AUC: pulse dosing mcg*hr/mL  Estimated Trough: pulse dosing mcg/mL  Next Level: 07/15/24 at 0600  Renal Function Monitoring: Daily BMP and UOP  Pharmacy will continue to follow closely for s/sx of nephrotoxicity, infusion reactions and appropriateness of therapy.  BMP and CBC will be ordered per protocol. We will continue to follow the patient’s culture results and clinical progress daily.    Chay Prince, Pharmacist

## 2024-07-13 NOTE — CASE MANAGEMENT
Case Management Assessment & Discharge Planning Note    Patient name Francis Caballero  Location MICU 05/MICU 05 MRN [id number]  : 1940 Date 2024       Current Admission Date: 2024  Current Admission Diagnosis:Anemia, Fever, H/O transurethral resection of bladder tumor (TURBT)   Patient Active Problem List    Diagnosis Date Noted Date Diagnosed    CVA (cerebral vascular accident) (HCC) 2024     JORGE LUIS (obstructive sleep apnea) 2024     Mitral regurgitation 2024     Smoker 2024     Chronic anticoagulation 2024     Anemia 2024     Intractable epilepsy without status epilepticus, unspecified epilepsy type (HCC) 2024     Coagulation disorder ([facility]) 2023     Malignant 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 (HCC) 06/15/2023     Other hydronephrosis 06/15/2023     Chronic cough 2023     Gastroesophageal reflux disease without esophagitis 2023     Sciatica, right side 2022     Chronic bilateral low back pain with right-sided sciatica 08/15/2021     DDD (degenerative disc disease), lumbar 08/15/2021     Essential hypertension 2021     Primary osteoarthritis of left hip 2020     Osteoarthritis of lumbar spine 2020     Frequent falls 2018     Weakness 2018     Cerebral artery occlusion 2017    Labyrinthitis, unspecified ear 2017    Pure hypercholesterolemia 2017    Gait instability 2017     Bilateral pseudophakia 2017     Age-related cataract of right eye 2017     Macular drusen 2017     Posterior vitreous detachment of both eyes 2017     Arthralgia 2014     Stage 3b chronic kidney disease (HCC) 2014     Asymptomatic cholelithiasis 2013     Benign prostatic hyperplasia 2013     Coronary arteriosclerosis 2013     Hiatal hernia 2013     Anxiety 2013     Paroxysmal atrial fibrillation (HCC)  06/13/2013     Benign essential hypertension 06/13/2013     History of epilepsy 06/13/2013     Hyperlipidemia 06/13/2013       LOS (days): 1  Geometric Mean LOS (GMLOS) (days):   Days to GMLOS:     OBJECTIVE:    Risk of Unplanned Readmission Score: 21.72         Current admission status: Inpatient       Preferred Pharmacy:   [facility][id number] - [place] - [address]  [address]  [facility] [address]  Phone: [phone] Fax: [phone]    [facility] - [place], [place] - [address]  [address] [address]  Phone: [phone] Fax: [phone]    Primary Care Provider: Chantel Santos DO    Primary Insurance: BL[employer] CR[employer] REP  Secondary Insurance:     ASSESSMENT:  Active Health Care Proxies       Daniel Caballero Health Care Representative - Spouse   Primary Phone: [phone] (Home)                           Readmission Root Cause  30 Day Readmission: No    Patient Information  Admitted from:: Home  During Assessment patient was accompanied by: Spouse  Assessment information provided by:: Spouse, Patient  Primary Caregiver: Self  Support Systems: Spouse/significant other  County of Residence: [facility] [place] do you live in?: [place]  Home entry access options. Select all that apply.: Stairs  Number of steps to enter home.: 3  Type of Current Residence: 2 story home  Upon entering residence, is there a bedroom on the main floor (no further steps)?: No  A bedroom is located on the following floor levels of residence (select all that apply):: 2nd Floor  Upon entering residence, is there a bathroom on the main floor (no further steps)?: No  Indicate which floors of current residence have a bathroom (select all the apply):: 2nd Floor  Number of steps to 2nd floor from main floor: One Flight  Living Arrangements: Lives w/ Spouse/significant other  Is patient a ?: No    Activities of Daily Living Prior to Admission  Functional Status: Independent  Completes ADLs  independently?: Yes  Ambulates independently?: Yes  Does patient use assisted devices?: Yes  Assisted Devices (DME) used: Walker, Straight Cane, Bedside Commode, Shower Chair, Other (Comment) (Recliner lift chair.)  Does patient have a history of Outpatient Therapy (PT/OT)?: Yes  Does the patient have a history of Short-Term Rehab?: No  Does patient have a history of HHC?: No  Does patient currently have HHC?: No         Patient Information Continued  Income Source: Pension/residential  Does patient have prescription coverage?: Yes  Does patient receive dialysis treatments?: No  Does patient have a history of substance abuse?: No  Does patient have a history of Mental Health Diagnosis?: No         Means of Transportation  Means of Transport to [facility]:: Family transport      Social Determinants of Health (SDOH)      Flowsheet Row Most Recent Value   Housing Stability    In the last 12 months, was there a time when you were not able to pay the mortgage or rent on time? N   In the past 12 months, how many times have you moved where you were living? 0   At any time in the past 12 months, were you homeless or living in a shelter (including now)? N   Transportation Needs    In the past 12 months, has lack of transportation kept you from medical appointments or from getting medications? no   In the past 12 months, has lack of transportation kept you from meetings, work, or from getting things needed for daily living? No   Food Insecurity    Within the past 12 months, you worried that your food would run out before you got the money to buy more. Never true   Within the past 12 months, the food you bought just didn't last and you didn't have money to get more. Never true   Utilities    In the past 12 months has the electric, gas, oil, or water company threatened to shut off services in your home? No            DISCHARGE DETAILS:    Discharge planning discussed with:: Patient and wife.      Contacts  Patient Contacts: Daniel  Ada (Spouse) [phone] (H)  Relationship to Patient:: Family  Contact Method: Phone, In Person  Phone Number: Daniel Caballero (Spouse) [phone] (K)  Reason/Outcome: Continuity of Care, Emergency Contact, Discharge Planning    Other Referral/Resources/Interventions Provided:  Referral Comments: Pt admitted with anemia, shock, and possible sepsis.  S/P TURBT 7/12/24.  Dispo pending clinical course. CM following

## 2024-07-14 PROBLEM — R78.81 POSITIVE BLOOD CULTURE: Status: ACTIVE | Noted: 2024-07-14

## 2024-07-14 PROBLEM — R57.9 SHOCK (HCC): Status: ACTIVE | Noted: 2024-07-14

## 2024-07-14 LAB
ANION GAP SERPL CALCULATED.3IONS-SCNC: 10 MMOL/L (ref 4–13)
BACTERIA UR CULT: NORMAL
BUN SERPL-MCNC: 26 MG/DL (ref 5–25)
CALCIUM SERPL-MCNC: 8.1 MG/DL (ref 8.4–10.2)
CHLORIDE SERPL-SCNC: 110 MMOL/L (ref 96–108)
CO2 SERPL-SCNC: 18 MMOL/L (ref 21–32)
CREAT SERPL-MCNC: 1.98 MG/DL (ref 0.6–1.3)
ERYTHROCYTE [DISTWIDTH] IN BLOOD BY AUTOMATED COUNT: 15.7 % (ref 11.6–15.1)
GFR SERPL CREATININE-BSD FRML MDRD: 30 ML/MIN/1.73SQ M
GLUCOSE SERPL-MCNC: 92 MG/DL (ref 65–140)
HCT VFR BLD AUTO: 25.3 % (ref 36.5–49.3)
HGB BLD-MCNC: 8 G/DL (ref 12–17)
INR PPP: 1.52 (ref 0.84–1.19)
MAGNESIUM SERPL-MCNC: 2.2 MG/DL (ref 1.9–2.7)
MCH RBC QN AUTO: 31.1 PG (ref 26.8–34.3)
MCHC RBC AUTO-ENTMCNC: 31.6 G/DL (ref 31.4–37.4)
MCV RBC AUTO: 98 FL (ref 82–98)
PLATELET # BLD AUTO: 210 THOUSANDS/UL (ref 149–390)
PMV BLD AUTO: 9.8 FL (ref 8.9–12.7)
POTASSIUM SERPL-SCNC: 4.5 MMOL/L (ref 3.5–5.3)
PROTHROMBIN TIME: 18.1 SECONDS (ref 11.6–14.5)
RBC # BLD AUTO: 2.57 MILLION/UL (ref 3.88–5.62)
SODIUM SERPL-SCNC: 138 MMOL/L (ref 135–147)
VANCOMYCIN SERPL-MCNC: 11.5 UG/ML (ref 10–20)
WBC # BLD AUTO: 17.62 THOUSAND/UL (ref 4.31–10.16)

## 2024-07-14 PROCEDURE — 85027 COMPLETE CBC AUTOMATED: CPT | Performed by: STUDENT IN AN ORGANIZED HEALTH CARE EDUCATION/TRAINING PROGRAM

## 2024-07-14 PROCEDURE — 83735 ASSAY OF MAGNESIUM: CPT | Performed by: STUDENT IN AN ORGANIZED HEALTH CARE EDUCATION/TRAINING PROGRAM

## 2024-07-14 PROCEDURE — 99232 SBSQ HOSP IP/OBS MODERATE 35: CPT | Performed by: PHYSICIAN ASSISTANT

## 2024-07-14 PROCEDURE — 80048 BASIC METABOLIC PNL TOTAL CA: CPT | Performed by: STUDENT IN AN ORGANIZED HEALTH CARE EDUCATION/TRAINING PROGRAM

## 2024-07-14 PROCEDURE — 99223 1ST HOSP IP/OBS HIGH 75: CPT | Performed by: INTERNAL MEDICINE

## 2024-07-14 PROCEDURE — 85610 PROTHROMBIN TIME: CPT | Performed by: STUDENT IN AN ORGANIZED HEALTH CARE EDUCATION/TRAINING PROGRAM

## 2024-07-14 PROCEDURE — 80202 ASSAY OF VANCOMYCIN: CPT | Performed by: STUDENT IN AN ORGANIZED HEALTH CARE EDUCATION/TRAINING PROGRAM

## 2024-07-14 PROCEDURE — 99232 SBSQ HOSP IP/OBS MODERATE 35: CPT

## 2024-07-14 RX ORDER — OXYBUTYNIN CHLORIDE 5 MG/1
5 TABLET ORAL 3 TIMES DAILY
Status: DISCONTINUED | OUTPATIENT
Start: 2024-07-14 | End: 2024-07-22

## 2024-07-14 RX ORDER — OXYBUTYNIN CHLORIDE 5 MG/1
5 TABLET ORAL 3 TIMES DAILY
Status: DISCONTINUED | OUTPATIENT
Start: 2024-07-14 | End: 2024-07-14

## 2024-07-14 RX ORDER — VANCOMYCIN HYDROCHLORIDE 750 MG/150ML
750 INJECTION, SOLUTION INTRAVENOUS EVERY 24 HOURS
Status: DISCONTINUED | OUTPATIENT
Start: 2024-07-14 | End: 2024-07-14

## 2024-07-14 RX ORDER — ALBUMIN (HUMAN) 12.5 G/50ML
25 SOLUTION INTRAVENOUS ONCE
Status: COMPLETED | OUTPATIENT
Start: 2024-07-14 | End: 2024-07-14

## 2024-07-14 RX ADMIN — OXYBUTYNIN CHLORIDE 5 MG: 5 TABLET ORAL at 22:00

## 2024-07-14 RX ADMIN — SENNOSIDES AND DOCUSATE SODIUM 1 TABLET: 50; 8.6 TABLET ORAL at 22:00

## 2024-07-14 RX ADMIN — ALPRAZOLAM 0.5 MG: 0.5 TABLET ORAL at 01:43

## 2024-07-14 RX ADMIN — ALPRAZOLAM 0.5 MG: 0.5 TABLET ORAL at 22:27

## 2024-07-14 RX ADMIN — CEFEPIME 2000 MG: 2 INJECTION, POWDER, FOR SOLUTION INTRAVENOUS at 11:50

## 2024-07-14 RX ADMIN — CEFEPIME 2000 MG: 2 INJECTION, POWDER, FOR SOLUTION INTRAVENOUS at 22:03

## 2024-07-14 RX ADMIN — PRAVASTATIN SODIUM 40 MG: 40 TABLET ORAL at 16:24

## 2024-07-14 RX ADMIN — ALBUMIN (HUMAN) 25 G: 0.25 INJECTION, SOLUTION INTRAVENOUS at 14:17

## 2024-07-14 RX ADMIN — OXYBUTYNIN CHLORIDE 5 MG: 5 TABLET ORAL at 14:17

## 2024-07-14 RX ADMIN — VANCOMYCIN HYDROCHLORIDE 750 MG: 750 INJECTION, SOLUTION INTRAVENOUS at 09:34

## 2024-07-14 NOTE — ASSESSMENT & PLAN NOTE
1/2 blood cultures positive for gram negative rods with detected Klebsiella pneumoniae  Cefepime recommended - continue   Will d/c vancomycin at this time   Curbside to ID regarding suspected source - given he received 1 dose of ancef prior to urine culture, could be false negative given urine culture without growth.   Continue above abx for now and adjust as susceptibilities finalize

## 2024-07-14 NOTE — ASSESSMENT & PLAN NOTE
Metoprolol being held for labile BP  Warfarin being held in the setting of hematuria, anemia. Resume when able

## 2024-07-14 NOTE — PROGRESS NOTES
Francis Caballero is a 84 y.o. male who is currently ordered Vancomycin IV with management by the Pharmacy Consult service.  Relevant clinical data and objective / subjective history reviewed.  Vancomycin Assessment:  Indication and Goal AUC/Trough: Urinary tract infection (goal -600, trough >10), -600, trough >10  Clinical Status: stable  Micro:     Renal Function:  SCr: 1.98 mg/dL  CrCl: 30.9 mL/min  Renal replacement: Not on dialysis  Days of Therapy: 2  Current Dose: 750mg iv daily prn when level less than 15  Vancomycin Plan:  New Dosinmg iv daily  Estimated AUC: 515 mcg*hr/mL  Estimated Trough: 16.6 mcg/mL  Vancomycin random=11.5 (24)  Next Level: 24 am labs  Renal Function Monitoring: Daily BMP and UOP  Pharmacy will continue to follow closely for s/sx of nephrotoxicity, infusion reactions and appropriateness of therapy.  BMP and CBC will be ordered per protocol. We will continue to follow the patient’s culture results and clinical progress daily.    Bro Mobley, Pharmacist

## 2024-07-14 NOTE — PLAN OF CARE
Problem: GENITOURINARY - ADULT  Goal: Urinary catheter remains patent  Description: INTERVENTIONS:  - Assess patency of urinary catheter  - If patient has a chronic irizarry, consider changing catheter if non-functioning  - Follow guidelines for intermittent irrigation of non-functioning urinary catheter  Outcome: Progressing

## 2024-07-14 NOTE — CONSULTS
Consultation - Nephrology   Francis Caballero 84 y.o. male MRN: [id number]  Unit/Bed#: [facility] 901-01 Encounter: [id number]    ASSESSMENT AND PLAN:  84-year-old male with significant medical issues of bladder neoplasm, hypertension, hyperlipidemia, A-fib, recently had TURBT on 7/12/2024 and eventually had generalized weakness found to have severe anemia, hypotension.  We are concerned for LELE/CKD management.    CKD stage III, baseline creatinine 1.5-1.8  -Creatinine slightly increased to 1.9 today  -Elevated creatinine could be in the setting of contrast nephropathy, status post IV contrast with CT scan on 7/12/2024, severe hypotension requiring vasopressors earlier, severe anemia  -Currently has Gracia catheter.  Good urine output noted  -Initially required IV fluid, now remains off.  -BMP in a.m.  -Will give IV albumin 25 g 1 dose today.  -CT scan shows renal cortical scarring in both kidneys, mild atrophy on right kidney.  Mild to moderate hydroureter although prior seen right hydronephrosis is improved.    Initial shock suspected hypovolemic/hemorrhagic shock, now improved, initially required vasopressors but remains off.  -Blood pressure has improved comparing to earlier on admission.  Still low normal BP readings noted.    Severe anemia suspect blood loss, status post TURBT  -Status post blood transfusion this admission.  Continue to monitor hemoglobin closely.  Transfuse as needed for hemoglobin less than 7    Status post TURBT, ureteral stent exchange, cystoscopy, urology closely following.  Urothelial carcinoma, status post chemo, radiation    Low bicarb, hyperchloremic non-anion gap metabolic acidosis suspect in the setting of earlier IV fluid resuscitation.  Bicarb level 18, continue to monitor closely.  Now remains off fluids.    Discussed above plan in detail with primary team regarding monitoring BMP and they agree with above recommendations.      HISTORY OF PRESENT ILLNESS:  Requesting Physician: Dana OLEA  MD Demario  Reason for Consult: CKD    Francis Caballero is a 84 y.o. year old male who was admitted to [facility] after presenting with generalized weakness, hypotension, anemia. A renal consultation is requested today for assistance in the management of CKD.  Old medical records including prior levels were reviewed from [facility] records.  Patient had TURBT on 7/12/2024 due to bladder neoplasm, cystoscopy and evacuation of clots, eventually went home and started having generalized weakness, came back and was found to have severe anemia, hypotension.  Initially required vasopressors in the ICU and now overall improving.    He denies any worsening shortness of breath at the time of my encounter.  Denies any nausea or vomiting.  Currently has Gracia catheter.    PAST MEDICAL HISTORY:  Past Medical History:   Diagnosis Date    Ambulates with cane     Anemia     last assessed: 05/23/2015    GERD (gastroesophageal reflux disease)     Hematuria     Hyperlipidemia     Hypertension     Intracerebral hemorrhage (HCC)     last assessed: 04/21/2014; right    Intracerebral hemorrhage (HCC)     last assessed: 11/23/2017    Mobility impaired     Stroke syndrome     last assessed: 06/13/2013    UTI (urinary tract infection)        PAST SURGICAL HISTORY:  Past Surgical History:   Procedure Laterality Date    BLADDER SURGERY      FL RETROGRADE PYELOGRAM  7/12/2024    IR NEPHROSTOMY TUBE PLACEMENT  7/27/2023    HI CYSTOURETHROSCOPY W/DEST &/RMVL TUMOR LARGE N/A 7/26/2023    Procedure: (TURBT);  Surgeon: Tha Carcamo MD;  Location: [facility] OR;  Service: Urology       ALLERGIES:  No Known Allergies    SOCIAL HISTORY:  Social History     Substance and Sexual Activity   Alcohol Use Yes    Comment: Social      Social History     Substance and Sexual Activity   Drug Use No     Social History     Tobacco Use   Smoking Status Every Day    Types: Cigars   Smokeless Tobacco Never   Tobacco Comments    Last cigar 7/10.24       FAMILY  HISTORY:  Family History   Problem Relation Age of Onset    Lung cancer Mother        MEDICATIONS:    Current Facility-Administered Medications:     ALPRAZolam (XANAX) tablet 0.5 mg, 0.5 mg, Oral, TID PRN, Harnishkumar Goode, DO, 0.5 mg at 07/14/24 0143    ceFEPime (MAXIPIME) 2,000 mg in dextrose 5 % 50 mL IVPB, 2,000 mg, Intravenous, Q12H, Gale Fregoso PA-C, Last Rate: 100 mL/hr at 07/14/24 1150, 2,000 mg at 07/14/24 1150    lidocaine (URO-JET) 2 % urethral/mucosal gel 1 Application, 1 Application, Urethral, Daily PRN, Harnishkumar Goode, DO    oxybutynin (DITROPAN) tablet 5 mg, 5 mg, Oral, TID PRN, Harnishkumar Goode, DO, 5 mg at 07/13/24 2000    pravastatin (PRAVACHOL) tablet 40 mg, 40 mg, Oral, Daily With Dinner, Airamkcasimiro Ferrarael, DO, 40 mg at 07/13/24 1736    senna-docusate sodium (SENOKOT S) 8.6-50 mg per tablet 1 tablet, 1 tablet, Oral, HS, Harnishkumar Goode, DO    REVIEW OF SYSTEMS:  More than 10 point review of systems were obtained and discussed in length with the patient. Review of systems were negative / unremarkable except mentioned in the HPI section.     PHYSICAL EXAM:  Current Weight: Weight - Scale: 79.2 kg (174 lb 9.7 oz)  First Weight: Weight - Scale: 64.3 kg (141 lb 12.1 oz)  Vitals:    07/14/24 0750   BP:    Pulse:    Resp:    Temp:    SpO2: 96%       Intake/Output Summary (Last 24 hours) at 7/14/2024 1308  Last data filed at 7/14/2024 1153  Gross per 24 hour   Intake 590 ml   Output 1820 ml   Net -1230 ml     Wt Readings from Last 3 Encounters:   07/14/24 79.2 kg (174 lb 9.7 oz)   07/12/24 75 kg (165 lb 5.5 oz)   06/24/24 77.1 kg (170 lb)     Temp Readings from Last 3 Encounters:   07/14/24 99 °F (37.2 °C)   07/12/24 (!) 97.4 °F (36.3 °C) (Temporal)   07/03/24 (!) 97.3 °F (36.3 °C) (Temporal)     BP Readings from Last 3 Encounters:   07/14/24 121/68   07/12/24 148/82   07/03/24 132/67     Pulse Readings from Last 3 Encounters:   07/14/24 87   07/12/24 98   07/03/24 65        Physical  Examination:  Eyes: Mild conjunctival pallor present  ENT: External examination of ear and nose unremarkable  Neck: No obvious new gross deformity noted  Respiratory: Bilateral air entry present  CVS: No significant edema in legs  GI: Soft, nondistended  CNS: Active, alert, follows commands  Skin: No new rash  Musculoskeletal: No obvious new gross deformity noted    Invasive Devices:   Urethral Catheter Three way 20 Fr. (Active)   Reasons to continue Urinary Catheter  Accurate I&O assessment in critically ill patients (48 hr. max) 07/13/24 0800   Goal for Removal Will consult urology 07/13/24 0800   Site Assessment Clean;Skin intact 07/13/24 2100   Gracia Care Done 07/13/24 2100   Collection Container Standard drainage bag 07/13/24 2100   Securement Method Securing device (Describe) 07/13/24 2100   Irrigant Normal saline 07/14/24 0115   Urethral Irrigation Intake (mL) 240 mL 07/14/24 0624   Output (mL) 400 mL 07/14/24 1153     Lab Results:   Results from last 7 days   Lab Units 07/14/24  0432 07/13/24  1654 07/13/24  1246 07/13/24  0453 07/13/24  0149 07/12/24  1932   WBC Thousand/uL 17.62*  --   --  20.79*  --  9.64   HEMOGLOBIN g/dL 8.0* 8.3* 7.9* 6.4* 7.1* 5.8*   HEMATOCRIT % 25.3* 26.2* 25.0* 20.9* 22.6* 18.7*   PLATELETS Thousands/uL 210  --   --  165  --  230   POTASSIUM mmol/L 4.5  --   --  4.6  --  4.2   CHLORIDE mmol/L 110*  --   --  113*  --  111*   CO2 mmol/L 18*  --   --  17*  --  21   BUN mg/dL 26*  --   --  20  --  22   CREATININE mg/dL 1.98*  --   --  1.70*  --  1.66*   CALCIUM mg/dL 8.1*  --   --  7.5*  --  7.9*   MAGNESIUM mg/dL 2.2  --   --  1.5*  --   --    PHOSPHORUS mg/dL  --   --   --  2.6  --   --        Other Studies:   XR chest portable ICU   Final Result by Diego Nagel MD (07/13 0542)      No acute cardiopulmonary disease. Cardiomegaly.                  Workstation performed: [id number]         CT abdomen pelvis with contrast   Final Result by Jermaine De La Cruz DO (07/12  "2238)      Interval removal of previously seen bladder mass as well as interval placement of right-sided nephroureteral stent, this appears appropriately positioned. Mild to moderate right hydroureter. Previously seen right-sided hydronephrosis is intervally    improved. Small amount of air in the right renal collecting system as well as right-sided perinephric fat stranding could be related to recent instrumentation and/or infection.      Partially distended bladder. Mild to moderate circumferential bladder wall thickening. Air within the bladder lumen, possibly related to recent instrumentation/intervention; superimposed cystitis considered in the appropriate clinical setting.    Correlation with the patient's symptoms, laboratory values, and urinalysis recommended.      Cardiomegaly, cholelithiasis, renal cysts, colonic diverticulosis without evidence of acute diverticulitis, small hiatal hernia, enlarged prostate, right-sided inguinal hernia containing fat and several small bowel loops; no discrete evidence of bowel    obstruction.      Other findings as above.      The study was marked in EPIC for immediate notification.      Workstation performed: [id number]             Portions of the record may have been created with voice recognition software. Occasional wrong word or \"sound a like\" substitutions may have occurred due to the inherent limitations of voice recognition software. Read the chart carefully and recognize, using context, where substitutions have occurred.    "

## 2024-07-14 NOTE — PROGRESS NOTES
Progress Note -urology  Francis Caballero 84 y.o. male MRN: [id number]  Unit/Bed#: [facility] 901-01 Encounter: [id number]    Assessment & Plan:  84-year-old male postop 2 cystoscopy TURBT and ureteral stent exchange presenting with shock hemorrhagic versus septic:     -Continue medical optimization per primary team.patient transitioned out of critical care.  Weaned off pressors  -Patient status post transfusion 3 units.  Repeat hemoglobin 7.9, repeat 8.3, 8.0 continue to trend transfuse as needed per primary team  -Patient presenting with fevers.  Urine cultures currently pending.  Adjust antibiotics based on culture data, preoperative urine culture revealed mixed contaminants  -Urethral Gracia catheter revealing urine turning more mariya in color representing old blood.  Mild.  -May remove urethral Gracia catheter once creatinine nadirs.  Initially was planning for void trial today however renal function trending upwards.  May want for adequate I's and O's.  If not needed can remove. hematuria again is mild and anticipated postoperatively with recent surgical procedure.  Improving self-limiting.  -Encourage ambulation provide a good bowel regimen  -Coagulation to be held 48 hours postoperatively.  -Anticipate patient have intermittent hematuria with ureteral stent in place and with current diagnosis.  -Continue follow-up with urology on outpatient as scheduled.  -Urology will follow peripherally          Subjective/Objective   Chief Complaint:     Subjective:     Objective:     Blood pressure 102/59, pulse 94, temperature 98.6 °F (37 °C), resp. rate 16, height 6' (1.829 m), weight 64.3 kg (141 lb 12.1 oz), SpO2 90%.,Body mass index is 19.23 kg/m².      Intake/Output Summary (Last 24 hours) at 7/14/2024 0337  Last data filed at 7/14/2024 0249  Gross per 24 hour   Intake 955.83 ml   Output 2580 ml   Net -1624.17 ml       Invasive Devices       Peripheral Intravenous Line  Duration             Peripheral IV 07/12/24 Left  Antecubital 1 day    Peripheral IV 07/13/24 Left;Ventral (anterior) Forearm <1 day              Drain  Duration             Urethral Catheter Three way 20 Fr. 2 days                Physical Exam  Constitutional:       General: He is not in acute distress.     Appearance: He is normal weight. He is not ill-appearing, toxic-appearing or diaphoretic.   HENT:      Head: Normocephalic and atraumatic.      Right Ear: External ear normal.      Left Ear: External ear normal.      Nose: Nose normal.      Mouth/Throat:      Pharynx: Oropharynx is clear.   Eyes:      Conjunctiva/sclera: Conjunctivae normal.   Cardiovascular:      Rate and Rhythm: Normal rate and regular rhythm.      Pulses: Normal pulses.   Pulmonary:      Effort: No respiratory distress.      Breath sounds: No wheezing or rales.   Abdominal:      General: Bowel sounds are normal. There is no distension.      Tenderness: There is no abdominal tenderness.   Genitourinary:     Comments: Karo/rust colored urine mild.  No clots, transparent  Musculoskeletal:      Cervical back: Normal range of motion.   Neurological:      Mental Status: He is alert.         Lab, Imaging and other studies:  Lab Results   Component Value Date    WBC 20.79 (H) 07/13/2024    HGB 8.3 (L) 07/13/2024    HCT 26.2 (L) 07/13/2024    MCV 99 (H) 07/13/2024     07/13/2024     Lab Results   Component Value Date    SODIUM 138 07/13/2024    K 4.6 07/13/2024     (H) 07/13/2024    CO2 17 (L) 07/13/2024    BUN 20 07/13/2024    CREATININE 1.70 (H) 07/13/2024    GLUC 104 07/13/2024    CALCIUM 7.5 (L) 07/13/2024       VTE Pharmacologic Prophylaxis: Reason for no pharmacologic prophylaxis blood loss anemia  VTE Mechanical Prophylaxis: sequential compression device    Gina Perez PA-C

## 2024-07-14 NOTE — ASSESSMENT & PLAN NOTE
Patient was brought to the ED for fever, pale/clammy skin, dizziness, and weakness after TURBT, cystoscopy earlier in the day   Hgb on arrival 5.8, s/p 3 units PRBC  SIRS criteria met with fever, tachycardia, urinary tract infection, & lactic 2.9  CT abd/pelvis:   Interval removal of previously seen bladder mass as well as interval placement of right-sided nephroureteral stent, this appears appropriately positioned. Mild to moderate right hydroureter. Previously seen right-sided hydronephrosis is intervally improved. Small amount of air in the right renal collecting system as well as right-sided perinephric fat stranding could be related to recent instrumentation and/or infection.  Partially distended bladder. Mild to moderate circumferential bladder wall thickening. Air within the bladder lumen, possibly related to recent instrumentation/intervention; superimposed cystitis considered in the appropriate clinical setting. Correlation with the patient's symptoms, laboratory values, and urinalysis recommended  Cardiomegaly, cholelithiasis, renal cysts, colonic diverticulosis without evidence of acute diverticulitis, small hiatal hernia, enlarged prostate, right-sided inguinal hernia containing fat and several small bowel loops; no discrete evidence of bowel obstruction.  Initially required pressors in the ICU as blood pressure unresponsive to fluids  Patient was eventually weaned off pressors, and moved out of the ICU  Urine culture negative however patient was given dose of cefazolin that could have resulted in false negative   Blood cultures 1/2 positive for gram negative rods - suspected urinary source   Was started on zosyn and vancomycin - now currently on cefepime. Vancomycin d/c'd 7/14  Continue to monitor and adjust abx regimen as susceptibilities finalize    Urology following, appreciate recs   Can remove irizarry catheter once Cr improves closer to baseline   Remaining hematuria thought to be anticipated post  operatively.  Continue holding anticoagulation x48 hours post op  Outpt follow up with urology

## 2024-07-14 NOTE — ASSESSMENT & PLAN NOTE
Follows with outpatient urology   S/p chemo and radiation   7/12 he underwent cystoscopy, TURBT for ongoing hematuria   Later the same night, patient was brought to the ED for fever, pale/clammy skin, dizziness, and weakness   See shock for full plan

## 2024-07-14 NOTE — PROGRESS NOTES
[facility]  Progress Note  Name: Francis Caballero I  MRN: [id number]  Unit/Bed#: [facility] 901-01 I Date of Admission: 7/12/2024   Date of Service: 7/14/2024 I Hospital Day: 2    Assessment & Plan   * Shock (HCC)  Assessment & Plan  Patient was brought to the ED for fever, pale/clammy skin, dizziness, and weakness after TURBT, cystoscopy earlier in the day   Hgb on arrival 5.8, s/p 3 units PRBC  SIRS criteria met with fever, tachycardia, urinary tract infection, & lactic 2.9  CT abd/pelvis:   Interval removal of previously seen bladder mass as well as interval placement of right-sided nephroureteral stent, this appears appropriately positioned. Mild to moderate right hydroureter. Previously seen right-sided hydronephrosis is intervally improved. Small amount of air in the right renal collecting system as well as right-sided perinephric fat stranding could be related to recent instrumentation and/or infection.  Partially distended bladder. Mild to moderate circumferential bladder wall thickening. Air within the bladder lumen, possibly related to recent instrumentation/intervention; superimposed cystitis considered in the appropriate clinical setting. Correlation with the patient's symptoms, laboratory values, and urinalysis recommended  Cardiomegaly, cholelithiasis, renal cysts, colonic diverticulosis without evidence of acute diverticulitis, small hiatal hernia, enlarged prostate, right-sided inguinal hernia containing fat and several small bowel loops; no discrete evidence of bowel obstruction.  Initially required pressors in the ICU as blood pressure unresponsive to fluids  Patient was eventually weaned off pressors, and moved out of the ICU  Urine culture negative however patient was given dose of cefazolin that could have resulted in false negative   Blood cultures 1/2 positive for gram negative rods - suspected urinary source   Was started on zosyn and vancomycin - now currently  on cefepime. Vancomycin d/c'd 7/14  Continue to monitor and adjust abx regimen as susceptibilities finalize    Urology following, appreciate recs   Can remove irizarry catheter once Cr improves closer to baseline   Remaining hematuria thought to be anticipated post operatively.  Continue holding anticoagulation x48 hours post op  Outpt follow up with urology     Positive blood culture  Assessment & Plan  1/2 blood cultures positive for gram negative rods with detected Klebsiella pneumoniae  Cefepime recommended - continue   Will d/c vancomycin at this time   Curbside to ID regarding suspected source - given he received 1 dose of ancef prior to urine culture, could be false negative given urine culture without growth.   Continue above abx for now and adjust as susceptibilities finalize    Symptomatic anemia  Assessment & Plan  Hgb on arrival 5.8  S/p 3 units PRBC  Continue to monitor hgb daily and replete for hgb <7  Has remained stable around 8    Malignant 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 (HCC)  Assessment & Plan  Follows with outpatient urology   S/p chemo and radiation   7/12 he underwent cystoscopy, TURBT for ongoing hematuria   Later the same night, patient was brought to the ED for fever, pale/clammy skin, dizziness, and weakness   See shock for full plan     Hyperlipidemia  Assessment & Plan  Continue pravastatin 40mg for home Crestor 5mg     Benign essential hypertension  Assessment & Plan  Home regimen: Metoprolol 50 mg twice daily -currently being held for labile blood pressures    Atrial fibrillation (HCC)  Assessment & Plan  Metoprolol being held for labile BP  Warfarin being held in the setting of hematuria, anemia. Resume when able     Anxiety  Assessment & Plan  Continue xanax 0.5mg TID PRN anxiety         VTE Pharmacologic Prophylaxis:   Moderate Risk (Score 3-4) - Pharmacological DVT Prophylaxis Contraindicated. Sequential Compression Devices Ordered.    Mobility:   Basic Mobility Inpatient  Raw Score: 16  JH-HLM Goal: 5: Stand one or more mins  JH-HLM Achieved: 5: Stand (1 or more minutes)  JH-HLM Goal achieved. Continue to encourage appropriate mobility.    Patient Centered Rounds: I performed bedside rounds with nursing staff today.   Discussions with Specialists or Other Care Team Provider: ID    Education and Discussions with Family / Patient: Updated  (wife) via phone.    Total Time Spent on Date of Encounter in care of patient:  This time was spent on one or more of the following: performing physical exam; counseling and coordination of care; obtaining or reviewing history; documenting in the medical record; reviewing/ordering tests, medications or procedures; communicating with other healthcare professionals and discussing with patient's family/caregivers.    Current Length of Stay: 2 day(s)  Current Patient Status: Inpatient   Certification Statement: The patient will continue to require additional inpatient hospital stay due to continued IV abx   Discharge Plan: Anticipate discharge in 48 hrs to home.    Code Status: Level 1 - Full Code    Subjective:   Seen and examined.  No acute events overnight.  Patient states he is feeling much better than when he first presented.  Complaining of some pain at Gracia site.  Otherwise denies fevers, chills, nausea, vomiting, diarrhea.    Objective:     Vitals:   Temp (24hrs), Av.7 °F (37.1 °C), Min:98.5 °F (36.9 °C), Max:99 °F (37.2 °C)    Temp:  [98.5 °F (36.9 °C)-99 °F (37.2 °C)] 99 °F (37.2 °C)  HR:  [75-94] 87  Resp:  [16-29] 17  BP: ()/(51-68) 121/68  SpO2:  [90 %-99 %] 96 %  Body mass index is 23.68 kg/m².     Input and Output Summary (last 24 hours):     Intake/Output Summary (Last 24 hours) at 2024 1420  Last data filed at 2024 1153  Gross per 24 hour   Intake 590 ml   Output 1760 ml   Net -1170 ml       Physical Exam:   Physical Exam  Constitutional:       General: He is not in acute distress.  HENT:       Mouth/Throat:      Mouth: Mucous membranes are moist.   Eyes:      Conjunctiva/sclera: Conjunctivae normal.   Cardiovascular:      Heart sounds: Normal heart sounds.   Pulmonary:      Breath sounds: Normal breath sounds.   Abdominal:      Palpations: Abdomen is soft.   Musculoskeletal:      Right lower leg: No edema.      Left lower leg: No edema.   Skin:     General: Skin is warm and dry.   Neurological:      Mental Status: Mental status is at baseline.          Additional Data:     Labs:  Results from last 7 days   Lab Units 07/14/24  0432 07/13/24  0149 07/12/24  1932   WBC Thousand/uL 17.62*   < > 9.64   HEMOGLOBIN g/dL 8.0*   < > 5.8*   HEMATOCRIT % 25.3*   < > 18.7*   PLATELETS Thousands/uL 210   < > 230   BANDS PCT %  --   --  21*   LYMPHO PCT %  --   --  2*   MONO PCT %  --   --  1*   EOS PCT %  --   --  0    < > = values in this interval not displayed.     Results from last 7 days   Lab Units 07/14/24 0432 07/13/24 0453 07/12/24  1932   SODIUM mmol/L 138   < > 138   POTASSIUM mmol/L 4.5   < > 4.2   CHLORIDE mmol/L 110*   < > 111*   CO2 mmol/L 18*   < > 21   BUN mg/dL 26*   < > 22   CREATININE mg/dL 1.98*   < > 1.66*   ANION GAP mmol/L 10   < > 6   CALCIUM mg/dL 8.1*   < > 7.9*   ALBUMIN g/dL  --   --  3.1*   TOTAL BILIRUBIN mg/dL  --   --  0.63   ALK PHOS U/L  --   --  67   ALT U/L  --   --  6*   AST U/L  --   --  10*   GLUCOSE RANDOM mg/dL 92   < > 97    < > = values in this interval not displayed.     Results from last 7 days   Lab Units 07/14/24  0432   INR  1.52*             Results from last 7 days   Lab Units 07/12/24 2222 07/12/24 1932   LACTIC ACID mmol/L 1.4 2.9*       Lines/Drains:  Invasive Devices       Peripheral Intravenous Line  Duration             Peripheral IV 07/12/24 Left Antecubital 1 day    Peripheral IV 07/13/24 Left;Ventral (anterior) Forearm 1 day              Drain  Duration             Urethral Catheter Three way 20 Fr. 2 days                  Urinary Catheter:  Goal for  removal: N/A- Discharging with Gracia               Imaging: No pertinent imaging reviewed.    Recent Cultures (last 7 days):   Results from last 7 days   Lab Units 07/12/24 2031 07/12/24 1932   BLOOD CULTURE   --  Klebsiella-Enterobacter  group*  No Growth at 24 hrs.   GRAM STAIN RESULT   --  Gram negative rods*   URINE CULTURE  No Growth <1000 cfu/mL  --        Last 24 Hours Medication List:   Current Facility-Administered Medications   Medication Dose Route Frequency Provider Last Rate    ALPRAZolam  0.5 mg Oral TID PRN Raad Goode,       cefepime  2,000 mg Intravenous Q12H Gale Fregoso PA-C 2,000 mg (07/14/24 1150)    lidocaine  1 Application Urethral Daily PRN Raad Goode,       oxybutynin  5 mg Oral TID ANASTASIYA Grossman      pravastatin  40 mg Oral Daily With Dinner Raad Goode,       senna-docusate sodium  1 tablet Oral HS Raad Goode DO          Today, Patient Was Seen By: Gale Fregoso PA-C    **Please Note: This note may have been constructed using a voice recognition system.**

## 2024-07-14 NOTE — ASSESSMENT & PLAN NOTE
Hgb on arrival 5.8  S/p 3 units PRBC  Continue to monitor hgb daily and replete for hgb <7  Has remained stable around 8

## 2024-07-15 PROBLEM — N18.9 CHRONIC RENAL IMPAIRMENT: Status: ACTIVE | Noted: 2024-07-15

## 2024-07-15 PROBLEM — N17.9 AKI (ACUTE KIDNEY INJURY) (HCC): Status: ACTIVE | Noted: 2024-07-15

## 2024-07-15 LAB
ABO GROUP BLD BPU: NORMAL
ABO GROUP BLD BPU: NORMAL
ANION GAP SERPL CALCULATED.3IONS-SCNC: 6 MMOL/L (ref 4–13)
ATRIAL RATE: 214 BPM
BACTERIA BLD CULT: ABNORMAL
BPU ID: NORMAL
BPU ID: NORMAL
BUN SERPL-MCNC: 22 MG/DL (ref 5–25)
CALCIUM SERPL-MCNC: 7.9 MG/DL (ref 8.4–10.2)
CHLORIDE SERPL-SCNC: 111 MMOL/L (ref 96–108)
CO2 SERPL-SCNC: 21 MMOL/L (ref 21–32)
CREAT SERPL-MCNC: 1.7 MG/DL (ref 0.6–1.3)
CROSSMATCH: NORMAL
CROSSMATCH: NORMAL
ERYTHROCYTE [DISTWIDTH] IN BLOOD BY AUTOMATED COUNT: 15.4 % (ref 11.6–15.1)
GFR SERPL CREATININE-BSD FRML MDRD: 36 ML/MIN/1.73SQ M
GLUCOSE SERPL-MCNC: 109 MG/DL (ref 65–140)
GRAM STN SPEC: ABNORMAL
HCT VFR BLD AUTO: 22.9 % (ref 36.5–49.3)
HGB BLD-MCNC: 7.3 G/DL (ref 12–17)
KLEBSIELLA SP DNA BLD POS QL NAA+NON-PRB: DETECTED
MCH RBC QN AUTO: 31.7 PG (ref 26.8–34.3)
MCHC RBC AUTO-ENTMCNC: 31.9 G/DL (ref 31.4–37.4)
MCV RBC AUTO: 100 FL (ref 82–98)
PLATELET # BLD AUTO: 187 THOUSANDS/UL (ref 149–390)
PMV BLD AUTO: 9.8 FL (ref 8.9–12.7)
POTASSIUM SERPL-SCNC: 4.3 MMOL/L (ref 3.5–5.3)
QRS AXIS: 6 DEGREES
QRSD INTERVAL: 78 MS
QT INTERVAL: 268 MS
QTC INTERVAL: 410 MS
RBC # BLD AUTO: 2.3 MILLION/UL (ref 3.88–5.62)
SODIUM SERPL-SCNC: 138 MMOL/L (ref 135–147)
T WAVE AXIS: 154 DEGREES
UNIT DISPENSE STATUS: NORMAL
UNIT DISPENSE STATUS: NORMAL
UNIT PRODUCT CODE: NORMAL
UNIT PRODUCT CODE: NORMAL
UNIT PRODUCT VOLUME: 350 ML
UNIT PRODUCT VOLUME: 350 ML
UNIT RH: NORMAL
UNIT RH: NORMAL
VENTRICULAR RATE: 141 BPM
WBC # BLD AUTO: 11.61 THOUSAND/UL (ref 4.31–10.16)

## 2024-07-15 PROCEDURE — 93010 ELECTROCARDIOGRAM REPORT: CPT | Performed by: INTERNAL MEDICINE

## 2024-07-15 PROCEDURE — 99232 SBSQ HOSP IP/OBS MODERATE 35: CPT | Performed by: INTERNAL MEDICINE

## 2024-07-15 PROCEDURE — 85027 COMPLETE CBC AUTOMATED: CPT

## 2024-07-15 PROCEDURE — 80048 BASIC METABOLIC PNL TOTAL CA: CPT | Performed by: INTERNAL MEDICINE

## 2024-07-15 RX ORDER — CEFAZOLIN SODIUM 2 G/50ML
2000 SOLUTION INTRAVENOUS EVERY 8 HOURS
Status: DISCONTINUED | OUTPATIENT
Start: 2024-07-15 | End: 2024-07-22

## 2024-07-15 RX ADMIN — PRAVASTATIN SODIUM 40 MG: 40 TABLET ORAL at 18:15

## 2024-07-15 RX ADMIN — CEFAZOLIN SODIUM 2000 MG: 2 SOLUTION INTRAVENOUS at 10:16

## 2024-07-15 RX ADMIN — SENNOSIDES AND DOCUSATE SODIUM 1 TABLET: 50; 8.6 TABLET ORAL at 21:36

## 2024-07-15 RX ADMIN — CEFAZOLIN SODIUM 2000 MG: 2 SOLUTION INTRAVENOUS at 18:16

## 2024-07-15 RX ADMIN — OXYBUTYNIN CHLORIDE 5 MG: 5 TABLET ORAL at 10:16

## 2024-07-15 RX ADMIN — OXYBUTYNIN CHLORIDE 5 MG: 5 TABLET ORAL at 18:15

## 2024-07-15 RX ADMIN — OXYBUTYNIN CHLORIDE 5 MG: 5 TABLET ORAL at 21:36

## 2024-07-15 RX ADMIN — ALPRAZOLAM 0.5 MG: 0.5 TABLET ORAL at 19:16

## 2024-07-15 NOTE — ASSESSMENT & PLAN NOTE
Home regimen: Metoprolol 50 mg twice daily -currently being held for labile blood pressures  Possibly restart in 24 hours at lower dose

## 2024-07-15 NOTE — TELEPHONE ENCOUNTER
Pt remains inpatient. Continue to monitor for discharge.     *Pt will need 2-week appt for path review with Dr. Carcamo and will also need follow up for right ureteral stent exchange in 3-4 months with management per Dr Carcamo.

## 2024-07-15 NOTE — PROGRESS NOTES
NEPHROLOGY PROGRESS NOTE    Francis Caballero 84 y.o. male MRN: [id number]  Unit/Bed#: [facility] 901-01 Encounter: [id number]  Reason for Consult: Chronic kidney disease    Patient is awake and alert says he feels better not back to normal but is feeling a little bit stronger.  Still has a Gracia catheter in with gross hematuria.  Denies any pain.    ASSESSMENT/PLAN:      1.  Renal    Patient is chronic kidney disease baseline creatinine ranges 1.5-1.8.  Reviewing the chart the patient earlier in hospitalization and episode of shock required some pressor support supportive care with blood transfusions.  The patient also had issues with hydronephrosis related to his bladder mass.  He underwent transurethral resection of the mass and the right-sided hydronephrosis was improved still had some right-sided hydroureter.  This CAT scan was done a couple days with contrast but creatinine is 1.7 today so does not appear to have caused any detrimental effect to his kidney function.    He continues to have gross hematuria with Gracia catheter and urology is following post TURBT.        SUBJECTIVE:  Review of Systems   Constitutional: Negative for chills, diaphoresis and fever.   HENT: Negative.     Eyes: Negative.    Cardiovascular:  Negative for chest pain, dyspnea on exertion, leg swelling and orthopnea.   Respiratory:  Negative for cough, shortness of breath and sputum production.    Gastrointestinal:  Negative for abdominal pain, diarrhea, nausea and vomiting.   Genitourinary:  Positive for hematuria.        Gracia catheter.   Neurological:  Negative for dizziness, focal weakness and headaches.   Psychiatric/Behavioral:  Negative for altered mental status.        OBJECTIVE:  Current Weight: Weight - Scale: 78.1 kg (172 lb 2.9 oz)  Vitals:Temp (24hrs), Av.6 °F (37 °C), Min:98.5 °F (36.9 °C), Max:98.7 °F (37.1 °C)  Current: Temperature: 98.7 °F (37.1 °C)   Blood pressure 124/73, pulse 89, temperature 98.7 °F (37.1 °C), resp. rate  17, height 6' (1.829 m), weight 78.1 kg (172 lb 2.9 oz), SpO2 94%. , Body mass index is 23.35 kg/m².      Intake/Output Summary (Last 24 hours) at 7/15/2024 1106  Last data filed at 7/15/2024 0900  Gross per 24 hour   Intake 300 ml   Output 1265 ml   Net -965 ml       Physical Exam: /73   Pulse 89   Temp 98.7 °F (37.1 °C)   Resp 17   Ht 6' (1.829 m)   Wt 78.1 kg (172 lb 2.9 oz)   SpO2 94%   BMI 23.35 kg/m²   Physical Exam  Constitutional:       General: He is not in acute distress.     Appearance: He is not toxic-appearing.   HENT:      Head: Normocephalic and atraumatic.      Nose: Nose normal.      Mouth/Throat:      Mouth: Mucous membranes are moist.   Eyes:      General: No scleral icterus.     Extraocular Movements: Extraocular movements intact.   Cardiovascular:      Rate and Rhythm: Normal rate and regular rhythm.      Heart sounds:      No friction rub. No gallop.   Pulmonary:      Effort: Pulmonary effort is normal. No respiratory distress.      Breath sounds: No wheezing or rales.   Abdominal:      General: Bowel sounds are normal. There is no distension.      Palpations: Abdomen is soft.      Tenderness: There is no abdominal tenderness. There is no rebound.   Musculoskeletal:      Cervical back: Normal range of motion and neck supple.   Neurological:      Mental Status: He is alert and oriented to person, place, and time. Mental status is at baseline.   Psychiatric:         Mood and Affect: Mood normal.         Behavior: Behavior normal.         Medications:    Current Facility-Administered Medications:     ALPRAZolam (XANAX) tablet 0.5 mg, 0.5 mg, Oral, TID PRN, Raad Goode DO, 0.5 mg at 07/14/24 2227    ceFAZolin (ANCEF) IVPB (premix in dextrose) 2,000 mg 50 mL, 2,000 mg, Intravenous, Q8H, Yasemin Randle DO, Last Rate: 100 mL/hr at 07/15/24 1016, 2,000 mg at 07/15/24 1016    lidocaine (URO-JET) 2 % urethral/mucosal gel 1 Application, 1 Application, Urethral, Daily PRN,  "Raad Goode DO    oxybutynin (DITROPAN) tablet 5 mg, 5 mg, Oral, TID, ANASTASIYA Grossman, 5 mg at 07/15/24 1016    pravastatin (PRAVACHOL) tablet 40 mg, 40 mg, Oral, Daily With Dinner, Raad Goode DO, 40 mg at 07/14/24 1624    senna-docusate sodium (SENOKOT S) 8.6-50 mg per tablet 1 tablet, 1 tablet, Oral, HS, Raad Goode DO, 1 tablet at 07/14/24 2200    Laboratory Results:  Lab Results   Component Value Date    WBC 11.61 (H) 07/15/2024    HGB 7.3 (L) 07/15/2024    HCT 22.9 (L) 07/15/2024     (H) 07/15/2024     07/15/2024     Lab Results   Component Value Date    SODIUM 138 07/15/2024    K 4.3 07/15/2024     (H) 07/15/2024    CO2 21 07/15/2024    BUN 22 07/15/2024    CREATININE 1.70 (H) 07/15/2024    GLUC 109 07/15/2024    CALCIUM 7.9 (L) 07/15/2024     Lab Results   Component Value Date    CALCIUM 7.9 (L) 07/15/2024    PHOS 2.6 07/13/2024     No results found for: \"LABPROT\"    "

## 2024-07-15 NOTE — PROGRESS NOTES
[facility]  Progress Note  Name: Francis Caballero I  MRN: [id number]  Unit/Bed#: [facility] 901-01 I Date of Admission: 7/12/2024   Date of Service: 7/15/2024 I Hospital Day: 3    Assessment & Plan   * Shock (HCC)  Assessment & Plan  Patient was brought to the ED for fever, pale/clammy skin, dizziness, and weakness after TURBT, cystoscopy earlier in the day   Hgb on arrival 5.8, s/p 3 units PRBC  SIRS criteria met with fever, tachycardia, urinary tract infection, & lactic 2.9  CT abd/pelvis:   Interval removal of previously seen bladder mass as well as interval placement of right-sided nephroureteral stent, this appears appropriately positioned. Mild to moderate right hydroureter. Previously seen right-sided hydronephrosis is intervally improved. Small amount of air in the right renal collecting system as well as right-sided perinephric fat stranding could be related to recent instrumentation and/or infection.  Partially distended bladder. Mild to moderate circumferential bladder wall thickening. Air within the bladder lumen, possibly related to recent instrumentation/intervention; superimposed cystitis considered in the appropriate clinical setting. Correlation with the patient's symptoms, laboratory values, and urinalysis recommended  Cardiomegaly, cholelithiasis, renal cysts, colonic diverticulosis without evidence of acute diverticulitis, small hiatal hernia, enlarged prostate, right-sided inguinal hernia containing fat and several small bowel loops; no discrete evidence of bowel obstruction.  Initially required pressors in the ICU as blood pressure unresponsive to fluids- he has since been weaned off of pressors  Urine culture negative however patient was given dose of cefazolin that could have resulted in false negative   Blood cultures 1/2 positive for gram negative rods- klebsiella. He had been treated with zosyn and vanco prior to the cultures. He has since been changed to  cefepime and now that cultures are sensitive to klebsiella will change him to ancef in which he can be changed to keflex at time of discharge to complete course.   Urology following, appreciate recs   Can remove irizarry catheter once Cr improves closer to baseline   Remaining hematuria thought to be anticipated post operatively.  Continues to have hematuria with decrease in h/h  Maintain irizarry and continue flushes. Check cbc in am.   Continue holding anticoagulation until hematuria improves   Outpt follow up with urology     Symptomatic anemia  Assessment & Plan  Hgb on arrival 5.8  S/p 3 units PRBC  Continue to monitor hgb daily and replete for hgb <7  Still with hematuria and hemoglobin decrease to 7.3.   Will monitor and transfuse as needed    Malignant 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 (HCC)  Assessment & Plan  Follows with outpatient urology   S/p chemo and radiation   7/12 he underwent cystoscopy, TURBT for ongoing hematuria   Later the same night, patient was brought to the ED for fever, pale/clammy skin, dizziness, and weakness   See shock for full plan       Hyperlipidemia  Assessment & Plan  Continue pravastatin 40mg for home Crestor 5mg     Benign essential hypertension  Assessment & Plan  Home regimen: Metoprolol 50 mg twice daily -currently being held for labile blood pressures  Possibly restart in 24 hours at lower dose     Atrial fibrillation (HCC)  Assessment & Plan  Metoprolol being held for labile BP  Warfarin being held in the setting of hematuria, anemia. Resume when able     Anxiety  Assessment & Plan  Continue xanax 0.5mg TID PRN anxiety               VTE Pharmacologic Prophylaxis:   contraindicated due to hematuria     Mobility:   Basic Mobility Inpatient Raw Score: 20  JH-HLM Goal: 6: Walk 10 steps or more  JH-HLM Achieved: 4: Move to chair/commode      Patient Centered Rounds:  discussed with his nurse         Education and Discussions with Family / Patient: Updated   (wife) via phone.    Current Length of Stay: 3 day(s)  Current Patient Status: Inpatient     Discharge Plan: Anticipate discharge in 24-48 hrs to home.    Code Status: Level 1 - Full Code    Subjective:   Pt seen and examined. Pt is still having blood in irizarry. He states at times it is clear. No f/c no cp no sob no n/v/d no abd pain     Objective:     Vitals:   Temp (24hrs), Av.6 °F (37 °C), Min:98.5 °F (36.9 °C), Max:98.7 °F (37.1 °C)    Temp:  [98.5 °F (36.9 °C)-98.7 °F (37.1 °C)] 98.7 °F (37.1 °C)  HR:  [89-99] 89  Resp:  [16-17] 17  BP: (124-138)/(73-79) 124/73  SpO2:  [94 %-100 %] 94 %  Body mass index is 23.68 kg/m².     Input and Output Summary (last 24 hours):     Intake/Output Summary (Last 24 hours) at 7/15/2024 0911  Last data filed at 2024 2233  Gross per 24 hour   Intake 300 ml   Output 815 ml   Net -515 ml       Physical Exam:   Physical Exam  Constitutional:       Appearance: Normal appearance.   HENT:      Head: Normocephalic and atraumatic.   Eyes:      Extraocular Movements: Extraocular movements intact.      Pupils: Pupils are equal, round, and reactive to light.   Cardiovascular:      Rate and Rhythm: Normal rate and regular rhythm.      Heart sounds: No murmur heard.     No friction rub. No gallop.   Pulmonary:      Effort: Pulmonary effort is normal. No respiratory distress.      Breath sounds: Normal breath sounds. No wheezing, rhonchi or rales.   Abdominal:      General: Bowel sounds are normal. There is no distension.      Palpations: Abdomen is soft.      Tenderness: There is no abdominal tenderness. There is no guarding or rebound.   Genitourinary:     Comments: Irizarry with blood and in tubing   Neurological:      Mental Status: He is alert and oriented to person, place, and time.          Additional Data:     Labs:  Results from last 7 days   Lab Units 07/15/24  0436 24  0149 24  1932   WBC Thousand/uL 11.61*   < > 9.64   HEMOGLOBIN g/dL 7.3*   < > 5.8*   HEMATOCRIT %  22.9*   < > 18.7*   PLATELETS Thousands/uL 187   < > 230   BANDS PCT %  --   --  21*   LYMPHO PCT %  --   --  2*   MONO PCT %  --   --  1*   EOS PCT %  --   --  0    < > = values in this interval not displayed.     Results from last 7 days   Lab Units 07/15/24  0436 07/13/24  0453 07/12/24  1932   SODIUM mmol/L 138   < > 138   POTASSIUM mmol/L 4.3   < > 4.2   CHLORIDE mmol/L 111*   < > 111*   CO2 mmol/L 21   < > 21   BUN mg/dL 22   < > 22   CREATININE mg/dL 1.70*   < > 1.66*   ANION GAP mmol/L 6   < > 6   CALCIUM mg/dL 7.9*   < > 7.9*   ALBUMIN g/dL  --   --  3.1*   TOTAL BILIRUBIN mg/dL  --   --  0.63   ALK PHOS U/L  --   --  67   ALT U/L  --   --  6*   AST U/L  --   --  10*   GLUCOSE RANDOM mg/dL 109   < > 97    < > = values in this interval not displayed.     Results from last 7 days   Lab Units 07/14/24  0432   INR  1.52*             Results from last 7 days   Lab Units 07/12/24 2222 07/12/24 1932   LACTIC ACID mmol/L 1.4 2.9*       Lines/Drains:  Invasive Devices       Peripheral Intravenous Line  Duration             Peripheral IV 07/12/24 Left Antecubital 2 days    Peripheral IV 07/13/24 Left;Ventral (anterior) Forearm 1 day              Drain  Duration             Urethral Catheter Three way 20 Fr. 3 days                  Urinary Catheter:  Goal for removal:  will have voiding trial once hematuria improves                Imaging: No pertinent imaging reviewed.    Recent Cultures (last 7 days):   Results from last 7 days   Lab Units 07/12/24 2031 07/12/24 1932   BLOOD CULTURE   --  Klebsiella pneumoniae*  No Growth at 48 hrs.   GRAM STAIN RESULT   --  Gram negative rods*   URINE CULTURE  No Growth <1000 cfu/mL  --        Last 24 Hours Medication List:   Current Facility-Administered Medications   Medication Dose Route Frequency Provider Last Rate    ALPRAZolam  0.5 mg Oral TID PRN Raad Goode,       cefazolin  2,000 mg Intravenous Q8H Yasemin Randle DO      lidocaine  1 Application Urethral  Daily PRN Raad Goode DO      oxybutynin  5 mg Oral TID ANASTASIYA Grossman      pravastatin  40 mg Oral Daily With Dinner Raad Goode DO      senna-docusate sodium  1 tablet Oral HS Raad Goode DO          Today, Patient Was Seen By: Yasemin Randle DO

## 2024-07-15 NOTE — ASSESSMENT & PLAN NOTE
Patient was brought to the ED for fever, pale/clammy skin, dizziness, and weakness after TURBT, cystoscopy earlier in the day   Hgb on arrival 5.8, s/p 3 units PRBC  SIRS criteria met with fever, tachycardia, urinary tract infection, & lactic 2.9  CT abd/pelvis:   Interval removal of previously seen bladder mass as well as interval placement of right-sided nephroureteral stent, this appears appropriately positioned. Mild to moderate right hydroureter. Previously seen right-sided hydronephrosis is intervally improved. Small amount of air in the right renal collecting system as well as right-sided perinephric fat stranding could be related to recent instrumentation and/or infection.  Partially distended bladder. Mild to moderate circumferential bladder wall thickening. Air within the bladder lumen, possibly related to recent instrumentation/intervention; superimposed cystitis considered in the appropriate clinical setting. Correlation with the patient's symptoms, laboratory values, and urinalysis recommended  Cardiomegaly, cholelithiasis, renal cysts, colonic diverticulosis without evidence of acute diverticulitis, small hiatal hernia, enlarged prostate, right-sided inguinal hernia containing fat and several small bowel loops; no discrete evidence of bowel obstruction.  Initially required pressors in the ICU as blood pressure unresponsive to fluids- he has since been weaned off of pressors  Urine culture negative however patient was given dose of cefazolin that could have resulted in false negative   Blood cultures 1/2 positive for gram negative rods- klebsiella. He had been treated with zosyn and vanco prior to the cultures. He has since been changed to cefepime and now that cultures are sensitive to klebsiella will change him to ancef in which he can be changed to keflex at time of discharge to complete course.   Urology following, appreciate recs   Can remove irizarry catheter once Cr improves closer to baseline    Remaining hematuria thought to be anticipated post operatively.  Continues to have hematuria with decrease in h/h  Maintain irizarry and continue flushes. Check cbc in am.   Continue holding anticoagulation until hematuria improves   Outpt follow up with urology

## 2024-07-15 NOTE — PLAN OF CARE
Problem: GENITOURINARY - ADULT  Goal: Maintains or returns to baseline urinary function  Description: INTERVENTIONS:  - Assess urinary function  - Encourage oral fluids to ensure adequate hydration if ordered  - Administer IV fluids as ordered to ensure adequate hydration  - Administer ordered medications as needed  - Offer frequent toileting  - Follow urinary retention protocol if ordered  Outcome: Progressing  Goal: Absence of urinary retention  Description: INTERVENTIONS:  - Assess patient’s ability to void and empty bladder  - Monitor I/O  - Bladder scan as needed  - Discuss with physician/AP medications to alleviate retention as needed  - Discuss catheterization for long term situations as appropriate  Outcome: Progressing  Goal: Urinary catheter remains patent  Description: INTERVENTIONS:  - Assess patency of urinary catheter  - If patient has a chronic irizarry, consider changing catheter if non-functioning  - Follow guidelines for intermittent irrigation of non-functioning urinary catheter  Outcome: Progressing

## 2024-07-15 NOTE — ASSESSMENT & PLAN NOTE
Hgb on arrival 5.8  S/p 3 units PRBC  Continue to monitor hgb daily and replete for hgb <7  Still with hematuria and hemoglobin decrease to 7.3.   Will monitor and transfuse as needed

## 2024-07-16 PROBLEM — N18.9 CHRONIC KIDNEY DISEASE: Status: ACTIVE | Noted: 2024-07-16

## 2024-07-16 LAB
ANION GAP SERPL CALCULATED.3IONS-SCNC: 6 MMOL/L (ref 4–13)
BASOPHILS # BLD AUTO: 0.05 THOUSANDS/ÂΜL (ref 0–0.1)
BASOPHILS NFR BLD AUTO: 1 % (ref 0–1)
BUN SERPL-MCNC: 21 MG/DL (ref 5–25)
CALCIUM SERPL-MCNC: 7.8 MG/DL (ref 8.4–10.2)
CHLORIDE SERPL-SCNC: 111 MMOL/L (ref 96–108)
CO2 SERPL-SCNC: 21 MMOL/L (ref 21–32)
CREAT SERPL-MCNC: 1.79 MG/DL (ref 0.6–1.3)
EOSINOPHIL # BLD AUTO: 0.35 THOUSAND/ÂΜL (ref 0–0.61)
EOSINOPHIL NFR BLD AUTO: 4 % (ref 0–6)
ERYTHROCYTE [DISTWIDTH] IN BLOOD BY AUTOMATED COUNT: 15.2 % (ref 11.6–15.1)
GFR SERPL CREATININE-BSD FRML MDRD: 34 ML/MIN/1.73SQ M
GLUCOSE SERPL-MCNC: 90 MG/DL (ref 65–140)
HCT VFR BLD AUTO: 23 % (ref 36.5–49.3)
HGB BLD-MCNC: 7.3 G/DL (ref 12–17)
IMM GRANULOCYTES # BLD AUTO: 0.13 THOUSAND/UL (ref 0–0.2)
IMM GRANULOCYTES NFR BLD AUTO: 2 % (ref 0–2)
INR PPP: 1.44 (ref 0.84–1.19)
LYMPHOCYTES # BLD AUTO: 1.06 THOUSANDS/ÂΜL (ref 0.6–4.47)
LYMPHOCYTES NFR BLD AUTO: 13 % (ref 14–44)
MCH RBC QN AUTO: 30.8 PG (ref 26.8–34.3)
MCHC RBC AUTO-ENTMCNC: 31.7 G/DL (ref 31.4–37.4)
MCV RBC AUTO: 97 FL (ref 82–98)
MONOCYTES # BLD AUTO: 0.78 THOUSAND/ÂΜL (ref 0.17–1.22)
MONOCYTES NFR BLD AUTO: 9 % (ref 4–12)
NEUTROPHILS # BLD AUTO: 5.95 THOUSANDS/ÂΜL (ref 1.85–7.62)
NEUTS SEG NFR BLD AUTO: 71 % (ref 43–75)
NRBC BLD AUTO-RTO: 0 /100 WBCS
PLATELET # BLD AUTO: 184 THOUSANDS/UL (ref 149–390)
PMV BLD AUTO: 9.6 FL (ref 8.9–12.7)
POTASSIUM SERPL-SCNC: 4.7 MMOL/L (ref 3.5–5.3)
PROTHROMBIN TIME: 17.3 SECONDS (ref 11.6–14.5)
RBC # BLD AUTO: 2.37 MILLION/UL (ref 3.88–5.62)
SODIUM SERPL-SCNC: 138 MMOL/L (ref 135–147)
WBC # BLD AUTO: 8.32 THOUSAND/UL (ref 4.31–10.16)

## 2024-07-16 PROCEDURE — 85610 PROTHROMBIN TIME: CPT | Performed by: INTERNAL MEDICINE

## 2024-07-16 PROCEDURE — 99232 SBSQ HOSP IP/OBS MODERATE 35: CPT | Performed by: NURSE PRACTITIONER

## 2024-07-16 PROCEDURE — 82746 ASSAY OF FOLIC ACID SERUM: CPT | Performed by: STUDENT IN AN ORGANIZED HEALTH CARE EDUCATION/TRAINING PROGRAM

## 2024-07-16 PROCEDURE — 80048 BASIC METABOLIC PNL TOTAL CA: CPT | Performed by: INTERNAL MEDICINE

## 2024-07-16 PROCEDURE — 97167 OT EVAL HIGH COMPLEX 60 MIN: CPT

## 2024-07-16 PROCEDURE — 83540 ASSAY OF IRON: CPT | Performed by: STUDENT IN AN ORGANIZED HEALTH CARE EDUCATION/TRAINING PROGRAM

## 2024-07-16 PROCEDURE — 99232 SBSQ HOSP IP/OBS MODERATE 35: CPT | Performed by: INTERNAL MEDICINE

## 2024-07-16 PROCEDURE — 85025 COMPLETE CBC W/AUTO DIFF WBC: CPT | Performed by: INTERNAL MEDICINE

## 2024-07-16 PROCEDURE — 82728 ASSAY OF FERRITIN: CPT | Performed by: STUDENT IN AN ORGANIZED HEALTH CARE EDUCATION/TRAINING PROGRAM

## 2024-07-16 PROCEDURE — 83550 IRON BINDING TEST: CPT | Performed by: STUDENT IN AN ORGANIZED HEALTH CARE EDUCATION/TRAINING PROGRAM

## 2024-07-16 PROCEDURE — 99232 SBSQ HOSP IP/OBS MODERATE 35: CPT | Performed by: STUDENT IN AN ORGANIZED HEALTH CARE EDUCATION/TRAINING PROGRAM

## 2024-07-16 PROCEDURE — 97163 PT EVAL HIGH COMPLEX 45 MIN: CPT

## 2024-07-16 RX ADMIN — CEFAZOLIN SODIUM 2000 MG: 2 SOLUTION INTRAVENOUS at 17:49

## 2024-07-16 RX ADMIN — ALPRAZOLAM 0.5 MG: 0.5 TABLET ORAL at 01:35

## 2024-07-16 RX ADMIN — CEFAZOLIN SODIUM 2000 MG: 2 SOLUTION INTRAVENOUS at 02:20

## 2024-07-16 RX ADMIN — PRAVASTATIN SODIUM 40 MG: 40 TABLET ORAL at 16:51

## 2024-07-16 RX ADMIN — OXYBUTYNIN CHLORIDE 5 MG: 5 TABLET ORAL at 20:37

## 2024-07-16 RX ADMIN — OXYBUTYNIN CHLORIDE 5 MG: 5 TABLET ORAL at 08:00

## 2024-07-16 RX ADMIN — CEFAZOLIN SODIUM 2000 MG: 2 SOLUTION INTRAVENOUS at 10:01

## 2024-07-16 RX ADMIN — OXYBUTYNIN CHLORIDE 5 MG: 5 TABLET ORAL at 16:52

## 2024-07-16 RX ADMIN — SENNOSIDES AND DOCUSATE SODIUM 1 TABLET: 50; 8.6 TABLET ORAL at 20:36

## 2024-07-16 NOTE — PHYSICAL THERAPY NOTE
Physical Therapy Evaluation     Patient's Name: Francis Caballero    Admitting Diagnosis  Anemia [D64.9]  Fever [R50.9]  H/O transurethral resection of bladder tumor (TURBT) [Z98.890, Z86.03]    Problem List  Patient Active Problem List   Diagnosis    Anxiety    Arthralgia    Asymptomatic cholelithiasis    Atrial fibrillation (HCC)    Benign essential hypertension    Benign prostatic hyperplasia    Bilateral pseudophakia    Coronary arteriosclerosis    History of epilepsy    Gait instability    Hiatal hernia    Hyperlipidemia    Frequent falls    Weakness    Stage 3b chronic kidney disease (HCC)    Age-related cataract of right eye    Macular drusen    Posterior vitreous detachment of both eyes    Primary osteoarthritis of left hip    Osteoarthritis of lumbar spine    Essential hypertension    Chronic bilateral low back pain with right-sided sciatica    DDD (degenerative disc disease), lumbar    Sciatica, right side    Chronic cough    Gastroesophageal reflux disease without esophagitis    Cerebral artery occlusion    Labyrinthitis, unspecified ear    Pure hypercholesterolemia    Malignant 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 (HCC)    Other hydronephrosis    Coagulation disorder (HCC)    Intractable epilepsy without status epilepticus, unspecified epilepsy type (HCC)    Symptomatic anemia    CVA (cerebral vascular accident) (HCC)    JORGE LUIS (obstructive sleep apnea)    Mitral regurgitation    Smoker    Chronic anticoagulation    Positive blood culture    Shock (HCC)    LELE (acute kidney injury) (HCC)    Chronic renal impairment    Chronic kidney disease       Past Medical History  Past Medical History:   Diagnosis Date    Ambulates with cane     Anemia     last assessed: 05/23/2015    GERD (gastroesophageal reflux disease)     Hematuria     Hyperlipidemia     Hypertension     Intracerebral hemorrhage (HCC)     last assessed: 04/21/2014; right    Intracerebral hemorrhage (HCC)     last assessed: 11/23/2017     Mobility impaired     Stroke syndrome     last assessed: 06/13/2013    UTI (urinary tract infection)        Past Surgical History  Past Surgical History:   Procedure Laterality Date    BLADDER SURGERY      FL RETROGRADE PYELOGRAM  7/12/2024    IR NEPHROSTOMY TUBE PLACEMENT  7/27/2023    TN CYSTO W/IRRIG & EVAC MULTPLE OBSTRUCTING CLOTS N/A 7/12/2024    Procedure: CYSTOSCOPY EVACUATION OF CLOTS;  Surgeon: Craig Simon MD;  [facility];  Service: Urology    TN CYSTO W/REMOVAL OF LESIONS SMALL N/A 7/12/2024    Procedure: (TURBT) cautery bleeding, clot evacuation;  Surgeon: Craig Simon MD;  [facility];  Service: Urology    TN CYSTOURETHROSCOPY W/DEST &/RMVL TUMOR LARGE N/A 7/26/2023    Procedure: (TURBT);  Surgeon: Tha Carcamo MD;  [facility];  Service: Urology          07/16/24 0950   PT Last Visit   PT Visit Date 07/16/24   Note Type   Note type Evaluation   Pain Assessment   Pain Assessment Tool 0-10   Pain Score No Pain   Restrictions/Precautions   Weight Bearing Precautions Per Order No   Other Precautions Cognitive;Chair Alarm;Bed Alarm;Impulsive;Multiple lines;Fall Risk   Home Living   Type of Home House   Home Layout Multi-level  (3 PO)   Bathroom Shower/Tub Tub/shower unit   Bathroom Toilet Raised   Bathroom Equipment Grab bars in shower;Shower chair;Commode   Home Equipment Walker;Cane  (uses SPC PRN)   Prior Function   Level of [place] Independent with ADLs;Independent with functional mobility;Independent with IADLS   Lives With Spouse   Receives Help From Family   IADLs Family/Friend/Other provides transportation;Independent with meal prep;Independent with medication management   Falls in the last 6 months 0   Vocational Retired   General   Family/Caregiver Present No   Cognition   Arousal/Participation Alert   Orientation Level Oriented X4   Memory Decreased recall of precautions   Following Commands Follows one step commands with increased time or repetition    Comments pleasant and cooperative, decreased safety awareness + insght noted at times   RLE Assessment   RLE Assessment   (functionally 3+/5)   LLE Assessment   LLE Assessment   (functionally 3+/5)   Bed Mobility   Supine to Sit 5  Supervision   Additional items HOB elevated;Bedrails;Increased time required   Sit to Supine 5  Supervision   Additional items HOB elevated;Bedrails;Increased time required   Additional Comments pt found/left supine in bed with all needs within reach- alarm on   Transfers   Sit to Stand 4  Minimal assistance   Additional items Assist x 1;Armrests;Increased time required;Verbal cues   Stand to Sit 4  Minimal assistance   Additional items Assist x 1;Increased time required;Verbal cues   Toilet transfer 3  Moderate assistance   Additional items Assist x 1;Increased time required;Verbal cues;Standard toilet   Additional Comments transfers with RW, cues for hand placemeng and sequencing   Ambulation/Elevation   Gait pattern Excessively slow;Short stride;Foward flexed;Decreased foot clearance;Wide BESS   Gait Assistance 4  Minimal assist   Additional items Assist x 1;Verbal cues   Assistive Device Rolling walker   Distance 10'; 2 x 40'   Stair Management Assistance Not tested   Balance   Static Sitting Fair   Dynamic Sitting Fair   Static Standing Fair -   Dynamic Standing Poor +   Ambulatory Poor +   Endurance Deficit   Endurance Deficit Yes   Activity Tolerance   Activity Tolerance Patient tolerated treatment well   Medical Staff Made Aware ALONA Purvis; co-session completed this date 2* increased medical complexity and multiple co-morbidities   Nurse Made Aware RN cleared   Assessment   Prognosis Good   Problem List Decreased strength;Decreased range of motion;Decreased endurance;Impaired balance;Decreased mobility;Decreased cognition;Impaired judgement;Decreased safety awareness   Assessment Pt is a 84 y.o. male seen for PT evaluation s/p admit to [facility] on 7/12/[facility]24. Pt was  admitted with a primary dx of: shock.  PT now consulted for assessment of mobility and d/c needs. Pt with active PT eval/ treat orders.  Pts current comorbidities effecting treatment include: LELE, chronic renal impairment, CKD, positive blood culture. Pt  has a past medical history of Ambulates with cane, Anemia, GERD (gastroesophageal reflux disease), Hematuria, Hyperlipidemia, Hypertension, Intracerebral hemorrhage (HCC), Intracerebral hemorrhage (HCC), Mobility impaired, Stroke syndrome, and UTI (urinary tract infection). Pts current clinical presentation is Unstable/ Unpredictable (high complexity) due to Ongoing medical management for primary dx, Increased reliance on more restrictive AD compared to baseline, Decreased activity tolerance compared to baseline, Fall risk, Increased assistance needed from caregiver at current time, Cog status, Trending lab values, Continuous pulse oximetry monitoring . Prior to admission, pt was residing spouse in multilevel home with 3 PO and was independent without AD use. Upon evaluation, pt currently is requiring S level for bed mobility; min A for transfers; min A for ambulation 2x40 ft w/ RW. Pt presents at PT eval functioning below baseline and currently w/ overall mobility deficits 2* to: BLE weakness, impaired balance, decreased endurance, gait deviations, decreased activity tolerance compared to baseline, decreased functional mobility tolerance compared to baseline, decreased safety awareness, impaired judgement, fall risk. Pt currently at a fall risk 2* to impairments listed above.  Pt will continue to benefit from skilled acute PT interventions to address stated impairments; to maximize functional mobility; for ongoing pt/ family training; and DME needs. At conclusion of PT session pt returned BTB and bed alarm engaged with phone and call bell within reach. Pt denies any further questions at this time. The patient's AM-PAC Basic Mobility Inpatient Short Form Raw Score  is 17. A Raw score of greater than 16 suggests the patient may benefit from discharge to home. Please also refer to the recommendation of the Physical Therapist for safe discharge planning. PT to continue to follow pt t/o hospital stay, recommend level II resource intensity upon hospital D/C.   Barriers to Discharge Inaccessible home environment   Goals   Patient Goals to rest in bed   STG Expiration Date 07/30/24   Short Term Goal #1 STG 1. Pt will be able to perform bed mobility tasks with mod I level in order to improve overall functional mobility and assist in safe d/c. STG 2. Pt with sit EOB for at least 25 minutes at mod I level in order to strengthen abdominal musculature and assist in future transfers/ ambulation. STG 3. Pt will be able to perform functional transfer with mod I level in order to improve overall functional mobility and assist in safe d/c. STG 4. Pt will be able to ambulate at least 250 feet with least restrictive device with mod I level A in order to improve overall functional mobility and assist in safe d/c. STG 5. Pt will improve sitting/standing static/dynamic balance 1/2 grade in order to improve functional mobility and assist in safe d/c. STG 6. Pt will improve LE strength by 1/2 grade in order to improve functional mobility and assist in safe d/c. STG 7. Pt will be able to negotiate at least 7 stairs with least restrictive device with S level A in order to improve overall functional mobility and assist in safe d/c.   PT Treatment Day 0   Plan   Treatment/Interventions Functional transfer training;LE strengthening/ROM;Elevations;Therapeutic exercise;Endurance training;Patient/family training;Equipment eval/education;Bed mobility;Gait training;Spoke to case management;Spoke to nursing;OT   PT Frequency 2-3x/wk   Discharge Recommendation   Rehab Resource Intensity Level, PT II (Moderate Resource Intensity)  (pending progress)   Equipment Recommended Walker  (pt reports owning)   AM-PAC  Basic Mobility Inpatient   Turning in Flat Bed Without Bedrails 3   Lying on Back to Sitting on Edge of Flat Bed Without Bedrails 3   Moving Bed to Chair 3   Standing Up From Chair Using Arms 3   Walk in Room 3   Climb 3-5 Stairs With Railing 2   Basic Mobility Inpatient Raw Score 17   Basic Mobility Standardized Score 39.67   [facility] Highest Level Of Mobility   -[facility] Goal 5: Stand one or more mins   -HLM Achieved 7: Walk 25 feet or more   Modified [place] Scale   Modified [place] Scale 4         Ayana Trinh, PT DPT

## 2024-07-16 NOTE — PROGRESS NOTES
[facility]  Progress Note  Name: Francis Caballero I  MRN: [id number]  Unit/Bed#: [facility] 901-01 I Date of Admission: 7/12/2024   Date of Service: 7/16/2024 I Hospital Day: 4    Assessment & Plan   * Shock (HCC)  Assessment & Plan  Patient was brought to the ED for fever, pale/clammy skin, dizziness, and weakness after TURBT, cystoscopy earlier in the day   Hgb on arrival 5.8, s/p 3 units PRBC  SIRS criteria met with fever, tachycardia, urinary tract infection, & lactic 2.9  CT abd/pelvis:   Interval removal of previously seen bladder mass as well as interval placement of right-sided nephroureteral stent, this appears appropriately positioned. Mild to moderate right hydroureter. Previously seen right-sided hydronephrosis is intervally improved. Small amount of air in the right renal collecting system as well as right-sided perinephric fat stranding could be related to recent instrumentation and/or infection.  Partially distended bladder. Mild to moderate circumferential bladder wall thickening. Air within the bladder lumen, possibly related to recent instrumentation/intervention; superimposed cystitis considered in the appropriate clinical setting. Correlation with the patient's symptoms, laboratory values, and urinalysis recommended  Cardiomegaly, cholelithiasis, renal cysts, colonic diverticulosis without evidence of acute diverticulitis, small hiatal hernia, enlarged prostate, right-sided inguinal hernia containing fat and several small bowel loops; no discrete evidence of bowel obstruction.  Initially required pressors in the ICU as blood pressure unresponsive to fluids- he has since been weaned off of pressors  Urine culture negative however patient was given dose of cefazolin that could have resulted in false negative   Blood cultures 1/2 positive for gram negative rods- klebsiella. He had been treated with zosyn and vanco prior to the cultures. He has since been changed to  cefepime and now that cultures are sensitive to klebsiella will change him to ancef in which he can be changed to keflex at time of discharge to complete course.   Urology following, appreciate recs   Can remove irizarry catheter once Cr improves closer to baseline   Remaining hematuria thought to be anticipated post operatively.  Continues to have hematuria with decrease in h/h  Maintain irizarry and continue flushes. Check cbc in am.   Continue holding anticoagulation until hematuria improves   Outpt follow up with urology   Continues to have dark/reddish urine    LELE (acute kidney injury) ([facility])  Assessment & Plan  Patient is chronic kidney disease baseline creatinine is 1.5-1.8.  Patient's acute kidney injury from earlier in hospitalization has resolved.   Creatinine currently at baseline at 1.7  Neurology following    Positive blood culture  Assessment & Plan  1/2 blood cultures positive for gram negative rods with detected Klebsiella pneumoniae  Cefepime recommended - continue   Will d/c vancomycin at this time   Curbside to ID regarding suspected source - given he received 1 dose of ancef prior to urine culture, could be false negative given urine culture without growth.   Continue above abx for now and adjust as susceptibilities finalize    Symptomatic anemia  Assessment & Plan  Hgb on arrival 5.8  S/p 3 units PRBC  Continue to monitor hgb daily and replete for hgb <7  Still with hematuria and hemoglobin decrease to 7.3.   Will monitor and transfuse as needed  F/U anemia work up    Malignant 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 (HCC)  Assessment & Plan  Follows with outpatient urology   S/p chemo and radiation   7/12 he underwent cystoscopy, TURBT for ongoing hematuria   Later the same night, patient was brought to the ED for fever, pale/clammy skin, dizziness, and weakness   See shock for full plan       Hyperlipidemia  Assessment & Plan  Continue pravastatin 40mg for home Crestor 5mg     Benign essential  hypertension  Assessment & Plan  Home regimen: Metoprolol 50 mg twice daily  -Initially held due to labile blood pressures  -Will resume Lopressor 50 twice daily 7/16/24      Atrial fibrillation (HCC)  Assessment & Plan  Metoprolol being held for labile BP  Warfarin being held in the setting of hematuria, anemia. Resume when able     Anxiety  Assessment & Plan  Continue xanax 0.5mg TID PRN anxiety               VTE Pharmacologic Prophylaxis:   Moderate Risk (Score 3-4) - Pharmacological DVT Prophylaxis Contraindicated. Sequential Compression Devices Ordered.    Mobility:   Basic Mobility Inpatient Raw Score: 17  JH-HLM Goal: 5: Stand one or more mins  JH-HLM Achieved: 7: Walk 25 feet or more  JH-HLM Goal achieved. Continue to encourage appropriate mobility.    Patient Centered Rounds: I performed bedside rounds with nursing staff today.   Discussions with Specialists or Other Care Team Provider: urology    Education and Discussions with Family / Patient: Updated  (wife) at bedside.    Total Time Spent on Date of Encounter in care of patient: 35 mins. This time was spent on one or more of the following: performing physical exam; counseling and coordination of care; obtaining or reviewing history; documenting in the medical record; reviewing/ordering tests, medications or procedures; communicating with other healthcare professionals and discussing with patient's family/caregivers.    Current Length of Stay: 4 day(s)  Current Patient Status: Inpatient   Certification Statement: The patient will continue to require additional inpatient hospital stay due to hematuria, will need voiding trial prior to d/c  Discharge Plan: Anticipate discharge in 24-48 hrs to home with home services.    Code Status: Level 1 - Full Code    Subjective:   Patient seen examined at bedside.  Requesting to be discharged.  Explained to him and his wife at bedside that most likely he will need 24 to 48 hours due to hematuria.     Objective:     Vitals:   Temp (24hrs), Av.1 °F (36.7 °C), Min:97.7 °F (36.5 °C), Max:98.5 °F (36.9 °C)    Temp:  [97.7 °F (36.5 °C)-98.5 °F (36.9 °C)] 97.7 °F (36.5 °C)  HR:  [74-84] 74  Resp:  [16-17] 16  BP: (123-132)/(73-76) 123/73  SpO2:  [95 %-100 %] 98 %  Body mass index is 23.35 kg/m².     Input and Output Summary (last 24 hours):     Intake/Output Summary (Last 24 hours) at 2024 1231  Last data filed at 2024 0900  Gross per 24 hour   Intake 240 ml   Output 2625 ml   Net -2385 ml       Physical Exam:   Physical Exam  Vitals and nursing note reviewed.   Constitutional:       General: He is not in acute distress.     Appearance: He is well-developed.   HENT:      Head: Normocephalic and atraumatic.   Eyes:      Conjunctiva/sclera: Conjunctivae normal.   Cardiovascular:      Rate and Rhythm: Normal rate and regular rhythm.      Heart sounds: No murmur heard.  Pulmonary:      Effort: Pulmonary effort is normal. No respiratory distress.      Breath sounds: Normal breath sounds.   Abdominal:      Palpations: Abdomen is soft.      Tenderness: There is no abdominal tenderness.   Genitourinary:     Comments: Gracia with dark red hematuria  Musculoskeletal:         General: No swelling.      Cervical back: Neck supple.   Skin:     General: Skin is warm and dry.   Neurological:      Mental Status: He is alert. Mental status is at baseline.   Psychiatric:         Mood and Affect: Mood normal.          Additional Data:     Labs:  Results from last 7 days   Lab Units 24  0430 24  0149 24  1932   WBC Thousand/uL 8.32   < > 9.64   HEMOGLOBIN g/dL 7.3*   < > 5.8*   HEMATOCRIT % 23.0*   < > 18.7*   PLATELETS Thousands/uL 184   < > 230   BANDS PCT %  --   --  21*   SEGS PCT % 71  --   --    LYMPHO PCT % 13*  --  2*   MONO PCT % 9  --  1*   EOS PCT % 4  --  0    < > = values in this interval not displayed.     Results from last 7 days   Lab Units 24  0430 24  0453 24  4584    SODIUM mmol/L 138   < > 138   POTASSIUM mmol/L 4.7   < > 4.2   CHLORIDE mmol/L 111*   < > 111*   CO2 mmol/L 21   < > 21   BUN mg/dL 21   < > 22   CREATININE mg/dL 1.79*   < > 1.66*   ANION GAP mmol/L 6   < > 6   CALCIUM mg/dL 7.8*   < > 7.9*   ALBUMIN g/dL  --   --  3.1*   TOTAL BILIRUBIN mg/dL  --   --  0.63   ALK PHOS U/L  --   --  67   ALT U/L  --   --  6*   AST U/L  --   --  10*   GLUCOSE RANDOM mg/dL 90   < > 97    < > = values in this interval not displayed.     Results from last 7 days   Lab Units 07/16/24  0430   INR  1.44*             Results from last 7 days   Lab Units 07/12/24 2222 07/12/24  1932   LACTIC ACID mmol/L 1.4 2.9*       Lines/Drains:  Invasive Devices       Peripheral Intravenous Line  Duration             Peripheral IV 07/13/24 Left;Ventral (anterior) Forearm 2 days              Drain  Duration             Urethral Catheter Three way 20 Fr. 4 days                  Urinary Catheter:  Goal for removal:  Deferred to urology               Imaging: Reviewed radiology reports from this admission including: chest xray    Recent Cultures (last 7 days):   Results from last 7 days   Lab Units 07/12/24 2031 07/12/24 1932   BLOOD CULTURE   --  No Growth at 72 hrs.  Klebsiella pneumoniae*   GRAM STAIN RESULT   --  Gram negative rods*   URINE CULTURE  No Growth <1000 cfu/mL  --        Last 24 Hours Medication List:   Current Facility-Administered Medications   Medication Dose Route Frequency Provider Last Rate    ALPRAZolam  0.5 mg Oral TID PRN Afshan Tracey PA-C      cefazolin  2,000 mg Intravenous Q8H Yasemin Randle DO 2,000 mg (07/16/24 1001)    lidocaine  1 Application Urethral Daily PRN Raad Goode DO      oxybutynin  5 mg Oral TID ANASTASIYA Grossman      pravastatin  40 mg Oral Daily With Dinner Raad Goode DO      senna-docusate sodium  1 tablet Oral HS Raad Goode DO          Today, Patient Was Seen By: Flori Barry DO    **Please Note: This note may have been  constructed using a voice recognition system.**

## 2024-07-16 NOTE — PROGRESS NOTES
Progress Note - Urology  Francis Caballero 1940, 84 y.o. male MRN: [id number]    Unit/Bed#: [facility] 901-01 Encounter: [id number]    Malignant 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 (HCC)  Assessment & Plan  S/p cystoscopy, TURBT and ureteral stent exchange 7/12 for high-grade muscle invasive bladder cancer with prior chemotherapy and radiation  Creat 1.79, stable  Hgb 7.3  Anticoagulation on hold currently  Continue to trend labs and transfuse per primary team  Urine culture no growth, on Cefazolin  Continue as needed manual irrigation  If ongoing hematuria could consider removal of internalized stent and placement of PCN; stent may be exacerbating bleeding        Subjective: 84-year-old male status post cystoscopy, TURBT and ureteral stent exchange 7/12/2024.  He has urothelial carcinoma with weeks of bleeding prior to surgical intervention on 7/12.  He is status postchemotherapy and radiation for high-grade muscle invasive bladder cancer.  Urology was asked to reevaluate patient for ongoing hematuria.  Discussed with Dr. Simon and he feels the stent may be aggravating the bleeding.  His bladder had some minor sessile CIS like disease at bladder neck     24 HR EVENTS:   no significant events.      Patient has  complaints of ongoing hematuria.       Review of Systems   Constitutional:  Negative for activity change, appetite change, chills, fatigue, fever and unexpected weight change.   HENT:  Negative for facial swelling.    Eyes:  Negative for discharge.   Respiratory: Negative.  Negative for cough and shortness of breath.    Cardiovascular:  Negative for chest pain and leg swelling.   Gastrointestinal: Negative.  Negative for abdominal distention, abdominal pain, constipation, diarrhea, nausea and vomiting.   Endocrine: Negative.    Genitourinary:  Positive for hematuria. Negative for decreased urine volume, dysuria, enuresis, flank pain and genital sores.   Musculoskeletal:  Negative for back pain and  myalgias.   Skin:  Negative for pallor and rash.   Allergic/Immunologic: Negative.  Negative for immunocompromised state.   Neurological:  Negative for facial asymmetry and speech difficulty.   Psychiatric/Behavioral:  Negative for agitation and confusion.        Objective:    Vitals: Blood pressure 123/73, pulse 74, temperature 97.7 °F (36.5 °C), resp. rate 16, height 6' (1.829 m), weight 78.1 kg (172 lb 2.9 oz), SpO2 98%.,Body mass index is 23.35 kg/m².  INS & OUTS:  I/O last 24 hours:  In: 420 [P.O.:420]  Out: 3475 [Urine:3475]    Physical Exam  Vitals and nursing note reviewed.   Constitutional:       General: He is not in acute distress.     Appearance: Normal appearance. He is not ill-appearing or toxic-appearing.   HENT:      Head: Normocephalic.   Cardiovascular:      Rate and Rhythm: Normal rate.   Pulmonary:      Effort: Pulmonary effort is normal. No respiratory distress.   Abdominal:      General: Abdomen is flat. There is no distension.      Palpations: Abdomen is soft.   Genitourinary:     Comments: Ongoing hematuria without clots, urine draining punch color  Musculoskeletal:         General: No swelling.   Skin:     General: Skin is warm and dry.   Neurological:      General: No focal deficit present.      Mental Status: He is alert and oriented to person, place, and time.   Psychiatric:         Mood and Affect: Mood normal.         Behavior: Behavior normal.         Thought Content: Thought content normal.         Judgment: Judgment normal.        Media Information    Document Information    Clinical Image - Mobile Device      07/16/2024 12:34   Attached To:   Hospital Encounter on 7/12/24   Source Information    ANASTASIYA Kearns  Be Pphp 9   Document History        Imaging:  CT ABDOMEN AND PELVIS WITH IV CONTRAST     INDICATION: turbt today anemic now hypotensive.     COMPARISON: CT abdomen pelvis dated 6/9/2023     TECHNIQUE: CT examination of the abdomen and pelvis was performed. Multiplanar  2D reformatted images were created from the source data.     This examination, like all CT scans performed in the [facility], was performed utilizing techniques to minimize radiation dose exposure, including the use of iterative reconstruction and automated exposure control. Radiation dose length   product (DLP) for this visit: 316.13 mGy-cm     IV Contrast: 85 mL of iohexol (OMNIPAQUE)  Enteric Contrast: Not administered.     FINDINGS:     ABDOMEN     LOWER CHEST: The heart is enlarged. Mild atelectasis noted dependently in the bilateral lower lung fields.     LIVER/BILIARY TREE: Unremarkable.     GALLBLADDER: Cholelithiasis without findings of acute cholecystitis.     SPLEEN: Unremarkable.     PANCREAS: Unremarkable.     ADRENAL GLANDS: Unremarkable.     KIDNEYS/URETERS: Renal cortical scarring on the left. Left kidney otherwise appears grossly unremarkable. Exophytic 1.1 cm cyst in the right kidney upper pole. There has been interval removal of the previously seen bladder mass as well as interval   placement of a right-sided nephroureteral stent which appears appropriately positioned. Mild to moderate hydroureter. Previously seen right-sided hydronephrosis is intervally improved. Renal cortical scarring and mild atrophy on the right. Small amount   of air in the right renal collecting system as well as right-sided perinephric fat stranding, could be related to recent instrumentation and/or infection.     STOMACH AND BOWEL: Scattered colonic diverticulosis, no discrete evidence of acute diverticulitis. No evidence of bowel obstruction. Small hiatal hernia. Otherwise grossly unremarkable.     APPENDIX: No findings to suggest appendicitis.     ABDOMINOPELVIC CAVITY: No significant ascites. No pneumoperitoneum. No discrete lymphadenopathy.     VESSELS: Moderate atherosclerosis, no aortic aneurysm.     PELVIS     REPRODUCTIVE ORGANS: Enlarged prostate indents the bladder base. Prostate volume  estimated at 58 mL.     URINARY BLADDER: Partially distended bladder. Mild to moderate circumferential bladder wall thickening. Air within the bladder lumen, possibly related to recent instrumentation/intervention, superimposed cystitis considered in the appropriate clinical   setting.     ABDOMINAL WALL/INGUINAL REGIONS: Moderate sized right inguinal hernia containing fat and multiple small bowel loops. Body wall edema     BONES: Multilevel degenerative changes of the spine. Generalized osteopenia.        IMPRESSION:     Interval removal of previously seen bladder mass as well as interval placement of right-sided nephroureteral stent, this appears appropriately positioned. Mild to moderate right hydroureter. Previously seen right-sided hydronephrosis is intervally   improved. Small amount of air in the right renal collecting system as well as right-sided perinephric fat stranding could be related to recent instrumentation and/or infection.     Partially distended bladder. Mild to moderate circumferential bladder wall thickening. Air within the bladder lumen, possibly related to recent instrumentation/intervention; superimposed cystitis considered in the appropriate clinical setting.   Correlation with the patient's symptoms, laboratory values, and urinalysis recommended.     Cardiomegaly, cholelithiasis, renal cysts, colonic diverticulosis without evidence of acute diverticulitis, small hiatal hernia, enlarged prostate, right-sided inguinal hernia containing fat and several small bowel loops; no discrete evidence of bowel   obstruction.     Other findings as above.     The study was marked in EPIC for immediate notification.     Workstation performed: [id number]     Imaging reviewed - both report and images personally reviewed.     Labs:  Recent Labs     07/14/24  0432 07/15/24  0436 07/16/24  0430   WBC 17.62* 11.61* 8.32       Recent Labs     07/13/24  1654 07/14/24  0432 07/15/24  0436 07/16/24  0430   HGB 8.3*  8.0* 7.3* 7.3*     Recent Labs     07/13/24  1654 07/14/24  0432 07/15/24  0436 07/16/24  0430   HCT 26.2* 25.3* 22.9* 23.0*     Recent Labs     07/14/24  0432 07/15/24  0436 07/16/24  0430   CREATININE 1.98* 1.70* 1.79*     Lab Results   Component Value Date    HGB 7.3 (L) 07/16/2024    HCT 23.0 (L) 07/16/2024    WBC 8.32 07/16/2024     07/16/2024     Lab Results   Component Value Date     08/29/2016    K 4.7 07/16/2024     (H) 07/16/2024    CO2 21 07/16/2024    BUN 21 07/16/2024    CREATININE 1.79 (H) 07/16/2024    CALCIUM 7.8 (L) 07/16/2024    GLUCOSE 100 (H) 08/29/2016       Urinalysis: Innumerable WBC's per HPF, innumerable RBC's per HPF, and no bacteria  Urine Culture: Growth: None    History:    Past Medical History:   Diagnosis Date    Ambulates with cane     Anemia     last assessed: 05/23/2015    GERD (gastroesophageal reflux disease)     Hematuria     Hyperlipidemia     Hypertension     Intracerebral hemorrhage (HCC)     last assessed: 04/21/2014; right    Intracerebral hemorrhage (HCC)     last assessed: 11/23/2017    Mobility impaired     Stroke syndrome     last assessed: 06/13/2013    UTI (urinary tract infection)      Past Surgical History:   Procedure Laterality Date    BLADDER SURGERY      FL RETROGRADE PYELOGRAM  7/12/2024    IR NEPHROSTOMY TUBE PLACEMENT  7/27/2023    DE CYSTO W/IRRIG & EVAC MULTPLE OBSTRUCTING CLOTS N/A 7/12/2024    Procedure: CYSTOSCOPY EVACUATION OF CLOTS;  Surgeon: Craig Simon MD;  Location: [facility];  Service: Urology    DE CYSTO W/REMOVAL OF LESIONS SMALL N/A 7/12/2024    Procedure: (TURBT) cautery bleeding, clot evacuation;  Surgeon: Craig Simon MD;  Location: [facility];  Service: Urology    DE CYSTOURETHROSCOPY W/DEST &/RMVL TUMOR LARGE N/A 7/26/2023    Procedure: (TURBT);  Surgeon: Tha Carcamo MD;  Location: [facility];  Service: Urology     Family History   Problem Relation Age of Onset    Lung cancer Mother      Social History      Socioeconomic History    Marital status: /Civil Union     Spouse name: None    Number of children: None    Years of education: None    Highest education level: None   Occupational History    None   Tobacco Use    Smoking status: Every Day     Types: Cigars    Smokeless tobacco: Never    Tobacco comments:     Last cigar 7/10.24   Vaping Use    Vaping status: Never Used   Substance and Sexual Activity    Alcohol use: Yes     Comment: Social     Drug use: No    Sexual activity: Not Currently   Other Topics Concern    None   Social History Narrative    None     Social Determinants of Health     Financial Resource Strain: Low Risk  (5/1/2023)    Overall Financial Resource Strain (CARDIA)     Difficulty of Paying Living Expenses: Not hard at all   Food Insecurity: No Food Insecurity (7/13/2024)    Hunger Vital Sign     Worried About Running Out of Food in the Last Year: Never true     Ran Out of Food in the Last Year: Never true   Transportation Needs: No Transportation Needs (7/13/2024)    PRAPARE - Transportation     Lack of Transportation (Medical): No     Lack of Transportation (Non-Medical): No   Physical Activity: Not on file   Stress: Not on file   Social Connections: Not on file   Intimate Partner Violence: Not on file   Housing Stability: Low Risk  (7/13/2024)    Housing Stability Vital Sign     Unable to Pay for Housing in the Last Year: No     Number of Times Moved in the Last Year: 0     Homeless in the Last Year: No       The following portions of the patient's history were reviewed and updated as appropriate: allergies, current medications, past family history, past medical history, past social history, past surgical history and problem list    ANASTASIYA Kearns  Date: 7/16/2024 Time: 3:00 PM

## 2024-07-16 NOTE — PLAN OF CARE
Problem: PHYSICAL THERAPY ADULT  Goal: Performs mobility at highest level of function for planned discharge setting.  See evaluation for individualized goals.  Description: Treatment/Interventions: Functional transfer training, LE strengthening/ROM, Elevations, Therapeutic exercise, Endurance training, Patient/family training, Equipment eval/education, Bed mobility, Gait training, Spoke to case management, Spoke to nursing, OT  Equipment Recommended: Walker (pt reports owning)       See flowsheet documentation for full assessment, interventions and recommendations.  7/16/2024 1226 by Ayana Trinh PT  Note: Prognosis: Good  Problem List: Decreased strength, Decreased range of motion, Decreased endurance, Impaired balance, Decreased mobility, Decreased cognition, Impaired judgement, Decreased safety awareness  Assessment: Pt is a 84 y.o. male seen for PT evaluation s/p admit to [facility] on 7/12/2024. Pt was admitted with a primary dx of: shock.  PT now consulted for assessment of mobility and d/c needs. Pt with active PT eval/ treat orders.  Pts current comorbidities effecting treatment include: LELE, chronic renal impairment, CKD, positive blood culture. Pt  has a past medical history of Ambulates with cane, Anemia, GERD (gastroesophageal reflux disease), Hematuria, Hyperlipidemia, Hypertension, Intracerebral hemorrhage (HCC), Intracerebral hemorrhage (HCC), Mobility impaired, Stroke syndrome, and UTI (urinary tract infection). Pts current clinical presentation is Unstable/ Unpredictable (high complexity) due to Ongoing medical management for primary dx, Increased reliance on more restrictive AD compared to baseline, Decreased activity tolerance compared to baseline, Fall risk, Increased assistance needed from caregiver at current time, Cog status, Trending lab values, Continuous pulse oximetry monitoring . Prior to admission, pt was residing spouse in multilevel home with 3 PO and was independent  without AD use. Upon evaluation, pt currently is requiring S level for bed mobility; min A for transfers; min A for ambulation 2x40 ft w/ RW. Pt presents at PT eval functioning below baseline and currently w/ overall mobility deficits 2* to: BLE weakness, impaired balance, decreased endurance, gait deviations, decreased activity tolerance compared to baseline, decreased functional mobility tolerance compared to baseline, decreased safety awareness, impaired judgement, fall risk. Pt currently at a fall risk 2* to impairments listed above.  Pt will continue to benefit from skilled acute PT interventions to address stated impairments; to maximize functional mobility; for ongoing pt/ family training; and DME needs. At conclusion of PT session pt returned BTB and bed alarm engaged with phone and call bell within reach. Pt denies any further questions at this time. The patient's AM-PAC Basic Mobility Inpatient Short Form Raw Score is 17. A Raw score of greater than 16 suggests the patient may benefit from discharge to home. Please also refer to the recommendation of the Physical Therapist for safe discharge planning. PT to continue to follow pt t/o hospital stay, recommend level II resource intensity upon hospital D/C.  Barriers to Discharge: Inaccessible home environment     Rehab Resource Intensity Level, PT: II (Moderate Resource Intensity) (pending progress)    See flowsheet documentation for full assessment.

## 2024-07-16 NOTE — OCCUPATIONAL THERAPY NOTE
Occupational Therapy Evaluation     Patient Name: Francis Caballero  Today's Date: 7/16/2024  Problem List  Principal Problem:    Shock (HCC)  Active Problems:    Anxiety    Atrial fibrillation (HCC)    Benign essential hypertension    Hyperlipidemia    Malignant 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 (HCC)    Symptomatic anemia    Positive blood culture    LELE (acute kidney injury) (HCC)    Past Medical History  Past Medical History:   Diagnosis Date    Ambulates with cane     Anemia     last assessed: 05/23/2015    GERD (gastroesophageal reflux disease)     Hematuria     Hyperlipidemia     Hypertension     Intracerebral hemorrhage (HCC)     last assessed: 04/21/2014; right    Intracerebral hemorrhage (HCC)     last assessed: 11/23/2017    Mobility impaired     Stroke syndrome     last assessed: 06/13/2013    UTI (urinary tract infection)      Past Surgical History  Past Surgical History:   Procedure Laterality Date    BLADDER SURGERY      FL RETROGRADE PYELOGRAM  7/12/2024    IR NEPHROSTOMY TUBE PLACEMENT  7/27/2023    TX CYSTO W/IRRIG & EVAC MULTPLE OBSTRUCTING CLOTS N/A 7/12/2024    Procedure: CYSTOSCOPY EVACUATION OF CLOTS;  Surgeon: Craig Simon MD;  [facility];  Service: Urology    TX CYSTO W/REMOVAL OF LESIONS SMALL N/A 7/12/2024    Procedure: (TURBT) cautery bleeding, clot evacuation;  Surgeon: Craig Simon MD;  [facility];  Service: Urology    TX CYSTOURETHROSCOPY W/DEST &/RMVL TUMOR LARGE N/A 7/26/2023    Procedure: (TURBT);  Surgeon: Tha Carcamo MD;  [facility];  Service: Urology        07/16/24 0949   OT Last Visit   OT Visit Date 07/16/24   Note Type   Note type Evaluation   Pain Assessment   Pain Assessment Tool 0-10   Pain Score No Pain   Restrictions/Precautions   Weight Bearing Precautions Per Order No   Other Precautions Impulsive;Cognitive;Chair Alarm;Bed Alarm;Multiple lines;Fall Risk   Home Living   Type of Home House   Home Layout  Multi-level;Bed/bath upstairs  (3 PO)   Bathroom Shower/Tub Tub/shower unit   Bathroom Toilet Raised   Bathroom Equipment Grab bars in shower;Shower chair;Commode   Home Equipment Walker;Cane  (used SPC PTA)   Prior Function   Level of [place] Independent with ADLs;Independent with functional mobility;Independent with IADLS   Lives With Spouse   Receives Help From Family   IADLs Independent with meal prep;Independent with medication management;Family/Friend/Other provides transportation   Falls in the last 6 months 0   Vocational Retired   Lifestyle   Autonomy Pt reports I w/ ADLs, most IADLs, and fxnl mobility w/ SPC at baseline; - driving.   Reciprocal Relationships Pt lives w/ his spouse; reports she can assist w/ ADLs and IADLs as needed.   Service to Others Pt is retired.   Intrinsic Gratification Pt enjoys watching TV.   General   Family/Caregiver Present No   ADL   Eating Assistance 7  Independent   Grooming Assistance 5  Supervision/Setup   UB Bathing Assistance 5  Supervision/Setup   LB Bathing Assistance 3  Moderate Assistance   UB Dressing Assistance 5  Supervision/Setup   LB Dressing Assistance 3  Moderate Assistance   Toileting Assistance  3  Moderate Assistance   Toileting Deficit Setup;Verbal cueing;Supervison/safety;Increased time to complete;Grab bar use;Perineal hygiene   Bed Mobility   Supine to Sit 5  Supervision   Additional items HOB elevated;Bedrails;Increased time required   Sit to Supine 5  Supervision   Additional items HOB elevated;Bedrails;Increased time required   Additional Comments Pt supine in bed at end of OT evaluation w/ all needs within reach and alarm activated.   Transfers   Sit to Stand 4  Minimal assistance   Additional items Assist x 1;Increased time required;Verbal cues   Stand to Sit 4  Minimal assistance   Additional items Assist x 1;Armrests;Increased time required;Verbal cues   Toilet transfer 3  Moderate assistance   Additional items Assist x 1;Increased time  required;Verbal cues;Standard toilet  (grab bar use)   Additional Comments x2 STS transfers t/o session w/ RW for support   Functional Mobility   Functional Mobility 4  Minimal assistance   Additional Comments Pt ambulated from EOB > bathroom > chair w/ min Ax1 using RW for support; + vc for walker management.   Additional items Rolling walker   Balance   Static Sitting Fair +   Dynamic Sitting Fair   Static Standing Fair -   Dynamic Standing Poor +   Ambulatory Poor +   Activity Tolerance   Activity Tolerance Patient tolerated treatment well   Medical Staff Made Aware PT Ayana, due to pt's medical complexity and multiple comorbidities   Nurse Made Aware RN clearance prior to session   RUE Assessment   RUE Assessment WFL   LUE Assessment   LUE Assessment WFL   Hand Function   Gross Motor Coordination Functional   Fine Motor Coordination Functional   Cognition   Arousal/Participation Responsive;Cooperative   Attention Attends with cues to redirect   Orientation Level Oriented X4   Memory Decreased recall of precautions   Following Commands Follows one step commands with increased time or repetition   Comments Pt was pleasant, cooperative, and willing to participate in OT evaluation w/ encouragement. Pt demonstrated decreased safety awareness and insight into deficits.   Assessment   Limitation Decreased ADL status;Decreased Safe judgement during ADL;Decreased endurance;Decreased self-care trans;Decreased high-level ADLs   Assessment Pt is a 84 y.o. male seen for OT evaluation s/p admission to [facility] on 7/12/2024. Pt diagnosed with Shock (HCC). Pt has a significant PMH impacting occupational performance including: Ambulates with cane, Anemia, GERD (gastroesophageal reflux disease), Hematuria, Hyperlipidemia, Hypertension, Intracerebral hemorrhage (HCC), Intracerebral hemorrhage (HCC), Mobility impaired, Stroke syndrome, and UTI (urinary tract infection). Pt with active OT evaluation and treatment  orders and activity orders. PTA, pt living with his spouse in a multi-level house w/ 3 PO. Pt reports I w/ ADLs, most IADLs, and fxnl mobility w/ SPC at baseline; - driving. Pt agreeable and willing to participate in OT evaluation. During evaluation, pt was I for eating, S for grooming and UB ADLs, and mod A for LB ADLs and toileting. Pt also required min Ax1 for bed mobility, STS transfers, and fxnl mobility w/ RW and mod Ax1 for toilet transfer. Performance deficits that affect the pt’s occupational performance during the initial evaluation include decreased ADL status, decreased activity tolerance, decreased endurance, decreased standing tolerance, decreased standing balance, decreased transfer skills, decreased fxnl mobility, decreased safety awareness, and decreased insight into deficits. Based on pt’s functional performance and deficits the following occupations will be addressed in OT treatments in order to maximize pt’s independence and overall occupational performance: grooming, bathing/showering, toileting and toilet hygiene, dressing, and functional mobility. Goals are listed below.  From OT perspective, recommend Level II (Moderate Resource Intensity) upon d/c when pt medically stable to d/c from acute care. Will continue to follow.   Goals   Patient Goals to rest in bed   LTG Time Frame 10-14   Plan   Treatment Interventions ADL retraining;Functional transfer training;Endurance training;Patient/family training;Equipment evaluation/education;Compensatory technique education;Continued evaluation;Energy conservation;Activityengagement   Goal Expiration Date 07/30/24   OT Treatment Day 0   OT Frequency 2-3x/wk   Discharge Recommendation   Rehab Resource Intensity Level, OT II (Moderate Resource Intensity)   AM-PAC Daily Activity Inpatient   Lower Body Dressing 2   Bathing 2   Toileting 2   Upper Body Dressing 3   Grooming 3   Eating 4   Daily Activity Raw Score 16   Daily Activity Standardized Score (Calc  for Raw Score >=11) 35.96   AM-PAC Applied Cognition Inpatient   Following a Speech/Presentation 4   Understanding Ordinary Conversation 4   Taking Medications 4   Remembering Where Things Are Placed or Put Away 4   Remembering List of 4-5 Errands 4   Taking Care of Complicated Tasks 3   Applied Cognition Raw Score 23   Applied Cognition Standardized Score 53.08       The patient's raw score on the AM-PAC Daily Activity Inpatient Short Form is 16. A raw score of less than 19 suggests the patient may benefit from discharge to post-acute rehabilitation services. Please refer to the recommendation of the Occupational Therapist for safe discharge planning.    Goals:      - Pt will complete UB ADLs w/ mod I to maximize independence and return home.    - Pt will complete LB ADLs w/ mod I to maximize independence and return home.     - Pt will complete toileting routine (transfers, hygiene, and clothing management) w/ mod I to maximize independence and return to prior level of function.    - Pt will complete bed mobility supine >< sit w/ mod I to maximize independence and return home.    - Pt will transfer to bed, chair, and toilet w/ mod I using AD / DME as needed to maximize independence and reduce burden of care.     - Pt will ambulate household distances w/ mod I using least restrictive device to maximize independence and return home.     - Pt will increase activity tolerance (and sitting tolerance) by eating all meals OOB in the chair.     - Pt will increase standing tolerance to 5-7 minutes to maximize independence w/ grooming tasks standing at the sink.     - Pt will tolerate therapeutic activities for greater than 30 minutes in order to increase tolerance for functional activities.     - Pt will participate in ongoing OT evaluation of cognitive skills to assist with safe d/c planning/recommendations.      DANIELITO Rivera, OTR/L

## 2024-07-16 NOTE — PLAN OF CARE
Problem: OCCUPATIONAL THERAPY ADULT  Goal: Performs self-care activities at highest level of function for planned discharge setting.  See evaluation for individualized goals.  Description: Treatment Interventions: ADL retraining, Functional transfer training, Endurance training, Patient/family training, Equipment evaluation/education, Compensatory technique education, Continued evaluation, Energy conservation, Activityengagement          See flowsheet documentation for full assessment, interventions and recommendations.   Note: Limitation: Decreased ADL status, Decreased Safe judgement during ADL, Decreased endurance, Decreased self-care trans, Decreased high-level ADLs     Assessment: Pt is a 84 y.o. male seen for OT evaluation s/p admission to [facility] on 7/12/2024. Pt diagnosed with Shock (HCC). Pt has a significant PMH impacting occupational performance including: Ambulates with cane, Anemia, GERD (gastroesophageal reflux disease), Hematuria, Hyperlipidemia, Hypertension, Intracerebral hemorrhage (HCC), Intracerebral hemorrhage (HCC), Mobility impaired, Stroke syndrome, and UTI (urinary tract infection). Pt with active OT evaluation and treatment orders and activity orders. PTA, pt living with his spouse in a multi-level house w/ 3 PO. Pt reports I w/ ADLs, most IADLs, and fxnl mobility w/ SPC at baseline; - driving. Pt agreeable and willing to participate in OT evaluation. During evaluation, pt was I for eating, S for grooming and UB ADLs, and mod A for LB ADLs and toileting. Pt also required min Ax1 for bed mobility, STS transfers, and fxnl mobility w/ RW and mod Ax1 for toilet transfer. Performance deficits that affect the pt’s occupational performance during the initial evaluation include decreased ADL status, decreased activity tolerance, decreased endurance, decreased standing tolerance, decreased standing balance, decreased transfer skills, decreased fxnl mobility, decreased safety  awareness, and decreased insight into deficits. Based on pt’s functional performance and deficits the following occupations will be addressed in OT treatments in order to maximize pt’s independence and overall occupational performance: grooming, bathing/showering, toileting and toilet hygiene, dressing, and functional mobility. Goals are listed below.  From OT perspective, recommend Level II (Moderate Resource Intensity) upon d/c when pt medically stable to d/c from acute care. Will continue to follow.     Rehab Resource Intensity Level, OT: II (Moderate Resource Intensity)

## 2024-07-16 NOTE — ASSESSMENT & PLAN NOTE
Patient was brought to the ED for fever, pale/clammy skin, dizziness, and weakness after TURBT, cystoscopy earlier in the day   Hgb on arrival 5.8, s/p 3 units PRBC  SIRS criteria met with fever, tachycardia, urinary tract infection, & lactic 2.9  CT abd/pelvis:   Interval removal of previously seen bladder mass as well as interval placement of right-sided nephroureteral stent, this appears appropriately positioned. Mild to moderate right hydroureter. Previously seen right-sided hydronephrosis is intervally improved. Small amount of air in the right renal collecting system as well as right-sided perinephric fat stranding could be related to recent instrumentation and/or infection.  Partially distended bladder. Mild to moderate circumferential bladder wall thickening. Air within the bladder lumen, possibly related to recent instrumentation/intervention; superimposed cystitis considered in the appropriate clinical setting. Correlation with the patient's symptoms, laboratory values, and urinalysis recommended  Cardiomegaly, cholelithiasis, renal cysts, colonic diverticulosis without evidence of acute diverticulitis, small hiatal hernia, enlarged prostate, right-sided inguinal hernia containing fat and several small bowel loops; no discrete evidence of bowel obstruction.  Initially required pressors in the ICU as blood pressure unresponsive to fluids- he has since been weaned off of pressors  Urine culture negative however patient was given dose of cefazolin that could have resulted in false negative   Blood cultures 1/2 positive for gram negative rods- klebsiella. He had been treated with zosyn and vanco prior to the cultures. He has since been changed to cefepime and now that cultures are sensitive to klebsiella will change him to ancef in which he can be changed to keflex at time of discharge to complete course.   Urology following, appreciate recs   Can remove irizarry catheter once Cr improves closer to baseline    Remaining hematuria thought to be anticipated post operatively.  Continues to have hematuria with decrease in h/h  Maintain irizarry and continue flushes. Check cbc in am.   Continue holding anticoagulation until hematuria improves   Outpt follow up with urology   Continues to have dark/reddish urine

## 2024-07-16 NOTE — CASE MANAGEMENT
Case Management Discharge Planning Note    Patient name Francis Caballero  Location [facility] 901/[facility] 901-01 MRN [id number]  : 1940 Date 2024       Current Admission Date: 2024  Current Admission Diagnosis:Shock (HCC)   Patient Active Problem List    Diagnosis Date Noted Date Diagnosed    Chronic kidney disease 2024     LELE (acute kidney injury) ([facility]) 07/15/2024     Chronic renal impairment 07/15/2024     Positive blood culture 2024     Shock ([facility]) 2024     CVA (cerebral vascular accident) ([facility]) 2024     JORGE LUIS (obstructive sleep apnea) 2024     Mitral regurgitation 2024     Smoker 2024     Chronic anticoagulation 2024     Symptomatic anemia 2024     Intractable epilepsy without status epilepticus, unspecified epilepsy type ([facility]) 2024     Coagulation disorder ([facility]) 2023     Malignant 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 ([facility]) 06/15/2023     Other hydronephrosis 06/15/2023     Chronic cough 2023     Gastroesophageal reflux disease without esophagitis 2023     Sciatica, right side 2022     Chronic bilateral low back pain with right-sided sciatica 08/15/2021     DDD (degenerative disc disease), lumbar 08/15/2021     Essential hypertension 2021     Primary osteoarthritis of left hip 2020     Osteoarthritis of lumbar spine 2020     Frequent falls 2018     Weakness 2018     Cerebral artery occlusion 2017    Labyrinthitis, unspecified ear 2017    Pure hypercholesterolemia 2017    Gait instability 2017     Bilateral pseudophakia 2017     Age-related cataract of right eye 2017     Macular drusen 2017     Posterior vitreous detachment of both eyes 2017     Arthralgia 2014     Stage 3b chronic kidney disease (HCC) 2014     Asymptomatic cholelithiasis 2013     Benign prostatic hyperplasia 2013      Coronary arteriosclerosis 06/20/2013     Hiatal hernia 06/20/2013     Anxiety 06/13/2013     Atrial fibrillation (HCC) 06/13/2013     Benign essential hypertension 06/13/2013     History of epilepsy 06/13/2013     Hyperlipidemia 06/13/2013       LOS (days): 4  Geometric Mean LOS (GMLOS) (days):   Days to GMLOS:     OBJECTIVE:  Risk of Unplanned Readmission Score: 19.44         Current admission status: Inpatient   Preferred Pharmacy:   [facility]/pharmacy #[id number] - [place], [place] - [address]  [address] [facility]  [facility] [address]  Phone: [phone] Fax: [phone]    [facility] - [place], [place] - [address] [address]  Phone: [phone] Fax: [phone]    Primary Care Provider: Chantel Santos DO    Primary Insurance: [employer]  Secondary Insurance:     DISCHARGE DETAILS:    Discharge planning discussed with:: Pt  Freedom of Choice: Yes  Comments - Freedom of Choice: Discussed FOC  CM contacted family/caregiver?: No- see comments (Pt alert and oriented)  Were Treatment Team discharge recommendations reviewed with patient/caregiver?: Yes  Did patient/caregiver verbalize understanding of patient care needs?: Yes  Were patient/caregiver advised of the risks associated with not following Treatment Team discharge recommendations?: Yes         Requested Home Health Care         Is the patient interested in HHC at discharge?: No    DME Referral Provided  Referral made for DME?: No    Other Referral/Resources/Interventions Provided:  Interventions: Short Term Rehab, HHC  Referral Comments: CM discussed PT/OT rec of STR, pt at this time declining and requesting to return home.  CM discussed HHC for PT/OT, pt at this time also declining and requesting to return home on discharge without services.         Treatment Team Recommendation: Short Term Rehab  Discharge Destination Plan:: Home

## 2024-07-16 NOTE — ASSESSMENT & PLAN NOTE
Hgb on arrival 5.8  S/p 3 units PRBC  Continue to monitor hgb daily and replete for hgb <7  Still with hematuria and hemoglobin decrease to 7.3.   Will monitor and transfuse as needed  F/U anemia work up

## 2024-07-16 NOTE — ASSESSMENT & PLAN NOTE
Patient is chronic kidney disease baseline creatinine is 1.5-1.8.  Patient's acute kidney injury from earlier in hospitalization has resolved.   Creatinine currently at baseline at 1.7  Neurology following

## 2024-07-16 NOTE — ASSESSMENT & PLAN NOTE
Home regimen: Metoprolol 50 mg twice daily  -Initially held due to labile blood pressures  -Will resume Lopressor 50 twice daily 7/16/24

## 2024-07-16 NOTE — ASSESSMENT & PLAN NOTE
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  Afebrile. HD stable. No extremis.  Hgb 7.3--8.4-8.4 today.    Plan:    Anticoagulation on hold currently  Continue to trend labs and transfuse per primary team  Urine culture no growth, on Cefazolin  [place]--manual irrigation. See below          NPO for potential OR but may not require intervention will discuss will  attending in between cases & update.

## 2024-07-16 NOTE — PROGRESS NOTES
NEPHROLOGY PROGRESS NOTE    Francis Caballero 84 y.o. male MRN: [id number]  Unit/Bed#: [facility] 901-01 Encounter: [id number]  Reason for Consult: Chronic kidney disease    Patient awake alert in good spirits.  Urine clearing still little pink.  No pain says he is getting better every day.    ASSESSMENT/PLAN:      1.  Renal    Patient is chronic kidney disease baseline creatinine is 1.5-1.8.  Patient's acute kidney injury from earlier in hospitalization has resolved.  He did undergo transurethral resection of a bladder mass and had right-sided hydronephrosis.  Urology is following and will monitor.  Creatinine at baseline range at 1.7 normal electrolytes.  Has required blood transfusions as he has hematuria seems to be slowing down hemoglobin stable 7.3.    2.  Patient's status post TURBT and ureteral stent change per urology.  Required multiple blood transfusions postoperatively.  Urology following.        SUBJECTIVE:  Review of Systems   Constitutional: Negative for chills, diaphoresis and fever.   HENT: Negative.     Eyes: Negative.    Cardiovascular:  Negative for chest pain, dyspnea on exertion and leg swelling.   Respiratory:  Negative for cough, shortness of breath, sputum production and wheezing.    Gastrointestinal:  Negative for abdominal pain, diarrhea, nausea and vomiting.   Genitourinary:         Catheter urine not is grossly bloody but still pink.   Neurological:  Negative for dizziness, focal weakness and headaches.   Psychiatric/Behavioral:  Negative for altered mental status.        OBJECTIVE:  Current Weight: Weight - Scale: 78.1 kg (172 lb 2.9 oz)  Vitals:Temp (24hrs), Av.1 °F (36.7 °C), Min:97.7 °F (36.5 °C), Max:98.5 °F (36.9 °C)  Current: Temperature: 97.7 °F (36.5 °C)   Blood pressure 123/73, pulse 74, temperature 97.7 °F (36.5 °C), resp. rate 16, height 6' (1.829 m), weight 78.1 kg (172 lb 2.9 oz), SpO2 98%. , Body mass index is 23.35 kg/m².      Intake/Output Summary (Last 24 hours) at 2024  Pt suctioned per mother's request.  Pt coughed and spit up mucus.  O2 increased to 1L nasal cannula.     1031  Last data filed at 7/16/2024 0900  Gross per 24 hour   Intake 240 ml   Output 2625 ml   Net -2385 ml       Physical Exam: /73   Pulse 74   Temp 97.7 °F (36.5 °C)   Resp 16   Ht 6' (1.829 m)   Wt 78.1 kg (172 lb 2.9 oz)   SpO2 98%   BMI 23.35 kg/m²   Physical Exam  Constitutional:       General: He is not in acute distress.     Appearance: He is not toxic-appearing.   HENT:      Head: Normocephalic and atraumatic.      Nose: Nose normal.      Mouth/Throat:      Mouth: Mucous membranes are moist.   Eyes:      General: No scleral icterus.     Extraocular Movements: Extraocular movements intact.   Cardiovascular:      Rate and Rhythm: Normal rate and regular rhythm.      Heart sounds:      No friction rub. No gallop.   Pulmonary:      Effort: Pulmonary effort is normal. No respiratory distress.      Breath sounds: No wheezing or rales.   Abdominal:      General: Bowel sounds are normal. There is no distension.      Palpations: Abdomen is soft.      Tenderness: There is no abdominal tenderness. There is no rebound.   Musculoskeletal:      Cervical back: Normal range of motion and neck supple.   Neurological:      Mental Status: He is alert. Mental status is at baseline.   Psychiatric:         Mood and Affect: Mood normal.         Behavior: Behavior normal.         Medications:    Current Facility-Administered Medications:     ALPRAZolam (XANAX) tablet 0.5 mg, 0.5 mg, Oral, TID PRN, Afshan Tracey PA-C, 0.5 mg at 07/16/24 0135    ceFAZolin (ANCEF) IVPB (premix in dextrose) 2,000 mg 50 mL, 2,000 mg, Intravenous, Q8H, Yasemin Randle DO, Last Rate: 100 mL/hr at 07/16/24 1001, 2,000 mg at 07/16/24 1001    lidocaine (URO-JET) 2 % urethral/mucosal gel 1 Application, 1 Application, Urethral, Daily PRN, Raad Goode DO    oxybutynin (DITROPAN) tablet 5 mg, 5 mg, Oral, TID, ANASTASIYA Grossman, 5 mg at 07/16/24 0800    pravastatin (PRAVACHOL) tablet 40 mg, 40 mg, Oral, Daily With Dinner,  "Raad Goode DO, 40 mg at 07/15/24 1815    senna-docusate sodium (SENOKOT S) 8.6-50 mg per tablet 1 tablet, 1 tablet, Oral, HS, Raad Goode DO, 1 tablet at 07/15/24 2136    Laboratory Results:  Lab Results   Component Value Date    WBC 8.32 07/16/2024    HGB 7.3 (L) 07/16/2024    HCT 23.0 (L) 07/16/2024    MCV 97 07/16/2024     07/16/2024     Lab Results   Component Value Date    SODIUM 138 07/16/2024    K 4.7 07/16/2024     (H) 07/16/2024    CO2 21 07/16/2024    BUN 21 07/16/2024    CREATININE 1.79 (H) 07/16/2024    GLUC 90 07/16/2024    CALCIUM 7.8 (L) 07/16/2024     Lab Results   Component Value Date    CALCIUM 7.8 (L) 07/16/2024    PHOS 2.6 07/13/2024     No results found for: \"LABPROT\"    "

## 2024-07-17 ENCOUNTER — TELEPHONE (OUTPATIENT)
Dept: OTHER | Facility: HOSPITAL | Age: 84
End: 2024-07-17

## 2024-07-17 LAB
FERRITIN SERPL-MCNC: 125 NG/ML (ref 24–336)
FOLATE SERPL-MCNC: 12.2 NG/ML
IRON SATN MFR SERPL: 5 % (ref 15–50)
IRON SERPL-MCNC: 12 UG/DL (ref 50–212)
TIBC SERPL-MCNC: 222 UG/DL (ref 250–450)
UIBC SERPL-MCNC: 210 UG/DL (ref 155–355)

## 2024-07-17 PROCEDURE — 99232 SBSQ HOSP IP/OBS MODERATE 35: CPT | Performed by: STUDENT IN AN ORGANIZED HEALTH CARE EDUCATION/TRAINING PROGRAM

## 2024-07-17 PROCEDURE — 88307 TISSUE EXAM BY PATHOLOGIST: CPT | Performed by: PATHOLOGY

## 2024-07-17 PROCEDURE — 99232 SBSQ HOSP IP/OBS MODERATE 35: CPT | Performed by: NURSE PRACTITIONER

## 2024-07-17 PROCEDURE — 99232 SBSQ HOSP IP/OBS MODERATE 35: CPT | Performed by: INTERNAL MEDICINE

## 2024-07-17 PROCEDURE — 88341 IMHCHEM/IMCYTCHM EA ADD ANTB: CPT | Performed by: PATHOLOGY

## 2024-07-17 PROCEDURE — 88342 IMHCHEM/IMCYTCHM 1ST ANTB: CPT | Performed by: PATHOLOGY

## 2024-07-17 PROCEDURE — 97530 THERAPEUTIC ACTIVITIES: CPT

## 2024-07-17 PROCEDURE — 97535 SELF CARE MNGMENT TRAINING: CPT

## 2024-07-17 RX ORDER — LORAZEPAM 0.5 MG/1
0.5 TABLET ORAL EVERY 8 HOURS PRN
Status: DISCONTINUED | OUTPATIENT
Start: 2024-07-17 | End: 2024-07-23 | Stop reason: HOSPADM

## 2024-07-17 RX ORDER — LORAZEPAM 0.5 MG/1
0.5 TABLET ORAL ONCE
Status: COMPLETED | OUTPATIENT
Start: 2024-07-17 | End: 2024-07-17

## 2024-07-17 RX ADMIN — CEFAZOLIN SODIUM 2000 MG: 2 SOLUTION INTRAVENOUS at 17:17

## 2024-07-17 RX ADMIN — OXYBUTYNIN CHLORIDE 5 MG: 5 TABLET ORAL at 20:56

## 2024-07-17 RX ADMIN — PRAVASTATIN SODIUM 40 MG: 40 TABLET ORAL at 17:17

## 2024-07-17 RX ADMIN — OXYBUTYNIN CHLORIDE 5 MG: 5 TABLET ORAL at 07:51

## 2024-07-17 RX ADMIN — CEFAZOLIN SODIUM 2000 MG: 2 SOLUTION INTRAVENOUS at 10:20

## 2024-07-17 RX ADMIN — LORAZEPAM 0.5 MG: 0.5 TABLET ORAL at 20:56

## 2024-07-17 RX ADMIN — CEFAZOLIN SODIUM 2000 MG: 2 SOLUTION INTRAVENOUS at 02:00

## 2024-07-17 RX ADMIN — LORAZEPAM 0.5 MG: 0.5 TABLET ORAL at 12:52

## 2024-07-17 RX ADMIN — OXYBUTYNIN CHLORIDE 5 MG: 5 TABLET ORAL at 17:17

## 2024-07-17 NOTE — ASSESSMENT & PLAN NOTE
Patient was brought to the ED for fever, pale/clammy skin, dizziness, and weakness after TURBT, cystoscopy earlier in the day and met sirs criteria with possible urinary infection as source. Became hypotensive and initially required pressors in ICU.  Hgb on arrival 5.8, s/p 3 units PRBC  CT abd/pelvis: Interval removal of previously seen bladder mass as well as interval placement of right-sided nephroureteral stent, this appears appropriately positioned. Mild to moderate right hydroureter. Previously seen right-sided hydronephrosis is intervally improved. Small amount of air in the right renal collecting system as well as right-sided perinephric fat stranding could be related to recent instrumentation and/or infection.  Partially distended bladder. Mild to moderate circumferential bladder wall thickening. Air within the bladder lumen, possibly related to recent instrumentation/intervention; superimposed cystitis considered in the appropriate clinical setting. Correlation with the patient's symptoms, laboratory values, and urinalysis recommended  Urine culture negative however patient was given dose of cefazolin that could have resulted in false negative   Blood cultures 1/2 positive for gram negative rods- klebsiella. He had been treated with zosyn and vanco prior to the cultures. He has since been changed to cefepime and now that cultures are sensitive to klebsiella switched to ancef in which he can be changed to keflex at time of discharge to complete course  BP has remained stable  Remains afebrile without leukocytosis  Continue to monitor

## 2024-07-17 NOTE — PROGRESS NOTES
[facility]  Progress Note  Name: Francis Caballero I  MRN: [id number]  Unit/Bed#: [facility] 901-01 I Date of Admission: 7/12/2024   Date of Service: 7/17/2024 I Hospital Day: 5    Assessment & Plan   * Shock (HCC)  Assessment & Plan  Patient was brought to the ED for fever, pale/clammy skin, dizziness, and weakness after TURBT, cystoscopy earlier in the day   Hgb on arrival 5.8, s/p 3 units PRBC  SIRS criteria met with fever, tachycardia, urinary tract infection, & lactic 2.9  CT abd/pelvis:   Interval removal of previously seen bladder mass as well as interval placement of right-sided nephroureteral stent, this appears appropriately positioned. Mild to moderate right hydroureter. Previously seen right-sided hydronephrosis is intervally improved. Small amount of air in the right renal collecting system as well as right-sided perinephric fat stranding could be related to recent instrumentation and/or infection.  Partially distended bladder. Mild to moderate circumferential bladder wall thickening. Air within the bladder lumen, possibly related to recent instrumentation/intervention; superimposed cystitis considered in the appropriate clinical setting. Correlation with the patient's symptoms, laboratory values, and urinalysis recommended  Cardiomegaly, cholelithiasis, renal cysts, colonic diverticulosis without evidence of acute diverticulitis, small hiatal hernia, enlarged prostate, right-sided inguinal hernia containing fat and several small bowel loops; no discrete evidence of bowel obstruction.  Initially required pressors in the ICU as blood pressure unresponsive to fluids- he has since been weaned off of pressors  Urine culture negative however patient was given dose of cefazolin that could have resulted in false negative   Blood cultures 1/2 positive for gram negative rods- klebsiella. He had been treated with zosyn and vanco prior to the cultures. He has since been changed to  cefepime and now that cultures are sensitive to klebsiella will change him to ancef in which he can be changed to keflex at time of discharge to complete course.   Urology following, appreciate recs   Can remove irizarry catheter once Cr improves closer to baseline   Remaining hematuria thought to be anticipated post operatively.  Continues to have hematuria with decrease in h/h  Maintain irizarry and continue flushes. Check cbc in am.   Continue holding anticoagulation until hematuria improves   Patient continues to have heamturia  Urology following  May need PCN      LELE (acute kidney injury) ([facility])  Assessment & Plan  Patient is chronic kidney disease baseline creatinine is 1.5-1.8.  Patient's acute kidney injury from earlier in hospitalization has resolved.   Creatinine currently at baseline at 1.7  Nephrology following    Positive blood culture  Assessment & Plan  1/2 blood cultures positive for gram negative rods with detected Klebsiella pneumoniae  Cefepime recommended - continue   Will d/c vancomycin at this time   Curbside to ID regarding suspected source - given he received 1 dose of ancef prior to urine culture, could be false negative given urine culture without growth.   Continue above abx for now and adjust as susceptibilities finalize    Symptomatic anemia  Assessment & Plan  Hgb on arrival 5.8  S/p 3 units PRBC  Continue to monitor hgb daily and replete for hgb <7  Still with hematuria and hemoglobin decrease to 7.3.   Will monitor and transfuse as needed  F/U anemia work up    Malignant 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 (HCC)  Assessment & Plan  Follows with outpatient urology   S/p chemo and radiation   7/12 he underwent cystoscopy, TURBT for ongoing hematuria   Later the same night, patient was brought to the ED for fever, pale/clammy skin, dizziness, and weakness   See shock for full plan       Hyperlipidemia  Assessment & Plan  Continue pravastatin 40mg for home Crestor 5mg     Benign  essential hypertension  Assessment & Plan  Continue home lopressor      Atrial fibrillation (HCC)  Assessment & Plan  Metoprolol being held for labile BP  Warfarin being held in the setting of hematuria, anemia. Resume when able     Anxiety  Assessment & Plan  Continue xanax 0.5mg TID PRN anxiety               VTE Pharmacologic Prophylaxis:   Moderate Risk (Score 3-4) - Pharmacological DVT Prophylaxis Contraindicated. Sequential Compression Devices Ordered.    Mobility:   Basic Mobility Inpatient Raw Score: 19  JH-HLM Goal: 6: Walk 10 steps or more  JH-HLM Achieved: 6: Walk 10 steps or more  JH-HLM Goal achieved. Continue to encourage appropriate mobility.    Patient Centered Rounds: I performed bedside rounds with nursing staff today.   Discussions with Specialists or Other Care Team Provider: urology    Education and Discussions with Family / Patient: Updated  (wife) at bedside.    Total Time Spent on Date of Encounter in care of patient: 35 mins. This time was spent on one or more of the following: performing physical exam; counseling and coordination of care; obtaining or reviewing history; documenting in the medical record; reviewing/ordering tests, medications or procedures; communicating with other healthcare professionals and discussing with patient's family/caregivers.    Current Length of Stay: 5 day(s)  Current Patient Status: Inpatient   Certification Statement: The patient will continue to require additional inpatient hospital stay due to hematuria, will need voiding trial prior to d/c  Discharge Plan: Anticipate discharge in 24-48 hrs to home with home services.    Code Status: Level 1 - Full Code    Subjective:     Admits to still having hematuria    Objective:     Vitals:   Temp (24hrs), Av.2 °F (36.8 °C), Min:98.2 °F (36.8 °C), Max:98.3 °F (36.8 °C)    Temp:  [98.2 °F (36.8 °C)-98.3 °F (36.8 °C)] 98.2 °F (36.8 °C)  HR:  [] 85  Resp:  [15-17] 17  BP: (110-159)/(63-94)  142/77  SpO2:  [96 %-99 %] 99 %  Body mass index is 22.78 kg/m².     Input and Output Summary (last 24 hours):     Intake/Output Summary (Last 24 hours) at 7/17/2024 1639  Last data filed at 7/17/2024 1258  Gross per 24 hour   Intake 236 ml   Output 2900 ml   Net -2664 ml       Physical Exam:   Physical Exam  Vitals and nursing note reviewed.   Constitutional:       General: He is not in acute distress.     Appearance: He is well-developed.   HENT:      Head: Normocephalic and atraumatic.   Eyes:      Conjunctiva/sclera: Conjunctivae normal.   Cardiovascular:      Rate and Rhythm: Normal rate and regular rhythm.      Heart sounds: No murmur heard.  Pulmonary:      Effort: Pulmonary effort is normal. No respiratory distress.      Breath sounds: Normal breath sounds.   Abdominal:      Palpations: Abdomen is soft.      Tenderness: There is no abdominal tenderness.   Genitourinary:     Comments: Gracia with dark red hematuria  Musculoskeletal:         General: No swelling.      Cervical back: Neck supple.   Skin:     General: Skin is warm and dry.   Neurological:      Mental Status: He is alert. Mental status is at baseline.   Psychiatric:         Mood and Affect: Mood normal.          Additional Data:     Labs:  Results from last 7 days   Lab Units 07/16/24  0430 07/13/24  0149 07/12/24  1932   WBC Thousand/uL 8.32   < > 9.64   HEMOGLOBIN g/dL 7.3*   < > 5.8*   HEMATOCRIT % 23.0*   < > 18.7*   PLATELETS Thousands/uL 184   < > 230   BANDS PCT %  --   --  21*   SEGS PCT % 71  --   --    LYMPHO PCT % 13*  --  2*   MONO PCT % 9  --  1*   EOS PCT % 4  --  0    < > = values in this interval not displayed.     Results from last 7 days   Lab Units 07/16/24  0430 07/13/24  0453 07/12/24  1932   SODIUM mmol/L 138   < > 138   POTASSIUM mmol/L 4.7   < > 4.2   CHLORIDE mmol/L 111*   < > 111*   CO2 mmol/L 21   < > 21   BUN mg/dL 21   < > 22   CREATININE mg/dL 1.79*   < > 1.66*   ANION GAP mmol/L 6   < > 6   CALCIUM mg/dL 7.8*   < >  7.9*   ALBUMIN g/dL  --   --  3.1*   TOTAL BILIRUBIN mg/dL  --   --  0.63   ALK PHOS U/L  --   --  67   ALT U/L  --   --  6*   AST U/L  --   --  10*   GLUCOSE RANDOM mg/dL 90   < > 97    < > = values in this interval not displayed.     Results from last 7 days   Lab Units 07/16/24  0430   INR  1.44*             Results from last 7 days   Lab Units 07/12/24  2222 07/12/24  1932   LACTIC ACID mmol/L 1.4 2.9*       Lines/Drains:  Invasive Devices       Peripheral Intravenous Line  Duration             Peripheral IV 07/13/24 Left;Ventral (anterior) Forearm 4 days              Drain  Duration             Urethral Catheter Three way 20 Fr. 5 days                  Urinary Catheter:  Goal for removal:  Deferred to urology               Imaging: Reviewed radiology reports from this admission including: chest xray    Recent Cultures (last 7 days):   Results from last 7 days   Lab Units 07/12/24 2031 07/12/24 1932   BLOOD CULTURE   --  No Growth After 4 Days.  Klebsiella pneumoniae*   GRAM STAIN RESULT   --  Gram negative rods*   URINE CULTURE  No Growth <1000 cfu/mL  --        Last 24 Hours Medication List:   Current Facility-Administered Medications   Medication Dose Route Frequency Provider Last Rate    cefazolin  2,000 mg Intravenous Q8H Yasemin Randle DO 2,000 mg (07/17/24 1020)    lidocaine  1 Application Urethral Daily PRN Raad Goode DO      LORazepam  0.5 mg Oral Q8H PRN Flori Barry DO      oxybutynin  5 mg Oral TID ANASTASIYA Grossman      pravastatin  40 mg Oral Daily With Dinner Raad Goode DO      senna-docusate sodium  1 tablet Oral HS Raad Goode DO          Today, Patient Was Seen By: Flori Barry DO    **Please Note: This note may have been constructed using a voice recognition system.**

## 2024-07-17 NOTE — PROGRESS NOTES
NEPHROLOGY PROGRESS NOTE    Francis Caballero 84 y.o. male MRN: [id number]  Unit/Bed#: [facility] 901-01 Encounter: [id number]  Reason for Consult: Chronic kidney disease    The patient is stable clinically with no significant changes.  Continues to have some hematuria.    ASSESSMENT/PLAN:      1.  Renal    Patient on chronic kidney disease baseline creatinine 1.5-1.8 range.  Had right-sided hydronephrosis underwent resection of bladder mass and has ureteral stent in place.  Creatinine had improved back into baseline range was 1.7.  Continues to have some hematuria urology is aware thinks the stent may be aggravating the situation anticoagulations on hold urology to follow.    2.  Bladder cancer    Patient had a transurethral section of bladder tumor.  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  Had obstruction and stent placed as well.  Postop per urology.        SUBJECTIVE:  Review of Systems   Constitutional: Negative for chills, diaphoresis and fever.   HENT: Negative.     Eyes: Negative.    Cardiovascular:  Negative for chest pain, dyspnea on exertion and orthopnea.   Respiratory:  Negative for cough, shortness of breath and sputum production.    Gastrointestinal:  Negative for abdominal pain, diarrhea, nausea and vomiting.   Genitourinary:         Catheter present   Neurological:  Negative for dizziness and headaches.   Psychiatric/Behavioral:  Negative for altered mental status.        OBJECTIVE:  Current Weight: Weight - Scale: 76.2 kg (167 lb 15.9 oz)  Vitals:Temp (24hrs), Av.1 °F (36.7 °C), Min:97.8 °F (36.6 °C), Max:98.3 °F (36.8 °C)  Current: Temperature: 98.3 °F (36.8 °C)   Blood pressure 110/63, pulse 89, temperature 98.3 °F (36.8 °C), resp. rate 17, height 6' (1.829 m), weight 76.2 kg (167 lb 15.9 oz), SpO2 97%. , Body mass index is 22.78 kg/m².      Intake/Output Summary (Last 24 hours) at 2024 1123  Last data filed at 2024 1017  Gross per 24 hour   Intake  298 ml   Output 2500 ml   Net -2202 ml       Physical Exam: /63   Pulse 89   Temp 98.3 °F (36.8 °C)   Resp 17   Ht 6' (1.829 m)   Wt 76.2 kg (167 lb 15.9 oz)   SpO2 97%   BMI 22.78 kg/m²   Physical Exam  Constitutional:       General: He is not in acute distress.     Appearance: He is not toxic-appearing.   HENT:      Head: Normocephalic and atraumatic.      Nose: Nose normal.      Mouth/Throat:      Mouth: Mucous membranes are dry.   Eyes:      General: No scleral icterus.     Extraocular Movements: Extraocular movements intact.   Cardiovascular:      Rate and Rhythm: Normal rate and regular rhythm.      Heart sounds:      No friction rub. No gallop.   Pulmonary:      Effort: Pulmonary effort is normal. No respiratory distress.      Breath sounds: No wheezing or rales.   Abdominal:      General: Bowel sounds are normal. There is no distension.      Palpations: Abdomen is soft.      Tenderness: There is no abdominal tenderness. There is no rebound.   Neurological:      Mental Status: He is alert and oriented to person, place, and time. Mental status is at baseline.   Psychiatric:         Mood and Affect: Mood normal.         Behavior: Behavior normal.         Medications:    Current Facility-Administered Medications:     ALPRAZolam (XANAX) tablet 0.5 mg, 0.5 mg, Oral, TID PRN, Afshan Tracey PA-C, 0.5 mg at 07/16/24 0135    ceFAZolin (ANCEF) IVPB (premix in dextrose) 2,000 mg 50 mL, 2,000 mg, Intravenous, Q8H, Yasemin Randle DO, Last Rate: 100 mL/hr at 07/17/24 1020, 2,000 mg at 07/17/24 1020    lidocaine (URO-JET) 2 % urethral/mucosal gel 1 Application, 1 Application, Urethral, Daily PRN, Raad Goode DO    oxybutynin (DITROPAN) tablet 5 mg, 5 mg, Oral, TID, ANASTASIYA Grossman, 5 mg at 07/17/24 0751    pravastatin (PRAVACHOL) tablet 40 mg, 40 mg, Oral, Daily With Dinner, Raad Goode DO, 40 mg at 07/16/24 1651    senna-docusate sodium (SENOKOT S) 8.6-50 mg per tablet 1 tablet, 1  "tablet, Oral, HS, Raad Goode DO, 1 tablet at 07/16/24 2036    Laboratory Results:  Lab Results   Component Value Date    WBC 8.32 07/16/2024    HGB 7.3 (L) 07/16/2024    HCT 23.0 (L) 07/16/2024    MCV 97 07/16/2024     07/16/2024     Lab Results   Component Value Date    SODIUM 138 07/16/2024    K 4.7 07/16/2024     (H) 07/16/2024    CO2 21 07/16/2024    BUN 21 07/16/2024    CREATININE 1.79 (H) 07/16/2024    GLUC 90 07/16/2024    CALCIUM 7.8 (L) 07/16/2024     Lab Results   Component Value Date    CALCIUM 7.8 (L) 07/16/2024    PHOS 2.6 07/13/2024     No results found for: \"LABPROT\"    "

## 2024-07-17 NOTE — ASSESSMENT & PLAN NOTE
Hgb on arrival 5.8 in setting of hematuria  S/p 3 units PRBC  7/18 given cyanocobalamin injection daily x 2 doses for macrocytic anemia  Venofer 300 mg IV x 1 given on going hematuria  Given TXA 7/18  Npo at midnight incase worsening hematuria requiring cystoscopy

## 2024-07-17 NOTE — PLAN OF CARE
Problem: OCCUPATIONAL THERAPY ADULT  Goal: Performs self-care activities at highest level of function for planned discharge setting.  See evaluation for individualized goals.  Description: Treatment Interventions: ADL retraining, Functional transfer training, Endurance training, Patient/family training, Equipment evaluation/education, Compensatory technique education, Continued evaluation, Energy conservation, Activityengagement          See flowsheet documentation for full assessment, interventions and recommendations.   Note: Limitation: Decreased ADL status, Decreased Safe judgement during ADL, Decreased endurance, Decreased self-care trans, Decreased high-level ADLs     Assessment: Pt seen for skilled OT treatment session from 1107 to 1137 w/ interventions focusing on ADL participation, activity tolerance, standing tolerance, standing balance, bed mobility , transfer skills, and fxnl mobility. Pt was agreeable and willing to participate in session. Pt engaged in the following tasks: I for eating and S for grooming tasks standing at the sink w/ RW. Pt also required S for bed mobility and min Ax1 for transfers and fxnl mobility w/ RW for support. In comparison to previous session, pt continues to require consistent levels of physical assistance for ADLs and mobility tasks at this time. Pt continues to be functioning below baseline level as occupational performance remains limited by decreased ADL status, decreased activity tolerance, decreased endurance, decreased standing tolerance, decreased standing balance, decreased transfer skills, decreased fxnl mobility, decreased safety awareness, and decreased insight into deficits. From OT standpoint, recommend Level II (Moderate Resource Intensity) at time of d/c. Pt will benefit from continued OT treatment while in acute care to address deficits as defined above and maximize level of functional independence with ADLs and functional mobility. Pt supine in bed w/  alarm activated and all needs met at end of session.     Rehab Resource Intensity Level, OT: II (Moderate Resource Intensity)

## 2024-07-17 NOTE — PROGRESS NOTES
Progress Note - Urology  Francis Caballero 1940, 84 y.o. male MRN: [id number]    Unit/Bed#: [facility] 901-01 Encounter: [id number]    Malignant 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 (HCC)  Assessment & Plan  S/p cystoscopy, TURBT and ureteral stent exchange 7/12 for high-grade muscle invasive bladder cancer with prior chemotherapy and radiation  Creat 1.79, stable for CA 7/16)  Hgb 7.3 (7/16)  Anticoagulation on hold currently  Continue to trend labs and transfuse per primary team  Urine culture no growth, on Cefazolin  Continue as needed manual irrigation  If ongoing hematuria could consider removal of internalized stent and placement of PCN; stent may be exacerbating bleeding-pt is unsure if he would like to proceed           Subjective: Patient resting in bed.  Has ongoing hematuria but offers no other complaints.  We discussed option of possible placement of PCN and removal of internalized stent which may be exacerbating bleeding.  Patient is unsure he would like to proceed with this currently.  Discussed with wife who also expressed concern that patient has been having bleeding for months prior to stent placement.  He had a prior nephrostomy tube which she felt was challenging to manage at home.    24 HR EVENTS:   no significant events.      Patient has  no complaints.      Review of Systems   Constitutional:  Negative for activity change, appetite change, chills, fatigue, fever and unexpected weight change.   HENT:  Negative for facial swelling.    Eyes:  Negative for discharge.   Respiratory: Negative.  Negative for cough and shortness of breath.    Cardiovascular:  Negative for chest pain and leg swelling.   Gastrointestinal: Negative.  Negative for abdominal distention, abdominal pain, constipation, diarrhea, nausea and vomiting.   Endocrine: Negative.    Genitourinary:  Positive for hematuria. Negative for decreased urine volume, dysuria, enuresis and flank pain.   Musculoskeletal:  Negative for back  pain and myalgias.   Skin:  Negative for pallor and rash.   Allergic/Immunologic: Negative.  Negative for immunocompromised state.   Neurological:  Negative for facial asymmetry and speech difficulty.   Psychiatric/Behavioral:  Negative for agitation and confusion.        Objective:    Vitals: Blood pressure 159/94, pulse 100, temperature 98.3 °F (36.8 °C), resp. rate 17, height 6' (1.829 m), weight 76.2 kg (167 lb 15.9 oz), SpO2 99%.,Body mass index is 22.78 kg/m².  INS & OUTS:  I/O last 24 hours:  In: 656 [P.O.:656]  Out: 3975 [Urine:3975]    Physical Exam  Vitals and nursing note reviewed.   Constitutional:       General: He is not in acute distress.     Appearance: Normal appearance. He is not ill-appearing or toxic-appearing.   HENT:      Head: Normocephalic.   Cardiovascular:      Rate and Rhythm: Tachycardia present.   Pulmonary:      Effort: Pulmonary effort is normal. No respiratory distress.   Abdominal:      General: Abdomen is flat. There is no distension.      Palpations: Abdomen is soft.   Genitourinary:     Comments: Gracia catheter draining punch colored urine without CBI infusing  Musculoskeletal:         General: No swelling.   Skin:     General: Skin is warm and dry.   Neurological:      General: No focal deficit present.      Mental Status: He is alert and oriented to person, place, and time.   Psychiatric:         Mood and Affect: Mood normal.         Behavior: Behavior normal.         Thought Content: Thought content normal.         Judgment: Judgment normal.        Media Information    Document Information    Clinical Image - Mobile Device      07/17/2024 10:39   Attached To:   Hospital Encounter on 7/12/24   Source Information    ANASTASIYA Kearns  Be Pphp 9   Document History        Labs:  Recent Labs     07/15/24  0436 07/16/24  0430   WBC 11.61* 8.32       Recent Labs     07/15/24  0436 07/16/24  0430   HGB 7.3* 7.3*     Recent Labs     07/15/24  0436 07/16/24  0430   HCT 22.9* 23.0*      Recent Labs     07/15/24  0436 07/16/24  0430   CREATININE 1.70* 1.79*     Lab Results   Component Value Date    HGB 7.3 (L) 07/16/2024    HCT 23.0 (L) 07/16/2024    WBC 8.32 07/16/2024     07/16/2024     Lab Results   Component Value Date     08/29/2016    K 4.7 07/16/2024     (H) 07/16/2024    CO2 21 07/16/2024    BUN 21 07/16/2024    CREATININE 1.79 (H) 07/16/2024    CALCIUM 7.8 (L) 07/16/2024    GLUCOSE 100 (H) 08/29/2016       History:    Past Medical History:   Diagnosis Date    Ambulates with cane     Anemia     last assessed: 05/23/2015    GERD (gastroesophageal reflux disease)     Hematuria     Hyperlipidemia     Hypertension     Intracerebral hemorrhage (HCC)     last assessed: 04/21/2014; right    Intracerebral hemorrhage (HCC)     last assessed: 11/23/2017    Mobility impaired     Stroke syndrome     last assessed: 06/13/2013    UTI (urinary tract infection)      Past Surgical History:   Procedure Laterality Date    BLADDER SURGERY      FL RETROGRADE PYELOGRAM  7/12/2024    IR NEPHROSTOMY TUBE PLACEMENT  7/27/2023    KS CYSTO W/IRRIG & EVAC MULTPLE OBSTRUCTING CLOTS N/A 7/12/2024    Procedure: CYSTOSCOPY EVACUATION OF CLOTS;  Surgeon: Craig Simon MD;  Location: [facility];  Service: Urology    KS CYSTO W/REMOVAL OF LESIONS SMALL N/A 7/12/2024    Procedure: (TURBT) cautery bleeding, clot evacuation;  Surgeon: Craig Simon MD;  Location[facility];  Service: Urology    KS CYSTOURETHROSCOPY W/DEST &/RMVL TUMOR LARGE N/A 7/26/2023    Procedure: (TURBT);  Surgeon: Tha Carcamo MD;  Location: [facility];  Service: Urology     Family History   Problem Relation Age of Onset    Lung cancer Mother      Social History     Socioeconomic History    Marital status: /Civil Union     Spouse name: None    Number of children: None    Years of education: None    Highest education level: None   Occupational History    None   Tobacco Use    Smoking status: Every Day     Types:  Cigars    Smokeless tobacco: Never    Tobacco comments:     Last cigar 7/10.24   Vaping Use    Vaping status: Never Used   Substance and Sexual Activity    Alcohol use: Yes     Comment: Social     Drug use: No    Sexual activity: Not Currently   Other Topics Concern    None   Social History Narrative    None     Social Determinants of Health     Financial Resource Strain: Low Risk  (5/1/2023)    Overall Financial Resource Strain (CARDIA)     Difficulty of Paying Living Expenses: Not hard at all   Food Insecurity: No Food Insecurity (7/13/2024)    Hunger Vital Sign     Worried About Running Out of Food in the Last Year: Never true     Ran Out of Food in the Last Year: Never true   Transportation Needs: No Transportation Needs (7/13/2024)    PRAPARE - Transportation     Lack of Transportation (Medical): No     Lack of Transportation (Non-Medical): No   Physical Activity: Not on file   Stress: Not on file   Social Connections: Not on file   Intimate Partner Violence: Not on file   Housing Stability: Low Risk  (7/13/2024)    Housing Stability Vital Sign     Unable to Pay for Housing in the Last Year: No     Number of Times Moved in the Last Year: 0     Homeless in the Last Year: No       The following portions of the patient's history were reviewed and updated as appropriate: allergies, current medications, past family history, past medical history, past social history, past surgical history and problem list    ANASTASIYA Kearns  Date: 7/17/2024 Time: 1:08 PM

## 2024-07-17 NOTE — ASSESSMENT & PLAN NOTE
Follows with outpatient urology   S/p chemo and radiation   7/12 he underwent cystoscopy, TURBT for ongoing hematuria with biopsy taken  Later the same night, patient was brought to the ED for fever, pale/clammy skin, dizziness, and weakness   See shock for full plan

## 2024-07-17 NOTE — TELEPHONE ENCOUNTER
Called wife to discuss recommendations. She is concerned that he had bleeding prior to stent placement and it has been ongoing for months.

## 2024-07-17 NOTE — ASSESSMENT & PLAN NOTE
Patient has chronic kidney disease baseline creatinine is 1.5-1.8  Patient's acute kidney injury from earlier in hospitalization has resolved  Nephrology following  Daily bmp
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
## 2024-07-17 NOTE — OCCUPATIONAL THERAPY NOTE
Occupational Therapy Progress Note     Patient Name: Francis Caballero  Today's Date: 7/17/2024  Problem List  Principal Problem:    Shock (HCC)  Active Problems:    Anxiety    Atrial fibrillation (HCC)    Benign essential hypertension    Hyperlipidemia    Malignant 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 (HCC)    Symptomatic anemia    Positive blood culture    LELE (acute kidney injury) (HCC)       07/17/24 1137   OT Last Visit   OT Visit Date 07/17/24   Note Type   Note Type Treatment   Pain Assessment   Pain Assessment Tool 0-10   Pain Score No Pain   Restrictions/Precautions   Weight Bearing Precautions Per Order No   Other Precautions Cognitive;Chair Alarm;Bed Alarm;Multiple lines;Fall Risk   Lifestyle   Autonomy Pt reports I w/ ADLs, most IADLs, and fxnl mobility w/ SPC at baseline; - driving.   Reciprocal Relationships Pt lives w/ his spouse; reports she can assist w/ ADLs and IADLs as needed.   Service to Others Pt is retired.   Intrinsic Gratification Pt enjoys watching TV.   ADL   Eating Assistance 7  Independent   Eating Comments Pt setup to eat lunch at end of OT tx session. Pt able to open containers and plastic packaging w/o assistance.   Grooming Assistance 5  Supervision/Setup   Grooming Deficit Setup;Supervision/safety;Increased time to complete;Wash/dry hands;Wash/dry face;Teeth care;Brushing hair   Grooming Comments Pt washed his face, washed his hands, and brushed his teeth standing at the sink. Pt combed his hair seated EOB due to fatigue.   Functional Standing Tolerance   Time ~4 minutes to complete grooming tasks standing at the sink   Activity Pt washed his face, washed his hands, and brushed his teeth standing at the sink. Pt able to reach outside of BESS for task items w/o any LOB.   Bed Mobility   Supine to Sit 5  Supervision   Additional items HOB elevated;Bedrails;Increased time required   Sit to Supine 5  Supervision   Additional items HOB elevated;Bedrails;Increased time required    Additional Comments Pt supine in bed at end of OT tx session w/ all needs within reach and alarm activated.   Transfers   Sit to Stand 4  Minimal assistance   Additional items Assist x 1;Increased time required;Verbal cues   Stand to Sit 4  Minimal assistance   Additional items Assist x 1;Increased time required;Verbal cues   Additional Comments x2 STS transfers t/o session w/ RW for support   Functional Mobility   Functional Mobility 4  Minimal assistance   Additional Comments Pt ambulated from EOB > bathroom > chair > EOB w/ min Ax1 using RW for support.   Additional items Rolling walker   Cognition   Arousal/Participation Alert;Responsive;Cooperative   Attention Attends with cues to redirect   Orientation Level Oriented X4   Memory Decreased recall of precautions   Following Commands Follows one step commands with increased time or repetition   Comments Pt was pleasant, cooperative, and willing to participate in OT tx session today. Pt was distractable.   Assessment   Assessment Pt seen for skilled OT treatment session from 1107 to 1137 w/ interventions focusing on ADL participation, activity tolerance, standing tolerance, standing balance, bed mobility , transfer skills, and fxnl mobility. Pt was agreeable and willing to participate in session. Pt engaged in the following tasks: I for eating and S for grooming tasks standing at the sink w/ RW. Pt also required S for bed mobility and min Ax1 for transfers and fxnl mobility w/ RW for support. In comparison to previous session, pt continues to require consistent levels of physical assistance for ADLs and mobility tasks at this time. Pt continues to be functioning below baseline level as occupational performance remains limited by decreased ADL status, decreased activity tolerance, decreased endurance, decreased standing tolerance, decreased standing balance, decreased transfer skills, decreased fxnl mobility, decreased safety awareness, and decreased insight into  deficits. From OT standpoint, recommend Level II (Moderate Resource Intensity) at time of d/c. Pt will benefit from continued OT treatment while in acute care to address deficits as defined above and maximize level of functional independence with ADLs and functional mobility. Pt supine in bed w/ alarm activated and all needs met at end of session.   Plan   Treatment Interventions ADL retraining;Functional transfer training;Endurance training;Patient/family training;Equipment evaluation/education;Compensatory technique education;Continued evaluation;Energy conservation;Activityengagement   Goal Expiration Date 07/30/24   OT Treatment Day 1   OT Frequency 2-3x/wk   Discharge Recommendation   Rehab Resource Intensity Level, OT II (Moderate Resource Intensity)   AM-PAC Daily Activity Inpatient   Lower Body Dressing 2   Bathing 2   Toileting 2   Upper Body Dressing 3   Grooming 3   Eating 4   Daily Activity Raw Score 16   Daily Activity Standardized Score (Calc for Raw Score >=11) 35.96   -PAC Applied Cognition Inpatient   Following a Speech/Presentation 4   Understanding Ordinary Conversation 4   Taking Medications 4   Remembering Where Things Are Placed or Put Away 4   Remembering List of 4-5 Errands 4   Taking Care of Complicated Tasks 3   Applied Cognition Raw Score 23   Applied Cognition Standardized Score 53.08       The patient's raw score on the AM-PAC Daily Activity Inpatient Short Form is 16. A raw score of less than 19 suggests the patient may benefit from discharge to post-acute rehabilitation services. Please refer to the recommendation of the Occupational Therapist for safe discharge planning.    DANIELITO Rivera, OTR/L

## 2024-07-17 NOTE — ASSESSMENT & PLAN NOTE
Metoprolol was being held for labile BP, resume now that BP consistently high  Warfarin being held in the setting of hematuria, anemia. Resume when able

## 2024-07-18 PROBLEM — R78.81 POSITIVE BLOOD CULTURE: Status: RESOLVED | Noted: 2024-07-14 | Resolved: 2024-07-18

## 2024-07-18 LAB
ANION GAP SERPL CALCULATED.3IONS-SCNC: 6 MMOL/L (ref 4–13)
BACTERIA BLD CULT: NORMAL
BUN SERPL-MCNC: 13 MG/DL (ref 5–25)
CALCIUM SERPL-MCNC: 8.3 MG/DL (ref 8.4–10.2)
CHLORIDE SERPL-SCNC: 110 MMOL/L (ref 96–108)
CO2 SERPL-SCNC: 24 MMOL/L (ref 21–32)
CREAT SERPL-MCNC: 1.55 MG/DL (ref 0.6–1.3)
ERYTHROCYTE [DISTWIDTH] IN BLOOD BY AUTOMATED COUNT: 14.7 % (ref 11.6–15.1)
GFR SERPL CREATININE-BSD FRML MDRD: 40 ML/MIN/1.73SQ M
GLUCOSE SERPL-MCNC: 84 MG/DL (ref 65–140)
HCT VFR BLD AUTO: 27.8 % (ref 36.5–49.3)
HGB BLD-MCNC: 8.4 G/DL (ref 12–17)
MCH RBC QN AUTO: 30.1 PG (ref 26.8–34.3)
MCHC RBC AUTO-ENTMCNC: 30.2 G/DL (ref 31.4–37.4)
MCV RBC AUTO: 100 FL (ref 82–98)
PLATELET # BLD AUTO: 271 THOUSANDS/UL (ref 149–390)
PMV BLD AUTO: 9.8 FL (ref 8.9–12.7)
POTASSIUM SERPL-SCNC: 4 MMOL/L (ref 3.5–5.3)
RBC # BLD AUTO: 2.79 MILLION/UL (ref 3.88–5.62)
SODIUM SERPL-SCNC: 140 MMOL/L (ref 135–147)
VIT B12 SERPL-MCNC: 309 PG/ML (ref 180–914)
WBC # BLD AUTO: 9.55 THOUSAND/UL (ref 4.31–10.16)

## 2024-07-18 PROCEDURE — 80048 BASIC METABOLIC PNL TOTAL CA: CPT | Performed by: INTERNAL MEDICINE

## 2024-07-18 PROCEDURE — 99232 SBSQ HOSP IP/OBS MODERATE 35: CPT | Performed by: INTERNAL MEDICINE

## 2024-07-18 PROCEDURE — 85027 COMPLETE CBC AUTOMATED: CPT | Performed by: STUDENT IN AN ORGANIZED HEALTH CARE EDUCATION/TRAINING PROGRAM

## 2024-07-18 PROCEDURE — 97116 GAIT TRAINING THERAPY: CPT

## 2024-07-18 PROCEDURE — 99232 SBSQ HOSP IP/OBS MODERATE 35: CPT | Performed by: STUDENT IN AN ORGANIZED HEALTH CARE EDUCATION/TRAINING PROGRAM

## 2024-07-18 PROCEDURE — 82607 VITAMIN B-12: CPT | Performed by: STUDENT IN AN ORGANIZED HEALTH CARE EDUCATION/TRAINING PROGRAM

## 2024-07-18 PROCEDURE — 99232 SBSQ HOSP IP/OBS MODERATE 35: CPT | Performed by: NURSE PRACTITIONER

## 2024-07-18 PROCEDURE — 97530 THERAPEUTIC ACTIVITIES: CPT

## 2024-07-18 RX ORDER — FOLIC ACID 1 MG/1
1 TABLET ORAL DAILY
Status: DISCONTINUED | OUTPATIENT
Start: 2024-07-18 | End: 2024-07-23 | Stop reason: HOSPADM

## 2024-07-18 RX ORDER — TRANEXAMIC ACID 10 MG/ML
1000 INJECTION, SOLUTION INTRAVENOUS ONCE
Status: COMPLETED | OUTPATIENT
Start: 2024-07-18 | End: 2024-07-18

## 2024-07-18 RX ORDER — CYANOCOBALAMIN 1000 UG/ML
1000 INJECTION, SOLUTION INTRAMUSCULAR; SUBCUTANEOUS DAILY
Status: COMPLETED | OUTPATIENT
Start: 2024-07-18 | End: 2024-07-19

## 2024-07-18 RX ADMIN — TRANEXAMIC ACID 1000 MG: 10 INJECTION, SOLUTION INTRAVENOUS at 18:48

## 2024-07-18 RX ADMIN — CEFAZOLIN SODIUM 2000 MG: 2 SOLUTION INTRAVENOUS at 02:17

## 2024-07-18 RX ADMIN — PRAVASTATIN SODIUM 40 MG: 40 TABLET ORAL at 18:01

## 2024-07-18 RX ADMIN — OXYBUTYNIN CHLORIDE 5 MG: 5 TABLET ORAL at 09:20

## 2024-07-18 RX ADMIN — SENNOSIDES AND DOCUSATE SODIUM 1 TABLET: 50; 8.6 TABLET ORAL at 21:51

## 2024-07-18 RX ADMIN — FOLIC ACID 1 MG: 1 TABLET ORAL at 09:10

## 2024-07-18 RX ADMIN — LORAZEPAM 0.5 MG: 0.5 TABLET ORAL at 19:32

## 2024-07-18 RX ADMIN — IRON SUCROSE 300 MG: 20 INJECTION, SOLUTION INTRAVENOUS at 10:24

## 2024-07-18 RX ADMIN — OXYBUTYNIN CHLORIDE 5 MG: 5 TABLET ORAL at 21:51

## 2024-07-18 RX ADMIN — CYANOCOBALAMIN 1000 MCG: 1000 INJECTION, SOLUTION INTRAMUSCULAR; SUBCUTANEOUS at 18:01

## 2024-07-18 RX ADMIN — CEFAZOLIN SODIUM 2000 MG: 2 SOLUTION INTRAVENOUS at 18:07

## 2024-07-18 RX ADMIN — OXYBUTYNIN CHLORIDE 5 MG: 5 TABLET ORAL at 18:01

## 2024-07-18 RX ADMIN — CEFAZOLIN SODIUM 2000 MG: 2 SOLUTION INTRAVENOUS at 09:20

## 2024-07-18 NOTE — PHYSICAL THERAPY NOTE
PHYSICAL THERAPY NOTE          Patient Name: Francis Caballero  Today's Date: 7/18/2024 07/18/24 1205   PT Last Visit   PT Visit Date 07/18/24   Note Type   Note Type Treatment   Pain Assessment   Pain Assessment Tool FLACC   Pain Location/Orientation   (catheter)   Hospital Pain Intervention(s) Repositioned;Ambulation/increased activity;Emotional support   Pain Rating: FLACC (Rest) - Face 0   Pain Rating: FLACC (Rest) - Legs 0   Pain Rating: FLACC (Rest) - Activity 1   Pain Rating: FLACC (Rest) - Cry 0   Pain Rating: FLACC (Rest) - Consolability 0   Score: FLACC (Rest) 1   Pain Rating: FLACC (Activity) - Face 1   Pain Rating: FLACC (Activity) - Legs 0   Pain Rating: FLACC (Activity) - Activity 1   Pain Rating: FLACC (Activity) - Cry 0   Pain Rating: FLACC (Activity) - Consolability 0   Score: FLACC (Activity) 2   Restrictions/Precautions   Weight Bearing Precautions Per Order No   Other Precautions Cognitive;Chair Alarm;Bed Alarm;Multiple lines;Fall Risk;Pain   General   Chart Reviewed Yes   Response to Previous Treatment Patient with no complaints from previous session.   Family/Caregiver Present No   Cognition   Arousal/Participation Alert;Responsive;Cooperative   Attention Attends with cues to redirect   Orientation Level Oriented X4   Memory Decreased recall of precautions   Following Commands Follows one step commands without difficulty   Comments pleasant and cooperative, distractable with decreased safety awareness noted at times   Bed Mobility   Supine to Sit 5  Supervision   Additional items HOB elevated;Bedrails;Increased time required   Sit to Supine Unable to assess   Additional Comments pt left sitting OOB in recliner with all needs within reach- alarm on   Transfers   Sit to Stand 4  Minimal assistance  (CGA)   Additional items Assist x 1;Increased time required;Verbal cues   Stand to Sit 5  Supervision   Additional  items Armrests;Increased time required   Toilet transfer 4  Minimal assistance   Additional items Assist x 1;Increased time required;Verbal cues;Standard toilet   Additional Comments transfers with RW   Ambulation/Elevation   Gait pattern Excessively slow;Short stride;Foward flexed;Shuffling   Gait Assistance 4  Minimal assist  (CGA)   Additional items Assist x 1   Assistive Device Rolling walker   Distance 150', 2 x 10'   Stair Management Assistance Not tested   Balance   Static Sitting Fair +   Dynamic Sitting Fair   Static Standing Fair   Dynamic Standing Fair -   Ambulatory Poor +  (RW)   Endurance Deficit   Endurance Deficit Yes   Activity Tolerance   Activity Tolerance Patient tolerated treatment well   Nurse Made Aware RN cleared   Exercises   Knee AROM Long Arc Quad Sitting;Bilateral;15 reps;AROM   Marching Sitting;Bilateral;15 reps;AROM   Assessment   Prognosis Good   Problem List Decreased strength;Decreased range of motion;Decreased endurance;Impaired balance;Decreased mobility;Decreased cognition;Impaired judgement;Decreased safety awareness   Assessment Pt seen for PT treatment session this date. Therapy session focused on bed mobility, functional transfers, gait training and endurance training in order to improve overall mobility and independence. Pt requires CGA/ min A for all mobility performed this date. Cues for sequencing and RW management required t/o session. Pt making steady progress toward goals as demonstrated by ambulating increased distances. Continues to be oriented but distractible, decreased safety awareness noted at times. Pt was left sitting OOB In recliner with alarm on at the end of PT session with all needs in reach. Pt would benefit from continued PT services while in hospital to address remaining limitations. PT to continue to follow pt and recommends level II pending progress. The patient's AM-PAC Basic Mobility Inpatient Short Form Raw Score is 17. A Raw score of greater than 16  "suggests the patient may benefit from discharge to home. Please also refer to the recommendation of the Physical Therapist for safe discharge planning.   Barriers to Discharge Inaccessible home environment   Goals   Patient Goals to get \"all these tubes out\"   [facility] Expiration Date 07/30/24   PT Treatment Day 1   Plan   Treatment/Interventions Functional transfer training;LE strengthening/ROM;Elevations;Therapeutic exercise;Endurance training;Patient/family training;Equipment eval/education;Bed mobility;Gait training;Spoke to nursing;Spoke to case management;OT   Progress Progressing toward goals   Discharge Recommendation   Rehab Resource Intensity Level, PT II (Moderate Resource Intensity)  (pending continued progress + stair trial)   Equipment Recommended Walker   AM-PAC Basic Mobility Inpatient   Turning in Flat Bed Without Bedrails 3   Lying on Back to Sitting on Edge of Flat Bed Without Bedrails 3   Moving Bed to Chair 3   Standing Up From Chair Using Arms 3   Walk in Room 3   Climb 3-5 Stairs With Railing 2   Basic Mobility Inpatient Raw Score 17   Basic Mobility Standardized Score 39.67   [facility] Highest Level Of Mobility   JH-HLM Goal 5: Stand one or more mins   -HLM Achieved 7: Walk 25 feet or more   Education   Education Provided Mobility training;Assistive device   Patient Demonstrates acceptance/verbal understanding   End of Consult   Patient Position at End of Consult Bedside chair;Bed/Chair alarm activated;All needs within reach     Ayana Trinh, PT, DPT    "

## 2024-07-18 NOTE — PLAN OF CARE
Problem: PHYSICAL THERAPY ADULT  Goal: Performs mobility at highest level of function for planned discharge setting.  See evaluation for individualized goals.  Description: Treatment/Interventions: Functional transfer training, LE strengthening/ROM, Elevations, Therapeutic exercise, Endurance training, Patient/family training, Equipment eval/education, Bed mobility, Gait training, Spoke to case management, Spoke to nursing, OT  Equipment Recommended: Walker (pt reports owning)       See flowsheet documentation for full assessment, interventions and recommendations.  Outcome: Progressing  Note: Prognosis: Good  Problem List: Decreased strength, Decreased range of motion, Decreased endurance, Impaired balance, Decreased mobility, Decreased cognition, Impaired judgement, Decreased safety awareness  Assessment: Pt seen for PT treatment session this date. Therapy session focused on bed mobility, functional transfers, gait training and endurance training in order to improve overall mobility and independence. Pt requires CGA/ min A for all mobility performed this date. Cues for sequencing and RW management required t/o session. Pt making steady progress toward goals as demonstrated by ambulating increased distances. Continues to be oriented but distractible, decreased safety awareness noted at times. Pt was left sitting OOB In recliner with alarm on at the end of PT session with all needs in reach. Pt would benefit from continued PT services while in hospital to address remaining limitations. PT to continue to follow pt and recommends level II pending progress. The patient's AM-PAC Basic Mobility Inpatient Short Form Raw Score is 17. A Raw score of greater than 16 suggests the patient may benefit from discharge to home. Please also refer to the recommendation of the Physical Therapist for safe discharge planning.  Barriers to Discharge: Inaccessible home environment     Rehab Resource Intensity Level, PT: II (Moderate  Resource Intensity) (pending continued progress + stair trial)    See flowsheet documentation for full assessment.

## 2024-07-18 NOTE — QUICK NOTE
Patient and family requesting additional intervention for ongoing hematuria.  Patient has been off his Coumadin for some time now.  Wife reports hematuria was ongoing prior to stent placement and was concerned about exchanging stent for PCN.  Patient prior PCN which she felt was difficult to manage at home.  Discussed with Dr. Mckee and primary team.  Will plan for TXA administration and make patient n.p.o. after midnight for cystoscopy, ureteroscopy, fulguration, possible TURBT, possible instillation and exchange of ureteral stent 7/19

## 2024-07-18 NOTE — PROGRESS NOTES
[facility]  Progress Note  Name: Francis Caballero I  MRN: [id number]  Unit/Bed#: [facility] 901-01 I Date of Admission: 7/12/2024   Date of Service: 7/18/2024 I Hospital Day: 6    Assessment & Plan   * Shock (HCC)  Assessment & Plan  Patient was brought to the ED for fever, pale/clammy skin, dizziness, and weakness after TURBT, cystoscopy earlier in the day   Hgb on arrival 5.8, s/p 3 units PRBC  SIRS criteria met with fever, tachycardia, urinary tract infection, & lactic 2.9  CT abd/pelvis:   Interval removal of previously seen bladder mass as well as interval placement of right-sided nephroureteral stent, this appears appropriately positioned. Mild to moderate right hydroureter. Previously seen right-sided hydronephrosis is intervally improved. Small amount of air in the right renal collecting system as well as right-sided perinephric fat stranding could be related to recent instrumentation and/or infection.  Partially distended bladder. Mild to moderate circumferential bladder wall thickening. Air within the bladder lumen, possibly related to recent instrumentation/intervention; superimposed cystitis considered in the appropriate clinical setting. Correlation with the patient's symptoms, laboratory values, and urinalysis recommended  Cardiomegaly, cholelithiasis, renal cysts, colonic diverticulosis without evidence of acute diverticulitis, small hiatal hernia, enlarged prostate, right-sided inguinal hernia containing fat and several small bowel loops; no discrete evidence of bowel obstruction.  Initially required pressors in the ICU as blood pressure unresponsive to fluids- he has since been weaned off of pressors  Urine culture negative however patient was given dose of cefazolin that could have resulted in false negative   Blood cultures 1/2 positive for gram negative rods- klebsiella. He had been treated with zosyn and vanco prior to the cultures. He has since been changed to  cefepime and now that cultures are sensitive to klebsiella will change him to ancef in which he can be changed to keflex at time of discharge to complete course.   Urology following, appreciate recs   Can remove irizarry catheter once Cr improves closer to baseline   Remaining hematuria thought to be anticipated post operatively.  Continues to have hematuria with decrease in h/h  Maintain irizarry and continue flushes. Check cbc in am.   Continue holding anticoagulation until hematuria improves   Patient continues to have heamturia  Urology following  Continues to have hematuria.  Will give one-time dose of tranexamic acid and make n.p.o. at midnight in case patient requires cystoscopy       Symptomatic anemia  Assessment & Plan  Hgb on arrival 5.8  S/p 3 units PRBC     Latest Reference Range & Units 07/16/24 04:30 07/18/24 06:22   Iron 50 - 212 ug/dL 12 (L)    FERRITIN 24 - 336 ng/mL 125    Iron Saturation 15 - 50 % 5 (L)    TIBC 250 - 450 ug/dL 222 (L)    UIBC 155 - 355 ug/dL 210    FOLATE >5.9 ng/mL 12.2    Vitamin B-12 180 - 914 pg/mL  309   (L): Data is abnormally low    7/18 will give cyanocobalamin injection daily x 2 doses for macrocytic anemia  Venofer 300 mg IV x 1 given on going hematuria     Continues to have hematuria.  Will give one-time dose of tranexamic acid and make n.p.o. at midnight in case patient requires cystoscopy       LELE (acute kidney injury) (HCC)  Assessment & Plan  Patient is chronic kidney disease baseline creatinine is 1.5-1.8.  Patient's acute kidney injury from earlier in hospitalization has resolved.   Creatinine currently at baseline at 1.7  Nephrology following    Malignant 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 (HCC)  Assessment & Plan  Follows with outpatient urology   S/p chemo and radiation   7/12 he underwent cystoscopy, TURBT for ongoing hematuria   Later the same night, patient was brought to the ED for fever, pale/clammy skin, dizziness, and weakness   See shock for full  plan       Hyperlipidemia  Assessment & Plan  Continue pravastatin 40mg for home Crestor 5mg     Benign essential hypertension  Assessment & Plan  Continue home lopressor      Atrial fibrillation (HCC)  Assessment & Plan  Metoprolol being held for labile BP  Warfarin being held in the setting of hematuria, anemia. Resume when able     Anxiety  Assessment & Plan  Continue xanax 0.5mg TID PRN anxiety    Positive blood culture-resolved as of 7/18/2024  Assessment & Plan  1/2 blood cultures positive for gram negative rods with detected Klebsiella pneumoniae  Cefepime recommended - continue   Will d/c vancomycin at this time   Curbside to ID regarding suspected source - given he received 1 dose of ancef prior to urine culture, could be false negative given urine culture without growth.   Continue above abx for now and adjust as susceptibilities finalize               VTE Pharmacologic Prophylaxis:   Moderate Risk (Score 3-4) - Pharmacological DVT Prophylaxis Contraindicated. Sequential Compression Devices Ordered.    Mobility:   Basic Mobility Inpatient Raw Score: 17  JH-HLM Goal: 5: Stand one or more mins  JH-HLM Achieved: 4: Move to chair/commode  JH-HLM Goal NOT achieved. Continue with multidisciplinary rounding and encourage appropriate mobility to improve upon JH-HLM goals.    Patient Centered Rounds: I performed bedside rounds with nursing staff today.   Discussions with Specialists or Other Care Team Provider: urology    Education and Discussions with Family / Patient: Updated  (wife) via phone.    Total Time Spent on Date of Encounter in care of patient: 35 mins. This time was spent on one or more of the following: performing physical exam; counseling and coordination of care; obtaining or reviewing history; documenting in the medical record; reviewing/ordering tests, medications or procedures; communicating with other healthcare professionals and discussing with patient's  family/caregivers.    Current Length of Stay: 6 day(s)  Current Patient Status: Inpatient   Certification Statement: The patient will continue to require additional inpatient hospital stay due to hematuria, will need voiding trial prior to d/c  Discharge Plan: Anticipate discharge in 24-48 hrs to home with home services.    Code Status: Level 1 - Full Code    Subjective:     Admits to still having hematuria.  Agreeable to trying tranexamic acid for bleeding    Objective:     Vitals:   Temp (24hrs), Av.7 °F (37.1 °C), Min:98.4 °F (36.9 °C), Max:99.2 °F (37.3 °C)    Temp:  [98.4 °F (36.9 °C)-99.2 °F (37.3 °C)] 98.5 °F (36.9 °C)  HR:  [] 85  Resp:  [16-19] 16  BP: (124-136)/(69-75) 130/75  SpO2:  [97 %-100 %] 100 %  Body mass index is 22.19 kg/m².     Input and Output Summary (last 24 hours):     Intake/Output Summary (Last 24 hours) at 2024 1603  Last data filed at 2024 1300  Gross per 24 hour   Intake 238 ml   Output 1775 ml   Net -1537 ml       Physical Exam:   Physical Exam  Vitals and nursing note reviewed.   Constitutional:       General: He is not in acute distress.     Appearance: He is well-developed.   HENT:      Head: Normocephalic and atraumatic.   Eyes:      Conjunctiva/sclera: Conjunctivae normal.   Cardiovascular:      Rate and Rhythm: Normal rate and regular rhythm.      Heart sounds: No murmur heard.  Pulmonary:      Effort: Pulmonary effort is normal. No respiratory distress.      Breath sounds: Normal breath sounds.   Abdominal:      Palpations: Abdomen is soft.      Tenderness: There is no abdominal tenderness.   Genitourinary:     Comments: Gracia with dark red hematuria  Musculoskeletal:         General: No swelling.      Cervical back: Neck supple.   Skin:     General: Skin is warm and dry.   Neurological:      Mental Status: He is alert. Mental status is at baseline.   Psychiatric:         Mood and Affect: Mood normal.          Additional Data:     Labs:  Results from last 7  days   Lab Units 07/18/24  0622 07/16/24  0430 07/13/24  0149 07/12/24  1932   WBC Thousand/uL 9.55 8.32   < > 9.64   HEMOGLOBIN g/dL 8.4* 7.3*   < > 5.8*   HEMATOCRIT % 27.8* 23.0*   < > 18.7*   PLATELETS Thousands/uL 271 184   < > 230   BANDS PCT %  --   --   --  21*   SEGS PCT %  --  71  --   --    LYMPHO PCT %  --  13*  --  2*   MONO PCT %  --  9  --  1*   EOS PCT %  --  4  --  0    < > = values in this interval not displayed.     Results from last 7 days   Lab Units 07/18/24 0622 07/13/24 0453 07/12/24 1932   SODIUM mmol/L 140   < > 138   POTASSIUM mmol/L 4.0   < > 4.2   CHLORIDE mmol/L 110*   < > 111*   CO2 mmol/L 24   < > 21   BUN mg/dL 13   < > 22   CREATININE mg/dL 1.55*   < > 1.66*   ANION GAP mmol/L 6   < > 6   CALCIUM mg/dL 8.3*   < > 7.9*   ALBUMIN g/dL  --   --  3.1*   TOTAL BILIRUBIN mg/dL  --   --  0.63   ALK PHOS U/L  --   --  67   ALT U/L  --   --  6*   AST U/L  --   --  10*   GLUCOSE RANDOM mg/dL 84   < > 97    < > = values in this interval not displayed.     Results from last 7 days   Lab Units 07/16/24  0430   INR  1.44*             Results from last 7 days   Lab Units 07/12/24 2222 07/12/24 1932   LACTIC ACID mmol/L 1.4 2.9*       Lines/Drains:  Invasive Devices       Peripheral Intravenous Line  Duration             Peripheral IV 07/18/24 Right;Ventral (anterior) Forearm <1 day              Drain  Duration             Urethral Catheter Three way 20 Fr. 6 days                  Urinary Catheter:  Goal for removal: Voiding trial prior to discharge               Imaging: Reviewed radiology reports from this admission including: chest xray    Recent Cultures (last 7 days):   Results from last 7 days   Lab Units 07/12/24 2031 07/12/24 1932   BLOOD CULTURE   --  No Growth After 5 Days.  Klebsiella pneumoniae*   GRAM STAIN RESULT   --  Gram negative rods*   URINE CULTURE  No Growth <1000 cfu/mL  --        Last 24 Hours Medication List:   Current Facility-Administered Medications   Medication  Dose Route Frequency Provider Last Rate    cefazolin  2,000 mg Intravenous Q8H Yasemin Randle DO 2,000 mg (07/18/24 0920)    cyanocobalamin  1,000 mcg Intramuscular Daily Flori Barry DO      folic acid  1 mg Oral Daily Flori Barry DO      lidocaine  1 Application Urethral Daily PRN Raad Goode,       LORazepam  0.5 mg Oral Q8H PRN Flori Barry DO      oxybutynin  5 mg Oral TID ANASTASIYA Grossman      pravastatin  40 mg Oral Daily With Dinner Raad Goode,       senna-docusate sodium  1 tablet Oral HS Raad Goode,       tranexamic acid  1,000 mg Intravenous Once Flori Barry DO          Today, Patient Was Seen By: Flori Barry DO    **Please Note: This note may have been constructed using a voice recognition system.**

## 2024-07-18 NOTE — RESTORATIVE TECHNICIAN NOTE
Restorative Technician Note      Patient Name: Francis Caballero     Restorative Tech Visit Date: 07/18/24  Note Type: Mobility  Patient Position Upon Consult: Supine  Activity Performed: Ambulated (To and from BR)  Assistive Device: Roller walker  Patient Position at End of Consult: Bedside chair; All needs within reach; Bed/Chair alarm activated  Nurse Communication: Nurse aware of consult, application of brace    Dagoberto Mancia, Restorative Technician

## 2024-07-18 NOTE — PROGRESS NOTES
Progress Note - Urology  Francis Caballero 1940, 84 y.o. male MRN: [id number]    Unit/Bed#: [facility] 901-01 Encounter: [id number]    Malignant 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 (HCC)  Assessment & Plan  S/p cystoscopy, TURBT and ureteral stent exchange 7/12 for high-grade muscle invasive bladder cancer with prior chemotherapy and radiation  Creat 1.55  Hgb 8.4  Anticoagulation on hold currently  Continue to trend labs and transfuse per primary team  Urine culture no growth, on Cefazolin  Continue as needed manual irrigation  If ongoing hematuria could consider removal of internalized stent and placement of PCN; stent may be exacerbating bleeding-pt is unsure if he would like to proceed since hematuria began prior to stent placement-some degree of hematuria expected in setting of stent          Subjective: Patient resting in bed.  Urine color improving slightly.  While talking to patient his phone rang and patient began to talk to family member on the phone.  Patient is currently getting iron infusion    24 HR EVENTS:   no significant events.      Patient has  complaints of ongoing hematuria.       Review of Systems   Constitutional:  Negative for chills and fever.   HENT:  Negative for congestion.    Respiratory:  Negative for shortness of breath.    Cardiovascular:  Negative for chest pain.   Gastrointestinal:  Negative for vomiting.   Genitourinary:  Positive for hematuria. Negative for difficulty urinating.       Objective:    Vitals: Blood pressure 136/71, pulse (!) 108, temperature 98.4 °F (36.9 °C), resp. rate 16, height 6' (1.829 m), weight 74.2 kg (163 lb 9.3 oz), SpO2 99%.,Body mass index is 22.19 kg/m².  INS & OUTS:  I/O last 24 hours:  In: 236 [P.O.:236]  Out: 2375 [Urine:2375]    Physical Exam  Vitals and nursing note reviewed.   Constitutional:       General: He is not in acute distress.     Appearance: Normal appearance. He is not ill-appearing or toxic-appearing.   HENT:      Head:  Normocephalic.   Cardiovascular:      Rate and Rhythm: Tachycardia present.   Pulmonary:      Effort: Pulmonary effort is normal. No respiratory distress.   Abdominal:      General: Abdomen is flat. There is no distension.   Genitourinary:     Comments: Gracia catheter draining light punch colored urine  Musculoskeletal:         General: No swelling.   Skin:     General: Skin is warm and dry.   Neurological:      General: No focal deficit present.      Mental Status: He is alert and oriented to person, place, and time.   Psychiatric:         Mood and Affect: Mood normal.         Behavior: Behavior normal.          Media Information    Document Information    Clinical Image - Mobile Device      07/18/2024 10:59   Attached To:   Hospital Encounter on 7/12/24   Source Information    Pavithra Garcia, ANASTASIYA  [facility] 9   Document History        Labs:  Recent Labs     07/16/24  0430 07/18/24  0622   WBC 8.32 9.55       Recent Labs     07/16/24  0430 07/18/24  0622   HGB 7.3* 8.4*     Recent Labs     07/16/24  0430 07/18/24  0622   HCT 23.0* 27.8*     Recent Labs     07/16/24  0430 07/18/24  0622   CREATININE 1.79* 1.55*     Lab Results   Component Value Date    HGB 8.4 (L) 07/18/2024    HCT 27.8 (L) 07/18/2024    WBC 9.55 07/18/2024     07/18/2024     Lab Results   Component Value Date     08/29/2016    K 4.0 07/18/2024     (H) 07/18/2024    CO2 24 07/18/2024    BUN 13 07/18/2024    CREATININE 1.55 (H) 07/18/2024    CALCIUM 8.3 (L) 07/18/2024    GLUCOSE 100 (H) 08/29/2016     Urine Culture: Growth: None    History:    Past Medical History:   Diagnosis Date    Ambulates with cane     Anemia     last assessed: 05/23/2015    GERD (gastroesophageal reflux disease)     Hematuria     Hyperlipidemia     Hypertension     Intracerebral hemorrhage (HCC)     last assessed: 04/21/2014; right    Intracerebral hemorrhage (HCC)     last assessed: 11/23/2017    Mobility impaired     Stroke syndrome     last assessed:  06/13/2013    UTI (urinary tract infection)      Past Surgical History:   Procedure Laterality Date    BLADDER SURGERY      FL RETROGRADE PYELOGRAM  7/12/2024    IR NEPHROSTOMY TUBE PLACEMENT  7/27/2023    AL CYSTO W/IRRIG & EVAC MULTPLE OBSTRUCTING CLOTS N/A 7/12/2024    Procedure: CYSTOSCOPY EVACUATION OF CLOTS;  Surgeon: Craig Simon MD;  Location: [facility];  Service: Urology    AL CYSTO W/REMOVAL OF LESIONS SMALL N/A 7/12/2024    Procedure: (TURBT) cautery bleeding, clot evacuation;  Surgeon: Craig Simon MD;  Location[facility];  Service: Urology    AL CYSTOURETHROSCOPY W/DEST &/RMVL TUMOR LARGE N/A 7/26/2023    Procedure: (TURBT);  Surgeon: Tha Carcamo MD;  Location: [facility];  Service: Urology     Family History   Problem Relation Age of Onset    Lung cancer Mother      Social History     Socioeconomic History    Marital status: /Civil Union     Spouse name: None    Number of children: None    Years of education: None    Highest education level: None   Occupational History    None   Tobacco Use    Smoking status: Every Day     Types: Cigars    Smokeless tobacco: Never    Tobacco comments:     Last cigar 7/10.24   Vaping Use    Vaping status: Never Used   Substance and Sexual Activity    Alcohol use: Yes     Comment: Social     Drug use: No    Sexual activity: Not Currently   Other Topics Concern    None   Social History Narrative    None     Social Determinants of Health     Financial Resource Strain: Low Risk  (5/1/2023)    Overall Financial Resource Strain (CARDIA)     Difficulty of Paying Living Expenses: Not hard at all   Food Insecurity: No Food Insecurity (7/13/2024)    Hunger Vital Sign     Worried About Running Out of Food in the Last Year: Never true     Ran Out of Food in the Last Year: Never true   Transportation Needs: No Transportation Needs (7/13/2024)    PRAPARE - Transportation     Lack of Transportation (Medical): No     Lack of Transportation (Non-Medical): No    Physical Activity: Not on file   Stress: Not on file   Social Connections: Not on file   Intimate Partner Violence: Not on file   Housing Stability: Low Risk  (7/13/2024)    Housing Stability Vital Sign     Unable to Pay for Housing in the Last Year: No     Number of Times Moved in the Last Year: 0     Homeless in the Last Year: No       The following portions of the patient's history were reviewed and updated as appropriate: allergies, current medications, past family history, past medical history, past social history, past surgical history and problem list    ANASTASIYA Kearns  Date: 7/18/2024 Time: 1:09 PM

## 2024-07-18 NOTE — PROGRESS NOTES
NEPHROLOGY PROGRESS NOTE    Francis Caballero 84 y.o. male MRN: [id number]  Unit/Bed#: [facility] 901-01 Encounter: [id number]  Reason for Consult: Chronic kidney disease    Stable with no complaints.  Really hoping to be able to go home soon.    ASSESSMENT/PLAN:  1.  Renal    The patient has mild chronic renal insufficiency with a range of creatinine 1.5-1.8.  Resection of bladder mass completed ureteral stent in place for right-sided hydro urology to follow-up with plan and management of stent and bladder cancer.  Renal functions been totally stable is 1.5 today.  Acute kidney injury is resolved.    2.  Bladder cancer    Status post TURBT and ureteral stent change .  Has high-grade muscle invasive bladder cancer status postchemotherapy and radiation.  Urology managing postoperatively.    We will sign off please call if we can be of further assistance.        SUBJECTIVE:  Review of Systems   Constitutional: Negative for chills and fever.   HENT: Negative.     Eyes: Negative.    Cardiovascular:  Negative for chest pain, dyspnea on exertion and orthopnea.   Respiratory:  Negative for cough, shortness of breath and sputum production.    Gastrointestinal:  Negative for abdominal pain, diarrhea, nausea and vomiting.   Genitourinary:         Catheter.       OBJECTIVE:  Current Weight: Weight - Scale: 76.2 kg (167 lb 15.9 oz)  Vitals:Temp (24hrs), Av.6 °F (37 °C), Min:98.2 °F (36.8 °C), Max:99.2 °F (37.3 °C)  Current: Temperature: 98.4 °F (36.9 °C)   Blood pressure 136/71, pulse (!) 108, temperature 98.4 °F (36.9 °C), resp. rate 16, height 6' (1.829 m), weight 76.2 kg (167 lb 15.9 oz), SpO2 99%. , Body mass index is 22.78 kg/m².      Intake/Output Summary (Last 24 hours) at 2024 0900  Last data filed at 2024 0004  Gross per 24 hour   Intake 236 ml   Output 2100 ml   Net -1864 ml       Physical Exam: /71   Pulse (!) 108   Temp 98.4 °F (36.9 °C)   Resp 16   Ht 6' (1.829 m)   Wt 76.2 kg (167 lb 15.9 oz)    SpO2 99%   BMI 22.78 kg/m²   Physical Exam  Constitutional:       General: He is not in acute distress.     Appearance: He is not toxic-appearing.   HENT:      Head: Normocephalic and atraumatic.      Nose: Nose normal.      Mouth/Throat:      Mouth: Mucous membranes are moist.   Eyes:      General: No scleral icterus.     Extraocular Movements: Extraocular movements intact.   Cardiovascular:      Rate and Rhythm: Normal rate and regular rhythm.      Heart sounds:      No gallop.   Pulmonary:      Effort: Pulmonary effort is normal. No respiratory distress.      Breath sounds: No wheezing or rales.   Abdominal:      General: Bowel sounds are normal. There is no distension.      Palpations: Abdomen is soft.   Neurological:      Mental Status: He is alert.         Medications:    Current Facility-Administered Medications:     ceFAZolin (ANCEF) IVPB (premix in dextrose) 2,000 mg 50 mL, 2,000 mg, Intravenous, Q8H, Yasemin Randle DO, Last Rate: 100 mL/hr at 07/18/24 0217, 2,000 mg at 07/18/24 0217    cyanocobalamin injection 1,000 mcg, 1,000 mcg, Intramuscular, Daily, Flori Barry DO    folic acid (FOLVITE) tablet 1 mg, 1 mg, Oral, Daily, Flori Barry DO    iron sucrose (VENOFER) 300 mg in sodium chloride 0.9 % 250 mL IVPB, 300 mg, Intravenous, Once, Flori Barry DO    lidocaine (URO-JET) 2 % urethral/mucosal gel 1 Application, 1 Application, Urethral, Daily PRN, Raad Goode DO    LORazepam (ATIVAN) tablet 0.5 mg, 0.5 mg, Oral, Q8H PRN, Flori Barry DO, 0.5 mg at 07/17/24 2056    oxybutynin (DITROPAN) tablet 5 mg, 5 mg, Oral, TID, ANASTASIYA Grossman, 5 mg at 07/17/24 2056    pravastatin (PRAVACHOL) tablet 40 mg, 40 mg, Oral, Daily With Dinner, Raad Goode DO, 40 mg at 07/17/24 1717    senna-docusate sodium (SENOKOT S) 8.6-50 mg per tablet 1 tablet, 1 tablet, Oral, HS, Raad Goode DO, 1 tablet at 07/16/24 2036    Laboratory Results:  Lab Results   Component Value Date    WBC 9.55 07/18/2024  "   HGB 8.4 (L) 07/18/2024    HCT 27.8 (L) 07/18/2024     (H) 07/18/2024     07/18/2024     Lab Results   Component Value Date    SODIUM 140 07/18/2024    K 4.0 07/18/2024     (H) 07/18/2024    CO2 24 07/18/2024    BUN 13 07/18/2024    CREATININE 1.55 (H) 07/18/2024    GLUC 84 07/18/2024    CALCIUM 8.3 (L) 07/18/2024     Lab Results   Component Value Date    CALCIUM 8.3 (L) 07/18/2024    PHOS 2.6 07/13/2024     No results found for: \"LABPROT\"    "

## 2024-07-19 ENCOUNTER — APPOINTMENT (INPATIENT)
Dept: RADIOLOGY | Facility: HOSPITAL | Age: 84
DRG: 853 | End: 2024-07-19
Payer: COMMERCIAL

## 2024-07-19 ENCOUNTER — ANESTHESIA (INPATIENT)
Dept: PERIOP | Facility: HOSPITAL | Age: 84
DRG: 853 | End: 2024-07-19
Payer: COMMERCIAL

## 2024-07-19 ENCOUNTER — ANESTHESIA EVENT (INPATIENT)
Dept: PERIOP | Facility: HOSPITAL | Age: 84
DRG: 853 | End: 2024-07-19
Payer: COMMERCIAL

## 2024-07-19 PROBLEM — R31.9 HEMATURIA: Status: ACTIVE | Noted: 2024-07-19

## 2024-07-19 LAB
ALBUMIN SERPL BCG-MCNC: 3.1 G/DL (ref 3.5–5)
ANION GAP SERPL CALCULATED.3IONS-SCNC: 8 MMOL/L (ref 4–13)
BUN SERPL-MCNC: 11 MG/DL (ref 5–25)
CALCIUM ALBUM COR SERPL-MCNC: 9.5 MG/DL (ref 8.3–10.1)
CALCIUM SERPL-MCNC: 8.8 MG/DL (ref 8.4–10.2)
CHLORIDE SERPL-SCNC: 107 MMOL/L (ref 96–108)
CO2 SERPL-SCNC: 25 MMOL/L (ref 21–32)
CREAT SERPL-MCNC: 1.51 MG/DL (ref 0.6–1.3)
ERYTHROCYTE [DISTWIDTH] IN BLOOD BY AUTOMATED COUNT: 14.7 % (ref 11.6–15.1)
GFR SERPL CREATININE-BSD FRML MDRD: 42 ML/MIN/1.73SQ M
GLUCOSE SERPL-MCNC: 80 MG/DL (ref 65–140)
HCT VFR BLD AUTO: 27 % (ref 36.5–49.3)
HGB BLD-MCNC: 8.4 G/DL (ref 12–17)
MAGNESIUM SERPL-MCNC: 1.9 MG/DL (ref 1.9–2.7)
MCH RBC QN AUTO: 30.8 PG (ref 26.8–34.3)
MCHC RBC AUTO-ENTMCNC: 31.1 G/DL (ref 31.4–37.4)
MCV RBC AUTO: 99 FL (ref 82–98)
PHOSPHATE SERPL-MCNC: 2.7 MG/DL (ref 2.3–4.1)
PLATELET # BLD AUTO: 296 THOUSANDS/UL (ref 149–390)
PMV BLD AUTO: 9.7 FL (ref 8.9–12.7)
POTASSIUM SERPL-SCNC: 4 MMOL/L (ref 3.5–5.3)
RBC # BLD AUTO: 2.73 MILLION/UL (ref 3.88–5.62)
SODIUM SERPL-SCNC: 140 MMOL/L (ref 135–147)
WBC # BLD AUTO: 9.18 THOUSAND/UL (ref 4.31–10.16)

## 2024-07-19 PROCEDURE — 99232 SBSQ HOSP IP/OBS MODERATE 35: CPT

## 2024-07-19 PROCEDURE — 99232 SBSQ HOSP IP/OBS MODERATE 35: CPT | Performed by: UROLOGY

## 2024-07-19 PROCEDURE — 83735 ASSAY OF MAGNESIUM: CPT | Performed by: STUDENT IN AN ORGANIZED HEALTH CARE EDUCATION/TRAINING PROGRAM

## 2024-07-19 PROCEDURE — 80069 RENAL FUNCTION PANEL: CPT | Performed by: STUDENT IN AN ORGANIZED HEALTH CARE EDUCATION/TRAINING PROGRAM

## 2024-07-19 PROCEDURE — 85027 COMPLETE CBC AUTOMATED: CPT | Performed by: STUDENT IN AN ORGANIZED HEALTH CARE EDUCATION/TRAINING PROGRAM

## 2024-07-19 RX ORDER — METOPROLOL TARTRATE 50 MG/1
50 TABLET, FILM COATED ORAL EVERY 12 HOURS SCHEDULED
Status: DISCONTINUED | OUTPATIENT
Start: 2024-07-19 | End: 2024-07-23 | Stop reason: HOSPADM

## 2024-07-19 RX ORDER — CEFAZOLIN SODIUM 1 G/50ML
1000 SOLUTION INTRAVENOUS ONCE
Status: DISCONTINUED | OUTPATIENT
Start: 2024-07-19 | End: 2024-07-19 | Stop reason: HOSPADM

## 2024-07-19 RX ADMIN — OXYBUTYNIN CHLORIDE 5 MG: 5 TABLET ORAL at 20:06

## 2024-07-19 RX ADMIN — SENNOSIDES AND DOCUSATE SODIUM 1 TABLET: 50; 8.6 TABLET ORAL at 22:05

## 2024-07-19 RX ADMIN — PRAVASTATIN SODIUM 40 MG: 40 TABLET ORAL at 17:23

## 2024-07-19 RX ADMIN — OXYBUTYNIN CHLORIDE 5 MG: 5 TABLET ORAL at 17:23

## 2024-07-19 RX ADMIN — CYANOCOBALAMIN 1000 MCG: 1000 INJECTION, SOLUTION INTRAMUSCULAR; SUBCUTANEOUS at 14:02

## 2024-07-19 RX ADMIN — METOPROLOL TARTRATE 50 MG: 50 TABLET, FILM COATED ORAL at 20:06

## 2024-07-19 RX ADMIN — LORAZEPAM 0.5 MG: 0.5 TABLET ORAL at 22:05

## 2024-07-19 RX ADMIN — CEFAZOLIN SODIUM 2000 MG: 2 SOLUTION INTRAVENOUS at 02:03

## 2024-07-19 RX ADMIN — CEFAZOLIN SODIUM 2000 MG: 2 SOLUTION INTRAVENOUS at 10:13

## 2024-07-19 RX ADMIN — CEFAZOLIN SODIUM 2000 MG: 2 SOLUTION INTRAVENOUS at 17:23

## 2024-07-19 RX ADMIN — LORAZEPAM 0.5 MG: 0.5 TABLET ORAL at 13:46

## 2024-07-19 NOTE — OCCUPATIONAL THERAPY NOTE
Occupational Therapy Cancellation Note       07/19/24 1230   OT Last Visit   OT Visit Date 07/19/24   Note Type   Note Type Cancelled Session   Cancel Reasons Patient to operating room  (Pt to the OR today for for cystoscopy. Will hold OT tx session at this time and continue to follow pt as appropriate.)     DANIELITO Rivera, OTR/L

## 2024-07-19 NOTE — PHYSICAL THERAPY NOTE
Physical Therapy Cancellation Note         07/19/24 1234   PT Last Visit   PT Visit Date 07/19/24   Note Type   Note Type Cancelled Session   Cancel Reasons Patient to operating room  (off floor for cystoscopy - will hold and see post-op as able.)       Ayana Trinh, PT, DPT

## 2024-07-19 NOTE — ANESTHESIA PREPROCEDURE EVALUATION
Procedure:  CYSTOSCOPY RETROGRADE PYELOGRAM WITH INSERTION STENT URETERAL (Right: Bladder)  TRANSURETHRAL RESECTION OF BLADDER TUMOR (TURBT) (Bladder)  CYSTOSCOPY EVACUATION OF CLOTS and fulgeration (Bladder)    Relevant Problems   ANESTHESIA (within normal limits)      CARDIO   (+) Atrial fibrillation (HCC)   (+) Benign essential hypertension   (+) Coronary arteriosclerosis   (+) Essential hypertension   (+) Hyperlipidemia   (+) Mitral regurgitation   (+) Pure hypercholesterolemia      GI/HEPATIC   (+) Gastroesophageal reflux disease without esophagitis   (+) Hiatal hernia      /RENAL   (+) LELE (acute kidney injury) (HCC)   (+) Benign prostatic hyperplasia   (+) Chronic kidney disease   (+) Chronic renal impairment   (+) Other hydronephrosis   (+) Stage 3b chronic kidney disease (HCC)      HEMATOLOGY   (+) Coagulation disorder ([facility])   (+) Symptomatic anemia      MUSCULOSKELETAL   (+) Chronic bilateral low back pain with right-sided sciatica   (+) DDD (degenerative disc disease), lumbar   (+) Hiatal hernia   (+) Osteoarthritis of lumbar spine   (+) Primary osteoarthritis of left hip      NEURO/PSYCH   (+) Anxiety   (+) CVA (cerebral vascular accident) ([facility])   (+) Chronic bilateral low back pain with right-sided sciatica   (+) Intractable epilepsy without status epilepticus, unspecified epilepsy type ([facility])      PULMONARY   (+) JORGE LUIS (obstructive sleep apnea)   (+) Smoker      6/28/2023  LVEF 53%    Physical Exam    Airway    Mallampati score: II  TM Distance: >3 FB  Neck ROM: full     Dental   Comment: No reported loose teeth, No notable dental hx     Cardiovascular  Cardiovascular exam normal    Pulmonary  Pulmonary exam normal     Other Findings        Anesthesia Plan  ASA Score- 3     Anesthesia Type- general with ASA Monitors.         Additional Monitors:     Airway Plan: LMA.    Comment: I discussed risks (reviewed with patient on the anesthesia consent form), benefits and alternatives for General Anesthesia.   These risks did include breathing tube remaining in place if not strong enough, PONV, damage to lips and teeth, sore throat, eye injury or blindness, risk of heart attack or stroke that may lead to death.  .       Plan Factors-    Chart reviewed. EKG reviewed.  Existing labs reviewed. Patient summary reviewed.                  Induction- intravenous.    Postoperative Plan- Plan for postoperative opioid use.     Perioperative Resuscitation Plan - Level 1 - Full Code.       Informed Consent- Anesthetic plan and risks discussed with patient.  I personally reviewed this patient with the CRNA. Discussed and agreed on the Anesthesia Plan with the CRNA..

## 2024-07-19 NOTE — PROGRESS NOTES
[facility]  Progress Note  Name: Francis Caballero I  MRN: [id number]  Unit/Bed#: [facility] 901-01 I Date of Admission: 7/12/2024   Date of Service: 7/19/2024 I Hospital Day: 7    Assessment & Plan   * Shock (HCC)  Assessment & Plan  Patient was brought to the ED for fever, pale/clammy skin, dizziness, and weakness after TURBT, cystoscopy earlier in the day and met sirs criteria with possible urinary infection as source. Became hypotensive and initially required pressors in ICU.  Hgb on arrival 5.8, s/p 3 units PRBC  CT abd/pelvis: Interval removal of previously seen bladder mass as well as interval placement of right-sided nephroureteral stent, this appears appropriately positioned. Mild to moderate right hydroureter. Previously seen right-sided hydronephrosis is intervally improved. Small amount of air in the right renal collecting system as well as right-sided perinephric fat stranding could be related to recent instrumentation and/or infection.  Partially distended bladder. Mild to moderate circumferential bladder wall thickening. Air within the bladder lumen, possibly related to recent instrumentation/intervention; superimposed cystitis considered in the appropriate clinical setting. Correlation with the patient's symptoms, laboratory values, and urinalysis recommended  Urine culture negative however patient was given dose of cefazolin that could have resulted in false negative   Blood cultures 1/2 positive for gram negative rods- klebsiella. He had been treated with zosyn and vanco prior to the cultures. He has since been changed to cefepime and now that cultures are sensitive to klebsiella switched to ancef in which he can be changed to keflex at time of discharge to complete course  BP has remained stable  Remains afebrile without leukocytosis  Continue to monitor    Hematuria  Assessment & Plan  Urology following, appreciate recs   Continues to have hematuria s/p  cystosocpy/TURBT 7/12/24  Maintain irizarry and continue flushes  Continue holding anticoagulation until hematuria improves   Given TXA 7/18  NPO at midnight incase worsening hematuria or drop in hemoglobin requiring cystoscopy    Malignant 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 (HCC)  Assessment & Plan  Follows with outpatient urology   S/p chemo and radiation   7/12 he underwent cystoscopy, TURBT for ongoing hematuria with biopsy taken  Later the same night, patient was brought to the ED for fever, pale/clammy skin, dizziness, and weakness   See shock for full plan    LELE (acute kidney injury) (HCC)  Assessment & Plan  Patient has chronic kidney disease baseline creatinine is 1.5-1.8  Patient's acute kidney injury from earlier in hospitalization has resolved  Nephrology following  Daily bmp    Symptomatic anemia  Assessment & Plan  Hgb on arrival 5.8 in setting of hematuria  S/p 3 units PRBC  7/18 given cyanocobalamin injection daily x 2 doses for macrocytic anemia  Venofer 300 mg IV x 1 given on going hematuria  Given TXA 7/18  Npo at midnight incase worsening hematuria requiring cystoscopy    Hyperlipidemia  Assessment & Plan  Continue pravastatin 40mg for home Crestor 5mg     Benign essential hypertension  Assessment & Plan  Continue home lopressor    Atrial fibrillation (HCC)  Assessment & Plan  Metoprolol was being held for labile BP, resume now that BP consistently high  Warfarin being held in the setting of hematuria, anemia. Resume when able    Anxiety  Assessment & Plan  Continue xanax 0.5mg TID PRN anxiety    Positive blood culture-resolved as of 7/18/2024  Assessment & Plan  1/2 blood cultures positive for gram negative rods with detected Klebsiella pneumoniae  Cefepime recommended - continue   Will d/c vancomycin at this time   Curbside to ID regarding suspected source - given he received 1 dose of ancef prior to urine culture, could be false negative given urine culture without growth.   Continue  above abx for now and adjust as susceptibilities finalize         VTE Pharmacologic Prophylaxis:   Moderate Risk (Score 3-4) - Pharmacological DVT Prophylaxis Contraindicated. Sequential Compression Devices Ordered.    Mobility:   Basic Mobility Inpatient Raw Score: 17  JH-HLM Goal: 5: Stand one or more mins  JH-HLM Achieved: 6: Walk 10 steps or more  JH-HLM Goal achieved. Continue to encourage appropriate mobility.    Patient Centered Rounds: I performed bedside rounds with nursing staff today.   Discussions with Specialists or Other Care Team Provider: urology, rn, cm    Education and Discussions with Family / Patient: Updated  (wife) at bedside.    Total Time Spent on Date of Encounter in care of patient: 40 mins. This time was spent on one or more of the following: performing physical exam; counseling and coordination of care; obtaining or reviewing history; documenting in the medical record; reviewing/ordering tests, medications or procedures; communicating with other healthcare professionals and discussing with patient's family/caregivers.    Current Length of Stay: 7 day(s)  Current Patient Status: Inpatient   Certification Statement: The patient will continue to require additional inpatient hospital stay due to hematuria, monitor hemoglobin  Discharge Plan: Anticipate discharge in 24-48 hrs to home.    Code Status: Level 1 - Full Code    Subjective:   Patient seen in afternoon eating lunch. Reports he would like to avoid procedure if possible. Denies any current symptoms.    Objective:     Vitals:   Temp (24hrs), Av.2 °F (36.8 °C), Min:97.7 °F (36.5 °C), Max:98.5 °F (36.9 °C)    Temp:  [97.7 °F (36.5 °C)-98.5 °F (36.9 °C)] 98.4 °F (36.9 °C)  HR:  [81-95] 95  Resp:  [16-18] 17  BP: (127-157)/(72-88) 152/86  SpO2:  [97 %-100 %] 99 %  Body mass index is 21.83 kg/m².     Input and Output Summary (last 24 hours):     Intake/Output Summary (Last 24 hours) at 2024 2161  Last data filed at  7/19/2024 0452  Gross per 24 hour   Intake --   Output 2650 ml   Net -2650 ml       Physical Exam:   Physical Exam  Vitals and nursing note reviewed.   Constitutional:       General: He is not in acute distress.     Appearance: He is well-developed.   Cardiovascular:      Rate and Rhythm: Normal rate and regular rhythm.   Pulmonary:      Effort: Pulmonary effort is normal. No respiratory distress.      Breath sounds: Normal breath sounds.   Abdominal:      Palpations: Abdomen is soft.      Tenderness: There is no abdominal tenderness.   Genitourinary:     Comments: Gracia in place with hematuria noted  Musculoskeletal:         General: No swelling.   Skin:     General: Skin is warm and dry.      Capillary Refill: Capillary refill takes less than 2 seconds.   Neurological:      Mental Status: He is alert.   Psychiatric:         Mood and Affect: Mood normal.          Additional Data:     Labs:  Results from last 7 days   Lab Units 07/19/24 0458 07/18/24  0622 07/16/24  0430 07/13/24  0149 07/12/24  1932   WBC Thousand/uL 9.18   < > 8.32   < > 9.64   HEMOGLOBIN g/dL 8.4*   < > 7.3*   < > 5.8*   HEMATOCRIT % 27.0*   < > 23.0*   < > 18.7*   PLATELETS Thousands/uL 296   < > 184   < > 230   BANDS PCT %  --   --   --   --  21*   SEGS PCT %  --   --  71  --   --    LYMPHO PCT %  --   --  13*  --  2*   MONO PCT %  --   --  9  --  1*   EOS PCT %  --   --  4  --  0    < > = values in this interval not displayed.     Results from last 7 days   Lab Units 07/19/24 0458 07/13/24  0453 07/12/24 1932   SODIUM mmol/L 140   < > 138   POTASSIUM mmol/L 4.0   < > 4.2   CHLORIDE mmol/L 107   < > 111*   CO2 mmol/L 25   < > 21   BUN mg/dL 11   < > 22   CREATININE mg/dL 1.51*   < > 1.66*   ANION GAP mmol/L 8   < > 6   CALCIUM mg/dL 8.8   < > 7.9*   ALBUMIN g/dL 3.1*  --  3.1*   TOTAL BILIRUBIN mg/dL  --   --  0.63   ALK PHOS U/L  --   --  67   ALT U/L  --   --  6*   AST U/L  --   --  10*   GLUCOSE RANDOM mg/dL 80   < > 97    < > = values in  this interval not displayed.     Results from last 7 days   Lab Units 07/16/24  0430   INR  1.44*             Results from last 7 days   Lab Units 07/12/24  2222 07/12/24  1932   LACTIC ACID mmol/L 1.4 2.9*       Lines/Drains:  Invasive Devices       Peripheral Intravenous Line  Duration             Peripheral IV 07/18/24 Right;Ventral (anterior) Forearm 1 day              Drain  Duration             Urethral Catheter Three way 20 Fr. 7 days                  Urinary Catheter:  Goal for removal:  per urology               Imaging: Reviewed radiology reports from this admission including: procedure reports    Recent Cultures (last 7 days):   Results from last 7 days   Lab Units 07/12/24 2031 07/12/24 1932   BLOOD CULTURE   --  No Growth After 5 Days.  Klebsiella pneumoniae*   GRAM STAIN RESULT   --  Gram negative rods*   URINE CULTURE  No Growth <1000 cfu/mL  --        Last 24 Hours Medication List:   Current Facility-Administered Medications   Medication Dose Route Frequency Provider Last Rate    cefazolin  2,000 mg Intravenous Q8H Gina Clemons PA-C 2,000 mg (07/19/24 1013)    folic acid  1 mg Oral Daily Gina Clemons PA-C      lidocaine  1 Application Urethral Daily PRN Gina Clemons PA-C      LORazepam  0.5 mg Oral Q8H PRN Gina Clemons PA-C      metoprolol tartrate  50 mg Oral Q12H [facility] Gina Clemons PA-C      oxybutynin  5 mg Oral TID Gina Clemons PA-C      pravastatin  40 mg Oral Daily With Dinner Gina Clemons PA-C      senna-docusate sodium  1 tablet Oral HS Gina Clemons PA-C          Today, Patient Was Seen By: Gina Clemons PA-C    **Please Note: This note may have been constructed using a voice recognition system.**

## 2024-07-19 NOTE — ASSESSMENT & PLAN NOTE
Urology following, appreciate recs   Continues to have hematuria s/p cystosocpy/TURBT 7/12/24  Maintain irizarry and continue flushes  Continue holding anticoagulation until hematuria improves   Given TXA 7/18  NPO at midnight incase worsening hematuria or drop in hemoglobin requiring cystoscopy

## 2024-07-19 NOTE — PLAN OF CARE
Problem: Prexisting or High Potential for Compromised Skin Integrity  Goal: Skin integrity is maintained or improved  Description: INTERVENTIONS:  - Identify patients at risk for skin breakdown  - Assess and monitor skin integrity  - Assess and monitor nutrition and hydration status  - Monitor labs   - Assess for incontinence   - Turn and reposition patient  - Assist with mobility/ambulation  - Relieve pressure over bony prominences  - Avoid friction and shearing  - Provide appropriate hygiene as needed including keeping skin clean and dry  - Evaluate need for skin moisturizer/barrier cream  - Collaborate with interdisciplinary team   - Patient/family teaching  - Consider wound care consult   Outcome: Progressing     Problem: GENITOURINARY - ADULT  Goal: Maintains or returns to baseline urinary function  Description: INTERVENTIONS:  - Assess urinary function  - Encourage oral fluids to ensure adequate hydration if ordered  - Administer IV fluids as ordered to ensure adequate hydration  - Administer ordered medications as needed  - Offer frequent toileting  - Follow urinary retention protocol if ordered  Outcome: Progressing  Goal: Absence of urinary retention  Description: INTERVENTIONS:  - Assess patient’s ability to void and empty bladder  - Monitor I/O  - Bladder scan as needed  - Discuss with physician/AP medications to alleviate retention as needed  - Discuss catheterization for long term situations as appropriate  Outcome: Progressing  Goal: Urinary catheter remains patent  Description: INTERVENTIONS:  - Assess patency of urinary catheter  - If patient has a chronic irizarry, consider changing catheter if non-functioning  - Follow guidelines for intermittent irrigation of non-functioning urinary catheter  Outcome: Progressing     Problem: MUSCULOSKELETAL - ADULT  Goal: Maintain or return mobility to safest level of function  Description: INTERVENTIONS:  - Assess patient's ability to carry out ADLs; assess  patient's baseline for ADL function and identify physical deficits which impact ability to perform ADLs (bathing, care of mouth/teeth, toileting, grooming, dressing, etc.)  - Assess/evaluate cause of self-care deficits   - Assess range of motion  - Assess patient's mobility  - Assess patient's need for assistive devices and provide as appropriate  - Encourage maximum independence but intervene and supervise when necessary  - Involve family in performance of ADLs  - Assess for home care needs following discharge   - Consider OT consult to assist with ADL evaluation and planning for discharge  - Provide patient education as appropriate  Outcome: Progressing  Goal: Maintain proper alignment of affected body part  Description: INTERVENTIONS:  - Support, maintain and protect limb and body alignment  - Provide patient/ family with appropriate education  Outcome: Progressing     Problem: Nutrition/Hydration-ADULT  Goal: Nutrient/Hydration intake appropriate for improving, restoring or maintaining nutritional needs  Description: Monitor and assess patient's nutrition/hydration status for malnutrition. Collaborate with interdisciplinary team and initiate plan and interventions as ordered.  Monitor patient's weight and dietary intake as ordered or per policy. Utilize nutrition screening tool and intervene as necessary. Determine patient's food preferences and provide high-protein, high-caloric foods as appropriate.     INTERVENTIONS:  - Monitor oral intake, urinary output, labs, and treatment plans  - Assess nutrition and hydration status and recommend course of action  - Evaluate amount of meals eaten  - Assist patient with eating if necessary   - Allow adequate time for meals  - Recommend/ encourage appropriate diets, oral nutritional supplements, and vitamin/mineral supplements  - Order, calculate, and assess calorie counts as needed  - Recommend, monitor, and adjust tube feedings and TPN/PPN based on assessed needs  -  Assess need for intravenous fluids  - Provide specific nutrition/hydration education as appropriate  - Include patient/family/caregiver in decisions related to nutrition  Outcome: Progressing

## 2024-07-19 NOTE — PROGRESS NOTES
[facility]  Progress Note  Name: Francis Caballero I  MRN: [id number]  Unit/Bed#: PPHP 901-01 I Date of Admission: 7/12/2024   Date of Service: 7/19/2024 I Hospital Day: 7    Assessment & Plan   Malignant 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 (HCC)  Assessment & Plan  S/p cystoscopy, TURBT and ureteral stent exchange 7/12 for high-grade muscle invasive bladder cancer with prior chemotherapy and radiation.  Afebrile. HD stable. No extremis.  Hgb 7.3--8.4-8.4 today.    Plan:    Anticoagulation on hold currently  Continue to trend labs and transfuse per primary team  Urine culture no growth, on Cefazolin  Gracia--manual irrigation. See below          NPO for potential OR but may not require intervention will discuss will  attending in between cases & update.                     Subjective/Objective   Chief Complaint: hematuria. History of bladder cancer    Subjective:  Denies fever, chills, n/v, flank or SP pain    Objective: See below      Blood pressure 157/88, pulse 95, temperature 97.7 °F (36.5 °C), resp. rate 17, height 6' (1.829 m), weight 73 kg (160 lb 15 oz), SpO2 99%.,Body mass index is 21.83 kg/m².      Intake/Output Summary (Last 24 hours) at 7/19/2024 1152  Last data filed at 7/19/2024 0452  Gross per 24 hour   Intake --   Output 2850 ml   Net -2850 ml       Invasive Devices       Peripheral Intravenous Line  Duration             Peripheral IV 07/18/24 Right;Ventral (anterior) Forearm 1 day              Drain  Duration             Urethral Catheter Three way 20 Fr. 7 days                    Physical Exam: General appearance: alert and oriented, in no acute distress, appears stated age, cooperative, and no distress  Head: Normocephalic, without obvious abnormality, atraumatic  Neck: no JVD and supple, symmetrical, trachea midline  Lungs: diminished breath sounds  Heart: regular rate and rhythm, S1, S2 normal, no murmur, click, rub or gallop  Abdomen: soft,  non-tender; bowel sounds normal; no masses,  no organomegaly  Extremities: extremities normal, warm and well-perfused; no cyanosis, clubbing, or edema  Pulses: 2+ and symmetric  Neurologic: Grossly normal  Gracia--see above.    Lab, Imaging and other studies:I have personally reviewed pertinent lab results.      Lab Results   Component Value Date    WBC 9.18 07/19/2024    HGB 8.4 (L) 07/19/2024    HCT 27.0 (L) 07/19/2024    MCV 99 (H) 07/19/2024     07/19/2024     Lab Results   Component Value Date    SODIUM 140 07/19/2024    K 4.0 07/19/2024     07/19/2024    CO2 25 07/19/2024    BUN 11 07/19/2024    CREATININE 1.51 (H) 07/19/2024    GLUC 80 07/19/2024    CALCIUM 8.8 07/19/2024

## 2024-07-20 LAB
ALBUMIN SERPL BCG-MCNC: 3.3 G/DL (ref 3.5–5)
ANION GAP SERPL CALCULATED.3IONS-SCNC: 9 MMOL/L (ref 4–13)
BUN SERPL-MCNC: 9 MG/DL (ref 5–25)
CALCIUM ALBUM COR SERPL-MCNC: 9.4 MG/DL (ref 8.3–10.1)
CALCIUM SERPL-MCNC: 8.8 MG/DL (ref 8.4–10.2)
CHLORIDE SERPL-SCNC: 105 MMOL/L (ref 96–108)
CO2 SERPL-SCNC: 26 MMOL/L (ref 21–32)
CREAT SERPL-MCNC: 1.5 MG/DL (ref 0.6–1.3)
ERYTHROCYTE [DISTWIDTH] IN BLOOD BY AUTOMATED COUNT: 15.1 % (ref 11.6–15.1)
GFR SERPL CREATININE-BSD FRML MDRD: 42 ML/MIN/1.73SQ M
GLUCOSE SERPL-MCNC: 79 MG/DL (ref 65–140)
HCT VFR BLD AUTO: 26.9 % (ref 36.5–49.3)
HGB BLD-MCNC: 8.4 G/DL (ref 12–17)
INR PPP: 1.25 (ref 0.84–1.19)
MAGNESIUM SERPL-MCNC: 1.9 MG/DL (ref 1.9–2.7)
MCH RBC QN AUTO: 30.4 PG (ref 26.8–34.3)
MCHC RBC AUTO-ENTMCNC: 31.2 G/DL (ref 31.4–37.4)
MCV RBC AUTO: 98 FL (ref 82–98)
PHOSPHATE SERPL-MCNC: 2.6 MG/DL (ref 2.3–4.1)
PLATELET # BLD AUTO: 348 THOUSANDS/UL (ref 149–390)
PMV BLD AUTO: 9.4 FL (ref 8.9–12.7)
POTASSIUM SERPL-SCNC: 3.8 MMOL/L (ref 3.5–5.3)
PROTHROMBIN TIME: 15.5 SECONDS (ref 11.6–14.5)
RBC # BLD AUTO: 2.76 MILLION/UL (ref 3.88–5.62)
SODIUM SERPL-SCNC: 140 MMOL/L (ref 135–147)
WBC # BLD AUTO: 9.08 THOUSAND/UL (ref 4.31–10.16)

## 2024-07-20 PROCEDURE — 85027 COMPLETE CBC AUTOMATED: CPT

## 2024-07-20 PROCEDURE — 85610 PROTHROMBIN TIME: CPT | Performed by: INTERNAL MEDICINE

## 2024-07-20 PROCEDURE — 83735 ASSAY OF MAGNESIUM: CPT

## 2024-07-20 PROCEDURE — 99232 SBSQ HOSP IP/OBS MODERATE 35: CPT | Performed by: UROLOGY

## 2024-07-20 PROCEDURE — 99232 SBSQ HOSP IP/OBS MODERATE 35: CPT | Performed by: INTERNAL MEDICINE

## 2024-07-20 PROCEDURE — 80069 RENAL FUNCTION PANEL: CPT

## 2024-07-20 RX ORDER — WARFARIN SODIUM 5 MG/1
5 TABLET ORAL
Status: DISCONTINUED | OUTPATIENT
Start: 2024-07-21 | End: 2024-07-23 | Stop reason: HOSPADM

## 2024-07-20 RX ADMIN — CEFAZOLIN SODIUM 2000 MG: 2 SOLUTION INTRAVENOUS at 02:26

## 2024-07-20 RX ADMIN — OXYBUTYNIN CHLORIDE 5 MG: 5 TABLET ORAL at 16:38

## 2024-07-20 RX ADMIN — CEFAZOLIN SODIUM 2000 MG: 2 SOLUTION INTRAVENOUS at 09:43

## 2024-07-20 RX ADMIN — CEFAZOLIN SODIUM 2000 MG: 2 SOLUTION INTRAVENOUS at 17:17

## 2024-07-20 RX ADMIN — METOPROLOL TARTRATE 50 MG: 50 TABLET, FILM COATED ORAL at 21:07

## 2024-07-20 RX ADMIN — LORAZEPAM 0.5 MG: 0.5 TABLET ORAL at 21:07

## 2024-07-20 RX ADMIN — METOPROLOL TARTRATE 50 MG: 50 TABLET, FILM COATED ORAL at 09:51

## 2024-07-20 RX ADMIN — SENNOSIDES AND DOCUSATE SODIUM 1 TABLET: 50; 8.6 TABLET ORAL at 21:07

## 2024-07-20 RX ADMIN — PRAVASTATIN SODIUM 40 MG: 40 TABLET ORAL at 16:39

## 2024-07-20 RX ADMIN — OXYBUTYNIN CHLORIDE 5 MG: 5 TABLET ORAL at 21:07

## 2024-07-20 RX ADMIN — FOLIC ACID 1 MG: 1 TABLET ORAL at 09:51

## 2024-07-20 RX ADMIN — OXYBUTYNIN CHLORIDE 5 MG: 5 TABLET ORAL at 09:51

## 2024-07-20 NOTE — ASSESSMENT & PLAN NOTE
Hgb on arrival 5.8 in setting of hematuria  S/p 3 units PRBC  7/18 given cyanocobalamin injection daily x 2 doses for macrocytic anemia  Venofer 300 mg IV x 1 given on going hematuria  Given TXA 7/18

## 2024-07-20 NOTE — ASSESSMENT & PLAN NOTE
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  Afebrile. HD stable. No extremis.  Hgb 7.3--> 8.4-->8. Awaiting repeat today   Urethral Gracia catheter clear yellow in Gracia catheter tubing    Plan:    Anticoagulation on hold currently consider restarting after 48 hours of clear yellow urine.  Continue to trend labs and transfuse per primary team  Urine culture no growth, on Cefazolin  Gracia--continue to wean off manual irrigations.

## 2024-07-20 NOTE — ASSESSMENT & PLAN NOTE
Metoprolol was being held for labile BP, resume now that BP consistently high  Warfarin being held in the setting of hematuria, anemia. Resume when able-likeloy tomorrow if continues to be stable inr egards to clear urine

## 2024-07-20 NOTE — PLAN OF CARE
Problem: Prexisting or High Potential for Compromised Skin Integrity  Goal: Skin integrity is maintained or improved  Description: INTERVENTIONS:  - Identify patients at risk for skin breakdown  - Assess and monitor skin integrity  - Assess and monitor nutrition and hydration status  - Monitor labs   - Assess for incontinence   - Turn and reposition patient  - Assist with mobility/ambulation  - Relieve pressure over bony prominences  - Avoid friction and shearing  - Provide appropriate hygiene as needed including keeping skin clean and dry  - Evaluate need for skin moisturizer/barrier cream  - Collaborate with interdisciplinary team   - Patient/family teaching  - Consider wound care consult   7/19/2024 2321 by Emma Hutchinson RN  Outcome: Progressing  7/19/2024 2318 by Emma Hutchinson RN  Outcome: Progressing     Problem: GENITOURINARY - ADULT  Goal: Maintains or returns to baseline urinary function  Description: INTERVENTIONS:  - Assess urinary function  - Encourage oral fluids to ensure adequate hydration if ordered  - Administer IV fluids as ordered to ensure adequate hydration  - Administer ordered medications as needed  - Offer frequent toileting  - Follow urinary retention protocol if ordered  7/19/2024 2321 by Emma Hutchinson RN  Outcome: Progressing  7/19/2024 2318 by Emma Hutchinson RN  Outcome: Progressing  Goal: Absence of urinary retention  Description: INTERVENTIONS:  - Assess patient’s ability to void and empty bladder  - Monitor I/O  - Bladder scan as needed  - Discuss with physician/AP medications to alleviate retention as needed  - Discuss catheterization for long term situations as appropriate  7/19/2024 2321 by Emma Hutchinson RN  Outcome: Progressing  7/19/2024 2318 by Emma Hutchinson RN  Outcome: Progressing  Goal: Urinary catheter remains patent  Description: INTERVENTIONS:  - Assess patency of urinary catheter  - If patient has a chronic irizarry, consider changing catheter if  non-functioning  - Follow guidelines for intermittent irrigation of non-functioning urinary catheter  7/19/2024 2321 by Emma Hutchinson RN  Outcome: Progressing  7/19/2024 2318 by Emma Hutchinson RN  Outcome: Progressing     Problem: MUSCULOSKELETAL - ADULT  Goal: Maintain or return mobility to safest level of function  Description: INTERVENTIONS:  - Assess patient's ability to carry out ADLs; assess patient's baseline for ADL function and identify physical deficits which impact ability to perform ADLs (bathing, care of mouth/teeth, toileting, grooming, dressing, etc.)  - Assess/evaluate cause of self-care deficits   - Assess range of motion  - Assess patient's mobility  - Assess patient's need for assistive devices and provide as appropriate  - Encourage maximum independence but intervene and supervise when necessary  - Involve family in performance of ADLs  - Assess for home care needs following discharge   - Consider OT consult to assist with ADL evaluation and planning for discharge  - Provide patient education as appropriate  7/19/2024 2321 by Emma Hutchinson RN  Outcome: Progressing  7/19/2024 2318 by Emma Hutchinson RN  Outcome: Progressing  Goal: Maintain proper alignment of affected body part  Description: INTERVENTIONS:  - Support, maintain and protect limb and body alignment  - Provide patient/ family with appropriate education  7/19/2024 2321 by Emma Hutchinson RN  Outcome: Progressing  7/19/2024 2318 by Emma Hutchinson RN  Outcome: Progressing     Problem: Nutrition/Hydration-ADULT  Goal: Nutrient/Hydration intake appropriate for improving, restoring or maintaining nutritional needs  Description: Monitor and assess patient's nutrition/hydration status for malnutrition. Collaborate with interdisciplinary team and initiate plan and interventions as ordered.  Monitor patient's weight and dietary intake as ordered or per policy. Utilize nutrition screening tool and intervene as necessary. Determine  patient's food preferences and provide high-protein, high-caloric foods as appropriate.     INTERVENTIONS:  - Monitor oral intake, urinary output, labs, and treatment plans  - Assess nutrition and hydration status and recommend course of action  - Evaluate amount of meals eaten  - Assist patient with eating if necessary   - Allow adequate time for meals  - Recommend/ encourage appropriate diets, oral nutritional supplements, and vitamin/mineral supplements  - Order, calculate, and assess calorie counts as needed  - Recommend, monitor, and adjust tube feedings and TPN/PPN based on assessed needs  - Assess need for intravenous fluids  - Provide specific nutrition/hydration education as appropriate  - Include patient/family/caregiver in decisions related to nutrition  7/19/2024 2321 by Emma Hutchinson RN  Outcome: Progressing  7/19/2024 2318 by Emma Hutchinson RN  Outcome: Progressing

## 2024-07-20 NOTE — ASSESSMENT & PLAN NOTE
Urology following, appreciate recs   Continues to have hematuria s/p cystosocpy/TURBT 7/12/24  Maintain irizarry and continue flushes  Continue holding anticoagulation until hematuria improves   Given TXA 7/18

## 2024-07-20 NOTE — PROGRESS NOTES
[facility]  Progress Note  Name: Francis Caballero I  MRN: [id number]  Unit/Bed#: PPHP 901-01 I Date of Admission: 7/12/2024   Date of Service: 7/20/2024 I Hospital Day: 8    Assessment & Plan   Hematuria  Assessment & Plan  Urology following, appreciate recs   Continues to have hematuria s/p cystosocpy/TURBT 7/12/24  Maintain irizarry and continue flushes  Continue holding anticoagulation until hematuria improves   Given TXA 7/18      LELE (acute kidney injury) ([facility]ly [facility])  Assessment & Plan  Patient has chronic kidney disease baseline creatinine is 1.5-1.8  Patient's acute kidney injury from earlier in hospitalization has resolved  Nephrology following  Daily bmp    Symptomatic anemia  Assessment & Plan  Hgb on arrival 5.8 in setting of hematuria  S/p 3 units PRBC  7/18 given cyanocobalamin injection daily x 2 doses for macrocytic anemia  Venofer 300 mg IV x 1 given on going hematuria  Given TXA 7/18      Malignant 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 (HCC)  Assessment & Plan  Follows with outpatient urology   S/p chemo and radiation   7/12 he underwent cystoscopy, TURBT for ongoing hematuria with biopsy taken  Later the same night, patient was brought to the ED for fever, pale/clammy skin, dizziness, and weakness   See shock for full plan    Benign essential hypertension  Assessment & Plan  Continue home lopressor    Atrial fibrillation (HCC)  Assessment & Plan  Metoprolol was being held for labile BP, resume now that BP consistently high  Warfarin being held in the setting of hematuria, anemia. Resume when able-likeloy tomorrow if continues to be stable inr egards to clear urine    Anxiety  Assessment & Plan  Continue xanax 0.5mg TID PRN anxiety    * Shock (HCC)  Assessment & Plan  Patient was brought to the ED for fever, pale/clammy skin, dizziness, and weakness after TURBT, cystoscopy earlier in the day and met sirs criteria with possible urinary infection as source. Became  hypotensive and initially required pressors in ICU.  Hgb on arrival 5.8, s/p 3 units PRBC  CT abd/pelvis: Interval removal of previously seen bladder mass as well as interval placement of right-sided nephroureteral stent, this appears appropriately positioned. Mild to moderate right hydroureter. Previously seen right-sided hydronephrosis is intervally improved. Small amount of air in the right renal collecting system as well as right-sided perinephric fat stranding could be related to recent instrumentation and/or infection.  Partially distended bladder. Mild to moderate circumferential bladder wall thickening. Air within the bladder lumen, possibly related to recent instrumentation/intervention; superimposed cystitis considered in the appropriate clinical setting. Correlation with the patient's symptoms, laboratory values, and urinalysis recommended  Urine culture negative however patient was given dose of cefazolin that could have resulted in false negative   Blood cultures 1/2 positive for gram negative rods- klebsiella. He had been treated with zosyn and vanco prior to the cultures. He has since been changed to cefepime and now that cultures are sensitive to klebsiella switched to ancef in which he can be changed to keflex at time of discharge to complete course  BP has remained stable  Remains afebrile without leukocytosis  Continue to monitor    Positive blood culture-resolved as of 7/18/2024  Assessment & Plan  1/2 blood cultures positive for gram negative rods with detected Klebsiella pneumoniae  Cefepime recommended - continue   Will d/c vancomycin at this time   Curbside to ID regarding suspected source - given he received 1 dose of ancef prior to urine culture, could be false negative given urine culture without growth.   Continue above abx for now and adjust as susceptibilities finalize               VTE Pharmacologic Prophylaxis:   Low Risk (Score 0-2) - Encourage Ambulation.    Mobility:   Basic  Mobility Inpatient Raw Score: 17  JH-HLM Goal: 5: Stand one or more mins  JH-HLM Achieved: 6: Walk 10 steps or more  JH-HLM Goal achieved. Continue to encourage appropriate mobility.    Patient Centered Rounds: I performed bedside rounds with nursing staff today.   Discussions with Specialists or Other Care Team Provider:     Education and Discussions with Family / Patient: Updated  (significant other) at bedside.    Total Time Spent on Date of Encounter in care of patient:  mins. This time was spent on one or more of the following: performing physical exam; counseling and coordination of care; obtaining or reviewing history; documenting in the medical record; reviewing/ordering tests, medications or procedures; communicating with other healthcare professionals and discussing with patient's family/caregivers.    Current Length of Stay: 8 day(s)  Current Patient Status: Inpatient   Certification Statement: The patient will continue to require additional inpatient hospital stay due to monitoring hematuria   Discharge Plan: Anticipate discharge in 48-72 hrs to home with home services.    Code Status: Level 1 - Full Code    Subjective:   Denies acute complaints    Objective:     Vitals:   Temp (24hrs), Av.9 °F (36.6 °C), Min:97.6 °F (36.4 °C), Max:98.1 °F (36.7 °C)    Temp:  [97.6 °F (36.4 °C)-98.1 °F (36.7 °C)] 98.1 °F (36.7 °C)  HR:  [74-88] 78  Resp:  [16-18] 16  BP: (133-140)/(71-83) 137/73  SpO2:  [94 %-100 %] 100 %  Body mass index is 21.83 kg/m².     Input and Output Summary (last 24 hours):     Intake/Output Summary (Last 24 hours) at 2024 1614  Last data filed at 2024 0943  Gross per 24 hour   Intake 0 ml   Output 2450 ml   Net -2450 ml       Physical Exam:   Physical Exam  Vitals and nursing note reviewed.   Constitutional:       General: He is not in acute distress.     Appearance: He is well-developed. He is not toxic-appearing or diaphoretic.   HENT:      Head: Normocephalic and  atraumatic.   Eyes:      General: No scleral icterus.     Conjunctiva/sclera: Conjunctivae normal.   Cardiovascular:      Rate and Rhythm: Normal rate and regular rhythm.      Heart sounds: No murmur heard.     No friction rub. No gallop.   Pulmonary:      Effort: Pulmonary effort is normal. No respiratory distress.      Breath sounds: Normal breath sounds. No stridor. No wheezing, rhonchi or rales.   Chest:      Chest wall: No tenderness.   Abdominal:      General: There is no distension.      Palpations: Abdomen is soft. There is no mass.      Tenderness: There is no abdominal tenderness. There is no guarding or rebound.      Hernia: No hernia is present.   Genitourinary:     Comments: Clear urine  Musculoskeletal:         General: No swelling or tenderness.      Cervical back: Neck supple.      Right lower leg: No edema.      Left lower leg: No edema.   Skin:     General: Skin is warm and dry.      Capillary Refill: Capillary refill takes less than 2 seconds.   Neurological:      Mental Status: He is alert and oriented to person, place, and time.   Psychiatric:         Mood and Affect: Mood normal.          Additional Data:     Labs:  Results from last 7 days   Lab Units 07/20/24  0536 07/18/24  0622 07/16/24  0430   WBC Thousand/uL 9.08   < > 8.32   HEMOGLOBIN g/dL 8.4*   < > 7.3*   HEMATOCRIT % 26.9*   < > 23.0*   PLATELETS Thousands/uL 348   < > 184   SEGS PCT %  --   --  71   LYMPHO PCT %  --   --  13*   MONO PCT %  --   --  9   EOS PCT %  --   --  4    < > = values in this interval not displayed.     Results from last 7 days   Lab Units 07/20/24  0536   SODIUM mmol/L 140   POTASSIUM mmol/L 3.8   CHLORIDE mmol/L 105   CO2 mmol/L 26   BUN mg/dL 9   CREATININE mg/dL 1.50*   ANION GAP mmol/L 9   CALCIUM mg/dL 8.8   ALBUMIN g/dL 3.3*   GLUCOSE RANDOM mg/dL 79     Results from last 7 days   Lab Units 07/16/24  0430   INR  1.44*                   Lines/Drains:  Invasive Devices       Peripheral Intravenous Line   Duration             Peripheral IV 07/18/24 Right;Ventral (anterior) Forearm 2 days              Drain  Duration             Urethral Catheter Three way 20 Fr. 8 days                  Urinary Catheter:  Goal for removal: N/A- Discharging with Gracia               Imaging: No pertinent imaging reviewed.    Recent Cultures (last 7 days):         Last 24 Hours Medication List:   Current Facility-Administered Medications   Medication Dose Route Frequency Provider Last Rate    cefazolin  2,000 mg Intravenous Q8H Gina Clemons PA-C 2,000 mg (07/20/24 0943)    folic acid  1 mg Oral Daily Gina Clemons PA-C      lidocaine  1 Application Urethral Daily PRN Gina Clemons PA-C      LORazepam  0.5 mg Oral Q8H PRN Gina Clemons PA-C      metoprolol tartrate  50 mg Oral Q12H RADHA Gina Clemons PA-C      oxybutynin  5 mg Oral TID Gina Clemons PA-C      pravastatin  40 mg Oral Daily With Dinner Gina Clemons PA-C      senna-docusate sodium  1 tablet Oral HS Gina Clemons PA-C      [START ON 7/21/2024] warfarin  5 mg Oral Daily (warfarin) Jairo Apodaca DO          Today, Patient Was Seen By: Jairo Apodaca DO    **Please Note: This note may have been constructed using a voice recognition system.**

## 2024-07-20 NOTE — PROGRESS NOTES
[facility]  Progress Note  Name: Francis Caballero I  MRN: [id number]  Unit/Bed#: PPHP 901-01 I Date of Admission: 7/12/2024   Date of Service: 7/20/2024 I Hospital Day: 8    Assessment & Plan   Malignant 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 (HCC)  Assessment & Plan  S/p cystoscopy, TURBT and ureteral stent exchange 7/12 for high-grade muscle invasive bladder cancer with prior chemotherapy and radiation.  Afebrile. HD stable. No extremis.  Hgb 7.3--> 8.4-->8. Awaiting repeat today   Urethral Garcia catheter clear yellow in Gracia catheter tubing    Plan:    Anticoagulation on hold currently consider restarting after 48 hours of clear yellow urine.  Continue to trend labs and transfuse per primary team  Urine culture no growth, on Cefazolin  Gracia--continue to wean off manual irrigations.                 Urology to follow    Subjective/Objective   Chief Complaint:     Subjective: Sitting comfortably in bed no acute distress denies any troubles with Gracia catheter.    Objective:     Blood pressure 133/71, pulse 74, temperature 97.6 °F (36.4 °C), resp. rate 18, height 6' (1.829 m), weight 73 kg (160 lb 15 oz), SpO2 99%.,Body mass index is 21.83 kg/m².      Intake/Output Summary (Last 24 hours) at 7/20/2024 0711  Last data filed at 7/20/2024 0234  Gross per 24 hour   Intake 0 ml   Output 1550 ml   Net -1550 ml       Invasive Devices       Peripheral Intravenous Line  Duration             Peripheral IV 07/18/24 Right;Ventral (anterior) Forearm 1 day              Drain  Duration             Urethral Catheter Three way 20 Fr. 8 days                    Physical Exam  Constitutional:       General: He is not in acute distress.     Appearance: He is normal weight. He is not ill-appearing, toxic-appearing or diaphoretic.   HENT:      Head: Normocephalic and atraumatic.      Right Ear: External ear normal.      Left Ear: External ear normal.      Nose: Nose normal.      Mouth/Throat:       Pharynx: Oropharynx is clear.   Eyes:      Conjunctiva/sclera: Conjunctivae normal.   Cardiovascular:      Rate and Rhythm: Normal rate and regular rhythm.   Pulmonary:      Effort: Pulmonary effort is normal. No respiratory distress.   Abdominal:      General: Bowel sounds are normal. There is no distension.      Tenderness: There is no abdominal tenderness.   Genitourinary:     Comments: Clear yellow urine within urethral Gracia catheter tubing.  Neurological:      Mental Status: He is alert.           Lab, Imaging and other studies:  Lab Results   Component Value Date    WBC 9.18 07/19/2024    HGB 8.4 (L) 07/19/2024    HCT 27.0 (L) 07/19/2024    MCV 99 (H) 07/19/2024     07/19/2024     Lab Results   Component Value Date    SODIUM 140 07/20/2024    K 3.8 07/20/2024     07/20/2024    CO2 26 07/20/2024    BUN 9 07/20/2024    CREATININE 1.50 (H) 07/20/2024    GLUC 79 07/20/2024    CALCIUM 8.8 07/20/2024       VTE Pharmacologic Prophylaxis: Reason for no pharmacologic prophylaxis currently on hold due to gross hematuria and acute blood loss anemia this admission.  Plan to restart in 48 hours after clear urine  VTE Mechanical Prophylaxis: sequential compression device      Gina Perez PA-C

## 2024-07-21 LAB
ALBUMIN SERPL BCG-MCNC: 3.3 G/DL (ref 3.5–5)
ANION GAP SERPL CALCULATED.3IONS-SCNC: 11 MMOL/L (ref 4–13)
BUN SERPL-MCNC: 10 MG/DL (ref 5–25)
CALCIUM ALBUM COR SERPL-MCNC: 9.6 MG/DL (ref 8.3–10.1)
CALCIUM SERPL-MCNC: 9 MG/DL (ref 8.4–10.2)
CHLORIDE SERPL-SCNC: 106 MMOL/L (ref 96–108)
CO2 SERPL-SCNC: 22 MMOL/L (ref 21–32)
CREAT SERPL-MCNC: 1.6 MG/DL (ref 0.6–1.3)
ERYTHROCYTE [DISTWIDTH] IN BLOOD BY AUTOMATED COUNT: 14.8 % (ref 11.6–15.1)
GFR SERPL CREATININE-BSD FRML MDRD: 39 ML/MIN/1.73SQ M
GLUCOSE SERPL-MCNC: 70 MG/DL (ref 65–140)
HCT VFR BLD AUTO: 28.4 % (ref 36.5–49.3)
HGB BLD-MCNC: 9.3 G/DL (ref 12–17)
INR PPP: 1.25 (ref 0.84–1.19)
MAGNESIUM SERPL-MCNC: 2 MG/DL (ref 1.9–2.7)
MCH RBC QN AUTO: 32.2 PG (ref 26.8–34.3)
MCHC RBC AUTO-ENTMCNC: 32.7 G/DL (ref 31.4–37.4)
MCV RBC AUTO: 98 FL (ref 82–98)
PHOSPHATE SERPL-MCNC: 2.9 MG/DL (ref 2.3–4.1)
PLATELET # BLD AUTO: 415 THOUSANDS/UL (ref 149–390)
PMV BLD AUTO: 9.6 FL (ref 8.9–12.7)
POTASSIUM SERPL-SCNC: 4 MMOL/L (ref 3.5–5.3)
PROTHROMBIN TIME: 15.5 SECONDS (ref 11.6–14.5)
RBC # BLD AUTO: 2.89 MILLION/UL (ref 3.88–5.62)
SODIUM SERPL-SCNC: 139 MMOL/L (ref 135–147)
WBC # BLD AUTO: 10.04 THOUSAND/UL (ref 4.31–10.16)

## 2024-07-21 PROCEDURE — 83735 ASSAY OF MAGNESIUM: CPT

## 2024-07-21 PROCEDURE — 80069 RENAL FUNCTION PANEL: CPT

## 2024-07-21 PROCEDURE — 99232 SBSQ HOSP IP/OBS MODERATE 35: CPT | Performed by: INTERNAL MEDICINE

## 2024-07-21 PROCEDURE — 85610 PROTHROMBIN TIME: CPT | Performed by: INTERNAL MEDICINE

## 2024-07-21 PROCEDURE — 85027 COMPLETE CBC AUTOMATED: CPT

## 2024-07-21 PROCEDURE — 99232 SBSQ HOSP IP/OBS MODERATE 35: CPT | Performed by: UROLOGY

## 2024-07-21 RX ORDER — POTASSIUM CHLORIDE 20 MEQ/1
40 TABLET, EXTENDED RELEASE ORAL ONCE
Status: DISCONTINUED | OUTPATIENT
Start: 2024-07-21 | End: 2024-07-23 | Stop reason: HOSPADM

## 2024-07-21 RX ADMIN — SENNOSIDES AND DOCUSATE SODIUM 1 TABLET: 50; 8.6 TABLET ORAL at 22:14

## 2024-07-21 RX ADMIN — OXYBUTYNIN CHLORIDE 5 MG: 5 TABLET ORAL at 22:14

## 2024-07-21 RX ADMIN — PRAVASTATIN SODIUM 40 MG: 40 TABLET ORAL at 18:05

## 2024-07-21 RX ADMIN — CEFAZOLIN SODIUM 2000 MG: 2 SOLUTION INTRAVENOUS at 02:03

## 2024-07-21 RX ADMIN — LORAZEPAM 0.5 MG: 0.5 TABLET ORAL at 12:55

## 2024-07-21 RX ADMIN — CEFAZOLIN SODIUM 2000 MG: 2 SOLUTION INTRAVENOUS at 12:56

## 2024-07-21 RX ADMIN — WARFARIN SODIUM 5 MG: 5 TABLET ORAL at 18:05

## 2024-07-21 RX ADMIN — FOLIC ACID 1 MG: 1 TABLET ORAL at 12:55

## 2024-07-21 RX ADMIN — METOPROLOL TARTRATE 50 MG: 50 TABLET, FILM COATED ORAL at 22:14

## 2024-07-21 RX ADMIN — OXYBUTYNIN CHLORIDE 5 MG: 5 TABLET ORAL at 12:55

## 2024-07-21 RX ADMIN — LORAZEPAM 0.5 MG: 0.5 TABLET ORAL at 22:14

## 2024-07-21 RX ADMIN — CEFAZOLIN SODIUM 2000 MG: 2 SOLUTION INTRAVENOUS at 18:03

## 2024-07-21 RX ADMIN — METOPROLOL TARTRATE 50 MG: 50 TABLET, FILM COATED ORAL at 12:55

## 2024-07-21 RX ADMIN — OXYBUTYNIN CHLORIDE 5 MG: 5 TABLET ORAL at 18:05

## 2024-07-21 NOTE — PROGRESS NOTES
[facility]  Progress Note  Name: Francis Caballero I  MRN: [id number]  Unit/Bed#: [facility] 901-01 I Date of Admission: 7/12/2024   Date of Service: 7/21/2024 I Hospital Day: 9    Assessment & Plan   Malignant 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 (HCC)  Assessment & Plan  S/p cystoscopy, TURBT and ureteral stent exchange 7/12 for high-grade muscle invasive bladder cancer with prior chemotherapy and radiation.  Afebrile. HD stable. No extremis.  Hgb 7.3--> patient's hemoglobin stable past 3 days at 8.4.  With repeat today trending upwards at 9.3.  Urethral Gracia catheter in place draining [place] colored urine.  No clots.  Anticipate intermittent hematuria with ureteral stent.  Continues to be mild.    Plan:    Anticoagulation on hold currently consider restarting after 48 hours of clear yellow urine.  Continue to trend labs and transfuse per primary team  Urine culture no growth, on Cefazolin  Gracia--continue to wean off manual irrigations.  Currently every shift x 2 days              Urology to follow peripherally.    Subjective/Objective   Chief Complaint:     Subjective: Sitting comfortably in bed no acute distress denies any troubles with Gracia catheter.    Objective:     Blood pressure 133/77, pulse 81, temperature 98.6 °F (37 °C), resp. rate 18, height 6' (1.829 m), weight 73 kg (160 lb 15 oz), SpO2 99%.,Body mass index is 21.83 kg/m².      Intake/Output Summary (Last 24 hours) at 7/21/2024 0813  Last data filed at 7/21/2024 0600  Gross per 24 hour   Intake 380 ml   Output 2300 ml   Net -1920 ml       Invasive Devices       Peripheral Intravenous Line  Duration             Peripheral IV 07/18/24 Right;Ventral (anterior) Forearm 2 days              Drain  Duration             Urethral Catheter Three way 20 Fr. 9 days                    Physical Exam  Constitutional:       General: He is not in acute distress.     Appearance: He is normal weight. He is not ill-appearing,  toxic-appearing or diaphoretic.   HENT:      Head: Normocephalic and atraumatic.      Right Ear: External ear normal.      Left Ear: External ear normal.      Nose: Nose normal.      Mouth/Throat:      Pharynx: Oropharynx is clear.   Eyes:      Conjunctiva/sclera: Conjunctivae normal.   Cardiovascular:      Rate and Rhythm: Normal rate and regular rhythm.   Pulmonary:      Effort: Pulmonary effort is normal. No respiratory distress.   Abdominal:      General: Bowel sounds are normal. There is no distension.      Tenderness: There is no abdominal tenderness.   Genitourinary:     Comments: [place] colored urine within urethral Gracia catheter tubing.  Neurological:      Mental Status: He is alert.           Lab, Imaging and other studies:  Lab Results   Component Value Date    WBC 10.04 07/21/2024    HGB 9.3 (L) 07/21/2024    HCT 28.4 (L) 07/21/2024    MCV 98 07/21/2024     (H) 07/21/2024     Lab Results   Component Value Date    SODIUM 139 07/21/2024    K 4.0 07/21/2024     07/21/2024    CO2 22 07/21/2024    BUN 10 07/21/2024    CREATININE 1.60 (H) 07/21/2024    GLUC 70 07/21/2024    CALCIUM 9.0 07/21/2024       VTE Pharmacologic Prophylaxis: Reason for no pharmacologic prophylaxis currently on hold due to gross hematuria and acute blood loss anemia this admission.  Plan to restart in 48 hours after clear urine  VTE Mechanical Prophylaxis: sequential compression device      Gina Perez PA-C

## 2024-07-21 NOTE — ASSESSMENT & PLAN NOTE
S/p cystoscopy, TURBT and ureteral stent exchange 7/12 for high-grade muscle invasive bladder cancer with prior chemotherapy and radiation  S/p TXA administration 7/18  Hgb 9.1, stable  Gracia catheter draining Pyr[place] colored urine status post 48 hours of anticoagulation (Coumadin)  Urine culture no growth, on Cefazolin  Discontinue Gracia catheter  Initiate voiding trial  Discharge home per primary team if patient is voiding without hematuria  notify urology for any concerns or recurrent hematuria  Follow-up with urology outpatient

## 2024-07-21 NOTE — PROGRESS NOTES
[facility]  Progress Note  Name: Francis Caballero I  MRN: [id number]  Unit/Bed#: [facility] 901-01 I Date of Admission: 7/12/2024   Date of Service: 7/21/2024 I Hospital Day: 9    Assessment & Plan   Hematuria  Assessment & Plan  Urology following, appreciate recs   Continues to have hematuria s/p cystosocpy/TURBT 7/12/24  Maintain irizarry and continue flushes  Continue holding anticoagulation until hematuria improves   Given TXA 7/18      LELE (acute kidney injury) (HCC)  Assessment & Plan  Patient has chronic kidney disease baseline creatinine is 1.5-1.8  Patient's acute kidney injury from earlier in hospitalization has resolved  Nephrology following  Daily bmp    Symptomatic anemia  Assessment & Plan  Hgb on arrival 5.8 in setting of hematuria  S/p 3 units PRBC  7/18 given cyanocobalamin injection daily x 2 doses for macrocytic anemia  Venofer 300 mg IV x 1 given on going hematuria  Given TXA 7/18      Malignant 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 (HCC)  Assessment & Plan  Follows with outpatient urology   S/p chemo and radiation   7/12 he underwent cystoscopy, TURBT for ongoing hematuria with biopsy taken  Later the same night, patient was brought to the ED for fever, pale/clammy skin, dizziness, and weakness   See shock for full plan    Benign essential hypertension  Assessment & Plan  Continue home lopressor    Atrial fibrillation (HCC)  Assessment & Plan  Metoprolol was being held for labile BP, resume now that BP consistently high  Warfarin resumed today will monitor urine output    Anxiety  Assessment & Plan  Continue xanax 0.5mg TID PRN anxiety    * Shock (HCC)  Assessment & Plan  Patient was brought to the ED for fever, pale/clammy skin, dizziness, and weakness after TURBT, cystoscopy earlier in the day and met sirs criteria with possible urinary infection as source. Became hypotensive and initially required pressors in ICU.  Hgb on arrival 5.8, s/p 3 units PRBC  CT  abd/pelvis: Interval removal of previously seen bladder mass as well as interval placement of right-sided nephroureteral stent, this appears appropriately positioned. Mild to moderate right hydroureter. Previously seen right-sided hydronephrosis is intervally improved. Small amount of air in the right renal collecting system as well as right-sided perinephric fat stranding could be related to recent instrumentation and/or infection.  Partially distended bladder. Mild to moderate circumferential bladder wall thickening. Air within the bladder lumen, possibly related to recent instrumentation/intervention; superimposed cystitis considered in the appropriate clinical setting. Correlation with the patient's symptoms, laboratory values, and urinalysis recommended  Urine culture negative however patient was given dose of cefazolin that could have resulted in false negative   Blood cultures 1/2 positive for gram negative rods- klebsiella. He had been treated with zosyn and vanco prior to the cultures. He has since been changed to cefepime and now that cultures are sensitive to klebsiella switched to ancef in which he can be changed to keflex at time of discharge to complete course  BP has remained stable  Remains afebrile without leukocytosis  Continue to monitor    Positive blood culture-resolved as of 7/18/2024  Assessment & Plan  1/2 blood cultures positive for gram negative rods with detected Klebsiella pneumoniae  Cefepime recommended - continue   Will d/c vancomycin at this time   Curbside to ID regarding suspected source - given he received 1 dose of ancef prior to urine culture, could be false negative given urine culture without growth.   Continue above abx for now and adjust as susceptibilities finalize               VTE Pharmacologic Prophylaxis:   Moderate Risk (Score 3-4) - Pharmacological DVT Prophylaxis Ordered: warfarin (Coumadin).    Mobility:   Basic Mobility Inpatient Raw Score: 17  [facility] Goal: 5: Stand  one or more mins  JH-HLM Achieved: 6: Walk 10 steps or more  JH-HLM Goal achieved. Continue to encourage appropriate mobility.    Patient Centered Rounds: I performed bedside rounds with nursing staff today.   Discussions with Specialists or Other Care Team Provider:     Education and Discussions with Family / Patient: Updated  (wife) via phone.    Total Time Spent on Date of Encounter in care of patient:  mins. This time was spent on one or more of the following: performing physical exam; counseling and coordination of care; obtaining or reviewing history; documenting in the medical record; reviewing/ordering tests, medications or procedures; communicating with other healthcare professionals and discussing with patient's family/caregivers.    Current Length of Stay: 9 day(s)  Current Patient Status: Inpatient   Certification Statement: The patient will continue to require additional inpatient hospital stay due to placement of pcn possible -monitorig hematuria  Discharge Plan: Anticipate discharge in >72 hrs to home.    Code Status: Level 1 - Full Code    Subjective:   Patient denies any acute complaints    Objective:     Vitals:   Temp (24hrs), Av.4 °F (36.9 °C), Min:97.7 °F (36.5 °C), Max:99 °F (37.2 °C)    Temp:  [97.7 °F (36.5 °C)-99 °F (37.2 °C)] 98.2 °F (36.8 °C)  HR:  [81-82] 81  Resp:  [16-18] 17  BP: (126-146)/(75-80) 146/80  SpO2:  [97 %-100 %] 99 %  Body mass index is 21.83 kg/m².     Input and Output Summary (last 24 hours):     Intake/Output Summary (Last 24 hours) at 2024 1550  Last data filed at 2024 1301  Gross per 24 hour   Intake 330 ml   Output 1450 ml   Net -1120 ml       Physical Exam:   Physical Exam  Vitals and nursing note reviewed.   Constitutional:       General: He is not in acute distress.     Appearance: He is well-developed. He is not toxic-appearing or diaphoretic.   HENT:      Head: Normocephalic and atraumatic.   Eyes:      General: No scleral icterus.      Conjunctiva/sclera: Conjunctivae normal.   Cardiovascular:      Rate and Rhythm: Normal rate and regular rhythm.      Heart sounds: No murmur heard.     No friction rub. No gallop.   Pulmonary:      Effort: Pulmonary effort is normal. No respiratory distress.      Breath sounds: Normal breath sounds. No stridor. No wheezing, rhonchi or rales.   Chest:      Chest wall: No tenderness.   Abdominal:      General: There is no distension.      Palpations: Abdomen is soft. There is no mass.      Tenderness: There is no abdominal tenderness. There is no guarding or rebound.      Hernia: No hernia is present.   Genitourinary:     Comments: [place] colored urine  Musculoskeletal:         General: No swelling or tenderness.      Cervical back: Neck supple.      Right lower leg: No edema.      Left lower leg: No edema.   Skin:     General: Skin is warm and dry.      Capillary Refill: Capillary refill takes less than 2 seconds.   Neurological:      Mental Status: He is alert and oriented to person, place, and time.   Psychiatric:         Mood and Affect: Mood normal.          Additional Data:     Labs:  Results from last 7 days   Lab Units 07/21/24  0433 07/18/24  0622 07/16/24  0430   WBC Thousand/uL 10.04   < > 8.32   HEMOGLOBIN g/dL 9.3*   < > 7.3*   HEMATOCRIT % 28.4*   < > 23.0*   PLATELETS Thousands/uL 415*   < > 184   SEGS PCT %  --   --  71   LYMPHO PCT %  --   --  13*   MONO PCT %  --   --  9   EOS PCT %  --   --  4    < > = values in this interval not displayed.     Results from last 7 days   Lab Units 07/21/24  0433   SODIUM mmol/L 139   POTASSIUM mmol/L 4.0   CHLORIDE mmol/L 106   CO2 mmol/L 22   BUN mg/dL 10   CREATININE mg/dL 1.60*   ANION GAP mmol/L 11   CALCIUM mg/dL 9.0   ALBUMIN g/dL 3.3*   GLUCOSE RANDOM mg/dL 70     Results from last 7 days   Lab Units 07/21/24  0433   INR  1.25*                   Lines/Drains:  Invasive Devices       Peripheral Intravenous Line  Duration             Peripheral IV  07/18/24 Right;Ventral (anterior) Forearm 3 days              Drain  Duration             Urethral Catheter Three way 20 Fr. 9 days                  Urinary Catheter:  Goal for removal: N/A- Discharging with Gracia               Imaging: No pertinent imaging reviewed.    Recent Cultures (last 7 days):         Last 24 Hours Medication List:   Current Facility-Administered Medications   Medication Dose Route Frequency Provider Last Rate    cefazolin  2,000 mg Intravenous Q8H DERICK Hooks-C 2,000 mg (07/21/24 1256)    [START ON 7/22/2024] vitamin B-12  1,000 mcg Oral Daily Jairo Apodaca DO      folic acid  1 mg Oral Daily Gina Clemons PA-C      lidocaine  1 Application Urethral Daily PRN Gina Clemons PA-C      LORazepam  0.5 mg Oral Q8H PRN Gina Clemons PA-C      metoprolol tartrate  50 mg Oral Q12H RADHA DERICK Hooks-MARIELA      oxybutynin  5 mg Oral TID Gina Clemons PA-C      potassium chloride  40 mEq Oral Once Jairo Apodaca DO      pravastatin  40 mg Oral Daily With Dinner Gina Clemons PA-C      senna-docusate sodium  1 tablet Oral HS Gina Clemons PA-C      warfarin  5 mg Oral Daily (warfarin) Jairo Apodaca DO          Today, Patient Was Seen By: Jairo Apodaca DO    **Please Note: This note may have been constructed using a voice recognition system.**

## 2024-07-21 NOTE — ASSESSMENT & PLAN NOTE
Metoprolol was being held for labile BP, resume now that BP consistently high  Warfarin resumed today will monitor urine output

## 2024-07-22 ENCOUNTER — TELEPHONE (OUTPATIENT)
Dept: OTHER | Facility: HOSPITAL | Age: 84
End: 2024-07-22

## 2024-07-22 LAB
ANION GAP SERPL CALCULATED.3IONS-SCNC: 11 MMOL/L (ref 4–13)
ANISOCYTOSIS BLD QL SMEAR: PRESENT
BASOPHILS # BLD MANUAL: 0.19 THOUSAND/UL (ref 0–0.1)
BASOPHILS NFR MAR MANUAL: 2 % (ref 0–1)
BUN SERPL-MCNC: 10 MG/DL (ref 5–25)
CALCIUM SERPL-MCNC: 8.8 MG/DL (ref 8.4–10.2)
CHLORIDE SERPL-SCNC: 104 MMOL/L (ref 96–108)
CO2 SERPL-SCNC: 22 MMOL/L (ref 21–32)
CREAT SERPL-MCNC: 1.51 MG/DL (ref 0.6–1.3)
EOSINOPHIL # BLD MANUAL: 0.29 THOUSAND/UL (ref 0–0.4)
EOSINOPHIL NFR BLD MANUAL: 3 % (ref 0–6)
ERYTHROCYTE [DISTWIDTH] IN BLOOD BY AUTOMATED COUNT: 15.2 % (ref 11.6–15.1)
GFR SERPL CREATININE-BSD FRML MDRD: 42 ML/MIN/1.73SQ M
GIANT PLATELETS BLD QL SMEAR: PRESENT
GLUCOSE SERPL-MCNC: 90 MG/DL (ref 65–140)
HCT VFR BLD AUTO: 29.5 % (ref 36.5–49.3)
HGB BLD-MCNC: 9.1 G/DL (ref 12–17)
INR PPP: 1.21 (ref 0.84–1.19)
LYMPHOCYTES # BLD AUTO: 1.53 THOUSAND/UL (ref 0.6–4.47)
LYMPHOCYTES # BLD AUTO: 12 % (ref 14–44)
MCH RBC QN AUTO: 29.9 PG (ref 26.8–34.3)
MCHC RBC AUTO-ENTMCNC: 30.8 G/DL (ref 31.4–37.4)
MCV RBC AUTO: 97 FL (ref 82–98)
MONOCYTES # BLD AUTO: 0.96 THOUSAND/UL (ref 0–1.22)
MONOCYTES NFR BLD: 10 % (ref 4–12)
NEUTROPHILS # BLD MANUAL: 6.61 THOUSAND/UL (ref 1.85–7.62)
NEUTS SEG NFR BLD AUTO: 69 % (ref 43–75)
PLATELET # BLD AUTO: 410 THOUSANDS/UL (ref 149–390)
PLATELET BLD QL SMEAR: ADEQUATE
PMV BLD AUTO: 9.3 FL (ref 8.9–12.7)
POIKILOCYTOSIS BLD QL SMEAR: PRESENT
POLYCHROMASIA BLD QL SMEAR: PRESENT
POTASSIUM SERPL-SCNC: 4 MMOL/L (ref 3.5–5.3)
PROTHROMBIN TIME: 15.2 SECONDS (ref 11.6–14.5)
RBC # BLD AUTO: 3.04 MILLION/UL (ref 3.88–5.62)
RBC MORPH BLD: PRESENT
SCHISTOCYTES BLD QL SMEAR: PRESENT
SODIUM SERPL-SCNC: 137 MMOL/L (ref 135–147)
VARIANT LYMPHS # BLD AUTO: 4 %
WBC # BLD AUTO: 9.58 THOUSAND/UL (ref 4.31–10.16)

## 2024-07-22 PROCEDURE — 99232 SBSQ HOSP IP/OBS MODERATE 35: CPT | Performed by: NURSE PRACTITIONER

## 2024-07-22 PROCEDURE — 97535 SELF CARE MNGMENT TRAINING: CPT

## 2024-07-22 PROCEDURE — 85007 BL SMEAR W/DIFF WBC COUNT: CPT | Performed by: INTERNAL MEDICINE

## 2024-07-22 PROCEDURE — 99232 SBSQ HOSP IP/OBS MODERATE 35: CPT | Performed by: INTERNAL MEDICINE

## 2024-07-22 PROCEDURE — 85610 PROTHROMBIN TIME: CPT | Performed by: INTERNAL MEDICINE

## 2024-07-22 PROCEDURE — 85027 COMPLETE CBC AUTOMATED: CPT | Performed by: INTERNAL MEDICINE

## 2024-07-22 PROCEDURE — 80048 BASIC METABOLIC PNL TOTAL CA: CPT | Performed by: INTERNAL MEDICINE

## 2024-07-22 RX ORDER — OXYBUTYNIN CHLORIDE 5 MG/1
5 TABLET ORAL 3 TIMES DAILY PRN
Status: DISCONTINUED | OUTPATIENT
Start: 2024-07-22 | End: 2024-07-23 | Stop reason: HOSPADM

## 2024-07-22 RX ADMIN — CYANOCOBALAMIN TAB 500 MCG 1000 MCG: 500 TAB at 07:33

## 2024-07-22 RX ADMIN — OXYBUTYNIN CHLORIDE 5 MG: 5 TABLET ORAL at 07:11

## 2024-07-22 RX ADMIN — SENNOSIDES AND DOCUSATE SODIUM 1 TABLET: 50; 8.6 TABLET ORAL at 22:31

## 2024-07-22 RX ADMIN — CEFAZOLIN SODIUM 2000 MG: 2 SOLUTION INTRAVENOUS at 09:24

## 2024-07-22 RX ADMIN — METOPROLOL TARTRATE 50 MG: 50 TABLET, FILM COATED ORAL at 07:11

## 2024-07-22 RX ADMIN — METOPROLOL TARTRATE 50 MG: 50 TABLET, FILM COATED ORAL at 22:31

## 2024-07-22 RX ADMIN — CEFAZOLIN SODIUM 2000 MG: 2 SOLUTION INTRAVENOUS at 17:04

## 2024-07-22 RX ADMIN — WARFARIN SODIUM 5 MG: 5 TABLET ORAL at 17:04

## 2024-07-22 RX ADMIN — LORAZEPAM 0.5 MG: 0.5 TABLET ORAL at 19:25

## 2024-07-22 RX ADMIN — FOLIC ACID 1 MG: 1 TABLET ORAL at 07:11

## 2024-07-22 RX ADMIN — CEFAZOLIN SODIUM 2000 MG: 2 SOLUTION INTRAVENOUS at 01:31

## 2024-07-22 RX ADMIN — PRAVASTATIN SODIUM 40 MG: 40 TABLET ORAL at 17:04

## 2024-07-22 NOTE — PLAN OF CARE
Problem: OCCUPATIONAL THERAPY ADULT  Goal: Performs self-care activities at highest level of function for planned discharge setting.  See evaluation for individualized goals.  Description: Treatment Interventions: ADL retraining, Functional transfer training, Endurance training, Patient/family training, Equipment evaluation/education, Compensatory technique education, Continued evaluation, Energy conservation, Activityengagement          See flowsheet documentation for full assessment, interventions and recommendations.   Note: Limitation: Decreased ADL status, Decreased Safe judgement during ADL, Decreased endurance, Decreased self-care trans, Decreased high-level ADLs     Assessment: Pt seen for skilled OT treatment session from 1011 to 1047 w/ interventions focusing on ADL participation, activity tolerance, sitting tolerance, sitting balance, standing tolerance, standing balance, bed mobility , transfer skills, and fxnl mobility. Pt was agreeable and willing to participate in session. Pt engaged in the following tasks: S for grooming tasks standing at the sink vs seated EOB, S for UB ADLs, and mod-max A for LB ADLs. Pt also required S for bed mobility, min Ax1 for transfers, and CCG for fxnl mobility w/ RW for support. In comparison to previous session, pt demonstrated improvements in mobility tasks as he required less physical assistance to participate in fxnl mobility today. Pt continues to be functioning below baseline level as occupational performance remains limited by decreased ADL status, decreased activity tolerance, decreased endurance, decreased sitting tolerance, decreased sitting balance, decreased standing tolerance, decreased standing balance, decreased transfer skills, decreased fxnl mobility, decreased safety awareness, and decreased insight into deficits. From OT standpoint, recommend Level II (Moderate Resource Intensity) at time of d/c. Pt will benefit from continued OT treatment while in  acute care to address deficits as defined above and maximize level of functional independence with ADLs and functional mobility. Pt supine in bed w/ alarm activated and all needs met at end of session.     Rehab Resource Intensity Level, OT: II (Moderate Resource Intensity)

## 2024-07-22 NOTE — PROGRESS NOTES
Progress Note - Urology  Francis Caballero 1940, 84 y.o. male MRN: [id number]    Unit/Bed#: [facility] 901-01 Encounter: [id number]    Malignant 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 (HCC)  Assessment & Plan  S/p cystoscopy, TURBT and ureteral stent exchange 7/12 for high-grade muscle invasive bladder cancer with prior chemotherapy and radiation  S/p TXA administration 7/18  Hgb 9.1, stable  Gracia catheter draining [place] colored urine status post 48 hours of anticoagulation (Coumadin)  Urine culture no growth, on Cefazolin  Discontinue Gracia catheter  Initiate voiding trial  Discharge home per primary team if patient is voiding without hematuria  notify urology for any concerns or recurrent hematuria  Follow-up with urology outpatient            Urology will sign off but remain available for any further inpatient needs. Please feel free to contact the provider currently covering the Urology [facility] role for this campus with questions or concerns.       Subjective: Patient resting in bed.  Wife at bedside.  Gracia catheter draining.  Colored urine.  Patient reports hematuria resolved after treatment of TXA on Thursday 7/18.  Patient and wife are thankful that he did not require operative procedure on 7/19.  At this time we will plan to discontinue his Gracia catheter and have him follow-up with urology as an outpatient.  If his hematuria returns    24 HR EVENTS:   no significant events.      Patient has  no complaints.      Review of Systems   Constitutional:  Negative for activity change, appetite change, chills, fatigue, fever and unexpected weight change.   HENT:  Negative for facial swelling.    Eyes:  Negative for discharge.   Respiratory: Negative.  Negative for cough and shortness of breath.    Cardiovascular:  Negative for chest pain and leg swelling.   Gastrointestinal: Negative.  Negative for abdominal distention, abdominal pain, constipation, diarrhea, nausea and vomiting.   Endocrine: Negative.     Genitourinary: Negative.  Negative for decreased urine volume, dysuria, enuresis, flank pain, frequency, genital sores and hematuria.   Musculoskeletal:  Negative for back pain and myalgias.   Skin:  Negative for pallor and rash.   Allergic/Immunologic: Negative.  Negative for immunocompromised state.   Neurological:  Negative for facial asymmetry and speech difficulty.   Psychiatric/Behavioral:  Negative for agitation and confusion.        Objective:  Nursing Rounds: Bryn  Vitals: Blood pressure 124/74, pulse 85, temperature 97.8 °F (36.6 °C), resp. rate 17, height 6' (1.829 m), weight 73 kg (160 lb 15 oz), SpO2 98%.,Body mass index is 21.83 kg/m².  INS & OUTS:  I/O last 24 hours:  In: -   Out: 2000 [Urine:2000]    Physical Exam  Vitals and nursing note reviewed.   Constitutional:       General: He is not in acute distress.     Appearance: Normal appearance. He is not ill-appearing or toxic-appearing.   HENT:      Head: Normocephalic.   Cardiovascular:      Rate and Rhythm: Normal rate.   Pulmonary:      Effort: Pulmonary effort is normal. No respiratory distress.   Abdominal:      General: Abdomen is flat. There is no distension.   Genitourinary:     Comments: Gracia catheter draining Pyridium colored urine  Musculoskeletal:         General: No swelling.   Skin:     General: Skin is warm and dry.   Neurological:      General: No focal deficit present.      Mental Status: He is alert and oriented to person, place, and time.   Psychiatric:         Mood and Affect: Mood normal.         Behavior: Behavior normal.             Labs:  Recent Labs     07/20/24  0536 07/21/24  0433 07/22/24  0509   WBC 9.08 10.04 9.58       Recent Labs     07/20/24 0536 07/21/24  0433 07/22/24  0509   HGB 8.4* 9.3* 9.1*     Recent Labs     07/20/24 0536 07/21/24  0433 07/22/24  0509   HCT 26.9* 28.4* 29.5*     Recent Labs     07/20/24 0536 07/21/24  0433 07/22/24  0509   CREATININE 1.50* 1.60* 1.51*     Lab Results   Component Value  Date    HGB 9.1 (L) 07/22/2024    HCT 29.5 (L) 07/22/2024    WBC 9.58 07/22/2024     (H) 07/22/2024     Lab Results   Component Value Date     08/29/2016    K 4.0 07/22/2024     07/22/2024    CO2 22 07/22/2024    BUN 10 07/22/2024    CREATININE 1.51 (H) 07/22/2024    CALCIUM 8.8 07/22/2024    GLUCOSE 100 (H) 08/29/2016         History:    Past Medical History:   Diagnosis Date    Ambulates with cane     Anemia     last assessed: 05/23/2015    GERD (gastroesophageal reflux disease)     Hematuria     Hyperlipidemia     Hypertension     Intracerebral hemorrhage (HCC)     last assessed: 04/21/2014; right    Intracerebral hemorrhage (HCC)     last assessed: 11/23/2017    Mobility impaired     Stroke syndrome     last assessed: 06/13/2013    UTI (urinary tract infection)      Past Surgical History:   Procedure Laterality Date    BLADDER SURGERY      FL RETROGRADE PYELOGRAM  7/12/2024    IR NEPHROSTOMY TUBE PLACEMENT  7/27/2023    NM CYSTO W/IRRIG & EVAC MULTPLE OBSTRUCTING CLOTS N/A 7/12/2024    Procedure: CYSTOSCOPY EVACUATION OF CLOTS;  Surgeon: Craig Simon MD;  Location: [facility];  Service: Urology    NM CYSTO W/REMOVAL OF LESIONS SMALL N/A 7/12/2024    Procedure: (TURBT) cautery bleeding, clot evacuation;  Surgeon: Craig Simon MD;  Location: [facility];  Service: Urology    NM CYSTOURETHROSCOPY W/DEST &/RMVL TUMOR LARGE N/A 7/26/2023    Procedure: (TURBT);  Surgeon: Tha Carcamo MD;  Location: [facility];  Service: Urology     Family History   Problem Relation Age of Onset    Lung cancer Mother      Social History     Socioeconomic History    Marital status: /Civil Union     Spouse name: None    Number of children: None    Years of education: None    Highest education level: None   Occupational History    None   Tobacco Use    Smoking status: Every Day     Types: Cigars    Smokeless tobacco: Never    Tobacco comments:     Last cigar 7/10.24   Vaping Use    Vaping status: Never  Used   Substance and Sexual Activity    Alcohol use: Yes     Comment: Social     Drug use: No    Sexual activity: Not Currently   Other Topics Concern    None   Social History Narrative    None     Social Determinants of Health     Financial Resource Strain: Low Risk  (5/1/2023)    Overall Financial Resource Strain (CARDIA)     Difficulty of Paying Living Expenses: Not hard at all   Food Insecurity: No Food Insecurity (7/13/2024)    Hunger Vital Sign     Worried About Running Out of Food in the Last Year: Never true     Ran Out of Food in the Last Year: Never true   Transportation Needs: No Transportation Needs (7/13/2024)    PRAPARE - Transportation     Lack of Transportation (Medical): No     Lack of Transportation (Non-Medical): No   Physical Activity: Not on file   Stress: Not on file   Social Connections: Not on file   Intimate Partner Violence: Not on file   Housing Stability: Low Risk  (7/13/2024)    Housing Stability Vital Sign     Unable to Pay for Housing in the Last Year: No     Number of Times Moved in the Last Year: 0     Homeless in the Last Year: No       The following portions of the patient's history were reviewed and updated as appropriate: allergies, current medications, past family history, past medical history, past social history, past surgical history and problem list    ANASTASIYA Kearns  Date: 7/22/2024 Time: 1:33 PM

## 2024-07-22 NOTE — ASSESSMENT & PLAN NOTE
Patient was brought to the ED for fever, pale/clammy skin, dizziness, and weakness after TURBT, cystoscopy earlier in the day and met sirs criteria with possible urinary infection as source. Became hypotensive and initially required pressors in ICU.  Hgb on arrival 5.8, s/p 3 units PRBC  CT abd/pelvis: Interval removal of previously seen bladder mass as well as interval placement of right-sided nephroureteral stent, this appears appropriately positioned. Mild to moderate right hydroureter. Previously seen right-sided hydronephrosis is intervally improved. Small amount of air in the right renal collecting system as well as right-sided perinephric fat stranding could be related to recent instrumentation and/or infection.  Partially distended bladder. Mild to moderate circumferential bladder wall thickening. Air within the bladder lumen, possibly related to recent instrumentation/intervention; superimposed cystitis considered in the appropriate clinical setting. Correlation with the patient's symptoms, laboratory values, and urinalysis recommended  Urine culture negative however patient was given dose of cefazolin that could have resulted in false negative   Completed abx course  BP has remained stable  Remains afebrile without leukocytosis  Continue to monitor

## 2024-07-22 NOTE — OCCUPATIONAL THERAPY NOTE
Occupational Therapy Progress Note     Patient Name: Francis Caballero  Today's Date: 7/22/2024  Problem List  Principal Problem:    Shock (HCC)  Active Problems:    Anxiety    Atrial fibrillation (HCC)    Benign essential hypertension    Malignant 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 (HCC)    Symptomatic anemia    LELE (acute kidney injury) (HCC)    Hematuria       07/22/24 1047   OT Last Visit   OT Visit Date 07/22/24   Note Type   Note Type Treatment   Pain Assessment   Pain Assessment Tool 0-10   Pain Score No Pain   Restrictions/Precautions   Weight Bearing Precautions Per Order No   Other Precautions Cognitive;Chair Alarm;Bed Alarm;Multiple lines;Fall Risk   Lifestyle   Autonomy Pt reports I w/ ADLs, most IADLs, and fxnl mobility w/ SPC at baseline; - driving.   Reciprocal Relationships Pt lives w/ his spouse; reports she can assist w/ ADLs and IADLs as needed.   Service to Others Pt is retired.   Intrinsic Gratification Pt enjoys watching TV.   ADL   Where Assessed Edge of bed   Grooming Assistance 5  Supervision/Setup   Grooming Deficit Setup;Supervision/safety;Increased time to complete;Wash/dry face;Teeth care   Grooming Comments Pt brushed his teeth standing at the sink w/ RW and unilateral UE support on counter. Pt washed his face seated EOB.   UB Bathing Assistance 5  Supervision/Setup   UB Bathing Deficit Setup;Verbal cueing;Increased time to complete;Chest;Right arm;Left arm;Abdomen   LB Bathing Assistance 3  Moderate Assistance   LB Bathing Deficit Setup;Supervision/safety;Increased time to complete;Abdomen;Buttocks;Right upper leg;Left upper leg;Right lower leg including foot;Left lower leg including foot   UB Dressing Assistance 5  Supervision/Setup   UB Dressing Deficit Setup;Verbal cueing;Increased time to complete;Thread RUE;Thread LUE;Pull around back   LB Dressing Assistance 2  Maximal Assistance   LB Dressing Deficit Setup;Don/doff R sock;Don/doff L sock   Functional Standing Tolerance    Time ~3 minutes to brush his teeth standing at the sink   Activity Pt brushed his teeth standing at the sink w/ unilateral UE support on countertop. Pt able to reach outside of BESS for task items w/o any LOB.   Bed Mobility   Supine to Sit 5  Supervision   Additional items HOB elevated;Bedrails;Increased time required   Sit to Supine 5  Supervision   Additional items HOB elevated;Bedrails;Increased time required   Additional Comments Pt supine in bed at end of OT tx session w/ alarm activated and all needs within reach.   Transfers   Sit to Stand 4  Minimal assistance   Additional items Assist x 1;Increased time required;Verbal cues   Stand to Sit 4  Minimal assistance   Additional items Assist x 1;Increased time required;Verbal cues   Additional Comments w/ RW for support   Functional Mobility   Functional Mobility   (CCG)   Additional Comments Pt ambulated from EOB >< bathroom w/ CCG using RW for support.   Additional items Rolling walker   Cognition   Arousal/Participation Alert;Responsive;Cooperative   Attention Attends with cues to redirect   Following Commands Follows one step commands without difficulty   Comments Pt was pleasant, cooperative, and willing to participate in OT tx session. Pt continues to require increased verbal cues for safety w/ mobility.   Activity Tolerance   Activity Tolerance Patient tolerated treatment well   Medical Staff Made Aware RN clearance prior to session   Assessment   Assessment Pt seen for skilled OT treatment session from 1011 to 1047 w/ interventions focusing on ADL participation, activity tolerance, sitting tolerance, sitting balance, standing tolerance, standing balance, bed mobility , transfer skills, and fxnl mobility. Pt was agreeable and willing to participate in session. Pt engaged in the following tasks: S for grooming tasks standing at the sink vs seated EOB, S for UB ADLs, and mod-max A for LB ADLs. Pt also required S for bed mobility, min Ax1 for transfers, and  [facility] for fxnl mobility w/ RW for support. In comparison to previous session, pt demonstrated improvements in mobility tasks as he required less physical assistance to participate in fxnl mobility today. Pt continues to be functioning below baseline level as occupational performance remains limited by decreased ADL status, decreased activity tolerance, decreased endurance, decreased sitting tolerance, decreased sitting balance, decreased standing tolerance, decreased standing balance, decreased transfer skills, decreased fxnl mobility, decreased safety awareness, and decreased insight into deficits. From OT standpoint, recommend Level II (Moderate Resource Intensity) at time of d/c. Pt will benefit from continued OT treatment while in acute care to address deficits as defined above and maximize level of functional independence with ADLs and functional mobility. Pt supine in bed w/ alarm activated and all needs met at end of session.   Plan   Treatment Interventions ADL retraining;Functional transfer training;Endurance training;Patient/family training;Equipment evaluation/education;Compensatory technique education;Continued evaluation;Energy conservation;Activityengagement   Goal Expiration Date 07/30/24   OT Treatment Day 2   OT Frequency 2-3x/wk   Discharge Recommendation   Rehab Resource Intensity Level, OT II (Moderate Resource Intensity)   AM-PAC Daily Activity Inpatient   Lower Body Dressing 2   Bathing 2   Toileting 3   Upper Body Dressing 3   Grooming 3   Eating 4   Daily Activity Raw Score 17   Daily Activity Standardized Score (Calc for Raw Score >=11) 37.26   AM-PAC Applied Cognition Inpatient   Following a Speech/Presentation 4   Understanding Ordinary Conversation 4   Taking Medications 4   Remembering Where Things Are Placed or Put Away 4   Remembering List of 4-5 Errands 4   Taking Care of Complicated Tasks 3   Applied Cognition Raw Score 23   Applied Cognition Standardized Score 53.08       The  patient's raw score on the AM-PAC Daily Activity Inpatient Short Form is 17. A raw score of less than 19 suggests the patient may benefit from discharge to post-acute rehabilitation services. Please refer to the recommendation of the Occupational Therapist for safe discharge planning.    DANIELITO Rivera, OTR/L

## 2024-07-22 NOTE — TELEPHONE ENCOUNTER
Patient seen in consultation for gross hematuria.  Had TXA during hospitalization.  Please make sure he has follow-up scheduled to discuss pathology and evaluate for any recurrent hematuria

## 2024-07-22 NOTE — RESTORATIVE TECHNICIAN NOTE
Restorative Technician Note      Patient Name: Francis Caballero     Restorative Tech Visit Date: 07/22/24  Note Type: Mobility  Patient Position Upon Consult: Supine  Activity Performed: Ambulated  Assistive Device: Roller walker  Patient Position at End of Consult: Supine; All needs within reach; Bed/Chair alarm activated  Nurse Communication: Nurse aware of consult, application of brace    Dagoberto Mancia, Restorative Technician

## 2024-07-22 NOTE — RESTORATIVE TECHNICIAN NOTE
"       Restorative Technician Note      Patient Name: Francis Caballero     Pt declined ambulation stating \"He was waiting for his wife and the doctor\" explained that we could walk before both and pt stated \"He didn't feel like walking at that time\"    Dagoberto Mancia, Restorative Technician               "

## 2024-07-22 NOTE — PLAN OF CARE
Problem: GENITOURINARY - ADULT  Goal: Maintains or returns to baseline urinary function  Description: INTERVENTIONS:  - Assess urinary function  - Encourage oral fluids to ensure adequate hydration if ordered  - Administer IV fluids as ordered to ensure adequate hydration  - Administer ordered medications as needed  - Offer frequent toileting  - Follow urinary retention protocol if ordered  Outcome: Progressing     Problem: Nutrition/Hydration-ADULT  Goal: Nutrient/Hydration intake appropriate for improving, restoring or maintaining nutritional needs  Description: Monitor and assess patient's nutrition/hydration status for malnutrition. Collaborate with interdisciplinary team and initiate plan and interventions as ordered.  Monitor patient's weight and dietary intake as ordered or per policy. Utilize nutrition screening tool and intervene as necessary. Determine patient's food preferences and provide high-protein, high-caloric foods as appropriate.     INTERVENTIONS:  - Monitor oral intake, urinary output, labs, and treatment plans  - Assess nutrition and hydration status and recommend course of action  - Evaluate amount of meals eaten  - Assist patient with eating if necessary   - Allow adequate time for meals  - Recommend/ encourage appropriate diets, oral nutritional supplements, and vitamin/mineral supplements  - Order, calculate, and assess calorie counts as needed  - Recommend, monitor, and adjust tube feedings and TPN/PPN based on assessed needs  - Assess need for intravenous fluids  - Provide specific nutrition/hydration education as appropriate  - Include patient/family/caregiver in decisions related to nutrition  Outcome: Progressing

## 2024-07-22 NOTE — ASSESSMENT & PLAN NOTE
Urology following, appreciate recs   Continues to have hematuria s/p cystosocpy/TURBT 7/12/24  Maintain irizarry and continue flushes  Warfarin resumed, hgbs table, no further hematuria today  Given TXA 7/18

## 2024-07-22 NOTE — CASE MANAGEMENT
Case Management Progress Note    Patient name Francis Caballero  Location [facility] 901/[facility] 901- MRN [id number]  : 1940 Date 2024       LOS (days): 10  Geometric Mean LOS (GMLOS) (days): 5.1  Days to GMLOS:-4.5        OBJECTIVE:        Current admission status: Inpatient  Preferred Pharmacy:   [facility]/pharmacy #[id number] - [place], [place] - [address].  [address] [address]  Phone: [phone] Fax: [phone]    [place], [place] - [address] [place] [address]  Phone: [phone] Fax: [phone]    Primary Care Provider: Chantel Santos DO    Primary Insurance: BLUE CROSS  REP  Secondary Insurance:     PROGRESS NOTE: CM completed chart review, pt not medically cleared for discharge at this time. CM will continue to follow for discharge planning needs.

## 2024-07-22 NOTE — PROGRESS NOTES
[facility]  Progress Note  Name: Francis Caballero I  MRN: [id number]  Unit/Bed#: [facility] 901-01 I Date of Admission: 7/12/2024   Date of Service: 7/22/2024 I Hospital Day: 10    Assessment & Plan   Hematuria  Assessment & Plan  Urology following, appreciate recs   Continues to have hematuria s/p cystosocpy/TURBT 7/12/24  Maintain irizarry and continue flushes  Warfarin resumed, hgbs table, no further hematuria today  Given TXA 7/18      LELE (acute kidney injury) (Formerly [facility])  Assessment & Plan  Patient has chronic kidney disease baseline creatinine is 1.5-1.8  Patient's acute kidney injury from earlier in hospitalization has resolved  Nephrology following  Daily bmp    Symptomatic anemia  Assessment & Plan  Hgb on arrival 5.8 in setting of hematuria  S/p 3 units PRBC  7/18 given cyanocobalamin injection daily x 2 doses for macrocytic anemia  Venofer 300 mg IV x 1 given on going hematuria  Given TXA 7/18      Malignant 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 (HCC)  Assessment & Plan  Follows with outpatient urology   S/p chemo and radiation   7/12 he underwent cystoscopy, TURBT for ongoing hematuria with biopsy taken  Later the same night, patient was brought to the ED for fever, pale/clammy skin, dizziness, and weakness   See shock for full plan    Benign essential hypertension  Assessment & Plan  Continue home lopressor    Atrial fibrillation (HCC)  Assessment & Plan  Metoprolol was being held for labile BP, resume now that BP consistently high  Warfarin resumed will monitor urine output    Anxiety  Assessment & Plan  Continue xanax 0.5mg TID PRN anxiety    * Shock (HCC)  Assessment & Plan  Patient was brought to the ED for fever, pale/clammy skin, dizziness, and weakness after TURBT, cystoscopy earlier in the day and met sirs criteria with possible urinary infection as source. Became hypotensive and initially required pressors in ICU.  Hgb on arrival 5.8, s/p 3 units PRBC  CT  abd/pelvis: Interval removal of previously seen bladder mass as well as interval placement of right-sided nephroureteral stent, this appears appropriately positioned. Mild to moderate right hydroureter. Previously seen right-sided hydronephrosis is intervally improved. Small amount of air in the right renal collecting system as well as right-sided perinephric fat stranding could be related to recent instrumentation and/or infection.  Partially distended bladder. Mild to moderate circumferential bladder wall thickening. Air within the bladder lumen, possibly related to recent instrumentation/intervention; superimposed cystitis considered in the appropriate clinical setting. Correlation with the patient's symptoms, laboratory values, and urinalysis recommended  Urine culture negative however patient was given dose of cefazolin that could have resulted in false negative   Completed abx course  BP has remained stable  Remains afebrile without leukocytosis  Continue to monitor    Positive blood culture-resolved as of 7/18/2024  Assessment & Plan  1/2 blood cultures positive for gram negative rods with detected Klebsiella pneumoniae  Cefepime recommended - continue   Will d/c vancomycin at this time   Curbside to ID regarding suspected source - given he received 1 dose of ancef prior to urine culture, could be false negative given urine culture without growth.   Dc abx               VTE Pharmacologic Prophylaxis:   Moderate Risk (Score 3-4) - Pharmacological DVT Prophylaxis Ordered: warfarin (Coumadin).    Mobility:   Basic Mobility Inpatient Raw Score: 17  JH-HLM Goal: 5: Stand one or more mins  JH-HLM Achieved: 6: Walk 10 steps or more  JH-HLM Goal achieved. Continue to encourage appropriate mobility.    Patient Centered Rounds: I performed bedside rounds with nursing staff today.   Discussions with Specialists or Other Care Team Provider:     Education and Discussions with Family / Patient: Updated   (wife) at bedside.    Total Time Spent on Date of Encounter in care of patient:  mins. This time was spent on one or more of the following: performing physical exam; counseling and coordination of care; obtaining or reviewing history; documenting in the medical record; reviewing/ordering tests, medications or procedures; communicating with other healthcare professionals and discussing with patient's family/caregivers.    Current Length of Stay: 10 day(s)  Current Patient Status: Inpatient   Certification Statement: The patient will continue to require additional inpatient hospital stay due to monitoring for hematuria  Discharge Plan: Anticipate discharge tomorrow to home with home services.    Code Status: Level 1 - Full Code    Subjective:   Patient denies any acute complaints    Objective:     Vitals:   Temp (24hrs), Av.9 °F (36.6 °C), Min:97.8 °F (36.6 °C), Max:98.2 °F (36.8 °C)    Temp:  [97.8 °F (36.6 °C)-98.2 °F (36.8 °C)] 97.8 °F (36.6 °C)  HR:  [81-85] 83  Resp:  [17] 17  BP: (120-142)/(59-80) 142/80  SpO2:  [98 %-100 %] 98 %  Body mass index is 21.83 kg/m².     Input and Output Summary (last 24 hours):     Intake/Output Summary (Last 24 hours) at 2024 1752  Last data filed at 2024 1533  Gross per 24 hour   Intake --   Output 1850 ml   Net -1850 ml       Physical Exam:   Physical Exam  Vitals and nursing note reviewed.   Constitutional:       General: He is not in acute distress.     Appearance: He is well-developed. He is not toxic-appearing or diaphoretic.   HENT:      Head: Normocephalic and atraumatic.   Eyes:      General: No scleral icterus.     Conjunctiva/sclera: Conjunctivae normal.   Cardiovascular:      Rate and Rhythm: Normal rate and regular rhythm.      Heart sounds: No murmur heard.     No friction rub. No gallop.   Pulmonary:      Effort: Pulmonary effort is normal. No respiratory distress.      Breath sounds: Normal breath sounds. No stridor. No wheezing, rhonchi or rales.    Chest:      Chest wall: No tenderness.   Abdominal:      General: There is no distension.      Palpations: Abdomen is soft. There is no mass.      Tenderness: There is no abdominal tenderness. There is no guarding or rebound.      Hernia: No hernia is present.   Musculoskeletal:         General: No swelling or tenderness.      Cervical back: Neck supple.   Skin:     General: Skin is warm and dry.      Capillary Refill: Capillary refill takes less than 2 seconds.   Neurological:      Mental Status: He is alert and oriented to person, place, and time.   Psychiatric:         Mood and Affect: Mood normal.          Additional Data:     Labs:  Results from last 7 days   Lab Units 07/22/24  0509 07/18/24  0622 07/16/24  0430   WBC Thousand/uL 9.58   < > 8.32   HEMOGLOBIN g/dL 9.1*   < > 7.3*   HEMATOCRIT % 29.5*   < > 23.0*   PLATELETS Thousands/uL 410*   < > 184   SEGS PCT %  --   --  71   LYMPHO PCT % 12*  --  13*   MONO PCT % 10  --  9   EOS PCT % 3  --  4    < > = values in this interval not displayed.     Results from last 7 days   Lab Units 07/22/24  0509 07/21/24  0433   SODIUM mmol/L 137 139   POTASSIUM mmol/L 4.0 4.0   CHLORIDE mmol/L 104 106   CO2 mmol/L 22 22   BUN mg/dL 10 10   CREATININE mg/dL 1.51* 1.60*   ANION GAP mmol/L 11 11   CALCIUM mg/dL 8.8 9.0   ALBUMIN g/dL  --  3.3*   GLUCOSE RANDOM mg/dL 90 70     Results from last 7 days   Lab Units 07/22/24  0509   INR  1.21*                   Lines/Drains:  Invasive Devices       Peripheral Intravenous Line  Duration             Peripheral IV 07/22/24 Left;Ventral (anterior) Forearm <1 day    Peripheral IV 07/22/24 Right Antecubital <1 day                          Imaging: No pertinent imaging reviewed.    Recent Cultures (last 7 days):         Last 24 Hours Medication List:   Current Facility-Administered Medications   Medication Dose Route Frequency Provider Last Rate    vitamin B-12  1,000 mcg Oral Daily Jairo Apodaca DO      folic acid  1 mg Oral Daily  Gina Clemons PA-C      lidocaine  1 Application Urethral Daily PRN Gina Clemons PA-C      LORazepam  0.5 mg Oral Q8H PRN Gina Clemons PA-C      metoprolol tartrate  50 mg Oral Q12H [facility] Gina Clemons PA-C      oxybutynin  5 mg Oral TID PRN Pavithra Garcia, CRNP      potassium chloride  40 mEq Oral Once Jairo Apodaca DO      pravastatin  40 mg Oral Daily With Dinner Gina Clemons PA-C      senna-docusate sodium  1 tablet Oral HS Gina Clemons PA-C      warfarin  5 mg Oral Daily (warfarin) Jairo Apodaca DO          Today, Patient Was Seen By: Jairo Apodaca DO    **Please Note: This note may have been constructed using a voice recognition system.**

## 2024-07-22 NOTE — ASSESSMENT & PLAN NOTE
Metoprolol was being held for labile BP, resume now that BP consistently high  Warfarin resumed will monitor urine output

## 2024-07-22 NOTE — ASSESSMENT & PLAN NOTE
1/2 blood cultures positive for gram negative rods with detected Klebsiella pneumoniae  Cefepime recommended - continue   Will d/c vancomycin at this time   Curbside to ID regarding suspected source - given he received 1 dose of ancef prior to urine culture, could be false negative given urine culture without growth.   Dc abx

## 2024-07-23 ENCOUNTER — TRANSITIONAL CARE MANAGEMENT (OUTPATIENT)
Dept: FAMILY MEDICINE CLINIC | Facility: CLINIC | Age: 84
End: 2024-07-23

## 2024-07-23 VITALS
SYSTOLIC BLOOD PRESSURE: 136 MMHG | WEIGHT: 152.56 LBS | TEMPERATURE: 98.1 F | DIASTOLIC BLOOD PRESSURE: 76 MMHG | HEIGHT: 72 IN | HEART RATE: 82 BPM | OXYGEN SATURATION: 98 % | BODY MASS INDEX: 20.66 KG/M2 | RESPIRATION RATE: 18 BRPM

## 2024-07-23 DIAGNOSIS — R31.0 GROSS HEMATURIA: Primary | ICD-10-CM

## 2024-07-23 PROCEDURE — 99239 HOSP IP/OBS DSCHRG MGMT >30: CPT | Performed by: INTERNAL MEDICINE

## 2024-07-23 RX ORDER — OXYBUTYNIN CHLORIDE 5 MG/1
5 TABLET ORAL 3 TIMES DAILY PRN
Qty: 20 TABLET | Refills: 0 | Status: SHIPPED | OUTPATIENT
Start: 2024-07-23

## 2024-07-23 RX ORDER — FOLIC ACID 1 MG/1
1 TABLET ORAL DAILY
Qty: 90 TABLET | Refills: 0 | Status: SHIPPED | OUTPATIENT
Start: 2024-07-24

## 2024-07-23 RX ADMIN — FOLIC ACID 1 MG: 1 TABLET ORAL at 07:08

## 2024-07-23 RX ADMIN — METOPROLOL TARTRATE 50 MG: 50 TABLET, FILM COATED ORAL at 07:08

## 2024-07-23 RX ADMIN — CYANOCOBALAMIN TAB 500 MCG 1000 MCG: 500 TAB at 07:08

## 2024-07-23 NOTE — PLAN OF CARE
Problem: GENITOURINARY - ADULT  Goal: Maintains or returns to baseline urinary function  Description: INTERVENTIONS:  - Assess urinary function  - Encourage oral fluids to ensure adequate hydration if ordered  - Administer IV fluids as ordered to ensure adequate hydration  - Administer ordered medications as needed  - Offer frequent toileting  - Follow urinary retention protocol if ordered  Outcome: Progressing  Goal: Absence of urinary retention  Description: INTERVENTIONS:  - Assess patient’s ability to void and empty bladder  - Monitor I/O  - Bladder scan as needed  - Discuss with physician/AP medications to alleviate retention as needed  - Discuss catheterization for long term situations as appropriate  Outcome: Progressing

## 2024-07-23 NOTE — PROGRESS NOTES
Patient discharged home. Patient left with wife and was taken out in a wheelchair by the PCA [place]. Bilateral IVs were removed, damon  was left in room

## 2024-07-23 NOTE — TELEPHONE ENCOUNTER
Patient's spouse Daniel called after speaking with Dr Carcamo.    Daniel stated that Dr Carcamo told her that the patient can be seen by either Dr Mckee or TELLY in [place] for follow up visit.    Pt is scheduled on 9/9 at 11:20 AM with TELLY Scherer to discuss next steps.    Call back if needed [phone]

## 2024-07-23 NOTE — CASE MANAGEMENT
Case Management Discharge Planning Note    Patient name Francis Caballero  Location [facility] 901/[facility] 901-01 MRN [id number]  : 1940 Date 2024       Current Admission Date: 2024  Current Admission Diagnosis:Shock (HCC)   Patient Active Problem List    Diagnosis Date Noted Date Diagnosed    Hematuria 2024     Chronic kidney disease 2024     LELE (acute kidney injury) ([facility]) 07/15/2024     Chronic renal impairment 07/15/2024     Shock ([facility]) 2024     CVA (cerebral vascular accident) ([facility]) 2024     JORGE LUIS (obstructive sleep apnea) 2024     Mitral regurgitation 2024     Smoker 2024     Chronic anticoagulation 2024     Symptomatic anemia 2024     Intractable epilepsy without status epilepticus, unspecified epilepsy type ([facility]) 2024     Coagulation disorder ([facility]) 2023     Malignant 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 ([facility]) 06/15/2023     Other hydronephrosis 06/15/2023     Chronic cough 2023     Gastroesophageal reflux disease without esophagitis 2023     Sciatica, right side 2022     Chronic bilateral low back pain with right-sided sciatica 08/15/2021     DDD (degenerative disc disease), lumbar 08/15/2021     Essential hypertension 2021     Primary osteoarthritis of left hip 2020     Osteoarthritis of lumbar spine 2020     Frequent falls 2018     Weakness 2018     Cerebral artery occlusion 2017    Labyrinthitis, unspecified ear 2017    Pure hypercholesterolemia 2017    Gait instability 2017     Bilateral pseudophakia 2017     Age-related cataract of right eye 2017     Macular drusen 2017     Posterior vitreous detachment of both eyes 2017     Arthralgia 2014     Stage 3b chronic kidney disease (HCC) 2014     Asymptomatic cholelithiasis 2013     Benign prostatic hyperplasia 2013     Coronary  arteriosclerosis 06/20/2013     Hiatal hernia 06/20/2013     Anxiety 06/13/2013     Atrial fibrillation (HCC) 06/13/2013     Benign essential hypertension 06/13/2013     History of epilepsy 06/13/2013     Hyperlipidemia 06/13/2013       LOS (days): 11  Geometric Mean LOS (GMLOS) (days): 5.1  Days to GMLOS:-5.4     OBJECTIVE:  Risk of Unplanned Readmission Score: 21.3         Current admission status: Inpatient   Preferred Pharmacy:   [facility]/pharmacy #[id number] - [place], [place] - [address]  [address] [facility] [address]  [facility] [address]  Phone: [phone] Fax: [phone]    [facility] - [place], [place] - [address] [address]  Phone: [phone] Fax: [phone]    Primary Care Provider: Chantel Santos DO    Primary Insurance: [employer] REP  Secondary Insurance:     DISCHARGE DETAILS:    Discharge planning discussed with:: Pt  Freedom of Choice: Yes     CM contacted family/caregiver?: No- see comments (Pt alert and oriented)  Were Treatment Team discharge recommendations reviewed with patient/caregiver?: Yes  Did patient/caregiver verbalize understanding of patient care needs?: Yes  Were patient/caregiver advised of the risks associated with not following Treatment Team discharge recommendations?: Yes         Requested Home Health Care         Is the patient interested in HHC at discharge?: No    DME Referral Provided  Referral made for DME?: No    Other Referral/Resources/Interventions Provided:  Referral Comments: CM discussed STR and HHC with pt, pt at this time declining and requesting to return home.         Treatment Team Recommendation: Short Term Rehab  Discharge Destination Plan:: Home                                IMM Given (Date):: 07/23/24  IMM Given to:: Patient                            IMM reviewed with patient, patient agrees with discharge determination.

## 2024-07-23 NOTE — PROGRESS NOTES
Telephone call with patient's wife.    I discussed his pathology, superficial, possible lamina propria invasion.  This is persistent cancer despite radiation and chemotherapy last year.    At the time of procedure, ureteroscopy is not possible.  Prior imaging suggested possible recurrence of tumor in right distal ureter.    Will check hemoglobin on Monday because of persistent hematuria    Office visit in 4 to 6 weeks to discuss further steps.    If the bleeding continues, may need repeat procedure in the hospital, cystoscopy, right ureteroscopy to see if any viable tumor in distal right ureter.

## 2024-07-24 NOTE — UTILIZATION REVIEW
NOTIFICATION OF ADMISSION DISCHARGE   This is a Notification of Discharge from [facility]. Please be advised that this patient has been discharge from our facility. Below you will find the admission and discharge date and time including the patient’s disposition.   UTILIZATION REVIEW CONTACT:  Marquita Petersen  Utilization   Network Utilization Review Department  Phone: [phone] x carefully listen to the prompts. All voicemails are confidential.  Email: [email or web address]     ADMISSION INFORMATION  PRESENTATION DATE: 7/12/2024  6:58 PM  OBERVATION ADMISSION DATE: N/A  INPATIENT ADMISSION DATE: 7/12/24 10:22 PM   DISCHARGE DATE: 7/23/2024 10:44 AM   DISPOSITION:Home/Self Care    Network Utilization Review Department  ATTENTION: Please call with any questions or concerns to [phone] and carefully listen to the prompts so that you are directed to the right person. All voicemails are confidential.   For Discharge needs, contact Care Management DC Support Team at [phone] opt. [phone]  Send all requests for admission clinical reviews, approved or denied determinations and any other requests to dedicated fax number below belonging to the campus where the patient is receiving treatment. List of dedicated fax numbers for the Facilities:  FACILITY NAME UR FAX NUMBER   ADMISSION DENIALS (Administrative/Medical Necessity) [phone]   DISCHARGE SUPPORT TEAM ([facility]) [phone]   PARENT CHILD HEALTH (Maternity/NICU/Pediatrics) [phone]   [facility] [phone]   [facility] [phone]   [facility] [phone]   [facility] [phone]   [facility] [phone]   [facility] [phone]   [facility] [phone]   [facility] [phone]    [facility] [phone]   [facility] [phone]   [facility] [phone]   [facility] [phone]

## 2024-07-25 NOTE — DISCHARGE SUMMARY
[facility]  Discharge- Francis Caballero 1940, 84 y.o. male MRN: [id number]  Unit/Bed#: [facility] 901-01 Encounter: [id number]  Primary Care Provider: Chantel Santos DO   Date and time admitted to hospital: 7/12/2024  6:58 PM    Hematuria  Assessment & Plan  Urology following, appreciate recs   Continues to have hematuria s/p cystosocpy/TURBT 7/12/24  Maintain irizarry and continue flushes  Warfarin resumed, hgbs table, no further hematuria today  Given TXA 7/18      LELE (acute kidney injury) ([facility])  Assessment & Plan  Patient has chronic kidney disease baseline creatinine is 1.5-1.8  Patient's acute kidney injury from earlier in hospitalization has resolved  Nephrology following  Daily bmp    Symptomatic anemia  Assessment & Plan  Hgb on arrival 5.8 in setting of hematuria  S/p 3 units PRBC  7/18 given cyanocobalamin injection daily x 2 doses for macrocytic anemia  Venofer 300 mg IV x 1 given on going hematuria  Given TXA 7/18      Malignant 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 (HCC)  Assessment & Plan  Follows with outpatient urology   S/p chemo and radiation   7/12 he underwent cystoscopy, TURBT for ongoing hematuria with biopsy taken  Later the same night, patient was brought to the ED for fever, pale/clammy skin, dizziness, and weakness   See shock for full plan    Benign essential hypertension  Assessment & Plan  Continue home lopressor    Atrial fibrillation (HCC)  Assessment & Plan  Metoprolol was being held for labile BP, resume now that BP consistently high  Warfarin resumed will monitor urine output    Anxiety  Assessment & Plan  Continue xanax 0.5mg TID PRN anxiety    * Shock (HCC)  Assessment & Plan  Patient was brought to the ED for fever, pale/clammy skin, dizziness, and weakness after TURBT, cystoscopy earlier in the day and met sirs criteria with possible urinary infection as source. Became hypotensive and initially required pressors in ICU.  Hgb on arrival 5.8,  s/p 3 units PRBC  CT abd/pelvis: Interval removal of previously seen bladder mass as well as interval placement of right-sided nephroureteral stent, this appears appropriately positioned. Mild to moderate right hydroureter. Previously seen right-sided hydronephrosis is intervally improved. Small amount of air in the right renal collecting system as well as right-sided perinephric fat stranding could be related to recent instrumentation and/or infection.  Partially distended bladder. Mild to moderate circumferential bladder wall thickening. Air within the bladder lumen, possibly related to recent instrumentation/intervention; superimposed cystitis considered in the appropriate clinical setting. Correlation with the patient's symptoms, laboratory values, and urinalysis recommended  Urine culture negative however patient was given dose of cefazolin that could have resulted in false negative   Completed abx course  BP has remained stable  Remains afebrile without leukocytosis  Continue to monitor    Positive blood culture-resolved as of 7/18/2024  Assessment & Plan  1/2 blood cultures positive for gram negative rods with detected Klebsiella pneumoniae  Cefepime recommended - continue   Will d/c vancomycin at this time   Curbside to ID regarding suspected source - given he received 1 dose of ancef prior to urine culture, could be false negative given urine culture without growth.   Dc abx              Medical Problems       Resolved Problems  Date Reviewed: 7/24/2024            Resolved    Positive blood culture 7/18/2024     Resolved by  Flori Barry DO        Discharging Physician / Practitioner: Jairo Apodaca DO  PCP: Chantel Santos DO  Admission Date:   Admission Orders (From admission, onward)       Ordered        07/12/24 2222  INPATIENT ADMISSION  Once                          Discharge Date: 07/23/24    Reason for Admission: Hypotension    Hospital Course:   Francis Caballero is a 84 y.o. male patient who originally  presented to the hospital on 7/12/2024 after undergoing a TURP with ureteral exchange for high-grade muscle invasive bladder cancer with prior chemo and radiation .  He returnined to the hospital due to due to dizziness fevers, found to be in shock with hemoglobin of 5.8 requiring 3 units of packed red blood cells.  Has complex urological history with bladder cancer status postradiation and chemotherapy over the course of last year, unfortunately there is suspicion for recurrence of his tumor in the right distal ureter based on biopsy results, patient will have urology follow-up outpatient discussed with his wife and patient at bedside.  His warfarin was resumed several days prior to discharge and his hematuria resolved.            Please see above list of diagnoses and related plan for additional information.     Condition at Discharge: stable    Discharge Day Visit / Exam:   Subjective: Patient denies any acute complaints  Vitals: Blood Pressure: 136/76 (07/23/24 0708)  Pulse: 82 (07/23/24 0708)  Temperature: 98.1 °F (36.7 °C) (07/23/24 0708)  Temp Source: Oral (07/22/24 2117)  Respirations: 18 (07/23/24 0708)  Height: 6' (182.9 cm) (07/13/24 1829)  Weight - Scale: 69.2 kg (152 lb 8.9 oz) (07/23/24 0708)  SpO2: 98 % (07/23/24 0708)  Exam:   Physical Exam  Vitals and nursing note reviewed.   Constitutional:       General: He is not in acute distress.     Appearance: He is well-developed. He is not toxic-appearing or diaphoretic.   HENT:      Head: Normocephalic and atraumatic.   Eyes:      General: No scleral icterus.     Conjunctiva/sclera: Conjunctivae normal.   Cardiovascular:      Rate and Rhythm: Normal rate and regular rhythm.      Heart sounds: No murmur heard.     No friction rub. No gallop.   Pulmonary:      Effort: Pulmonary effort is normal. No respiratory distress.      Breath sounds: Normal breath sounds. No stridor. No wheezing, rhonchi or rales.   Chest:      Chest wall: No tenderness.   Abdominal:       General: There is no distension.      Palpations: Abdomen is soft. There is no mass.      Tenderness: There is no abdominal tenderness. There is no guarding or rebound.      Hernia: No hernia is present.   Musculoskeletal:         General: No swelling or tenderness.      Cervical back: Neck supple.   Skin:     General: Skin is warm and dry.      Capillary Refill: Capillary refill takes less than 2 seconds.   Neurological:      Mental Status: He is alert and oriented to person, place, and time.   Psychiatric:         Mood and Affect: Mood normal.          Discussion with Family: Updated  (wife) at bedside.    Discharge instructions/Information to patient and family:   See after visit summary for information provided to patient and family.      Provisions for Follow-Up Care:  See after visit summary for information related to follow-up care and any pertinent home health orders.      Mobility at time of Discharge:   Basic Mobility Inpatient Raw Score: 17  JH-HLM Goal: 5: Stand one or more mins  JH-HLM Achieved: 6: Walk 10 steps or more  HLM Goal achieved. Continue to encourage appropriate mobility.     Disposition:   Home    Planned Readmission: no     Discharge Statement:  I spent  minutes discharging the patient. This time was spent on the day of discharge. I had direct contact with the patient on the day of discharge. Greater than 50% of the total time was spent examining patient, answering all patient questions, arranging and discussing plan of care with patient as well as directly providing post-discharge instructions.  Additional time then spent on discharge activities.    Discharge Medications:  See after visit summary for reconciled discharge medications provided to patient and/or family.      **Please Note: This note may have been constructed using a voice recognition system**

## 2024-07-25 NOTE — TELEPHONE ENCOUNTER
Patient is tentatively scheduled with Dr Mckee in October- has an appointment with Kathy in September can discuss then if appointment needed and next step

## 2024-07-29 ENCOUNTER — NURSE TRIAGE (OUTPATIENT)
Dept: FAMILY MEDICINE CLINIC | Facility: CLINIC | Age: 84
End: 2024-07-29

## 2024-07-29 ENCOUNTER — ANTICOAG VISIT (OUTPATIENT)
Dept: FAMILY MEDICINE CLINIC | Facility: CLINIC | Age: 84
End: 2024-07-29

## 2024-07-29 LAB — INR PPP: 6.3 (ref 0.84–1.19)

## 2024-07-30 ENCOUNTER — TELEPHONE (OUTPATIENT)
Age: 84
End: 2024-07-30

## 2024-07-30 ENCOUNTER — TELEMEDICINE (OUTPATIENT)
Dept: FAMILY MEDICINE CLINIC | Facility: CLINIC | Age: 84
End: 2024-07-30
Payer: COMMERCIAL

## 2024-07-30 DIAGNOSIS — D62 ANEMIA ASSOCIATED WITH ACUTE BLOOD LOSS: Primary | ICD-10-CM

## 2024-07-30 DIAGNOSIS — I48.21 PERMANENT ATRIAL FIBRILLATION (HCC): ICD-10-CM

## 2024-07-30 DIAGNOSIS — I10 PRIMARY HYPERTENSION: ICD-10-CM

## 2024-07-30 DIAGNOSIS — Z79.01 CHRONIC ANTICOAGULATION: ICD-10-CM

## 2024-07-30 DIAGNOSIS — C67.2 MALIGNANT NEOPLASM OF LATERAL WALL OF URINARY BLADDER (HCC): ICD-10-CM

## 2024-07-30 PROBLEM — C67.9: Status: ACTIVE | Noted: 2023-11-03

## 2024-07-30 PROBLEM — R80.1 PERSISTENT PROTEINURIA: Status: ACTIVE | Noted: 2023-11-03

## 2024-07-30 PROBLEM — D63.1 ANEMIA IN CHRONIC KIDNEY DISEASE: Status: ACTIVE | Noted: 2023-11-03

## 2024-07-30 PROBLEM — E87.5 HYPERKALEMIA: Chronic | Status: ACTIVE | Noted: 2024-05-06

## 2024-07-30 PROBLEM — N18.9 ANEMIA IN CHRONIC KIDNEY DISEASE: Status: ACTIVE | Noted: 2023-11-03

## 2024-07-30 PROBLEM — N13.8: Status: ACTIVE | Noted: 2023-11-03

## 2024-07-30 PROBLEM — E87.22 CHRONIC METABOLIC ACIDOSIS: Status: ACTIVE | Noted: 2024-05-06

## 2024-07-30 PROCEDURE — 99496 TRANSJ CARE MGMT HIGH F2F 7D: CPT | Performed by: FAMILY MEDICINE

## 2024-07-30 PROCEDURE — 1111F DSCHRG MED/CURRENT MED MERGE: CPT | Performed by: FAMILY MEDICINE

## 2024-07-30 NOTE — TELEPHONE ENCOUNTER
Patient's wife called wanting to know if patient's TCM appt. Scheduled for this afternoon can be changed to a virtual visit.  Patient is not feeling well.  Clerical unavailable.Please contact wife Daniel in regards to appt.

## 2024-07-30 NOTE — TELEPHONE ENCOUNTER
"Answer Assessment - Initial Assessment Questions  1. REASON FOR CALL or QUESTION: \"What is your reason for calling today?\" or \"How can I best help you?\" or \"What question do you have that I can help answer?\"      7/29/24 INR = 6.3    Protocols used: Information Only Call - No Triage-ADULT-[place]    "

## 2024-07-30 NOTE — PROGRESS NOTES
Virtual TCM Visit:    Verification of patient location:    Patient is located at Home in the following state in which I hold an active license [place]    Assessment & Plan   1. Anemia associated with acute blood loss  Assessment & Plan:  Pt had hematuria after procedure, had transfusions. Pt on warfarin- restarted with no further bleeding.  Will need to monitor cbc for continued improvement. Will go tomorrow for repeat. Keep follow up with urology  2. Malignant 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 (HCC)  Assessment & Plan:  Continue follow up with urology   3. Chronic anticoagulation  4. Permanent atrial fibrillation (HCC)  Assessment & Plan:  Controlled rate  5. Primary hypertension  Assessment & Plan:  Pt wife took bp today and was normal. Controlled on current medication      Depression Screening and Follow-up Plan: Patient was screened for depression during today's encounter. They screened negative with a PHQ-2 score of 0.    Tobacco Cessation Counseling: Tobacco cessation counseling was not provided. The patient is sincerely urged to quit consumption of tobacco. He is not ready to quit tobacco. Medication options and side effects of medication not discussed. Patient refused medication.         Encounter provider Chantel Santos DO     Provider located at [address]  [facility] [address]    After connecting through [employer], the patient was identified by name and date of birth. Francis Caballero was informed that this is a telemedicine visit and that the visit is being conducted through the Epic Embedded platform. He agrees to proceed..  My office door was closed. No one else was in the room.  He acknowledged consent and understanding of privacy and security of the video platform. The patient has agreed to participate and understands they can discontinue the visit at any time.    Patient is aware this is a billable service.    History of Present Illness      Transitional Care Management Review:   Francis Caballero is a 84 y.o. male here for TCM follow up.    During the TCM phone call patient stated:  TCM Call       Date and time call was made  7/23/2024  3:17 PM    Hospital care reviewed  Records reviewed    Patient was hospitial[facility] at  [facility]    Date of Admission  07/12/24    Date of discharge  07/23/24    Diagnosis  Shock (HCC) Principal problem    Disposition  Home    Were the patients medications reviewed and updated  No    Current Symptoms  None          TCM Call       Post hospital issues  None    Should patient be enrolled in anticoag monitoring?  No    Scheduled for follow up?  Yes    Patients specialists  Urologist    Did you obtain your prescribed medications  Yes    Do you need help managing your prescriptions or medications  No    Is transportation to your appointment needed  No    I have advised the patient to call PCP with any new or worsening symptoms  Mendy OLEA, Patient     Living Arrangements  Spouse or Significiant other    Support System  Partner    The type of support provided  Emotional    Are you recieving any outpatient services  Yes    Are you recieving home care services  No    Have you fallen in the last 12 months  No    Interperter language line needed  No    Comments  serina OLEA have to talk to DR garibay to see where to put him in he needs an afternoon appt          Pt is seen with his wife in follow up to recent hospitalization. Feeling better.   Pt had hematuria after TURBT and required 3 blood transfusions. He has  Not had bleeding, no abdominal/pelvic pain,   Appetite good, no nausea. Diarrhea- x3 - soft bms now. Denies shortness of breath.   Taking  b12/folic acid daily . They were going to get blood work today but pt not feeling well. Will get tomorrow      Review of Systems   Constitutional: Negative.  Negative for fatigue and fever.   HENT: Negative.     Eyes: Negative.    Respiratory: Negative.  Negative for  cough.    Cardiovascular: Negative.    Gastrointestinal: Negative.    Endocrine: Negative.    Genitourinary: Negative.    Musculoskeletal: Negative.    Skin: Negative.    Allergic/Immunologic: Negative.    Neurological: Negative.    Psychiatric/Behavioral: Negative.       Objective     There were no vitals taken for this visit.    Physical Exam  Vitals and nursing note reviewed.   Constitutional:       Appearance: He is well-developed.   HENT:      Head: Normocephalic.      Right Ear: External ear normal.      Left Ear: External ear normal.      Nose: Nose normal.   Eyes:      Conjunctiva/sclera: Conjunctivae normal.      Pupils: Pupils are equal, round, and reactive to light.   Cardiovascular:      Rate and Rhythm: Normal rate and regular rhythm.   Pulmonary:      Effort: Pulmonary effort is normal.      Breath sounds: Normal breath sounds.   Abdominal:      General: Bowel sounds are normal.      Palpations: Abdomen is soft.   Musculoskeletal:         General: Normal range of motion.      Cervical back: Normal range of motion and neck supple.   Skin:     General: Skin is warm and dry.   Neurological:      Mental Status: He is alert and oriented to person, place, and time.   Psychiatric:         Behavior: Behavior normal.         Thought Content: Thought content normal.         Judgment: Judgment normal.       Medications have been reviewed by provider in current encounter  Administrative Statements       Chantel Santos,       VIRTUAL VISIT DISCLAIMER    Francis Caballero verbally agrees to participate in Virtual Care Services. Pt is aware that Virtual Care Services could be limited without vital signs or the ability to perform a full hands-on physical exam. Francis Caballero understands he or the provider may request at any time to terminate the video visit and request the patient to seek care or treatment in person.

## 2024-07-31 PROBLEM — D62 ANEMIA ASSOCIATED WITH ACUTE BLOOD LOSS: Status: ACTIVE | Noted: 2024-07-31

## 2024-07-31 NOTE — ASSESSMENT & PLAN NOTE
Pt had hematuria after procedure, had transfusions. Pt on warfarin- restarted with no further bleeding.  Will need to monitor cbc for continued improvement. Will go tomorrow for repeat. Keep follow up with urology

## 2024-08-05 ENCOUNTER — TELEPHONE (OUTPATIENT)
Age: 84
End: 2024-08-05

## 2024-08-05 DIAGNOSIS — R19.7 DIARRHEA, UNSPECIFIED TYPE: Primary | ICD-10-CM

## 2024-08-05 NOTE — TELEPHONE ENCOUNTER
Pts wife, Daniel, called stating that pt has been having diarrhea almost constantly and he is eating very little. He has had a few Imodium but is still going. I attempted to get other symptoms but our phone connection was poor. Please call Daniel back at [phone]

## 2024-08-05 NOTE — TELEPHONE ENCOUNTER
Spoke to pt's wife Daniel. Daniel will be in to  specimen kit on Monday. Patient for a couple days has taken the imodium, but does not seem to be helping.

## 2024-08-05 NOTE — TELEPHONE ENCOUNTER
"Patient's wife, Daniel, called back seeking an update - relayed provider's follow-up questions. Per Daniel, patient has been having diarrhea for \"at least a full week.\" Denies fever, bleeding/dark color to stool and nausea/vomiting. Patient is very weak and has decreased appetite. Advised Daniel to offer Gatorade/Pedialyte for electrolyte replacement and encourage fluids. Requesting call back with recommendation from provider today - very concerned.   "

## 2024-08-09 LAB
ADV 40+41 DNA STL QL NAA+NON-PROBE: NOT DETECTED
ASTRO TYP 1-8 RNA STL QL NAA+NON-PROBE: NOT DETECTED
C CAYETANENSIS DNA STL QL NAA+NON-PROBE: NOT DETECTED
C COLI+JEJ+UPSA DNA STL QL NAA+NON-PROBE: NOT DETECTED
C DIF TOX TCDA+TCDB STL QL NAA+NON-PROBE: DETECTED
C DIFF TOX GENS STL QL NAA+PROBE: POSITIVE
CRYPTOSP DNA STL QL NAA+NON-PROBE: NOT DETECTED
E COLI O157 DNA STL QL NAA+NON-PROBE: ABNORMAL
E HISTOLYT DNA STL QL NAA+NON-PROBE: NOT DETECTED
EAEC PAA PLAS AGGR+AATA ST NAA+NON-PRB: NOT DETECTED
EC STX1+STX2 GENES STL QL NAA+NON-PROBE: NOT DETECTED
EPEC EAE GENE STL QL NAA+NON-PROBE: NOT DETECTED
ETEC LTA+ST1A+ST1B TOX ST NAA+NON-PROBE: NOT DETECTED
G LAMBLIA DNA STL QL NAA+NON-PROBE: NOT DETECTED
Lab: NORMAL
NOROVIRUS GI+II RNA STL QL NAA+NON-PROBE: NOT DETECTED
O+P STL CONC: NORMAL
P SHIGELLOIDES DNA STL QL NAA+NON-PROBE: NOT DETECTED
RVA RNA STL QL NAA+NON-PROBE: NOT DETECTED
S ENT+BONG DNA STL QL NAA+NON-PROBE: NOT DETECTED
SAPO I+II+IV+V RNA STL QL NAA+NON-PROBE: NOT DETECTED
SHIGELLA SP+EIEC IPAH ST NAA+NON-PROBE: NOT DETECTED
V CHOL+PARA+VUL DNA STL QL NAA+NON-PROBE: NOT DETECTED
V CHOLERAE DNA STL QL NAA+NON-PROBE: NOT DETECTED
Y ENTEROCOL DNA STL QL NAA+NON-PROBE: NOT DETECTED

## 2024-08-12 DIAGNOSIS — A04.72 C. DIFFICILE DIARRHEA: Primary | ICD-10-CM

## 2024-08-12 RX ORDER — VANCOMYCIN HYDROCHLORIDE 125 MG/1
125 CAPSULE ORAL 4 TIMES DAILY
Qty: 40 CAPSULE | Refills: 0 | Status: SHIPPED | OUTPATIENT
Start: 2024-08-12 | End: 2024-08-22

## 2024-08-12 NOTE — RESULT ENCOUNTER NOTE
Ousmane's stool test is positive for c. Diff. I sent vancomycin to the pharmacy for him to take 4x daily for 10 days, wipe down all shared surfaces, good hand hygiene

## 2024-08-14 DIAGNOSIS — I48.21 PERMANENT ATRIAL FIBRILLATION (HCC): Primary | ICD-10-CM

## 2024-08-14 DIAGNOSIS — N18.32 ANEMIA IN STAGE 3B CHRONIC KIDNEY DISEASE  (HCC): ICD-10-CM

## 2024-08-14 DIAGNOSIS — N18.32 STAGE 3B CHRONIC KIDNEY DISEASE (HCC): Primary | ICD-10-CM

## 2024-08-14 DIAGNOSIS — D68.9 COAGULATION DISORDER (HCC): ICD-10-CM

## 2024-08-14 DIAGNOSIS — Z79.01 CHRONIC ANTICOAGULATION: ICD-10-CM

## 2024-08-14 DIAGNOSIS — D63.1 ANEMIA IN STAGE 3B CHRONIC KIDNEY DISEASE  (HCC): ICD-10-CM

## 2024-08-14 NOTE — TELEPHONE ENCOUNTER
"Patient's wife called in to ask if there was anything else she could do for patient because he is still having diarrhea and is on the antibiotics. Patient has been on abx less than 48 hrs. Reassurance given to patient's wife. She stated she thinks stools may be more firm. I reviewed adequate hydration, BRAT diet and hygiene practices with wife. She will call back with any more questions.     Reason for Disposition   Information only question and nurse able to answer    Answer Assessment - Initial Assessment Questions  1. REASON FOR CALL or QUESTION: \"What is your reason for calling today?\" or \"How can I best help you?\" or \"What question do you have that I can help answer?\"      Am I doing enough for patient? He is still having diarrhea.    Protocols used: Information Only Call - No Triage-ADULT-[place]    "

## 2024-08-15 ENCOUNTER — ANTICOAG VISIT (OUTPATIENT)
Dept: FAMILY MEDICINE CLINIC | Facility: CLINIC | Age: 84
End: 2024-08-15

## 2024-08-15 ENCOUNTER — TELEPHONE (OUTPATIENT)
Dept: FAMILY MEDICINE CLINIC | Facility: CLINIC | Age: 84
End: 2024-08-15

## 2024-08-15 LAB — INR PPP: 5.6 (ref 0.85–1.19)

## 2024-08-19 LAB — INR PPP: 2.1 (ref 0.85–1.19)

## 2024-08-22 LAB
BASOPHILS # BLD AUTO: 0 X10E3/UL (ref 0–0.2)
BASOPHILS NFR BLD AUTO: 1 %
BUN SERPL-MCNC: 29 MG/DL (ref 8–27)
BUN/CREAT SERPL: 19 (ref 10–24)
CALCIUM SERPL-MCNC: 8.1 MG/DL (ref 8.6–10.2)
CHLORIDE SERPL-SCNC: 105 MMOL/L (ref 96–106)
CO2 SERPL-SCNC: 25 MMOL/L (ref 20–29)
CREAT SERPL-MCNC: 1.54 MG/DL (ref 0.76–1.27)
EGFR: 44 ML/MIN/1.73
EOSINOPHIL # BLD AUTO: 0.2 X10E3/UL (ref 0–0.4)
EOSINOPHIL NFR BLD AUTO: 3 %
ERYTHROCYTE [DISTWIDTH] IN BLOOD BY AUTOMATED COUNT: 15 % (ref 11.6–15.4)
GLUCOSE SERPL-MCNC: 87 MG/DL (ref 70–99)
HCT VFR BLD AUTO: 30.1 % (ref 37.5–51)
HGB BLD-MCNC: 9.6 G/DL (ref 13–17.7)
IMM GRANULOCYTES # BLD: 0.1 X10E3/UL (ref 0–0.1)
IMM GRANULOCYTES NFR BLD: 1 %
LYMPHOCYTES # BLD AUTO: 1.6 X10E3/UL (ref 0.7–3.1)
LYMPHOCYTES NFR BLD AUTO: 20 %
MCH RBC QN AUTO: 30.9 PG (ref 26.6–33)
MCHC RBC AUTO-ENTMCNC: 31.9 G/DL (ref 31.5–35.7)
MCV RBC AUTO: 97 FL (ref 79–97)
MONOCYTES # BLD AUTO: 0.7 X10E3/UL (ref 0.1–0.9)
MONOCYTES NFR BLD AUTO: 8 %
NEUTROPHILS # BLD AUTO: 5.4 X10E3/UL (ref 1.4–7)
NEUTROPHILS NFR BLD AUTO: 67 %
PLATELET # BLD AUTO: 347 X10E3/UL (ref 150–450)
POTASSIUM SERPL-SCNC: 4.8 MMOL/L (ref 3.5–5.2)
RBC # BLD AUTO: 3.11 X10E6/UL (ref 4.14–5.8)
SODIUM SERPL-SCNC: 141 MMOL/L (ref 134–144)
WBC # BLD AUTO: 8 X10E3/UL (ref 3.4–10.8)

## 2024-08-23 NOTE — PROGRESS NOTES
8/23/24---Called patient spoke with wife told her Dr Santos said to stay the same and recheck in 1 week. She verbalized understanding-MAYRA

## 2024-08-24 DIAGNOSIS — F41.9 ANXIETY: ICD-10-CM

## 2024-08-26 RX ORDER — ALPRAZOLAM 0.5 MG
0.5 TABLET ORAL 3 TIMES DAILY PRN
Qty: 90 TABLET | Refills: 0 | Status: SHIPPED | OUTPATIENT
Start: 2024-08-26

## 2024-08-27 ENCOUNTER — ANTICOAG VISIT (OUTPATIENT)
Dept: FAMILY MEDICINE CLINIC | Facility: CLINIC | Age: 84
End: 2024-08-27

## 2024-08-27 LAB
INR PPP: 1.8 (ref 0.85–1.19)
INR PPP: 2.8 (ref 0.85–1.19)

## 2024-08-27 NOTE — PROGRESS NOTES
8/27/24- Spoke with wife and advised her of providers order to continue same dose and recheck in 1 week-MAYRA

## 2024-09-04 ENCOUNTER — ANTICOAG VISIT (OUTPATIENT)
Dept: FAMILY MEDICINE CLINIC | Facility: CLINIC | Age: 84
End: 2024-09-04

## 2024-09-04 LAB — INR PPP: 2.6 (ref 0.85–1.19)

## 2024-09-04 NOTE — PROGRESS NOTES
Left message for patient to continue same dose and recheck in 1 week and to call us back to let us know they got the message

## 2024-09-09 ENCOUNTER — TELEPHONE (OUTPATIENT)
Age: 84
End: 2024-09-09

## 2024-09-09 DIAGNOSIS — A09 DIARRHEA OF INFECTIOUS ORIGIN: Primary | ICD-10-CM

## 2024-09-09 DIAGNOSIS — A04.72 C. DIFFICILE DIARRHEA: ICD-10-CM

## 2024-09-09 LAB — INR PPP: 2.2 (ref 0.85–1.19)

## 2024-09-09 NOTE — TELEPHONE ENCOUNTER
PT cancellig today 9/9/24 with [facility] lashell due to being sick.This was for a 6 wk follow up to discuss next steps.    Pt is also scheduled for october with Dr. Mckee.  There is nothing available prior to lashell that is scheduled with Dr. Mckee   Please advise if this needs to be rescheduled .

## 2024-09-09 NOTE — TELEPHONE ENCOUNTER
Patient had TURBT and stent placement by Dr Simon on  7/12 Patient has h/o urothelial cancer and bleeding also Chemotherapy and radiation for high grade invasive bladder cancer. He had an appointment that he canceled to day with Kathy to discuss next steps.  He also has an appointment with Dr Mckee 10/15 to discuss  this appropriate

## 2024-09-11 DIAGNOSIS — A04.72 CLOSTRIDIUM DIFFICILE DIARRHEA: Primary | ICD-10-CM

## 2024-09-11 LAB — C DIFF TOX GENS STL QL NAA+PROBE: POSITIVE

## 2024-09-11 RX ORDER — VANCOMYCIN HYDROCHLORIDE 125 MG/1
125 CAPSULE ORAL 4 TIMES DAILY
Qty: 40 CAPSULE | Refills: 0 | Status: SHIPPED | OUTPATIENT
Start: 2024-09-11 | End: 2024-09-21

## 2024-09-17 ENCOUNTER — ANTICOAG VISIT (OUTPATIENT)
Dept: FAMILY MEDICINE CLINIC | Facility: CLINIC | Age: 84
End: 2024-09-17

## 2024-09-17 LAB — INR PPP: 2.3 (ref 0.85–1.19)

## 2024-09-24 ENCOUNTER — ANTICOAG VISIT (OUTPATIENT)
Dept: FAMILY MEDICINE CLINIC | Facility: CLINIC | Age: 84
End: 2024-09-24

## 2024-09-24 LAB — INR PPP: 1.4 (ref 0.85–1.19)

## 2024-09-30 ENCOUNTER — TELEPHONE (OUTPATIENT)
Age: 84
End: 2024-09-30

## 2024-09-30 ENCOUNTER — ANTICOAG VISIT (OUTPATIENT)
Dept: FAMILY MEDICINE CLINIC | Facility: CLINIC | Age: 84
End: 2024-09-30

## 2024-09-30 DIAGNOSIS — A04.72 CLOSTRIDIUM DIFFICILE DIARRHEA: Primary | ICD-10-CM

## 2024-09-30 LAB — INR PPP: 1.5 (ref 0.85–1.19)

## 2024-09-30 NOTE — TELEPHONE ENCOUNTER
Patient's wife called and stated that patient was given antibiotic Vancomycin but patient is still suffering from C.Diff. She is wondering if something stronger can be prescribed to him. Because patient knows that he still has it. Please call patient's wife to advise.  Thank you!!

## 2024-10-01 NOTE — TELEPHONE ENCOUNTER
Patient's wife (Daniel) called she went to  [facility] and it was $2000.  She wanted to see if a different medication can be sent.     Thank you

## 2024-10-03 ENCOUNTER — TELEPHONE (OUTPATIENT)
Age: 84
End: 2024-10-03

## 2024-10-03 NOTE — TELEPHONE ENCOUNTER
Would like a script to retest for C.Diff. Before getting antibiotics.  Will come  and needs right container.

## 2024-10-04 DIAGNOSIS — A49.8 CLOSTRIDIUM DIFFICILE INFECTION: Primary | ICD-10-CM

## 2024-10-04 DIAGNOSIS — A04.72 CLOSTRIDIUM DIFFICILE DIARRHEA: Primary | ICD-10-CM

## 2024-10-04 NOTE — TELEPHONE ENCOUNTER
Patient's wife called to ask if the she can pick a clear bottle for the test orders.  She was also wondering if there's a test to see if pt developed resistance to the antibiotics and that's why it's not helping anymore. Please advise and contact pt.

## 2024-10-08 ENCOUNTER — ANTICOAG VISIT (OUTPATIENT)
Dept: FAMILY MEDICINE CLINIC | Facility: CLINIC | Age: 84
End: 2024-10-08

## 2024-10-08 LAB
C DIFF TOX GENS STL QL NAA+PROBE: POSITIVE
INR PPP: 1.8 (ref 0.85–1.19)

## 2024-10-09 ENCOUNTER — TELEPHONE (OUTPATIENT)
Age: 84
End: 2024-10-09

## 2024-10-09 NOTE — TELEPHONE ENCOUNTER
Pt rescheduled appt. Nothing Dr Mckee until January.  I did rescheduled pt with Ayaka on 10/21/24.    Please advise if this is appropriate     If pt needs to be rescheduled with MD please call pt wife at [phone]

## 2024-10-09 NOTE — TELEPHONE ENCOUNTER
Patients wife, Daniel, called stating the patient was diagnosed with C diff and scheduled an appointment with a GI doctor on 10/15/2024. She wants to cancel the appointment with Dr. Mckee at this time. She said she will call back to reschedule after she speaks with the patient.

## 2024-10-10 ENCOUNTER — TELEPHONE (OUTPATIENT)
Dept: UROLOGY | Facility: AMBULATORY SURGERY CENTER | Age: 84
End: 2024-10-10

## 2024-10-12 DIAGNOSIS — E78.2 MIXED HYPERLIPIDEMIA: ICD-10-CM

## 2024-10-14 LAB — INR PPP: 2.2 (ref 0.85–1.19)

## 2024-10-14 RX ORDER — ROSUVASTATIN CALCIUM 5 MG/1
TABLET, COATED ORAL
Qty: 30 TABLET | Refills: 0 | Status: SHIPPED | OUTPATIENT
Start: 2024-10-14

## 2024-10-15 ENCOUNTER — OFFICE VISIT (OUTPATIENT)
Dept: GASTROENTEROLOGY | Facility: CLINIC | Age: 84
End: 2024-10-15
Payer: COMMERCIAL

## 2024-10-15 VITALS
SYSTOLIC BLOOD PRESSURE: 132 MMHG | BODY MASS INDEX: 21.59 KG/M2 | DIASTOLIC BLOOD PRESSURE: 88 MMHG | WEIGHT: 159.4 LBS | HEIGHT: 72 IN

## 2024-10-15 DIAGNOSIS — A04.72 CLOSTRIDIUM DIFFICILE DIARRHEA: ICD-10-CM

## 2024-10-15 DIAGNOSIS — D64.9 ANEMIA: ICD-10-CM

## 2024-10-15 DIAGNOSIS — F41.9 ANXIETY: ICD-10-CM

## 2024-10-15 PROCEDURE — 99203 OFFICE O/P NEW LOW 30 MIN: CPT | Performed by: INTERNAL MEDICINE

## 2024-10-15 RX ORDER — FOLIC ACID 1 MG/1
1 TABLET ORAL DAILY
Qty: 90 TABLET | Refills: 3 | Status: SHIPPED | OUTPATIENT
Start: 2024-10-15

## 2024-10-15 NOTE — PROGRESS NOTES
[facility] - Outpatient Consultation  Francis Caballero 84 y.o. male MRN: [id number]  Encounter: [id number]    ASSESSMENT AND PLAN:         1. Clostridium difficile diarrhea  -     Ambulatory Referral to Gastroenterology     Assessment & Plan  1. Clostridium difficile infection.  Given his absence of typical symptoms such as diarrhea, it is unlikely that he is currently experiencing an active C. difficile infection.  Possible that he is having a positive test that shows exposure and colonization to C. difficile but not an active infection.      He was advised to maintain good hygiene practices, including thorough hand washing and surface cleaning. The consumption of probiotics and yogurt was recommended to help restore bacterial balance.     At this time, treatment will be deferred due to the lack of diarrhea. Should diarrhea recur, he is to contact us immediately. In such a case, retesting will be considered, and if positive, a prolonged taper of vancomycin will be initiated, followed by an IV infusion medication of bezlotoxumab, will be initiated to prevent recurrence of C. difficile.      ---    Chief Complaint   Patient presents with    C.Diff Infection     Pt has done 3 courses of antibiotics, still testing positive for C.diff. PCP recommended GI consult.       HPI:   Francis Caballero is a 84 y.o. male presenting to [facility] care.   History of Present Illness  The patient is an 84-year-old male presenting for a consultation from Dr. Robb regarding a C. difficile infection. He is accompanied by his wife.    He was first noted to have C. difficile on 08/06/2024 and was given vancomycin 4 times daily for 10 days.  He  tested positive again and was retreated with vancomycin for 10 days.  Again he was positive and underwent Pradaxa mycin for 10 days.  His initial C. difficile infection was characterized by severe watery diarrhea. He reports no current diarrhea, but rather soft  stools occurring once daily in the morning. There is no presence of blood in his stool. He does not experience abdominal pain, nausea, vomiting, fever, or chills. He maintains good hygiene practices, including regular hand washing and surface cleaning. He has never undergone a colonoscopy or stool test. He was hospitalized for 11 days, during which he received intravenous antibiotics.    He has a past medical history of high-grade invasive bladder cancer with prior chemotherapy and radiation, status post TURP. He also has a history of atrial fibrillation (A. fib) and is on warfarin. Additionally, he has a history of symptomatic anemia in the setting of hematuria.    He has a dental appointment scheduled for tomorrow due to a toothache, which may necessitate another course of antibiotics. He has recently added yogurt to his diet.    Historical Information   Past Medical History:   Diagnosis Date    Ambulates with cane     Anemia     last assessed: 05/23/2015    Cancer ([facility]) 2016    don't know    Clotting disorder ([facility]) 2018    GERD (gastroesophageal reflux disease)     Hematuria     Hyperlipidemia     Hypertension     Intracerebral hemorrhage (HCC)     last assessed: 04/21/2014; right    Intracerebral hemorrhage ([facility])     last assessed: 11/23/2017    Mobility impaired     Stroke ([facility]) 2011    Stroke syndrome     last assessed: 06/13/2013    UTI (urinary tract infection)      Past Surgical History:   Procedure Laterality Date    BLADDER SURGERY      FL RETROGRADE PYELOGRAM  7/12/2024    IR NEPHROSTOMY TUBE PLACEMENT  7/27/2023    MN CYSTO W/IRRIG & EVAC MULTPLE OBSTRUCTING CLOTS N/A 7/12/2024    Procedure: CYSTOSCOPY EVACUATION OF CLOTS;  Surgeon: Craig Simon MD;  Location: [facility];  Service: Urology    MN CYSTO W/REMOVAL OF LESIONS SMALL N/A 7/12/2024    Procedure: (TURBT) cautery bleeding, clot evacuation;  Surgeon: Craig Simon MD;  [facility];  Service: Urology    MN CYSTOURETHROSCOPY  W/DEST &/RMVL TUMOR LARGE N/A 7/26/2023    Procedure: (TURBT);  Surgeon: Tha Carcamo MD;  Location: [facility];  Service: Urology     Social History     Substance and Sexual Activity   Alcohol Use Yes    Alcohol/week: 1.0 standard drink of alcohol    Types: 1 Standard drinks or equivalent per week    Comment: Social      Social History     Substance and Sexual Activity   Drug Use No     Social History     Tobacco Use   Smoking Status Former    Current packs/day: 0.25    Average packs/day: 0.3 packs/day for 40.0 years (10.0 ttl pk-yrs)    Types: Cigars, Cigarettes   Smokeless Tobacco Never   Tobacco Comments    Last cigar 7/10.24     Family History   Problem Relation Age of Onset    Lung cancer Mother     Cancer Mother         lung    Heart disease Father     COPD Brother     Colon cancer Neg Hx        Meds/Allergies     Current Outpatient Medications:     ALPRAZolam (XANAX) 0.5 mg tablet    cyanocobalamin (VITAMIN B-12) 1000 MCG tablet    folic acid (FOLVITE) 1 mg tablet    metoprolol tartrate (LOPRESSOR) 50 mg tablet    Multiple Vitamins-Minerals (PreserVision AREDS 2) CAPS    rosuvastatin (CRESTOR) 5 mg tablet    warfarin (COUMADIN) 1 mg tablet    warfarin (COUMADIN) 5 mg tablet    acetaminophen (TYLENOL) 650 mg CR tablet  No Known Allergies    PHYSICAL EXAM:    Blood pressure 132/88, height 6' (1.829 m), weight 72.3 kg (159 lb 6.4 oz). Body mass index is 21.62 kg/m².  Physical Exam      General Appearance: No apparent distress, cooperative, alert.  Eyes: Anicteric.  Gastrointestinal: Soft, non-tender, non-distended; normal bowel sounds; no masses, no organomegaly.    Rectal: Deferred.  Musculoskeletal: No edema.  Skin: No jaundice.     OTHER LAB RESULTS:   Lab Results   Component Value Date    WBC 8.0 08/21/2024    WBC 9.58 07/22/2024    WBC 10.04 07/21/2024    HGB 9.6 (L) 08/21/2024    HGB 9.1 (L) 07/22/2024    HGB 9.3 (L) 07/21/2024    MCV 97 08/21/2024     08/21/2024     (H) 07/22/2024    PLT  "415 (H) 07/21/2024    INR 2.20 (A) 10/14/2024    INR 1.80 (A) 10/07/2024    INR 1.50 (A) 09/30/2024     Lab Results   Component Value Date     08/29/2016    K 4.8 08/21/2024     08/21/2024    CO2 25 08/21/2024    ANIONGAP 9 05/22/2015    BUN 29 (H) 08/21/2024    CREATININE 1.54 (H) 08/21/2024    GLUCOSE 100 (H) 08/29/2016    GLUF 96 08/07/2023    CALCIUM 8.8 07/22/2024    CORRECTEDCA 9.6 07/21/2024    AST 10 (L) 07/12/2024    AST 16 06/25/2024    AST 28 08/07/2023    ALT 6 (L) 07/12/2024    ALT 9 06/25/2024    ALT 33 08/07/2023    ALKPHOS 67 07/12/2024    ALKPHOS 82 08/07/2023    ALKPHOS 86 07/10/2023    ALB 3.3 (L) 07/21/2024    TBILI 0.63 07/12/2024    TBILI 0.8 06/25/2024    TBILI 0.62 08/07/2023    EGFR 44 (L) 08/21/2024     Lab Results   Component Value Date    IRON 12 (L) 07/16/2024    TIBC 222 (L) 07/16/2024    FERRITIN 125 07/16/2024     No results found for: \"LIPASE\"    OTHER RADIOLOGY RESULTS:   No results found.      "

## 2024-10-16 ENCOUNTER — TELEPHONE (OUTPATIENT)
Age: 84
End: 2024-10-16

## 2024-10-16 RX ORDER — ALPRAZOLAM 0.5 MG
0.5 TABLET ORAL 3 TIMES DAILY PRN
Qty: 90 TABLET | Refills: 0 | Status: SHIPPED | OUTPATIENT
Start: 2024-10-16

## 2024-10-16 NOTE — TELEPHONE ENCOUNTER
Patient was seen in office yesterday for C-diff.  Dentist calling, patient has swollen cheek and dental infection and needs to be placed on antibiotic.  Wondering what they can give patient.  Please advise.

## 2024-10-17 NOTE — TELEPHONE ENCOUNTER
Returned call, not available, left message requesting return call to review provider recommendations noted:    Ultimately was advise biotic stewardship.  If the patient absolutely needs antibiotics, then they can use any. They all increase risk of cdiff.  Also penicillins and cephalosporins tend to be a higher risk.

## 2024-10-17 NOTE — TELEPHONE ENCOUNTER
[facility] returned call and information was passed on to them.  They will Discuss with Dr. Nicole.

## 2024-10-17 NOTE — TELEPHONE ENCOUNTER
Dr. Nicole returning phone call in regards to antibiotics. Call warm transferred to clinical team.

## 2024-10-17 NOTE — TELEPHONE ENCOUNTER
I returned call and spoke with Rosalba. I conveyed provider recommendation doxycycline noted by Dr. Padilla. She appreciated call back.

## 2024-10-17 NOTE — TELEPHONE ENCOUNTER
Dr. Nicole (dentist) would like to know which medication to put him on.  Wants Dr. Padilla to make decision.

## 2024-10-22 ENCOUNTER — ANTICOAG VISIT (OUTPATIENT)
Dept: FAMILY MEDICINE CLINIC | Facility: CLINIC | Age: 84
End: 2024-10-22

## 2024-10-22 LAB — INR PPP: 1.7 (ref 0.85–1.19)

## 2024-10-22 NOTE — PROGRESS NOTES
Patient's spouse saw note on MyChart     Increase warfarin to 7.5mg Monday, Wednesday and Friday, 5mg all other days  Recheck in 1 week   Written by Chantel Santos DO on 10/22/2024 12:46 PM EDT  Seen by proxy Daniel Caballero on 10/22/2024 12:52 PM

## 2024-10-29 ENCOUNTER — ANTICOAG VISIT (OUTPATIENT)
Dept: FAMILY MEDICINE CLINIC | Facility: CLINIC | Age: 84
End: 2024-10-29

## 2024-10-29 LAB — INR PPP: 1.8 (ref 0.85–1.19)

## 2024-10-30 PROBLEM — A04.72 CLOSTRIDIUM DIFFICILE DIARRHEA: Status: RESOLVED | Noted: 2024-09-30 | Resolved: 2024-10-30

## 2024-11-01 NOTE — TELEPHONE ENCOUNTER
[facility]  327/1  HOSPITAL MEDICINE PROGRESS NOTE   Patient: April Briggs  Today's Date: 11/1/2024    YOB: 1938  Admission Date: 10/28/2024    MRN: [id number]  Inpatient LOS: 3    Attending: Ricardo Newton MD  Hospital Day: Hospital Day: 5    Subjective   HISTORY AND SUBJECTIVE COMPLAINTS     Chief Complaint:   Abdominal pain    Interval History / Subjective:   Patient feels she is slowly getting better.  Pain continues to be a difficult obstacle but she has been managing okay with Tylenol only but did have oxycodone 5 mg x one overnight.    The hematoma seems to be slightly smaller to her observation and exam.     She has had some bowel movements.    PT and OT notes reviewed.     Hospital Course:  April Briggs is a 86 year old female who presented on 10/28/2024 with complaints of abdominal pain.     ROS:  Pertinent systems negative except as above.    Objective   PHYSICAL EXAMINATION     Vital 24 Hour Range Most Recent Value   Temperature Temp  Min: 97.7 °F (36.5 °C)  Max: 98.2 °F (36.8 °C) 98.2 °F (36.8 °C)   Pulse Pulse  Min: 70  Max: 75 70   Respiratory Resp  Min: 16  Max: 18 18   Blood Pressure BP  Min: 117/68  Max: 136/78 117/68   Pulse Oximetry SpO2  Min: 94 %  Max: 98 % 96 %   Arterial BP No data recorded     O2 No data recorded       Recorded Intake and Output:  Intake/Output Summary (Last 24 hours) at 11/1/2024 1214  Last data filed at 11/1/2024 1016  Gross per 24 hour   Intake 630 ml   Output 1150 ml   Net -520 ml      Recorded Last Stool Occurrence: 1 (10/31/24 1415)     Vital Most Recent Value First Value   Weight 55.9 kg (123 lb 3.8 oz) Weight: 57 kg (125 lb 10.6 oz)   Height 5' (152.4 cm) Height: 5' (152.4 cm)   BMI 24.07 N/A     General: Sitting in a hospital bed. Does not look acutely ill. Conversant.   Abd:  Right mid to lower right quadrant hematoma semi-firm upon palpation. Has ecchymosis overlying. Area of hematoma outlined, this area was underlined  Pt remains inpatient at this time. Will monitor for discharge to arrange for FU with Dr. Carcamo to discuss pathology and next steps    10/29/2024 and the hematoma does not expand beyond the outline today and is even within the outline to some extent. Other areas of abdomen are soft and non-tender.      Ext: No peripheral edema  Psych: normal judgement and insight, oriented to time, place and person, and normal mood and affect    TEST RESULTS     Labs: The Laboratory values listed below have been reviewed and pertinent findings discussed in the Assessment and Plan.    Laboratory values:   Recent Labs   Lab 11/01/24  0547 10/31/24  0607 10/30/24  1924 10/30/24  1203 10/30/24  0417 10/29/24  1157 10/29/24  0552 10/28/24  2305   WBC  --  8.2  --   --   --   --  6.4 5.5   HGB 11.2* 11.4* 11.8* 13.1 11.5*   < > 12.5 13.1   HCT  --  35.0*  --  40.2 35.2*   < > 37.9 38.9   PLT  --  246  --   --  224  --  232 250    < > = values in this interval not displayed.         Recent Labs   Lab 11/01/24  0547 10/31/24  0607 10/30/24  1924 10/30/24  0417 10/29/24  0552   SODIUM 138 140  --  140 140   POTASSIUM 4.0 4.3  --  3.9 4.2   CHLORIDE 102 101  --  103 103   CO2 32 29  --  30 30   CALCIUM 8.9 8.6  --  8.4 8.7   GLUCOSE 89 96  --  101* 89   BUN 7 10  --  9 9   CREATININE 0.52 0.64 0.64 0.67 0.66   MG  --   --   --   --  2.4        Recent Labs   Lab 10/28/24  2305   ALBUMIN 3.6   AST 21   GPT 25   BILIRUBIN 0.5       Radiology: Imaging studies have been reviewed and pertinent findings discussed in the Assessment and Plan.  No results found for any visits on 10/28/24 (from the past 48 hour(s)).       ANCILLARY ORDERS     Diet:  Cardiac Diet  Telemetry: Off  Consults:    None  Therapy Orders:   PT and OT Orders Placed this Encounter   Procedures    Occupational Therapy Evaluation    Occupational Therapy Treatment    Physical Therapy Evaluation    Physical Therapy Treatment       ADVANCED DIRECTIVES     Code Status: Full Resuscitation           ASSESSMENT AND PLAN     -Anterior abdominal wall swelling consistent with rectus sheath hematoma related to Lovenox  injections   10/28 CT scan shows: Right anterior abdominal swelling corresponds to subcutaneous 9 cm diameter by 5 cm thick heterogeneous collection. 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 Hemodynamically stable. No light-headedness or dizziness.  Hemoglobin mildly decreased since admit and stable.  Continue to monitor but will decrease the frequency daily instead of BID  Held any further anticoagulation. Last dose warfarin 10/28.   Monitor PT/INR.  Repeated CT scan 10/29. Slight expansion from previous image 10/28. 10.4 x 6.5 x 11.8 .      -Age-related osteoporosis pathological vertebral compression fracture  CT scan shows: Progressive second lumbar vertebral body compression--worsened since 18 May 2023.   No specific complaints of back pain today. PRN pain medication, non-pharmacologic intervention ice/heat, repositioning.  Has an implantable pain pump     -Paroxysmal atrial fibrillation   [id number] score is 7  On long-term anticoagulant with warfarin.  Was being bridged with Lovenox as it was held because of spinal injection for chronic back pain, hold for now in view of rectus sheath hematoma  Patient has indwelling pacemaker due to complete heart block, indwelling AICD  At this time, due to rectus sheath hematoma, all anticoagulants are being held.  Once her hematoma appears to be stable for 7 days or so,  as well as her mobility improved, consider resumption of warfarin only without bridging. Anticipating resumption of warfarin on 11/4/2024 if she remains stable otherwise as presently she is.      -Chronic diastolic heart failure, pulmonary hypertension   No signs of exacerbation, resume home dose.  On goal-directed medical therapy with  Entresto, carvedilol, furosemide, and as needed metolazone.  Entresto and furosemide and carvedilol held p.m. 10/30/2024 and 10/31/2024 due to lower normal pressures.  Blood pressure is improved, resume the above  stated medications today.  Holding jardiance while in hospital. Holding potassium supplement and will use hospital electrolyte protocol for now.  Last echo in 2023 showed ejection fraction 58%, RVSP 52 mmHg, moderate mitral regurgitation     -COPD    On tiotropium, olodaterol, currently no signs of exacerbation, continue home inhaler regimen     -Restless leg syndrome  Continue on primidone     -Hyperlipidemia  Continue on statin     -Chronic pain disorder  On intrathecal pump, recent caudal spinal injections for neurogenic claudication       Smoking status: non smoker    Nutrition status: appropriate  Body mass index is 24.07 kg/m². - appropriate weight BMI 18.5-24  DVT Prophylaxis: Lovenox therapeutic dosing        DISCHARGE PLANNING     The patient's treatment plans were discussed with patient, RN, and .    Discharge Planning   Depending on PT and OT recommendations.   Barriers to discharge: Patient is not medically ready and needs to remain in the hospital today due to continue monitoring, treatment of abdomen wall hematoma. Monitoring hgb and monitor hematoma, control pain.    Anticipated discharge destination: Home with home health  Expected Discharge Date: To be determined, patient would like to go home in the next 24 to 48 hours.           Ricardo Newton MDHospitalist  11/1/2024  12:14 PM

## 2024-11-06 ENCOUNTER — ANTICOAG VISIT (OUTPATIENT)
Dept: FAMILY MEDICINE CLINIC | Facility: CLINIC | Age: 84
End: 2024-11-06

## 2024-11-06 LAB — INR PPP: 2.1 (ref 0.85–1.19)

## 2024-11-06 NOTE — PROGRESS NOTES
Your warfarin level is good. Continue same dose and repeat in 1 week   Written by Chantel Santos DO on 11/6/2024 10:01 AM EST  Seen by proxy Daniel Caballero on 11/6/2024 10:45 AM

## 2024-11-08 ENCOUNTER — PREP FOR PROCEDURE (OUTPATIENT)
Dept: UROLOGY | Facility: HOSPITAL | Age: 84
End: 2024-11-08

## 2024-11-08 DIAGNOSIS — N13.5 OBSTRUCTION OF RIGHT URETER: Primary | ICD-10-CM

## 2024-11-08 DIAGNOSIS — E78.2 MIXED HYPERLIPIDEMIA: ICD-10-CM

## 2024-11-08 RX ORDER — ROSUVASTATIN CALCIUM 5 MG/1
TABLET, COATED ORAL
Qty: 90 TABLET | Refills: 1 | Status: SHIPPED | OUTPATIENT
Start: 2024-11-08

## 2024-11-12 ENCOUNTER — TELEPHONE (OUTPATIENT)
Dept: UROLOGY | Facility: CLINIC | Age: 84
End: 2024-11-12

## 2024-11-12 ENCOUNTER — PREP FOR PROCEDURE (OUTPATIENT)
Dept: UROLOGY | Facility: CLINIC | Age: 84
End: 2024-11-12

## 2024-11-12 DIAGNOSIS — Z01.812 PRE-OPERATIVE LABORATORY EXAMINATION: ICD-10-CM

## 2024-11-12 DIAGNOSIS — N13.5 OBSTRUCTION OF RIGHT URETER: Primary | ICD-10-CM

## 2024-11-12 DIAGNOSIS — R39.89 SUSPECTED UTI: ICD-10-CM

## 2024-11-15 ENCOUNTER — TELEPHONE (OUTPATIENT)
Age: 84
End: 2024-11-15

## 2024-11-15 NOTE — TELEPHONE ENCOUNTER
Patients spouse had called in and mentioned that the patient had the mobile lab come out on the 14th, to have blood work completed.     The cardiologist from [facility], had mentioned the patients potassium was high 5.7, and was wondering if Dr. Santos can put another blood work order in to have that retested. Cardiologist stated they can't get the order into the patients [facility] chart. Please advise out to patient if order could be  placed, for patient to have the test completed.

## 2024-11-16 LAB
BACTERIA UR CULT: NORMAL
Lab: NO GROWTH

## 2024-11-18 DIAGNOSIS — D63.1 ANEMIA IN STAGE 3B CHRONIC KIDNEY DISEASE  (HCC): ICD-10-CM

## 2024-11-18 DIAGNOSIS — N18.32 ANEMIA IN STAGE 3B CHRONIC KIDNEY DISEASE  (HCC): ICD-10-CM

## 2024-11-18 DIAGNOSIS — E87.5 HYPERKALEMIA: Chronic | ICD-10-CM

## 2024-11-18 DIAGNOSIS — N18.32 STAGE 3B CHRONIC KIDNEY DISEASE (HCC): Primary | ICD-10-CM

## 2024-11-19 ENCOUNTER — APPOINTMENT (OUTPATIENT)
Dept: LAB | Facility: HOSPITAL | Age: 84
End: 2024-11-19
Payer: COMMERCIAL

## 2024-11-19 DIAGNOSIS — Z01.812 PRE-OPERATIVE LABORATORY EXAMINATION: ICD-10-CM

## 2024-11-19 DIAGNOSIS — E87.5 HYPERKALEMIA: ICD-10-CM

## 2024-11-19 DIAGNOSIS — N13.5 OBSTRUCTION OF RIGHT URETER: ICD-10-CM

## 2024-11-19 LAB
ANION GAP SERPL CALCULATED.3IONS-SCNC: 4 MMOL/L (ref 4–13)
BUN SERPL-MCNC: 32 MG/DL (ref 5–25)
CALCIUM SERPL-MCNC: 9.3 MG/DL (ref 8.4–10.2)
CHLORIDE SERPL-SCNC: 106 MMOL/L (ref 96–108)
CO2 SERPL-SCNC: 31 MMOL/L (ref 21–32)
CREAT SERPL-MCNC: 1.49 MG/DL (ref 0.6–1.3)
GFR SERPL CREATININE-BSD FRML MDRD: 42 ML/MIN/1.73SQ M
GLUCOSE P FAST SERPL-MCNC: 101 MG/DL (ref 65–99)
POTASSIUM SERPL-SCNC: 5.3 MMOL/L (ref 3.5–5.3)
SODIUM SERPL-SCNC: 141 MMOL/L (ref 135–147)

## 2024-11-19 PROCEDURE — 80048 BASIC METABOLIC PNL TOTAL CA: CPT

## 2024-11-19 PROCEDURE — 36415 COLL VENOUS BLD VENIPUNCTURE: CPT

## 2024-11-21 NOTE — PRE-PROCEDURE INSTRUCTIONS
Pre-Surgery Instructions:   Medication Instructions    ALPRAZolam (XANAX) 0.5 mg tablet Uses PRN- OK to take day of surgery    cyanocobalamin (VITAMIN B-12) 1000 MCG tablet Hold day of surgery.    folic acid (FOLVITE) 1 mg tablet Hold day of surgery.    metoprolol tartrate (LOPRESSOR) 50 mg tablet Take day of surgery.    Multiple Vitamins-Minerals (PreserVision AREDS 2) CAPS Hold day of surgery.    rosuvastatin (CRESTOR) 5 mg tablet Take day of surgery.    warfarin (COUMADIN) 1 mg tablet Instructions provided by MD    warfarin (COUMADIN) 5 mg tablet Instructions provided by MD   Medication instructions for day surgery reviewed. Please use only a sip of water to take your instructed medications. Avoid all over the counter vitamins, supplements and NSAIDS for one week prior to surgery per anesthesia guidelines. Tylenol is ok to take as needed.     You will receive a call one business day prior to surgery with an arrival time and hospital directions. If your surgery is scheduled on a Monday, the hospital will be calling you on the Friday prior to your surgery. If you have not heard from anyone by 8pm, please call the hospital supervisor through the hospital  at [phone]. ([place] [phone] or [place] [phone]).    Do not eat or drink anything after midnight the night before your surgery, including candy, mints, lifesavers, or chewing gum. Do not drink alcohol 24hrs before your surgery. Try not to smoke at least 24hrs before your surgery.       Follow the pre surgery showering instructions as listed in the “My Surgical Experience Booklet” or otherwise provided by your surgeon's office. Do not use a blade to shave the surgical area 1 week before surgery. It is okay to use a clean electric clippers up to 24 hours before surgery. Do not apply any lotions, creams, including makeup, cologne, deodorant, or perfumes after showering on the day of your surgery. Do not use dry shampoo, hair spray, hair gel,  or any type of hair products.     No contact lenses, eye make-up, or artificial eyelashes. Remove nail polish, including gel polish, and any artificial, gel, or acrylic nails if possible. Remove all jewelry including rings and body piercing jewelry.     Wear causal clothing that is easy to take on and off. Consider your type of surgery.    Keep any valuables, jewelry, piercings at home. Please bring any specially ordered equipment (sling, braces) if indicated.    Arrange for a responsible person to drive you to and from the hospital on the day of your surgery. Please confirm the visitor policy for the day of your procedure when you receive your phone call with an arrival time.     Call the surgeon's office with any new illnesses, exposures, or additional questions prior to surgery.    Please reference your “My Surgical Experience Booklet” for additional information to prepare for your upcoming surgery.

## 2024-11-22 ENCOUNTER — ANESTHESIA EVENT (OUTPATIENT)
Dept: PERIOP | Facility: HOSPITAL | Age: 84
End: 2024-11-22
Payer: COMMERCIAL

## 2024-11-25 DIAGNOSIS — F41.9 ANXIETY: ICD-10-CM

## 2024-11-26 RX ORDER — ALPRAZOLAM 0.5 MG
0.5 TABLET ORAL 3 TIMES DAILY PRN
Qty: 90 TABLET | Refills: 0 | Status: SHIPPED | OUTPATIENT
Start: 2024-11-26

## 2024-11-27 PROCEDURE — NC001 PR NO CHARGE: Performed by: UROLOGY

## 2024-11-27 NOTE — H&P
HISTORY AND PHYSICAL  ?  ?  Patient Name: Francis Caballero  Patient MRN: [id number]  Attending Provider: Tha Carcamo MD  Service: Urology  Chief Complaint    Right hydronephrosis, history of bladder cancer    HPI   Francis Caballero is a 84 y.o. male with bladder cancer resected 1 year ago.  Followed by chemotherapy and radiation at [facility].  Right stent has been in place since then because of persistent hydronephrosis.  I plan cystoscopy, right stent exchange, any other indicated procedures if required.  Potential risks and complications discussed, and informed consent was given by the patient.  Medications  Meds/Allergies   No current facility-administered medications for this encounter.      Cannot display prior to admission medications because the patient has not been admitted in this contact.     No current facility-administered medications for this encounter.    Current Outpatient Medications:     cyanocobalamin (VITAMIN B-12) 1000 MCG tablet, Take 1 tablet (1,000 mcg total) by mouth daily, Disp: 90 tablet, Rfl: 0    folic acid (FOLVITE) 1 mg tablet, Take 1 tablet (1 mg total) by mouth daily, Disp: 90 tablet, Rfl: 3    metoprolol tartrate (LOPRESSOR) 50 mg tablet, TAKE 1 TABLET BY MOUTH TWICE A DAY, Disp: 180 tablet, Rfl: 3    Multiple Vitamins-Minerals (PreserVision AREDS 2) CAPS, , Disp: , Rfl:     rosuvastatin (CRESTOR) 5 mg tablet, TAKE 1 TABLET BY MOUTH EVERY DAY, Disp: 90 tablet, Rfl: 1    warfarin (COUMADIN) 1 mg tablet, TAKE 2 TABLETS DAILY -COMBINE WITH 5MG (Patient taking differently: daily at bedtime TAKE 2 TABLETS DAILY -COMBINE WITH 5MG- Per wife alternates between 5 mg, 7.5 mg), Disp: 180 tablet, Rfl: 1    warfarin (COUMADIN) 5 mg tablet, TAKE 1 TABLET BY MOUTH EVERY DAY, combine with 1-2mg tab as directed (alternates between 6mg and 7mg QOD) (Patient taking differently: daily at bedtime TAKE 1 TABLET BY MOUTH EVERY DAY, combine with 1-2mg tab as directed (alternates between 5 mg and 7.5  mg QOD)-), Disp: 90 tablet, Rfl: 1    acetaminophen (TYLENOL) 650 mg CR tablet, Take 1 tablet (650 mg total) by mouth every 8 (eight) hours as needed for mild pain (Patient not taking: Reported on 10/15/2024), Disp: 30 tablet, Rfl: 0    ALPRAZolam (XANAX) 0.5 mg tablet, Take 1 tablet (0.5 mg total) by mouth 3 (three) times a day as needed for anxiety, Disp: 90 tablet, Rfl: 0    doxycycline hyclate (VIBRAMYCIN) 100 mg capsule, TAKE 2 CAPSULES BY MOUTH NOW THEN 1 CAPSULE BY MOUTH ONCE DAILY FOR 7 DAYS (Patient not taking: Reported on 11/21/2024), Disp: , Rfl:   Review of Systems  10 point review of systems negative except as noted in HPI  Allergies  No Known Allergies  PMH  Past Medical History:   Diagnosis Date    A-fib (HCC)     Ambulates with cane     Anemia     last assessed: 05/23/2015    Cancer (HCC) 2016    don't know    CKD (chronic kidney disease)     Clotting disorder (HCC) 2018    Frequent falls     GERD (gastroesophageal reflux disease)     Hematuria     History of transfusion     reaction    Hyperlipidemia     Hypertension     Intracerebral hemorrhage (HCC)     last assessed: 04/21/2014; right    Intracerebral hemorrhage (HCC)     last assessed: 11/23/2017    Mitral valve regurgitation     Mobility impaired     Sleep apnea     no cpap    Stroke ([facility]) 2011    Stroke syndrome     last assessed: 06/13/2013    UTI (urinary tract infection)     Wears dentures     Wears glasses      Past surgical history  Past Surgical History:   Procedure Laterality Date    BLADDER SURGERY      FL RETROGRADE PYELOGRAM  07/12/2024    IR NEPHROSTOMY TUBE PLACEMENT  07/27/2023    RI CYSTO W/IRRIG & EVAC MULTPLE OBSTRUCTING CLOTS N/A 07/12/2024    Procedure: CYSTOSCOPY EVACUATION OF CLOTS;  Surgeon: Craig Simon MD;  [facility];  Service: Urology    RI CYSTO W/REMOVAL OF LESIONS SMALL N/A 07/12/2024    Procedure: (TURBT) cautery bleeding, clot evacuation;  Surgeon: Craig Simon MD;  [facility];   Service: Urology    FL CYSTOURETHROSCOPY W/DEST &/RMVL TUMOR LARGE N/A 07/26/2023    Procedure: (TURBT);  Surgeon: Tha Carcamo MD;  [facility];  Service: Urology    WISDOM TOOTH EXTRACTION       Social history  Social History     Tobacco Use    Smoking status: Former     Current packs/day: 0.25     Average packs/day: 0.3 packs/day for 40.0 years (10.0 ttl pk-yrs)     Types: Cigars, Cigarettes    Smokeless tobacco: Never    Tobacco comments:     Last cigar 7/10.24   Vaping Use    Vaping status: Never Used   Substance Use Topics    Alcohol use: Yes     Alcohol/week: 7.0 standard drinks of alcohol     Types: 7 Standard drinks or equivalent per week     Comment: Social     Drug use: No     ?  Physical Exam    .vs  General appearance: alert and oriented, in no acute distress  Head: Normocephalic, without obvious abnormality, atraumatic  Throat: lips, mucosa, and tongue normal; teeth and gums normal  Neck: no adenopathy, no carotid bruit, no JVD, supple, symmetrical, trachea midline, and thyroid not enlarged, symmetric, no tenderness/mass/nodules  Lungs: clear to auscultation bilaterally  Heart: regular rate and rhythm, S1, S2 normal, no murmur, click, rub or gallop  Abdomen: soft, non-tender; bowel sounds normal; no masses,  no organomegaly  Extremities: extremities normal, warm and well-perfused; no cyanosis, clubbing, or edema  Tha Carcamo MD

## 2024-11-29 ENCOUNTER — HOSPITAL ENCOUNTER (OUTPATIENT)
Facility: HOSPITAL | Age: 84
Setting detail: OUTPATIENT SURGERY
Discharge: HOME | End: 2024-11-29
Attending: UROLOGY | Admitting: UROLOGY
Payer: COMMERCIAL

## 2024-11-29 ENCOUNTER — APPOINTMENT (OUTPATIENT)
Dept: RADIOLOGY | Facility: HOSPITAL | Age: 84
End: 2024-11-29
Payer: COMMERCIAL

## 2024-11-29 ENCOUNTER — ANESTHESIA (OUTPATIENT)
Dept: PERIOP | Facility: HOSPITAL | Age: 84
End: 2024-11-29
Payer: COMMERCIAL

## 2024-11-29 VITALS
DIASTOLIC BLOOD PRESSURE: 76 MMHG | HEART RATE: 69 BPM | BODY MASS INDEX: 21.86 KG/M2 | RESPIRATION RATE: 16 BRPM | WEIGHT: 161.38 LBS | SYSTOLIC BLOOD PRESSURE: 153 MMHG | HEIGHT: 72 IN | TEMPERATURE: 97.2 F | OXYGEN SATURATION: 98 %

## 2024-11-29 DIAGNOSIS — N13.5 OBSTRUCTION OF RIGHT URETER: ICD-10-CM

## 2024-11-29 LAB
INR PPP: 1.34 (ref 0.85–1.19)
PROTHROMBIN TIME: 16.7 SECONDS (ref 12.3–15)

## 2024-11-29 PROCEDURE — 74420 UROGRAPHY RTRGR +-KUB: CPT

## 2024-11-29 PROCEDURE — C2617 STENT, NON-COR, TEM W/O DEL: HCPCS | Performed by: UROLOGY

## 2024-11-29 PROCEDURE — 99024 POSTOP FOLLOW-UP VISIT: CPT | Performed by: UROLOGY

## 2024-11-29 PROCEDURE — 52332 CYSTOSCOPY AND TREATMENT: CPT | Performed by: UROLOGY

## 2024-11-29 PROCEDURE — 85610 PROTHROMBIN TIME: CPT | Performed by: ANESTHESIOLOGY

## 2024-11-29 PROCEDURE — C1769 GUIDE WIRE: HCPCS | Performed by: UROLOGY

## 2024-11-29 PROCEDURE — 87086 URINE CULTURE/COLONY COUNT: CPT | Performed by: UROLOGY

## 2024-11-29 DEVICE — VARIABLE LENGTH INJECTION STENT SET
Type: IMPLANTABLE DEVICE | Site: URETER | Status: FUNCTIONAL
Brand: CONTOUR VL™ INJECTION STENT SET

## 2024-11-29 RX ORDER — HYDROCODONE BITARTRATE AND ACETAMINOPHEN 5; 325 MG/1; MG/1
1 TABLET ORAL EVERY 6 HOURS PRN
Qty: 4 TABLET | Refills: 0 | Status: SHIPPED | OUTPATIENT
Start: 2024-11-29 | End: 2024-12-09

## 2024-11-29 RX ORDER — CEFAZOLIN SODIUM 2 G/50ML
2000 SOLUTION INTRAVENOUS ONCE
Status: COMPLETED | OUTPATIENT
Start: 2024-11-29 | End: 2024-11-29

## 2024-11-29 RX ORDER — FENTANYL CITRATE 50 UG/ML
INJECTION, SOLUTION INTRAMUSCULAR; INTRAVENOUS AS NEEDED
Status: DISCONTINUED | OUTPATIENT
Start: 2024-11-29 | End: 2024-11-29

## 2024-11-29 RX ORDER — LIDOCAINE HYDROCHLORIDE 20 MG/ML
INJECTION, SOLUTION EPIDURAL; INFILTRATION; INTRACAUDAL; PERINEURAL AS NEEDED
Status: DISCONTINUED | OUTPATIENT
Start: 2024-11-29 | End: 2024-11-29

## 2024-11-29 RX ORDER — ONDANSETRON 2 MG/ML
INJECTION INTRAMUSCULAR; INTRAVENOUS AS NEEDED
Status: DISCONTINUED | OUTPATIENT
Start: 2024-11-29 | End: 2024-11-29

## 2024-11-29 RX ORDER — OXYCODONE AND ACETAMINOPHEN 5; 325 MG/1; MG/1
1 TABLET ORAL EVERY 4 HOURS PRN
Status: DISCONTINUED | OUTPATIENT
Start: 2024-11-29 | End: 2024-11-29 | Stop reason: HOSPADM

## 2024-11-29 RX ORDER — ONDANSETRON 2 MG/ML
4 INJECTION INTRAMUSCULAR; INTRAVENOUS ONCE AS NEEDED
Status: DISCONTINUED | OUTPATIENT
Start: 2024-11-29 | End: 2024-11-29 | Stop reason: HOSPADM

## 2024-11-29 RX ORDER — PROPOFOL 10 MG/ML
INJECTION, EMULSION INTRAVENOUS AS NEEDED
Status: DISCONTINUED | OUTPATIENT
Start: 2024-11-29 | End: 2024-11-29

## 2024-11-29 RX ORDER — FENTANYL CITRATE/PF 50 MCG/ML
25 SYRINGE (ML) INJECTION
Status: DISCONTINUED | OUTPATIENT
Start: 2024-11-29 | End: 2024-11-29 | Stop reason: HOSPADM

## 2024-11-29 RX ORDER — METOPROLOL TARTRATE 1 MG/ML
2.5 INJECTION, SOLUTION INTRAVENOUS ONCE
Status: COMPLETED | OUTPATIENT
Start: 2024-11-29 | End: 2024-11-29

## 2024-11-29 RX ORDER — METOPROLOL TARTRATE 1 MG/ML
2.5 INJECTION, SOLUTION INTRAVENOUS ONCE
Status: DISCONTINUED | OUTPATIENT
Start: 2024-11-29 | End: 2024-11-29

## 2024-11-29 RX ORDER — SODIUM CHLORIDE 9 MG/ML
125 INJECTION, SOLUTION INTRAVENOUS CONTINUOUS
Status: DISCONTINUED | OUTPATIENT
Start: 2024-11-29 | End: 2024-11-29 | Stop reason: HOSPADM

## 2024-11-29 RX ADMIN — CEFAZOLIN SODIUM 2000 MG: 2 SOLUTION INTRAVENOUS at 10:21

## 2024-11-29 RX ADMIN — FENTANYL CITRATE 25 MCG: 50 INJECTION INTRAMUSCULAR; INTRAVENOUS at 10:47

## 2024-11-29 RX ADMIN — LIDOCAINE HYDROCHLORIDE 100 MG: 20 INJECTION, SOLUTION EPIDURAL; INFILTRATION; INTRACAUDAL at 10:43

## 2024-11-29 RX ADMIN — ONDANSETRON 4 MG: 2 INJECTION INTRAMUSCULAR; INTRAVENOUS at 10:47

## 2024-11-29 RX ADMIN — PROPOFOL 120 MG: 10 INJECTION, EMULSION INTRAVENOUS at 10:43

## 2024-11-29 RX ADMIN — FENTANYL CITRATE 25 MCG: 50 INJECTION INTRAMUSCULAR; INTRAVENOUS at 10:55

## 2024-11-29 RX ADMIN — METOROPROLOL TARTRATE 2.5 MG: 5 INJECTION, SOLUTION INTRAVENOUS at 09:44

## 2024-11-29 RX ADMIN — SODIUM CHLORIDE 125 ML/HR: 0.9 INJECTION, SOLUTION INTRAVENOUS at 09:30

## 2024-11-29 NOTE — ANESTHESIA POSTPROCEDURE EVALUATION
Post-Op Assessment Note    CV Status:  Stable    Pain management: adequate       Mental Status:  Alert and awake   Hydration Status:  Euvolemic   PONV Controlled:  Controlled   Airway Patency:  Patent  Two or more mitigation strategies used for obstructive sleep apnea   Post Op Vitals Reviewed: Yes    No anethesia notable event occurred.    Staff: CRNA           Last Filed PACU Vitals:  Vitals Value Taken Time   Temp     Pulse     BP     Resp     SpO2         Modified Priscilla:  No data recorded

## 2024-11-29 NOTE — DISCHARGE SUMMARY
Discharge Summary - Francis Caballero 84 y.o. male MRN: [id number]    Admission Date: 11/29/2024     Admitting Diagnosis: Obstruction of right ureter [N13.5]    Procedures Performed: See below    Patient underwent planned outpt surgery and recovered without complication. Discharged in good condition.  Medications were prescribed.  Pt knows to have office follow-up at the appropriate time.                      Cystoscopy, right stent exchange, retrograde pyelogram

## 2024-11-29 NOTE — OP NOTE
OPERATIVE REPORT  PATIENT NAME: Francis Caballero    :  1940  MRN: [id number]  Pt Location: [facility]OM 08    SURGERY DATE: 2024    Surgeons and Role:     * Tha Carcamo MD - Primary    Preop Diagnosis:  Obstruction of right ureter [N13.5]    Post-Op Diagnosis Codes:     * Obstruction of right ureter [N13.5]    Procedure(s):  Right - CYSTO. RETROGRADE PYELOGRAM W/ INSERTION STENT URETERAL. stent exchange    Specimen(s):  ID Type Source Tests Collected by Time Destination   A :  Urine Urine, Cystoscopic URINE CULTURE Tha Carcamo MD 2024 10:52 AM        Estimated Blood Loss:   Minimal    Drains:  Ureteral Internal Stent Right ureter 7 Fr. (Active)   Site Assessment AKANKSHA 24 1200   Number of days: 0       Anesthesia Type:   General    Operative Indications:  Obstruction of right ureter [N13.5]      Operative Findings:  1.  Lots of scar tissue in the base of the bladder, some necrotic epithelial and sloughing off.  No apparent tumor.    2.  Mild right hydronephrosis, new 7 Swedish stent placed.  Minimal calcification of the old stent.      Complications:   None    Procedure and Technique:     After the patient was placed under adequate anesthesia, they were prepped and draped using chlorhexidine solution in lithotomy position. The 22 Swedish scope was passed thru the urethra and bladder.  Further findings upon passage of the scope were as listed above..     The prior right stent was grasped, brought out thru the meatus, and wired. A new 7 Swedish Contour stent was passed over the wire up to the renal pelvis. Contrast was injected, retrograde pyelogram showing good position and mild hydronephrosis.       The scope was removed, leaving the stent in place.  They were taken to RR in good condition.   I was present for the entire procedure.    Patient Disposition:  PACU              SIGNATURE: Tha Carcamo MD  DATE: 2024  TIME: 4:59 PM

## 2024-11-29 NOTE — QUICK NOTE
See op report.  Stent exchange.  Surprisingly no calcification of stent.    Will have office visit in 3 to 4 months or so.  Plan for stent exchange in April or May.    Patient requests [facility] if possible

## 2024-11-29 NOTE — ANESTHESIA PREPROCEDURE EVALUATION
Procedure:  CYSTO RETROGRADE PYELOGRAM W/ INSERTION STENT URETERAL, stent exchange (Right: Ureter)    Relevant Problems   ANESTHESIA (within normal limits)      CARDIO   (+) Atrial fibrillation (HCC)   (+) Cerebral artery occlusion   (+) Coronary arteriosclerosis   (+) Hyperlipidemia   (+) Hypertension   (+) Mitral regurgitation   (+) Pure hypercholesterolemia      ENDO (within normal limits)      GI/HEPATIC   (+) Gastro-esophageal reflux disease without esophagitis   (+) Hiatal hernia      /RENAL   (+) LELE (acute kidney injury) (HCC)   (+) Benign prostatic hyperplasia   (+) Chronic kidney disease   (+) Chronic renal impairment   (+) 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)   (+) Other hydronephrosis   (+) Stage 3b chronic kidney disease (HCC)      GYN (within normal limits)      HEMATOLOGY   (+) Anemia associated with acute blood loss   (+) Anemia in chronic kidney disease   (+) Coagulation disorder (HCC)      MUSCULOSKELETAL   (+) Chronic bilateral low back pain with right-sided sciatica   (+) DDD (degenerative disc disease), lumbar   (+) Hiatal hernia   (+) Osteoarthritis of lumbar spine   (+) Primary osteoarthritis of left hip      NEURO/PSYCH   (+) Anxiety   (+) CVA (cerebral vascular accident) (HCC)   (+) Chronic bilateral low back pain with right-sided sciatica   (+) Intractable epilepsy without status epilepticus, unspecified epilepsy type ([facility])      PULMONARY   (+) JORGE LUIS (obstructive sleep apnea)   (+) Smoker      OSH cards ote excerpt: [id number] LVEF 60%, mild MR, mild TR    Stress 2011: normal perfusion EF 60%       Physical Exam    Airway    Mallampati score: II         Dental       Cardiovascular  Rhythm: irregular, Rate: normal    Pulmonary   Breath sounds clear to auscultation    Other Findings        Anesthesia Plan  ASA Score- 3     Anesthesia Type- general with ASA Monitors.         Additional Monitors:     Airway Plan: LMA.    Comment: Beeta blocker ordered in [facility].        Plan Factors-Exercise tolerance (METS): <4 METS.    Chart reviewed.   Existing labs reviewed.     Patient is a current smoker.  Patient instructed to abstain from smoking on day of procedure. Patient did not smoke on day of surgery.    Obstructive sleep apnea risk education given perioperatively.        Induction- intravenous.    Postoperative Plan- Plan for postoperative opioid use.     Perioperative Resuscitation Plan - Level 1 - Full Code.       Informed Consent- Anesthetic plan and risks discussed with patient and spouse.

## 2024-11-29 NOTE — DISCHARGE INSTR - AVS FIRST PAGE
ALL YOUR  PREVIOUS MEDS ARE THE SAME.    NEW MEDS: Hydrocodone if needed for pain    EXPECT SOME BLOOD IN URINE, BURNING, FREQUENT URINATION.    ACTIVITY:  RESUME FULL ACTIVITY Sunday.    We will call you to be seen in the [place] office in 4 months or so to plan the next stent exchange.

## 2024-11-29 NOTE — ANESTHESIA POSTPROCEDURE EVALUATION
Post-Op Assessment Note    CV Status:  Stable    Pain management: adequate       Mental Status:  Alert and awake   Hydration Status:  Euvolemic   PONV Controlled:  Controlled   Airway Patency:  Patent     Post Op Vitals Reviewed: Yes    No anethesia notable event occurred.    Staff: Anesthesiologist           Last Filed PACU Vitals:  Vitals Value Taken Time   Temp 97.5 °F (36.4 °C) 11/29/24 1144   Pulse 70 11/29/24 1148   /67 11/29/24 1144   Resp 17 11/29/24 1148   SpO2 100 % 11/29/24 1148   Vitals shown include unfiled device data.    Modified Priscilla:  Activity: 2 (11/29/2024 11:44 AM)  Respiration: 2 (11/29/2024 11:44 AM)  Circulation: 2 (11/29/2024 11:44 AM)  Consciousness: 2 (11/29/2024 11:44 AM)  Oxygen Saturation: 2 (11/29/2024 11:44 AM)  Modified Priscilla Score: 10 (11/29/2024 11:44 AM)

## 2024-12-01 LAB — BACTERIA UR CULT: NORMAL

## 2024-12-02 ENCOUNTER — TELEPHONE (OUTPATIENT)
Dept: UROLOGY | Facility: MEDICAL CENTER | Age: 84
End: 2024-12-02

## 2024-12-02 ENCOUNTER — ANTICOAG VISIT (OUTPATIENT)
Dept: FAMILY MEDICINE CLINIC | Facility: CLINIC | Age: 84
End: 2024-12-02

## 2024-12-02 LAB — INR PPP: 1.3 (ref 0.85–1.19)

## 2024-12-02 NOTE — TELEPHONE ENCOUNTER
Spoke to pt's wife and advised per Dr. Carcamo that as long as pt is not having pain there would be no intervention needed for right inguinal hernia.  Pt is scheduled to see Dr. Mckee on 1/14/25 for TURBT post op appt at which time pt's hernia can be discussed.    Tha Carcamo MD  You3 minutes ago (1:57 PM)     Only if painful.  When I saw him, he did not have any symptoms

## 2024-12-05 NOTE — PROGRESS NOTES
Marlene instructed patient during office visit today to take 6 mg coumadin daily repeat 8/10/23 if normal will go back to weekly
05-Dec-2024 03:38

## 2024-12-09 LAB — INR PPP: 2.9 (ref 0.85–1.19)

## 2024-12-16 ENCOUNTER — TELEPHONE (OUTPATIENT)
Dept: OTHER | Facility: OTHER | Age: 84
End: 2024-12-16

## 2024-12-16 ENCOUNTER — ANTICOAG VISIT (OUTPATIENT)
Dept: FAMILY MEDICINE CLINIC | Facility: CLINIC | Age: 84
End: 2024-12-16

## 2024-12-16 LAB — INR PPP: 5.2 (ref 0.85–1.19)

## 2024-12-17 NOTE — TELEPHONE ENCOUNTER
Lab Result: INR Result 5.2   Date/Time Drawn: 12/16 @ 10:49 am    Ordering Provider: Dr. Santos    Lab Personnel's Name: Sheng/DAVID        Read back to the lab as documented above     [x]     Secure Chat message sent to on-call provider  [x]     Provider confirmed receipt of message     [x]

## 2024-12-23 LAB — INR PPP: 2 (ref 0.85–1.19)

## 2024-12-31 ENCOUNTER — TELEPHONE (OUTPATIENT)
Age: 84
End: 2024-12-31

## 2024-12-31 NOTE — TELEPHONE ENCOUNTER
Patients machine is broken and they cannot get his INR. Can an order for an INR be placed please? Also if anyone cancels for a nurse visit on 1/2/25, please call pt for labs.    Thanks

## 2025-01-01 ENCOUNTER — HOME CARE VISIT (OUTPATIENT)
Dept: HOME HEALTH SERVICES | Facility: HOME HEALTHCARE | Age: 85
End: 2025-01-01
Payer: MEDICARE

## 2025-01-01 ENCOUNTER — HOME CARE VISIT (OUTPATIENT)
Dept: HOME HOSPICE | Facility: HOSPICE | Age: 85
End: 2025-01-01
Payer: MEDICARE

## 2025-01-01 ENCOUNTER — HOSPICE ADMISSION (OUTPATIENT)
Dept: HOME HOSPICE | Facility: HOSPICE | Age: 85
End: 2025-01-01
Payer: MEDICARE

## 2025-01-01 ENCOUNTER — TELEPHONE (OUTPATIENT)
Dept: HOME HEALTH SERVICES | Facility: HOME HEALTHCARE | Age: 85
End: 2025-01-01

## 2025-01-01 ENCOUNTER — RESULTS FOLLOW-UP (OUTPATIENT)
Dept: FAMILY MEDICINE CLINIC | Facility: CLINIC | Age: 85
End: 2025-01-01

## 2025-01-01 VITALS
TEMPERATURE: 97.8 F | SYSTOLIC BLOOD PRESSURE: 152 MMHG | HEART RATE: 78 BPM | OXYGEN SATURATION: 88 % | DIASTOLIC BLOOD PRESSURE: 84 MMHG | RESPIRATION RATE: 20 BRPM

## 2025-01-01 VITALS
HEART RATE: 86 BPM | DIASTOLIC BLOOD PRESSURE: 94 MMHG | TEMPERATURE: 98.6 F | RESPIRATION RATE: 20 BRPM | SYSTOLIC BLOOD PRESSURE: 168 MMHG

## 2025-01-01 DIAGNOSIS — Z51.5 HOSPICE CARE PATIENT: ICD-10-CM

## 2025-01-01 DIAGNOSIS — R06.00 DYSPNEA: ICD-10-CM

## 2025-01-01 DIAGNOSIS — R52 PAIN: Primary | ICD-10-CM

## 2025-01-01 PROCEDURE — G0299 HHS/HOSPICE OF RN EA 15 MIN: HCPCS

## 2025-01-01 PROCEDURE — 10330064 BRIEF, TAB CLSR ULTRA MED 32-44 (16/BG 6

## 2025-01-01 PROCEDURE — G0155 HHCP-SVS OF CSW,EA 15 MIN: HCPCS

## 2025-01-01 PROCEDURE — 10330064 UNDERPAD, REUSE 36X36 1DZ     BECKCL

## 2025-01-01 PROCEDURE — 10330087 HSPC SERVICE INTENSITY ADD-ON

## 2025-01-01 PROCEDURE — 10330057 MEDICATION, GENERAL

## 2025-01-01 PROCEDURE — G0156 HHCP-SVS OF AIDE,EA 15 MIN: HCPCS

## 2025-01-01 PROCEDURE — 10330064 BRIEF, TAB CLSR ULTRA LG 45-58LG (18/BG

## 2025-01-01 PROCEDURE — 10330064

## 2025-01-01 RX ORDER — OXYCODONE HCL 5 MG/5 ML
5 SOLUTION, ORAL ORAL EVERY 4 HOURS PRN
Qty: 30 ML | Refills: 0 | Status: SHIPPED | OUTPATIENT
Start: 2025-01-01 | End: 2025-05-30

## 2025-01-01 RX ADMIN — METRONIDAZOLE 250 MG: 250 TABLET ORAL at 15:00

## 2025-01-02 ENCOUNTER — ANTICOAG VISIT (OUTPATIENT)
Dept: FAMILY MEDICINE CLINIC | Facility: CLINIC | Age: 85
End: 2025-01-02

## 2025-01-02 ENCOUNTER — TELEPHONE (OUTPATIENT)
Dept: FAMILY MEDICINE CLINIC | Facility: CLINIC | Age: 85
End: 2025-01-02

## 2025-01-02 LAB — INR PPP: 2.1 (ref 0.85–1.19)

## 2025-01-02 NOTE — PROGRESS NOTES
Aby Clemente MA to Daniel Caballero (Emergency Contact)   KD      1/2/25 11:42 AM  I spoke to Daniel and gavemessage from provider to contiue same dose and recheck in one week   She had no new concerns

## 2025-01-08 LAB — INR PPP: 1.9 (ref 0.85–1.19)

## 2025-01-10 NOTE — PROGRESS NOTES
Name: Francis Caballero      : 1940      MRN: [id number]  Encounter Provider: Eladio Mckee MD  Encounter Date: 2025   Encounter department: [facility] [place]  :  Assessment & Plan  Hydronephrosis, unspecified hydronephrosis type  Informed consent signed today.  Plan for right ureteral stent exchange in May.  Orders:    Case request operating room: EXCHANGE STENT URETERAL, TRANSURETHRAL RESECTION OF BLADDER TUMOR (TURBT); Standing    Malignant neoplasm of overlapping sites of bladder (HCC)  Most the base of bladder cancer status post chemotherapy and radiation  We have scheduled his stent exchange along with possible TURBT if any recurrent tumor is noted.  Orders:    Case request operating room: EXCHANGE STENT URETERAL, TRANSURETHRAL RESECTION OF BLADDER TUMOR (TURBT); Standing            History of Present Illness   Francis Caballero is a 84 y.o. male who presents with high risk bladder cancer, needing a stent exchange.  We will arrange for this in May.  He has a history of Savaysa bladder cancer status post trimodality/bladder sparing therapy.  Performance status at this time.  No complaints.  ECOG 0.    The following portions of the patient's history were reviewed and updated as appropriate: allergies, current medications, past family history, past medical history, past social history, past surgical history and problem list.  AUA SYMPTOM SCORE      Flowsheet Row Most Recent Value   AUA SYMPTOM SCORE    How often have you had a sensation of not emptying your bladder completely after you finished urinating? 4 (P)     How often have you had to urinate again less than two hours after you finished urinating? 5 (P)     How often have you found you stopped and started again several times when you urinate? 4 (P)     How often have you found it difficult to postpone urination? 5 (P)     How often have you had a weak urinary stream? 4 (P)     How often have you had to push or strain to  begin urination? 1 (P)     How many times did you most typically get up to urinate from the time you went to bed at night until the time you got up in the morning? 5 (P)     Quality of Life: If you were to spend the rest of your life with your urinary condition just the way it is now, how would you feel about that? 6 (P)     AUA SYMPTOM SCORE 28 (P)            Review of Systems   Constitutional: Negative.    HENT: Negative.     Eyes: Negative.    Respiratory: Negative.     Cardiovascular: Negative.    Gastrointestinal: Negative.    Endocrine: Negative.    Genitourinary: Negative.    Musculoskeletal: Negative.    Skin: Negative.    Allergic/Immunologic: Negative.    Neurological: Negative.    Hematological: Negative.    Psychiatric/Behavioral: Negative.            Objective   There were no vitals taken for this visit.    Physical Exam  Vitals reviewed.   Constitutional:       General: He is not in acute distress.     Appearance: Normal appearance. He is well-developed. He is not ill-appearing, toxic-appearing or diaphoretic.   HENT:      Head: Normocephalic and atraumatic.      Nose: Nose normal.      Mouth/Throat:      Mouth: Mucous membranes are moist.   Eyes:      General: No scleral icterus.        Right eye: No discharge.         Left eye: No discharge.   Neck:      Thyroid: No thyromegaly.      Trachea: No tracheal deviation.   Pulmonary:      Effort: Pulmonary effort is normal.   Chest:      Chest wall: No tenderness.   Abdominal:      General: There is no distension.      Palpations: There is no mass.      Tenderness: There is no abdominal tenderness. There is no guarding.      Hernia: No hernia is present.   Musculoskeletal:         General: No deformity or signs of injury. Normal range of motion.      Cervical back: Normal range of motion and neck supple.   Lymphadenopathy:      Cervical: No cervical adenopathy.   Skin:     General: Skin is dry.      Coloration: Skin is not jaundiced or pale.      Findings:  No erythema.   Neurological:      Mental Status: He is alert and oriented to person, place, and time.      Cranial Nerves: No cranial nerve deficit.      Motor: No abnormal muscle tone.      Coordination: Coordination normal.   Psychiatric:         Mood and Affect: Mood normal.         Behavior: Behavior normal.         Thought Content: Thought content normal.         Judgment: Judgment normal.          Results  Lab Results   Component Value Date    PSA 1.5 05/22/2023    PSA 1.6 04/27/2022    PSA 1.2 08/29/2016     Lab Results   Component Value Date    GLUCOSE 100 (H) 08/29/2016    CALCIUM 9.3 11/19/2024     08/29/2016    K 5.3 11/19/2024    CO2 31 11/19/2024     11/19/2024    BUN 32 (H) 11/19/2024    CREATININE 1.49 (H) 11/19/2024     Lab Results   Component Value Date    WBC 8.0 08/21/2024    HGB 9.6 (L) 08/21/2024    HCT 30.1 (L) 08/21/2024    MCV 97 08/21/2024     08/21/2024       Office Urine Dip  No results found for this or any previous visit (from the past hour).]

## 2025-01-11 DIAGNOSIS — Z79.01 ANTICOAGULATED: ICD-10-CM

## 2025-01-11 DIAGNOSIS — I48.21 PERMANENT ATRIAL FIBRILLATION (HCC): ICD-10-CM

## 2025-01-11 DIAGNOSIS — I10 PRIMARY HYPERTENSION: ICD-10-CM

## 2025-01-13 RX ORDER — METOPROLOL TARTRATE 50 MG
50 TABLET ORAL 2 TIMES DAILY
Qty: 180 TABLET | Refills: 1 | Status: SHIPPED | OUTPATIENT
Start: 2025-01-13

## 2025-01-13 RX ORDER — WARFARIN SODIUM 5 MG/1
TABLET ORAL
Qty: 90 TABLET | Refills: 1 | Status: SHIPPED | OUTPATIENT
Start: 2025-01-13

## 2025-01-14 ENCOUNTER — OFFICE VISIT (OUTPATIENT)
Dept: UROLOGY | Facility: HOSPITAL | Age: 85
End: 2025-01-14
Payer: COMMERCIAL

## 2025-01-14 ENCOUNTER — ANTICOAG VISIT (OUTPATIENT)
Dept: FAMILY MEDICINE CLINIC | Facility: CLINIC | Age: 85
End: 2025-01-14

## 2025-01-14 VITALS
OXYGEN SATURATION: 97 % | WEIGHT: 160 LBS | HEIGHT: 72 IN | DIASTOLIC BLOOD PRESSURE: 80 MMHG | BODY MASS INDEX: 21.67 KG/M2 | SYSTOLIC BLOOD PRESSURE: 134 MMHG | HEART RATE: 75 BPM

## 2025-01-14 DIAGNOSIS — N13.30 HYDRONEPHROSIS, UNSPECIFIED HYDRONEPHROSIS TYPE: Primary | ICD-10-CM

## 2025-01-14 DIAGNOSIS — N18.32 STAGE 3B CHRONIC KIDNEY DISEASE (HCC): ICD-10-CM

## 2025-01-14 DIAGNOSIS — C67.8 MALIGNANT NEOPLASM OF OVERLAPPING SITES OF BLADDER (HCC): ICD-10-CM

## 2025-01-14 LAB — INR PPP: 1.7 (ref 0.85–1.19)

## 2025-01-14 PROCEDURE — 99213 OFFICE O/P EST LOW 20 MIN: CPT | Performed by: UROLOGY

## 2025-01-14 RX ORDER — CEFAZOLIN SODIUM 2 G/50ML
2000 SOLUTION INTRAVENOUS ONCE
OUTPATIENT
Start: 2025-01-14 | End: 2025-01-14

## 2025-01-14 RX ORDER — ACETAMINOPHEN 325 MG/1
975 TABLET ORAL ONCE
OUTPATIENT
Start: 2025-01-14 | End: 2025-01-14

## 2025-01-15 ENCOUNTER — PREP FOR PROCEDURE (OUTPATIENT)
Dept: UROLOGY | Facility: CLINIC | Age: 85
End: 2025-01-15

## 2025-01-15 ENCOUNTER — TELEPHONE (OUTPATIENT)
Dept: UROLOGY | Facility: CLINIC | Age: 85
End: 2025-01-15

## 2025-01-15 DIAGNOSIS — C67.8 MALIGNANT NEOPLASM OF OVERLAPPING SITES OF BLADDER (HCC): ICD-10-CM

## 2025-01-15 DIAGNOSIS — Z79.01 LONG TERM (CURRENT) USE OF ANTICOAGULANTS: ICD-10-CM

## 2025-01-15 DIAGNOSIS — N13.30 HYDRONEPHROSIS, UNSPECIFIED HYDRONEPHROSIS TYPE: Primary | ICD-10-CM

## 2025-01-15 DIAGNOSIS — F41.9 ANXIETY: ICD-10-CM

## 2025-01-15 DIAGNOSIS — Z01.812 PRE-OPERATIVE LABORATORY EXAMINATION: ICD-10-CM

## 2025-01-15 DIAGNOSIS — Z01.810 PRE-OPERATIVE CARDIOVASCULAR EXAMINATION: ICD-10-CM

## 2025-01-15 DIAGNOSIS — R39.89 SUSPECTED UTI: ICD-10-CM

## 2025-01-15 RX ORDER — ALPRAZOLAM 0.5 MG
0.5 TABLET ORAL 3 TIMES DAILY PRN
Qty: 90 TABLET | Refills: 0 | Status: SHIPPED | OUTPATIENT
Start: 2025-01-15

## 2025-01-22 ENCOUNTER — ANTICOAG VISIT (OUTPATIENT)
Dept: FAMILY MEDICINE CLINIC | Facility: CLINIC | Age: 85
End: 2025-01-22

## 2025-01-22 ENCOUNTER — RESULTS FOLLOW-UP (OUTPATIENT)
Dept: FAMILY MEDICINE CLINIC | Facility: CLINIC | Age: 85
End: 2025-01-22

## 2025-01-22 LAB — INR PPP: 2.5 (ref 0.85–1.19)

## 2025-01-22 NOTE — PROGRESS NOTES
Chantel Santos, DO to Ousmane Caballero and proxy (Daniel Caballero)         1/22/25  1:00 PM  Your warfarin level is good  Continue same dose and recheck 1 week    Last read by Daniel Caballero at 1:30PM on 1/22/2025.

## 2025-01-27 LAB — INR PPP: 3.3 (ref 0.85–1.19)

## 2025-01-28 NOTE — PROGRESS NOTES
1/28/25- Spoke w/ Daniel informed to hold 2 days and then restart normal does and recheck in 1 week. She informs me she held it last night. Provider made aware.-MAYRA

## 2025-02-03 LAB — INR PPP: 1.9 (ref 0.85–1.19)

## 2025-02-10 LAB — INR PPP: 2.7 (ref 0.85–1.19)

## 2025-02-17 NOTE — TELEPHONE ENCOUNTER
Spoke with patient and confirmed surgery date of:5/2/25  Type of surgery:STENT EXCHANGE AND TURBT  Operating physician: Dr. Mckee  Location of surgery:  [facility]    Verbally went over prep with patient on: 1/15/25 (W/ PT'S WIFE)  NPO  Bowel prep? No  Hospital calls afternoon prior with arrival time -Calls Friday afternoon for Monday surgeries  Patient needs ride to and from surgery (outpatient/inpatient)   Pre-op testing to be done 2 weeks prior to surgery/PRE OP LABS, URINE CULTURE EKG ORDERED FOR 4/11/25  (list labs needed)  Blood thinners: WARFARIN  List blood thinner/how long needs to held or no hold needed  Clearances needed: (Medical/Cardiac/None)    Mailed/emailed to patient on:1/16/25  Copy of packet scanned into Media on:1/16/25  Labs in packet  Soap / Bowel prep in packet  Pre-op & Post-op in packet  Dates of H&P and post-op if needed    Consent: in Media or on admit  If needed:TO BE DONE AT PRE OP H&P/CONSENT APPT W/ ANGLE 4/14/25 AT [facility] [facility]    Medical/Cardiac Clearance:  Appt with:  Appt date and time:  Date clearance form faxed:  Best fax number:    Medication Suspension of: WARFARINMedication Name / how many days  Ordering provider:DR SHEARER  Faxed Medication Suspension form on:1/16/25  Best fax number:SEE CHART    [place]:  Faxed form to pharmacy on:    RBC blood bank done on:    Rep info:  Emailed rep on date:    Vaccine status unknown

## 2025-02-18 ENCOUNTER — ANTICOAG VISIT (OUTPATIENT)
Dept: FAMILY MEDICINE CLINIC | Facility: CLINIC | Age: 85
End: 2025-02-18

## 2025-02-18 LAB — INR PPP: 2.4 (ref 0.85–1.19)

## 2025-02-25 ENCOUNTER — ANTICOAG VISIT (OUTPATIENT)
Dept: FAMILY MEDICINE CLINIC | Facility: CLINIC | Age: 85
End: 2025-02-25

## 2025-02-25 ENCOUNTER — PATIENT MESSAGE (OUTPATIENT)
Dept: FAMILY MEDICINE CLINIC | Facility: CLINIC | Age: 85
End: 2025-02-25

## 2025-02-25 LAB — INR PPP: 3.9 (ref 0.85–1.19)

## 2025-03-06 ENCOUNTER — ANTICOAG VISIT (OUTPATIENT)
Dept: FAMILY MEDICINE CLINIC | Facility: CLINIC | Age: 85
End: 2025-03-06

## 2025-03-06 LAB — INR PPP: 2.9 (ref 0.85–1.19)

## 2025-03-11 ENCOUNTER — TELEPHONE (OUTPATIENT)
Age: 85
End: 2025-03-11

## 2025-03-11 ENCOUNTER — ANTICOAG VISIT (OUTPATIENT)
Dept: FAMILY MEDICINE CLINIC | Facility: CLINIC | Age: 85
End: 2025-03-11

## 2025-03-11 LAB — INR PPP: 4.8 (ref 0.85–1.19)

## 2025-03-14 DIAGNOSIS — F41.9 ANXIETY: ICD-10-CM

## 2025-03-14 RX ORDER — ALPRAZOLAM 0.5 MG
0.5 TABLET ORAL 3 TIMES DAILY PRN
Qty: 90 TABLET | Refills: 0 | Status: SHIPPED | OUTPATIENT
Start: 2025-03-14

## 2025-03-18 ENCOUNTER — ANTICOAG VISIT (OUTPATIENT)
Dept: FAMILY MEDICINE CLINIC | Facility: CLINIC | Age: 85
End: 2025-03-18

## 2025-03-18 LAB — INR PPP: 2.3 (ref 0.85–1.19)

## 2025-03-21 ENCOUNTER — TELEPHONE (OUTPATIENT)
Age: 85
End: 2025-03-21

## 2025-03-21 ENCOUNTER — TELEPHONE (OUTPATIENT)
Dept: LAB | Facility: HOSPITAL | Age: 85
End: 2025-03-21

## 2025-03-21 NOTE — TELEPHONE ENCOUNTER
[employer] called to update. They were able to get patient scheduled for his next draw for the 26th. Any questions please call [phone]. They had mentioned they were just speaking with another team member regarding trying to schedule patient, but we did not see a new note from today.   
<<-----Click on this checkbox to enter Pre-Op Dx

## 2025-03-24 NOTE — TELEPHONE ENCOUNTER
3/24 - spoke with Day, she was able to accommodate pt request for mobile appt with another lab service on 3/26

## 2025-03-25 ENCOUNTER — ANTICOAG VISIT (OUTPATIENT)
Dept: FAMILY MEDICINE CLINIC | Facility: CLINIC | Age: 85
End: 2025-03-25

## 2025-03-25 LAB — INR PPP: 4 (ref 0.85–1.19)

## 2025-03-26 ENCOUNTER — TELEPHONE (OUTPATIENT)
Age: 85
End: 2025-03-26

## 2025-03-27 ENCOUNTER — ANTICOAG VISIT (OUTPATIENT)
Dept: FAMILY MEDICINE CLINIC | Facility: CLINIC | Age: 85
End: 2025-03-27

## 2025-03-28 NOTE — PROGRESS NOTES
Chantel Santos, DO to Ousmane Caballero and proxy (Daniel Caballero)       3/27/25 11:12 PM  Hold warfarin for 2 days   Then decrease warfarin dose to 5mg on Monday and Friday only and 2.5mg all other days   Recheck next week 4/2    Last read by Daniel Caballero at 6:40AM on 3/28/2025.

## 2025-04-01 ENCOUNTER — OFFICE VISIT (OUTPATIENT)
Dept: FAMILY MEDICINE CLINIC | Facility: CLINIC | Age: 85
End: 2025-04-01
Payer: COMMERCIAL

## 2025-04-01 VITALS
DIASTOLIC BLOOD PRESSURE: 74 MMHG | HEIGHT: 72 IN | SYSTOLIC BLOOD PRESSURE: 128 MMHG | BODY MASS INDEX: 21.13 KG/M2 | HEART RATE: 65 BPM | OXYGEN SATURATION: 100 % | WEIGHT: 156 LBS | TEMPERATURE: 97.1 F

## 2025-04-01 DIAGNOSIS — F41.9 ANXIETY: ICD-10-CM

## 2025-04-01 DIAGNOSIS — N18.32 STAGE 3B CHRONIC KIDNEY DISEASE (HCC): ICD-10-CM

## 2025-04-01 DIAGNOSIS — I10 PRIMARY HYPERTENSION: ICD-10-CM

## 2025-04-01 DIAGNOSIS — R29.6 FREQUENT FALLS: ICD-10-CM

## 2025-04-01 DIAGNOSIS — E78.2 MIXED HYPERLIPIDEMIA: ICD-10-CM

## 2025-04-01 DIAGNOSIS — N18.31 STAGE 3A CHRONIC KIDNEY DISEASE (HCC): ICD-10-CM

## 2025-04-01 DIAGNOSIS — I48.21 PERMANENT ATRIAL FIBRILLATION (HCC): Primary | ICD-10-CM

## 2025-04-01 DIAGNOSIS — R26.81 GAIT INSTABILITY: ICD-10-CM

## 2025-04-01 DIAGNOSIS — R53.83 FATIGUE, UNSPECIFIED TYPE: ICD-10-CM

## 2025-04-01 DIAGNOSIS — R53.1 WEAKNESS: ICD-10-CM

## 2025-04-01 DIAGNOSIS — G40.919 INTRACTABLE EPILEPSY WITHOUT STATUS EPILEPTICUS, UNSPECIFIED EPILEPSY TYPE (HCC): ICD-10-CM

## 2025-04-01 PROCEDURE — 99214 OFFICE O/P EST MOD 30 MIN: CPT | Performed by: FAMILY MEDICINE

## 2025-04-01 NOTE — PROGRESS NOTES
Spoke with spouse crystal INR 2.4. per Provider instructions. Cont same dosage and repeat in a week.

## 2025-04-01 NOTE — PATIENT INSTRUCTIONS
Due for lipid panel  Patient Education     Standing Balance Exercises, Beginner   About this topic   Having good balance is an important part of avoiding falls. Trouble with your balance can happen after an injury or surgery. It can also happen as you get older. Your eyesight decreases, your muscles are weaker, and you are slower to react as you get older. These can all affect your balance. You can do exercises to help your balance and make your muscles stronger.  General   Before starting with a program, ask your doctor if you are healthy enough to do these exercises. Your doctor may have you work with a  or physical therapist to make a safe exercise program to meet your needs.  It is important to be safe when doing these exercises. Use a chair or counter to help you with your balance. Use both hands to keep you steady at first. Then, progress to using one hand. When you feel safer, just have your hands hovering over the chair or counter in case you need support. You may want to have a chair close to you if you need to rest. If you are really unsteady, have a helper close to you.  Strengthening Exercises   Strengthening exercises keep your muscles firm and strong. Start by repeating each exercise 2 to 3 times. Work up to doing each exercise 10 times. Try to do the exercises 2 to 3 times each day. Do all exercises slowly.  Standing leg lifts / marching ? Slowly march in place, holding each leg up for 3 to 5 seconds.  Sideways leg lifts ? Bring one leg out to the side. Hold 3 to 5 seconds. Repeat on the other leg.  Backward leg lifts ? Bring your leg back, keeping the knee straight. Stand up tall and do not bend at the waist. Hold 3 to 5 seconds. Repeat on the other leg.  Heel raises ? Lift your heels up and rise up onto your toes. Hold 3 to 5 seconds. Lower yourself back down.  Toe lifts ? Lift your toes up and put your weight onto your heels. Hold 3 to 5 seconds.  Single leg balance ? Lift one leg up and  balance on the other leg for 10 to 30 seconds. Repeat on the other leg.  Feet together and arms moving ? Stand with your feet together and touching each other. Move your arms up and down while keeping your elbows straight. You may want to try this with a small weight in your hands.                 What will the results be?   Stronger muscles  Better balance  Less chance of falling  Better performance in sports  Easier to walk  Helpful tips   If you get dizzy, do not shut your eyes. Instead, focus on an object until your dizziness goes away. Do not exercise if you continue to be dizzy.  If these exercises become too easy you may want to:  Stand and do them on an uneven or unstable surface. Try standing on a pillow or piece of foam as you exercise.  Try doing the exercises with your eyes closed.  Last Reviewed Date   2021-09-13  Consumer Information Use and Disclaimer   This generalized information is a limited summary of diagnosis, treatment, and/or medication information. It is not meant to be comprehensive and should be used as a tool to help the user understand and/or assess potential diagnostic and treatment options. It does NOT include all information about conditions, treatments, medications, side effects, or risks that may apply to a specific patient. It is not intended to be medical advice or a substitute for the medical advice, diagnosis, or treatment of a health care provider based on the health care provider's examination and assessment of a patient’s specific and unique circumstances. Patients must speak with a health care provider for complete information about their health, medical questions, and treatment options, including any risks or benefits regarding use of medications. This information does not endorse any treatments or medications as safe, effective, or approved for treating a specific patient. UpTo[employer], Inc[employer] and its affiliates disclaim any warranty or liability relating to this information or  the use thereof. The use of this information is governed by the Terms of Use, available at https://www.wolterskluwer.com/en/know/clinical-effectiveness-terms   Copyright   Copyright © 2024 UpToDate, Inc. and its affiliates and/or licensors. All rights reserved.

## 2025-04-01 NOTE — PROGRESS NOTES
Name: Francis Caballero      : 1940      MRN: [id number]  Encounter Provider: Chantel Santos DO  Encounter Date: 2025   Encounter department: [facility]  :  Assessment & Plan  Permanent atrial fibrillation (HCC)  Pt continues on warfarin and checks with home inr monitor weekly.   Pt will become higher risk for falls with decreased muscle strength  Pt taking metoprolol 50mg twice a day   Pt due for lipid panel  Orders:    Lipid Panel with Direct LDL reflex    Primary hypertension  Controlled on metoprolol 50mg twice a day     Orders:    Lipid Panel with Direct LDL reflex    Mixed hyperlipidemia    Orders:    Lipid Panel with Direct LDL reflex    Stage 3b chronic kidney disease (HCC)  Lab Results   Component Value Date    EGFR 46 (L) 2025    EGFR 42 2024    EGFR 44 (L) 2024    CREATININE 1.50 (H) 2025    CREATININE 1.49 (H) 2024    CREATININE 1.54 (H) 2024   stable         Anxiety  Medication agreement up to date, alprazolam as needed, pdmp reviewed regularly     Orders:    Lipid Panel with Direct LDL reflex    Gait instability  Recommend physical therapy. Pt states he will do his home exercises. Very hesitant to participate in physical therapy          Weakness    Orders:    Lipid Panel with Direct LDL reflex    Frequent falls    Orders:    Lipid Panel with Direct LDL reflex    Fatigue, unspecified type    Orders:    Lipid Panel with Direct LDL reflex    Intractable epilepsy without status epilepticus, unspecified epilepsy type (HCC)  Has been stable for over 10 years without medication   Orders:    Lipid Panel with Direct LDL reflex    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 (HCC)  Lab Results   Component Value Date    EGFR 46 (L) 2025    EGFR 42 2024    EGFR 44 (L) 2024    CREATININE 1.50 (H) 2025    CREATININE 1.49 (H) 2024    CREATININE 1.54 (H) 2024       Orders:    Lipid Panel with Direct LDL reflex          Depression  Screening and Follow-up Plan: Patient was screened for depression during today's encounter. They screened negative with a PHQ-2 score of 0.        History of Present Illness   Pt is here in follow up on chronic conditions.   Pt weighed 170 in June -10 months ago and now 156. His appetite has decreased.  He is very inactive per wife. They have tried physical therapy and patient did not want to participate. Is not doing home exercises.   Pt checks warfarin level weekly per insurance company.       Review of Systems   Constitutional:  Positive for fatigue. Negative for fever.   HENT: Negative.     Eyes: Negative.    Respiratory: Negative.  Negative for cough.    Cardiovascular: Negative.    Gastrointestinal: Negative.    Endocrine: Negative.    Genitourinary: Negative.    Musculoskeletal: Negative.    Skin: Negative.    Allergic/Immunologic: Negative.    Neurological:  Positive for weakness.   Psychiatric/Behavioral: Negative.         Objective   /74   Pulse 65   Temp (!) 97.1 °F (36.2 °C) (Tympanic)   Ht 6' (1.829 m)   Wt 70.8 kg (156 lb)   SpO2 100%   BMI 21.16 kg/m²      Physical Exam  Vitals and nursing note reviewed.   Constitutional:       Appearance: He is well-developed.   HENT:      Head: Normocephalic and atraumatic.   Cardiovascular:      Rate and Rhythm: Normal rate and regular rhythm.      Heart sounds: Normal heart sounds.   Pulmonary:      Effort: Pulmonary effort is normal.      Breath sounds: Normal breath sounds.   Abdominal:      General: Bowel sounds are normal.      Palpations: Abdomen is soft.   Skin:     General: Skin is warm and dry.   Neurological:      Mental Status: He is alert and oriented to person, place, and time.      Motor: Weakness present.      Coordination: Coordination abnormal.      Gait: Gait abnormal.   Psychiatric:         Behavior: Behavior normal.         Thought Content: Thought content normal.         Judgment: Judgment normal.

## 2025-04-06 PROBLEM — R57.9 SHOCK (HCC): Status: RESOLVED | Noted: 2024-07-14 | Resolved: 2025-04-06

## 2025-04-06 NOTE — ASSESSMENT & PLAN NOTE
Lab Results   Component Value Date    EGFR 46 (L) 03/26/2025    EGFR 42 11/19/2024    EGFR 44 (L) 08/21/2024    CREATININE 1.50 (H) 03/26/2025    CREATININE 1.49 (H) 11/19/2024    CREATININE 1.54 (H) 08/21/2024       Orders:    Lipid Panel with Direct LDL reflex

## 2025-04-06 NOTE — ASSESSMENT & PLAN NOTE
Pt continues on warfarin and checks with home inr monitor weekly.   Pt will become higher risk for falls with decreased muscle strength  Pt taking metoprolol 50mg twice a day   Pt due for lipid panel  Orders:    Lipid Panel with Direct LDL reflex

## 2025-04-06 NOTE — ASSESSMENT & PLAN NOTE
Has been stable for over 10 years without medication   Orders:    Lipid Panel with Direct LDL reflex

## 2025-04-06 NOTE — ASSESSMENT & PLAN NOTE
Recommend physical therapy. Pt states he will do his home exercises. Very hesitant to participate in physical therapy

## 2025-04-06 NOTE — ASSESSMENT & PLAN NOTE
Medication agreement up to date, alprazolam as needed, pdmp reviewed regularly     Orders:    Lipid Panel with Direct LDL reflex

## 2025-04-06 NOTE — ASSESSMENT & PLAN NOTE
Lab Results   Component Value Date    EGFR 46 (L) 03/26/2025    EGFR 42 11/19/2024    EGFR 44 (L) 08/21/2024    CREATININE 1.50 (H) 03/26/2025    CREATININE 1.49 (H) 11/19/2024    CREATININE 1.54 (H) 08/21/2024   stable

## 2025-04-09 ENCOUNTER — ANTICOAG VISIT (OUTPATIENT)
Dept: FAMILY MEDICINE CLINIC | Facility: CLINIC | Age: 85
End: 2025-04-09

## 2025-04-09 LAB — INR PPP: 3.2 (ref 0.85–1.19)

## 2025-04-10 ENCOUNTER — TELEPHONE (OUTPATIENT)
Age: 85
End: 2025-04-10

## 2025-04-10 NOTE — TELEPHONE ENCOUNTER
FOLLOW UP: Has appt 4/14/25    REASON FOR CONVERSATION: No Triage Call    SYMPTOMS: N/A    OTHER: Wife calling in to ask if pt is able to take pills with sip of water if fasting for labs. Confirmed. Wife also asked if appointment 4/14/25 could be virtual. Advised wife that this includes and history and physical for stent exchange, so he will need to be seen in person for the physical. No other questions at this time.     [facility] Medical Communication Consent Form verified.      DISPOSITION: Information or Advice Only Call

## 2025-04-11 DIAGNOSIS — E78.2 MIXED HYPERLIPIDEMIA: ICD-10-CM

## 2025-04-11 RX ORDER — ROSUVASTATIN CALCIUM 5 MG/1
5 TABLET, COATED ORAL DAILY
Qty: 30 TABLET | Refills: 0 | Status: SHIPPED | OUTPATIENT
Start: 2025-04-11

## 2025-04-14 NOTE — TELEPHONE ENCOUNTER
Patient's wife called stating patient is suppose to have an appointment today for his H and p but he is feeling very weak and not able to move much. He is shaking also. Warm transfer to TCS nurse for further assessment.

## 2025-04-14 NOTE — TELEPHONE ENCOUNTER
Wife calling with concerns that patient wont make it to the appointment today due to weakness, constipation, difficulty walking, increase confusion    She states that all these symptoms are on going but worsening last week    Taking Senokot, prune juice and water. Encouraged to increase water and decrease bladder irritants    Denies urinary symptoms, fever, pain    Per request rescheduled today's appointment to next Monday.

## 2025-04-15 ENCOUNTER — OFFICE VISIT (OUTPATIENT)
Dept: LAB | Facility: HOSPITAL | Age: 85
End: 2025-04-15
Payer: COMMERCIAL

## 2025-04-15 ENCOUNTER — APPOINTMENT (OUTPATIENT)
Dept: LAB | Facility: HOSPITAL | Age: 85
End: 2025-04-15
Payer: COMMERCIAL

## 2025-04-15 ENCOUNTER — RESULTS FOLLOW-UP (OUTPATIENT)
Dept: FAMILY MEDICINE CLINIC | Facility: CLINIC | Age: 85
End: 2025-04-15

## 2025-04-15 DIAGNOSIS — C67.8 MALIGNANT NEOPLASM OF OVERLAPPING SITES OF BLADDER (HCC): ICD-10-CM

## 2025-04-15 DIAGNOSIS — Z79.01 LONG TERM (CURRENT) USE OF ANTICOAGULANTS: ICD-10-CM

## 2025-04-15 DIAGNOSIS — N13.30 HYDRONEPHROSIS, UNSPECIFIED HYDRONEPHROSIS TYPE: ICD-10-CM

## 2025-04-15 DIAGNOSIS — Z01.810 PRE-OPERATIVE CARDIOVASCULAR EXAMINATION: ICD-10-CM

## 2025-04-15 DIAGNOSIS — Z01.812 PRE-OPERATIVE LABORATORY EXAMINATION: ICD-10-CM

## 2025-04-15 DIAGNOSIS — R39.89 SUSPECTED UTI: ICD-10-CM

## 2025-04-15 LAB
ANION GAP SERPL CALCULATED.3IONS-SCNC: 8 MMOL/L (ref 4–13)
APTT PPP: 47 SECONDS (ref 23–34)
BASOPHILS # BLD AUTO: 0.03 THOUSANDS/ÂΜL (ref 0–0.1)
BASOPHILS NFR BLD AUTO: 1 % (ref 0–1)
BUN SERPL-MCNC: 38 MG/DL (ref 5–25)
CALCIUM SERPL-MCNC: 9.4 MG/DL (ref 8.4–10.2)
CHLORIDE SERPL-SCNC: 100 MMOL/L (ref 96–108)
CO2 SERPL-SCNC: 32 MMOL/L (ref 21–32)
CREAT SERPL-MCNC: 1.79 MG/DL (ref 0.6–1.3)
EOSINOPHIL # BLD AUTO: 0.1 THOUSAND/ÂΜL (ref 0–0.61)
EOSINOPHIL NFR BLD AUTO: 2 % (ref 0–6)
ERYTHROCYTE [DISTWIDTH] IN BLOOD BY AUTOMATED COUNT: 13.5 % (ref 11.6–15.1)
GFR SERPL CREATININE-BSD FRML MDRD: 34 ML/MIN/1.73SQ M
GLUCOSE P FAST SERPL-MCNC: 99 MG/DL (ref 65–99)
HCT VFR BLD AUTO: 32.4 % (ref 36.5–49.3)
HGB BLD-MCNC: 10.8 G/DL (ref 12–17)
IMM GRANULOCYTES # BLD AUTO: 0.03 THOUSAND/UL (ref 0–0.2)
IMM GRANULOCYTES NFR BLD AUTO: 1 % (ref 0–2)
INR PPP: 2.18 (ref 0.85–1.19)
LYMPHOCYTES # BLD AUTO: 0.69 THOUSANDS/ÂΜL (ref 0.6–4.47)
LYMPHOCYTES NFR BLD AUTO: 11 % (ref 14–44)
MCH RBC QN AUTO: 35.2 PG (ref 26.8–34.3)
MCHC RBC AUTO-ENTMCNC: 33.3 G/DL (ref 31.4–37.4)
MCV RBC AUTO: 106 FL (ref 82–98)
MONOCYTES # BLD AUTO: 0.56 THOUSAND/ÂΜL (ref 0.17–1.22)
MONOCYTES NFR BLD AUTO: 9 % (ref 4–12)
NEUTROPHILS # BLD AUTO: 4.97 THOUSANDS/ÂΜL (ref 1.85–7.62)
NEUTS SEG NFR BLD AUTO: 76 % (ref 43–75)
NRBC BLD AUTO-RTO: 0 /100 WBCS
PLATELET # BLD AUTO: 286 THOUSANDS/UL (ref 149–390)
PMV BLD AUTO: 9.3 FL (ref 8.9–12.7)
POTASSIUM SERPL-SCNC: 4.1 MMOL/L (ref 3.5–5.3)
PROTHROMBIN TIME: 24.6 SECONDS (ref 12.3–15)
RBC # BLD AUTO: 3.07 MILLION/UL (ref 3.88–5.62)
SODIUM SERPL-SCNC: 140 MMOL/L (ref 135–147)
WBC # BLD AUTO: 6.38 THOUSAND/UL (ref 4.31–10.16)

## 2025-04-15 PROCEDURE — 87086 URINE CULTURE/COLONY COUNT: CPT

## 2025-04-15 PROCEDURE — 93005 ELECTROCARDIOGRAM TRACING: CPT

## 2025-04-15 PROCEDURE — 80048 BASIC METABOLIC PNL TOTAL CA: CPT

## 2025-04-15 PROCEDURE — 85025 COMPLETE CBC W/AUTO DIFF WBC: CPT

## 2025-04-15 PROCEDURE — 85730 THROMBOPLASTIN TIME PARTIAL: CPT

## 2025-04-15 PROCEDURE — 36415 COLL VENOUS BLD VENIPUNCTURE: CPT

## 2025-04-15 PROCEDURE — 85610 PROTHROMBIN TIME: CPT

## 2025-04-15 NOTE — TELEPHONE ENCOUNTER
----- Message from ANASTASIYA Woodard sent at 4/15/2025  1:05 PM EDT -----  INR stable, repeat in 1 week, continue current regimen

## 2025-04-15 NOTE — PROGRESS NOTES
spoke to patient spouse daniel about results.   INR stable, repeat in 1 week, continue current regimen    Last read by Daniel Caballero at 2:21PM on 4/15/2025.

## 2025-04-15 NOTE — TELEPHONE ENCOUNTER
INR stable, repeat in 1 week, continue current regimen    Last read by Daniel Caballero at 2:21PM on 4/15/2025.    Also spoke to patient spouse daniel about results.   Patient recently had labs completed for upcoming surgery on 05/02/2025 ordered by urology, spouse stated she saw multiple abnormalities and want  to take a look at [facility] labs, patient spouse aware  out of the office until the week of 004/28/2025, I will have one of the provider look at it but also I have advise to contact surgeon office with lab test concerns.    Patient spouse stated will do once all lab completed are received

## 2025-04-16 ENCOUNTER — TELEPHONE (OUTPATIENT)
Dept: FAMILY MEDICINE CLINIC | Facility: CLINIC | Age: 85
End: 2025-04-16

## 2025-04-16 DIAGNOSIS — R29.6 FREQUENT FALLS: ICD-10-CM

## 2025-04-16 DIAGNOSIS — R26.81 GAIT INSTABILITY: Primary | ICD-10-CM

## 2025-04-16 DIAGNOSIS — R53.1 WEAKNESS: ICD-10-CM

## 2025-04-16 NOTE — TELEPHONE ENCOUNTER
Order has been faxed to [facility]. Confirmation received , patient spouse aware

## 2025-04-16 NOTE — TELEPHONE ENCOUNTER
----- Message from ANASTASIYA Woodard sent at 4/15/2025  3:10 PM EDT -----  Reviewed his other labs, anemia is improving compared to months ago, and his kidney function is declining slightly, ensure avoid NSAIDs, adequate hydration, low potassium diet, ensure protein. Keep follow up with nephrology in 3 months and okay to continue with urology procedure in May  ----- Message -----  From: Harlan Tineo MA  Sent: 4/15/2025   2:40 PM EDT  To: ANASTASIYA Masters

## 2025-04-17 LAB — BACTERIA UR CULT: NORMAL

## 2025-04-17 NOTE — PROGRESS NOTES
Pre-op visit - H&P  4/21/2025      Chief Complaint   Patient presents with    Hydronephrosis       Assessment and Plan     85 y.o. male managed by Dr. Mckee    Chronic Right Ureteral Obstruction   Last ureteral stent exchange 11/29/24 by Dr. Carcamo  Denies flank pain, urinating well  Scheduled for cystoscopy, right ureteral stent exchange and possible TURBT 5/2/25 by   Surgical consent has already been signed by surgeon and patient    Physical exam as below -- no new changes  Pre-op urine culture negative for infection   INR remains slightly elevated -- HOLD Warfarin 5 days pre-procedure per Dr. Mckee  Discussion ongoing with pt PCP to see if bridging will be needed   Proceed with surgical intervention as scheduled     History and physical was performed for the patients upcoming cystoscope and right-sided ureteral stent exchange and possible TURBT scheduled for 5/2/25 with Dr. Mckee. All questions and concerns regarding surgery and perioperative expectations have been addressed and answered.  No overall changes in their health since last visit, denies any prior complications with anesthesia.  Will proceed with surgery as planned.    History of Present Illness  Francis Caballero is a 85 y.o. male here for history and physical prior to their upcoming surgery.  His last (right) ureteral stent exchange was 11/29/2024 by Dr. Carcamo.  Since that time he denies any drastic changes in his urinary pattern.  Currently no flank pain.  He is scheduled for cystoscopy, right ureteral stent exchange and possible TURBT on 5/2/2025 by Dr. Mckee.  Surgical consent is in the system and has already been signed by Dr. Mckee as well as the patient.  Preop urine culture returned negative.  He currently takes warfarin 6 mg or 7 mg (alternates every other day) for his history of CVA and A-fib.  Dr. Mckee would like the medication held 5 days preprocedure, discussion ongoing with the patient's PCP (prescribed) to see if bridging  is recommended.  The patient denies chest pain, shortness of breath, or swelling in bilateral lower extremities.  He is cleared for surgery from a urologic perspective.    Urologic history as below:  Bladder Cancer  S/p chemotherapy and radiation   Possible TURBT during stent exchange if any recurrent tumors are visualized     Review of Systems   Constitutional:  Negative for activity change, chills, fatigue and fever.   Respiratory:  Negative for apnea, cough and shortness of breath.    Cardiovascular:  Negative for chest pain and leg swelling.   Gastrointestinal:  Negative for abdominal distention, abdominal pain, constipation and diarrhea.   Genitourinary:  Negative for decreased urine volume, difficulty urinating, dysuria, flank pain, frequency, hematuria, penile pain, testicular pain and urgency.   Neurological:  Negative for dizziness and headaches.   Psychiatric/Behavioral: Negative.         Vitals  Vitals:    04/21/25 1318   BP: 126/82   BP Location: Left arm   Patient Position: Sitting   Cuff Size: Adult   Pulse: 61   SpO2: 98%       Physical Exam  Vitals and nursing note reviewed.   Constitutional:       General: He is not in acute distress.     Appearance: Normal appearance. He is well-developed. He is not ill-appearing.   HENT:      Head: Normocephalic and atraumatic.   Eyes:      Conjunctiva/sclera: Conjunctivae normal.   Cardiovascular:      Rate and Rhythm: Normal rate and regular rhythm.      Heart sounds: No murmur heard.  Pulmonary:      Effort: Pulmonary effort is normal. No respiratory distress.      Breath sounds: Normal breath sounds.   Abdominal:      Palpations: Abdomen is soft.      Tenderness: There is no abdominal tenderness.   Musculoskeletal:         General: No swelling.      Cervical back: Neck supple.      Comments: Right ankle > Left ankle; no edema, pain, erythema or injury noted    Skin:     General: Skin is warm and dry.      Capillary Refill: Capillary refill takes less than 2  seconds.   Neurological:      Mental Status: He is alert.   Psychiatric:         Mood and Affect: Mood normal.       Past Medical History  Past Medical History:   Diagnosis Date    A-fib (HCC)     Ambulates with cane     Anemia     last assessed: 05/23/2015    Cancer (HCC) 2016    don't know    CKD (chronic kidney disease)     Clotting disorder (HCC) 2018    Frequent falls     GERD (gastroesophageal reflux disease)     Hematuria     History of transfusion     reaction    Hyperlipidemia     Hypertension     Intracerebral hemorrhage (HCC)     last assessed: 04/21/2014; right    Intracerebral hemorrhage (HCC)     last assessed: 11/23/2017    Mitral valve regurgitation     Mobility impaired     Sleep apnea     no cpap    Stroke (HCC) 2011    Stroke syndrome     last assessed: 06/13/2013    UTI (urinary tract infection)     Wears dentures     Wears glasses        Past Social History  Past Surgical History:   Procedure Laterality Date    BLADDER SURGERY      FL RETROGRADE PYELOGRAM  07/12/2024    FL RETROGRADE PYELOGRAM  11/29/2024    IR NEPHROSTOMY TUBE PLACEMENT  07/27/2023    DC CYSTO W/IRRIG & EVAC MULTPLE OBSTRUCTING CLOTS N/A 07/12/2024    Procedure: CYSTOSCOPY EVACUATION OF CLOTS;  Surgeon: Craig Simon MD;  Location: [facility];  Service: Urology    DC CYSTO W/REMOVAL OF LESIONS SMALL N/A 07/12/2024    Procedure: (TURBT) cautery bleeding, clot evacuation;  Surgeon: Craig Simon MD;  Location[facility];  Service: Urology    DC CYSTOURETHROSCOPY W/DEST &/RMVL TUMOR LARGE N/A 07/26/2023    Procedure: (TURBT);  Surgeon: Tha Carcamo MD;  Location: [facility];  Service: Urology    DC CYSTOURETHROSCOPY W/URETERAL CATHETERIZATION Right 11/29/2024    Procedure: CYSTO, RETROGRADE PYELOGRAM W/ INSERTION STENT URETERAL, stent exchange;  Surgeon: Tha Carcamo MD;  Location: [facility];  Service: Urology    WISDOM TOOTH EXTRACTION         Past Family History  Family History   Problem Relation Age of Onset    Lung  cancer Mother     Cancer Mother         lung    Heart disease Father     COPD Brother     Colon cancer Neg Hx        Past Social history  Social History     Socioeconomic History    Marital status: /Civil Union     Spouse name: Not on file    Number of children: Not on file    Years of education: Not on file    Highest education level: Not on file   Occupational History    Not on file   Tobacco Use    Smoking status: Former     Current packs/day: 0.25     Average packs/day: 0.3 packs/day for 40.0 years (10.0 ttl pk-yrs)     Types: Cigars, Cigarettes    Smokeless tobacco: Never    Tobacco comments:     Last cigar 7/10.24   Vaping Use    Vaping status: Never Used   Substance and Sexual Activity    Alcohol use: Yes     Alcohol/week: 7.0 standard drinks of alcohol     Types: 7 Standard drinks or equivalent per week     Comment: Social     Drug use: No    Sexual activity: Not Currently     Partners: Female     Birth control/protection: Male Sterilization   Other Topics Concern    Not on file   Social History Narrative    Not on file     Social Drivers of Health     Financial Resource Strain: Low Risk  (5/1/2023)    Overall Financial Resource Strain (CARDIA)     Difficulty of Paying Living Expenses: Not hard at all   Food Insecurity: No Food Insecurity (7/13/2024)    Nursing - Inadequate Food Risk Classification     Worried About Running Out of Food in the Last Year: Never true     Ran Out of Food in the Last Year: Never true     Ran Out of Food in the Last Year: Not on file   Transportation Needs: No Transportation Needs (7/13/2024)    PRAPARE - Transportation     Lack of Transportation (Medical): No     Lack of Transportation (Non-Medical): No   Physical Activity: Not on file   Stress: Not on file   Social Connections: Not on file   Intimate Partner Violence: Not on file   Housing Stability: Low Risk  (7/13/2024)    Housing Stability Vital Sign     Unable to Pay for Housing in the Last Year: No     Number of  Times Moved in the Last Year: 0     Homeless in the Last Year: No       Current Medications  Current Outpatient Medications   Medication Sig Dispense Refill    ALPRAZolam (XANAX) 0.5 mg tablet TAKE 1 TABLET (0.5 MG TOTAL) BY MOUTH 3 (THREE) TIMES A DAY AS NEEDED FOR ANXIETY. 90 tablet 0    cyanocobalamin (VITAMIN B-12) 1000 MCG tablet Take 1 tablet (1,000 mcg total) by mouth daily 90 tablet 0    folic acid (FOLVITE) 1 mg tablet Take 1 tablet (1 mg total) by mouth daily 90 tablet 3    metoprolol tartrate (LOPRESSOR) 50 mg tablet TAKE 1 TABLET BY MOUTH TWICE A  tablet 1    Multiple Vitamins-Minerals (PreserVision AREDS 2) CAPS       rosuvastatin (CRESTOR) 5 mg tablet TAKE 1 TABLET BY MOUTH EVERY DAY 30 tablet 0    warfarin (COUMADIN) 1 mg tablet TAKE 2 TABLETS DAILY -COMBINE WITH 5MG 180 tablet 1    warfarin (COUMADIN) 5 mg tablet TAKE 1 TABLET BY MOUTH EVERY DAY, COMBINE WITH 1-2MG TAB AS DIRECTED (ALTERNATES BETWEEN 6MG AND 7MG EVERY OTHER DAY ) 90 tablet 1    acetaminophen (TYLENOL) 650 mg CR tablet Take 1 tablet (650 mg total) by mouth every 8 (eight) hours as needed for mild pain (Patient not taking: Reported on 4/21/2025) 30 tablet 0     No current facility-administered medications for this visit.       Allergies  No Known Allergies      Past Medical History, Social History, Family History, medications and allergies were reviewed.

## 2025-04-17 NOTE — TELEPHONE ENCOUNTER
Fax received from [employer].    - Patient does not qualify fro pressure cushion with provided information, please provide Dxof pressure ulcer or condition causing inability to shift weight.     - Chart notes to quality for (wheelchair and seatbelt).    Please addend notes to include the following per insurance guidelines-This must be in chart notes insurance will NOT accept letter medical necessity format or order.

## 2025-04-18 LAB
QRS AXIS: 12 DEGREES
QRSD INTERVAL: 88 MS
QT INTERVAL: 370 MS
QTC INTERVAL: 427 MS
T WAVE AXIS: 66 DEGREES
VENTRICULAR RATE: 80 BPM

## 2025-04-18 PROCEDURE — 93010 ELECTROCARDIOGRAM REPORT: CPT | Performed by: INTERNAL MEDICINE

## 2025-04-21 ENCOUNTER — OFFICE VISIT (OUTPATIENT)
Dept: UROLOGY | Facility: HOSPITAL | Age: 85
End: 2025-04-21
Payer: COMMERCIAL

## 2025-04-21 ENCOUNTER — RESULTS FOLLOW-UP (OUTPATIENT)
Dept: FAMILY MEDICINE CLINIC | Facility: CLINIC | Age: 85
End: 2025-04-21

## 2025-04-21 VITALS — DIASTOLIC BLOOD PRESSURE: 82 MMHG | SYSTOLIC BLOOD PRESSURE: 126 MMHG | OXYGEN SATURATION: 98 % | HEART RATE: 61 BPM

## 2025-04-21 DIAGNOSIS — C67.8 MALIGNANT NEOPLASM OF OVERLAPPING SITES OF BLADDER (HCC): ICD-10-CM

## 2025-04-21 DIAGNOSIS — N13.30 HYDRONEPHROSIS, UNSPECIFIED HYDRONEPHROSIS TYPE: Primary | ICD-10-CM

## 2025-04-21 PROCEDURE — 99213 OFFICE O/P EST LOW 20 MIN: CPT

## 2025-04-22 NOTE — TELEPHONE ENCOUNTER
INR stable, continue current dose repeat 1 week    Last read by Daniel Caballero at 6:30PM on 4/21/2025.

## 2025-04-22 NOTE — TELEPHONE ENCOUNTER
----- Message from ANASTASIYA Woodard sent at 4/21/2025  6:27 PM EDT -----  INR stable, continue current dose repeat 1 week

## 2025-04-25 RX ORDER — DOCUSATE SODIUM 100 MG/1
100 CAPSULE, LIQUID FILLED ORAL 2 TIMES DAILY
COMMUNITY

## 2025-04-25 NOTE — PRE-PROCEDURE INSTRUCTIONS
Pre-Surgery Instructions:   Medication Instructions    acetaminophen (TYLENOL) 650 mg CR tablet Uses PRN- OK to take day of surgery    ALPRAZolam (XANAX) 0.5 mg tablet Uses PRN- OK to take day of surgery    cyanocobalamin (VITAMIN B-12) 1000 MCG tablet Stop taking 7 days prior to surgery.    docusate sodium (COLACE) 100 mg capsule Hold day of surgery.    folic acid (FOLVITE) 1 mg tablet Stop taking 7 days prior to surgery.    metoprolol tartrate (LOPRESSOR) 50 mg tablet Take day of surgery.    Multiple Vitamins-Minerals (PreserVision AREDS 2) CAPS Stop taking 7 days prior to surgery.    rosuvastatin (CRESTOR) 5 mg tablet Take day of surgery.    warfarin (COUMADIN) 1 mg tablet Stop taking 5 days prior to surgery.    warfarin (COUMADIN) 5 mg tablet Stop taking 5 days prior to surgery.      Medication instructions for day of surgery reviewed. Please take all instructed medications with only a sip of water.       You will receive a call one business day prior to surgery with an arrival time and hospital directions. If your surgery is scheduled on a Monday, the hospital will be calling you on the Friday prior to your surgery. If you have not heard from anyone by 8pm, please call the hospital supervisor through the hospital  at [phone]. ([place] [phone] or [place] [phone]).    Do not eat or drink anything after midnight the night before your surgery, including candy, mints, lifesavers, or chewing gum. Do not drink alcohol 24hrs before your surgery. Try not to smoke at least 24hrs before your surgery.       Follow the pre surgery showering instructions as listed in the “My Surgical Experience Booklet” or otherwise provided by your surgeon's office. Do not use a blade to shave the surgical area 1 week before surgery. It is okay to use a clean electric clippers up to 24 hours before surgery. Do not apply any lotions, creams, including makeup, cologne, deodorant, or perfumes after showering on the day  of your surgery. Do not use dry shampoo, hair spray, hair gel, or any type of hair products.     No contact lenses, eye make-up, or artificial eyelashes. Remove nail polish, including gel polish, and any artificial, gel, or acrylic nails if possible. Remove all jewelry including rings and body piercing jewelry.     Wear causal clothing that is easy to take on and off. Consider your type of surgery.    Keep any valuables, jewelry, piercings at home. Please bring any specially ordered equipment (sling, braces) if indicated.    Arrange for a responsible person to drive you to and from the hospital on the day of your surgery. Please confirm the visitor policy for the day of your procedure when you receive your phone call with an arrival time.     Call the surgeon's office with any new illnesses, exposures, or additional questions prior to surgery.    Please reference your “My Surgical Experience Booklet” for additional information to prepare for your upcoming surgery.

## 2025-04-29 ENCOUNTER — TELEPHONE (OUTPATIENT)
Dept: FAMILY MEDICINE CLINIC | Facility: CLINIC | Age: 85
End: 2025-04-29

## 2025-04-29 ENCOUNTER — TELEPHONE (OUTPATIENT)
Age: 85
End: 2025-04-29

## 2025-04-29 NOTE — TELEPHONE ENCOUNTER
----- Message from Chantel Santos DO sent at 4/29/2025 12:56 PM EDT -----  Regarding: FW: Anticoagulation    Please call his wife to see if he has been holding his warfarin.  Ok to hold 5 days prior to procedure which would have been 4/27  If they have not been holding it, they need to contact urology to reschedule the procedure      ----- Message -----  From: Kathy Scherer PA-C  Sent: 4/21/2025   2:08 PM EDT  To: Chantel Santos DO  Subject: Anticoagulation                                  Hello!  This is Kathy Scherer from the urology department, this patient is undergoing a right ureteral stent exchange as well as possible TURBT on 5/2/2025 by Dr. Mckee.  We are requesting that he hold his warfarin for 5 days preoperatively and Dr. Mckee asked if I could reach out to see if he needs bridging from your standpoint.  If that is the case, would your office be able to reach out to the patient with details?  Thank you!    Kathy Scherer PA-C

## 2025-05-01 PROCEDURE — NC001 PR NO CHARGE: Performed by: UROLOGY

## 2025-05-01 NOTE — ASSESSMENT & PLAN NOTE
Indwelling ureteral stent, right  Plan for cystoscopy and right retrograde pyelography with ureteral stent exchange and possible TURBT

## 2025-05-01 NOTE — H&P
H&P - Urology   Name: Francis Caballero 85 y.o. 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 male who presents with MIBC s/p trimodality therapy (resection, chemotherapy, radiation) and with right hydronephrosis. Here for ureteral stent exchange, right. And possible TURBT    The following portions of the patient's history were reviewed and updated as appropriate: allergies, current medications, past family history, past medical history, past social history, past surgical history and problem list.  .    Review of Systems   Constitutional: Negative.    HENT: Negative.     Eyes: Negative.    Respiratory: Negative.     Cardiovascular: Negative.    Gastrointestinal: Negative.    Endocrine: Negative.    Genitourinary: Negative.    Musculoskeletal: Negative.    Skin: Negative.    Allergic/Immunologic: Negative.    Neurological: Negative.    Hematological: Negative.    Psychiatric/Behavioral: Negative.           Objective :  Temp:  [98.1 °F (36.7 °C)] 98.1 °F (36.7 °C)  HR:  [102] 102  BP: (174)/(81) 174/81  Resp:  [15] 15  SpO2:  [98 %] 98 %  O2 Device: None (Room air)    I/O       None            Physical Exam  Vitals reviewed.   Constitutional:       General: He is not in acute distress.     Appearance: Normal appearance. He is well-developed. He is not ill-appearing, toxic-appearing or diaphoretic.   HENT:      Head: Normocephalic and atraumatic.      Nose: Nose normal.      Mouth/Throat:      Mouth: Mucous membranes are moist.   Eyes:      General: No scleral icterus.        Right eye: No discharge.         " Left eye: No discharge.   Neck:      Thyroid: No thyromegaly.      Trachea: No tracheal deviation.   Pulmonary:      Effort: Pulmonary effort is normal.   Chest:      Chest wall: No tenderness.   Abdominal:      General: There is no distension.      Palpations: There is no mass.      Tenderness: There is no abdominal tenderness. There is no right CVA tenderness, left CVA tenderness or guarding.      Hernia: No hernia is present.   Musculoskeletal:         General: No deformity or signs of injury. Normal range of motion.      Cervical back: Normal range of motion and neck supple.   Lymphadenopathy:      Cervical: No cervical adenopathy.   Skin:     General: Skin is dry.      Coloration: Skin is not jaundiced or pale.      Findings: No erythema.   Neurological:      Mental Status: He is alert and oriented to person, place, and time.      Cranial Nerves: No cranial nerve deficit.      Motor: No abnormal muscle tone.      Coordination: Coordination normal.   Psychiatric:         Mood and Affect: Mood normal.         Behavior: Behavior normal.         Thought Content: Thought content normal.         Judgment: Judgment normal.            Lab Results: I have reviewed the following results:  No results for input(s): \"WBC\", \"HGB\", \"HCT\", \"PLT\", \"BANDSPCT\", \"SODIUM\", \"K\", \"CL\", \"CO2\", \"BUN\", \"CREATININE\", \"GLUC\", \"CAIONIZED\", \"MG\", \"PHOS\", \"AST\", \"ALT\", \"ALB\", \"TBILI\", \"DBILI\", \"ALKPHOS\", \"PTT\", \"INR\" in the last 72 hours.  Lab Results   Component Value Date    COLORU Dark Red 07/12/2024    CLARITYU Extra Turbid 07/12/2024    SPECGRAV 1.025 07/12/2024    PHUR 6.5 07/12/2024    LEUKOCYTESUR Moderate (A) 07/12/2024    NITRITE Negative 07/12/2024    GLUCOSEU Negative 07/12/2024    KETONESU Negative 07/12/2024    BILIRUBINUR Negative 07/12/2024    BLOODU Large (A) 07/12/2024   ,   Lab Results   Component Value Date    URINECX <10,000 cfu/ml 04/15/2025             VTE Prophylaxis: Sequential compression device (Venodyne)       "

## 2025-05-01 NOTE — DISCHARGE INSTR - AVS FIRST PAGE
Ousmane,    Today we saw that your stent was very difficult to change.  We used a balloon to dilate scar tissue in the distal ureter and were ultimately successful in changing your stent however I think that your stent is failing and you may require a nephrostomy tube.  We will discuss this further at your next visit.  A nephrostomy tube is placed through the skin of the back into the kidney and drains the kidney from above into a drainage bag.  What you will need to do over the coming weeks is decide whether or not this is something you wish to proceed with or if you wish to continue with your stent knowing that your kidney function will likely worsen over time.    I have ordered a follow-up CT scan to see whether or not you have any metastatic disease or new disease above the bladder.    The good news from today's procedure is that you do not have any new bladder tumors.    We wish you a rapid recovery,    Dr. Mckee

## 2025-05-02 ENCOUNTER — TELEPHONE (OUTPATIENT)
Dept: UROLOGY | Facility: CLINIC | Age: 85
End: 2025-05-02

## 2025-05-02 ENCOUNTER — HOSPITAL ENCOUNTER (OUTPATIENT)
Facility: HOSPITAL | Age: 85
Setting detail: OUTPATIENT SURGERY
Discharge: HOME | End: 2025-05-02
Attending: UROLOGY | Admitting: UROLOGY
Payer: COMMERCIAL

## 2025-05-02 ENCOUNTER — ANESTHESIA EVENT (OUTPATIENT)
Dept: PERIOP | Facility: HOSPITAL | Age: 85
End: 2025-05-02
Payer: COMMERCIAL

## 2025-05-02 ENCOUNTER — APPOINTMENT (OUTPATIENT)
Dept: RADIOLOGY | Facility: HOSPITAL | Age: 85
End: 2025-05-02
Payer: COMMERCIAL

## 2025-05-02 ENCOUNTER — ANESTHESIA (OUTPATIENT)
Dept: PERIOP | Facility: HOSPITAL | Age: 85
End: 2025-05-02
Payer: COMMERCIAL

## 2025-05-02 VITALS
DIASTOLIC BLOOD PRESSURE: 73 MMHG | SYSTOLIC BLOOD PRESSURE: 154 MMHG | HEART RATE: 82 BPM | RESPIRATION RATE: 14 BRPM | OXYGEN SATURATION: 97 % | TEMPERATURE: 98.1 F

## 2025-05-02 DIAGNOSIS — C67.8 MALIGNANT NEOPLASM OF OVERLAPPING SITES OF BLADDER (HCC): Primary | ICD-10-CM

## 2025-05-02 DIAGNOSIS — N13.39 OTHER HYDRONEPHROSIS: Primary | ICD-10-CM

## 2025-05-02 PROCEDURE — 52341 CYSTO W/URETER STRICTURE TX: CPT | Performed by: UROLOGY

## 2025-05-02 PROCEDURE — 74420 UROGRAPHY RTRGR +-KUB: CPT

## 2025-05-02 PROCEDURE — C1769 GUIDE WIRE: HCPCS | Performed by: UROLOGY

## 2025-05-02 PROCEDURE — 52332 CYSTOSCOPY AND TREATMENT: CPT | Performed by: UROLOGY

## 2025-05-02 PROCEDURE — C1726 CATH, BAL DIL, NON-VASCULAR: HCPCS | Performed by: UROLOGY

## 2025-05-02 PROCEDURE — C1758 CATHETER, URETERAL: HCPCS | Performed by: UROLOGY

## 2025-05-02 PROCEDURE — C2617 STENT, NON-COR, TEM W/O DEL: HCPCS | Performed by: UROLOGY

## 2025-05-02 RX ORDER — ONDANSETRON 2 MG/ML
4 INJECTION INTRAMUSCULAR; INTRAVENOUS EVERY 6 HOURS PRN
Status: CANCELLED | OUTPATIENT
Start: 2025-05-02

## 2025-05-02 RX ORDER — PROPOFOL 10 MG/ML
INJECTION, EMULSION INTRAVENOUS AS NEEDED
Status: DISCONTINUED | OUTPATIENT
Start: 2025-05-02 | End: 2025-05-02

## 2025-05-02 RX ORDER — SODIUM CHLORIDE, SODIUM LACTATE, POTASSIUM CHLORIDE, CALCIUM CHLORIDE 600; 310; 30; 20 MG/100ML; MG/100ML; MG/100ML; MG/100ML
INJECTION, SOLUTION INTRAVENOUS CONTINUOUS PRN
Status: DISCONTINUED | OUTPATIENT
Start: 2025-05-02 | End: 2025-05-02

## 2025-05-02 RX ORDER — OXYCODONE HYDROCHLORIDE 5 MG/1
5 TABLET ORAL EVERY 4 HOURS PRN
Refills: 0 | Status: CANCELLED | OUTPATIENT
Start: 2025-05-02

## 2025-05-02 RX ORDER — OXYCODONE HYDROCHLORIDE 5 MG/1
2.5 TABLET ORAL EVERY 4 HOURS PRN
Refills: 0 | Status: CANCELLED | OUTPATIENT
Start: 2025-05-02

## 2025-05-02 RX ORDER — MAGNESIUM HYDROXIDE/ALUMINUM HYDROXICE/SIMETHICONE 120; 1200; 1200 MG/30ML; MG/30ML; MG/30ML
30 SUSPENSION ORAL EVERY 6 HOURS PRN
Status: CANCELLED | OUTPATIENT
Start: 2025-05-02

## 2025-05-02 RX ORDER — SODIUM CHLORIDE, SODIUM LACTATE, POTASSIUM CHLORIDE, CALCIUM CHLORIDE 600; 310; 30; 20 MG/100ML; MG/100ML; MG/100ML; MG/100ML
125 INJECTION, SOLUTION INTRAVENOUS CONTINUOUS
Status: CANCELLED | OUTPATIENT
Start: 2025-05-02

## 2025-05-02 RX ORDER — FENTANYL CITRATE 50 UG/ML
INJECTION, SOLUTION INTRAMUSCULAR; INTRAVENOUS AS NEEDED
Status: DISCONTINUED | OUTPATIENT
Start: 2025-05-02 | End: 2025-05-02

## 2025-05-02 RX ORDER — ACETAMINOPHEN 325 MG/1
975 TABLET ORAL ONCE
Status: COMPLETED | OUTPATIENT
Start: 2025-05-02 | End: 2025-05-02

## 2025-05-02 RX ORDER — ONDANSETRON 2 MG/ML
INJECTION INTRAMUSCULAR; INTRAVENOUS AS NEEDED
Status: DISCONTINUED | OUTPATIENT
Start: 2025-05-02 | End: 2025-05-02

## 2025-05-02 RX ORDER — PROPOFOL 10 MG/ML
INJECTION, EMULSION INTRAVENOUS CONTINUOUS PRN
Status: DISCONTINUED | OUTPATIENT
Start: 2025-05-02 | End: 2025-05-02

## 2025-05-02 RX ORDER — ACETAMINOPHEN 325 MG/1
650 TABLET ORAL EVERY 6 HOURS SCHEDULED
Status: CANCELLED | OUTPATIENT
Start: 2025-05-02

## 2025-05-02 RX ORDER — HYDROMORPHONE HCL/PF 1 MG/ML
0.5 SYRINGE (ML) INJECTION EVERY 2 HOUR PRN
Refills: 0 | Status: CANCELLED | OUTPATIENT
Start: 2025-05-02 | End: 2025-05-04

## 2025-05-02 RX ORDER — CEFAZOLIN SODIUM 2 G/50ML
2000 SOLUTION INTRAVENOUS ONCE
Status: COMPLETED | OUTPATIENT
Start: 2025-05-02 | End: 2025-05-02

## 2025-05-02 RX ORDER — LIDOCAINE HYDROCHLORIDE 10 MG/ML
INJECTION, SOLUTION EPIDURAL; INFILTRATION; INTRACAUDAL; PERINEURAL AS NEEDED
Status: DISCONTINUED | OUTPATIENT
Start: 2025-05-02 | End: 2025-05-02

## 2025-05-02 RX ORDER — PHENAZOPYRIDINE HYDROCHLORIDE 100 MG/1
200 TABLET, FILM COATED ORAL
Status: CANCELLED | OUTPATIENT
Start: 2025-05-02

## 2025-05-02 RX ORDER — ONDANSETRON 2 MG/ML
4 INJECTION INTRAMUSCULAR; INTRAVENOUS ONCE AS NEEDED
Status: DISCONTINUED | OUTPATIENT
Start: 2025-05-02 | End: 2025-05-02 | Stop reason: HOSPADM

## 2025-05-02 RX ORDER — FENTANYL CITRATE/PF 50 MCG/ML
50 SYRINGE (ML) INJECTION
Status: DISCONTINUED | OUTPATIENT
Start: 2025-05-02 | End: 2025-05-02 | Stop reason: HOSPADM

## 2025-05-02 RX ADMIN — LIDOCAINE HYDROCHLORIDE 50 MG: 10 INJECTION, SOLUTION EPIDURAL; INFILTRATION; INTRACAUDAL; PERINEURAL at 07:33

## 2025-05-02 RX ADMIN — ONDANSETRON 4 MG: 2 INJECTION, SOLUTION INTRAMUSCULAR; INTRAVENOUS at 08:13

## 2025-05-02 RX ADMIN — FENTANYL CITRATE 25 MCG: 50 INJECTION INTRAMUSCULAR; INTRAVENOUS at 07:57

## 2025-05-02 RX ADMIN — FENTANYL CITRATE 25 MCG: 50 INJECTION INTRAMUSCULAR; INTRAVENOUS at 07:44

## 2025-05-02 RX ADMIN — SODIUM CHLORIDE, SODIUM LACTATE, POTASSIUM CHLORIDE, AND CALCIUM CHLORIDE: .6; .31; .03; .02 INJECTION, SOLUTION INTRAVENOUS at 07:29

## 2025-05-02 RX ADMIN — FENTANYL CITRATE 25 MCG: 50 INJECTION INTRAMUSCULAR; INTRAVENOUS at 08:05

## 2025-05-02 RX ADMIN — CEFAZOLIN SODIUM 2000 MG: 2 SOLUTION INTRAVENOUS at 07:34

## 2025-05-02 RX ADMIN — FENTANYL CITRATE 25 MCG: 50 INJECTION INTRAMUSCULAR; INTRAVENOUS at 07:50

## 2025-05-02 RX ADMIN — ACETAMINOPHEN 975 MG: 325 TABLET, FILM COATED ORAL at 06:58

## 2025-05-02 RX ADMIN — PROPOFOL 120 MG: 10 INJECTION, EMULSION INTRAVENOUS at 07:33

## 2025-05-02 RX ADMIN — PROPOFOL 50 MCG/KG/MIN: 10 INJECTION, EMULSION INTRAVENOUS at 07:34

## 2025-05-02 NOTE — INTERVAL H&P NOTE
H&P reviewed. After examining the patient I find no changes in the patients condition since the H&P had been written.    Vitals:    05/02/25 0640   BP: (!) 174/81   Pulse: 102   Resp: 15   Temp: 98.1 °F (36.7 °C)   SpO2: 98%   Plan: right ureteral stent exchange and all indicated procedures (to include possible TURBT)

## 2025-05-02 NOTE — OP NOTE
OPERATIVE REPORT  PATIENT NAME: Francis Caballero    :  1940  MRN: [id number]  Pt Location: [facility] OR ROOM 03    SURGERY DATE: 2025    Surgeons and Role:     * Eladoi Mckee MD - Primary    Preop Diagnosis:  Hydronephrosis, unspecified hydronephrosis type [N13.30]  Malignant neoplasm of overlapping sites of bladder (HCC) [C67.8]    Post-Op Diagnosis Codes:     * Hydronephrosis, unspecified hydronephrosis type [N13.30]     * Malignant neoplasm of overlapping sites of bladder (HCC) [C67.8]    Procedure(s):  Right - EXCHANGE STENT URETERAL; W/ BALLOON DILATION URETHRAL STRICTURE  TRANSURETHRAL RESECTION OF BLADDER TUMOR (TURBT)    Specimen(s):  * No specimens in log *    Estimated Blood Loss:   Minimal    Drains:  Ureteral Internal Stent Right ureter 7 Fr. (Active)   Number of days: 154       Anesthesia Type:   General    Operative Indications:  Hydronephrosis, unspecified hydronephrosis type [N13.30]  Malignant neoplasm of overlapping sites of bladder (HCC) [C67.8]      Operative Findings:  Severe obstruction at the distal right ureter making stent exchange quite difficult.  This required a number of wires including a Super Stiff wire as well as a ureteral dilation balloon to place the stent.  The stent only traverses into the more proximal ureter with inability to pass this further.  This indicates likely stent failure going forward.  We will discuss nephrostomy tube placement with him and removal of his stent in the operating room setting as the gripped force of office-based cystoscopic graspers will not be strong enough to remove the stent given the severe scar tissue or potential recurrent disease in the distal right ureter.    The patient will require a CT scan which I have ordered.  We will discuss these findings with him in the office setting      Complications:   None    Procedure and Technique:        PROCEDURES PERFORMED:  1) Cystoscopy  2) right retrograde pyelography with fluoroscopic  interpretation  3) right ureteral stent exchange (unusually difficult due to patient disease-- 7 Fr x 28 cm)    SURGEON:  Eladio Mckee MD    ASSISTANTS:  none    NOTE:  There were no qualified teaching residents to assist with this case    ANESTHESIA: General     COMPLICATIONS:   None    ANTIBIOTICS:  ancef    INTRAOPERATIVE THROMBOEMBOLISM PROPHYLAXIS:  Pneumatic compression stockings     RADIOLOGIC FINDINGS:    1. Retrograde pyelogram was performed on the right side using a 5 Fr open ended catheter.  Approximately 10 mL contrast was used today.    2. The following findings were noted: severe hydronephrosis with an abrupt cutoff in the distal right ureter        INDICATIONS FOR PROCEDURE:  Francis Caballero is an 85 y.o. old male with high risk bladder cancer status post trimodality/bladder sparing therapy.  After discussing the options, the patient elected to undergo ureteral stent exchange.  We discussed the procedure in detail, the alternatives, and the risks, and they signed informed consent to proceed.  The appropriate laterality was marked    PROCEDURE IN DETAIL:   The patient was identified and brought to the OR.  Antibiotic prophylaxis and DVT prophylaxis were administered.  They were placed in the lithotomy position with care to pad all pressure points.  They were prepared and draped in the usual sterile fashion.      A surgical time out was performed with all in the room in agreement with the correct patient, procedure, indications, and laterality.  A 21-Welsh rigid cystoscope was used to enter the bladder.  Postradiation changes are noted to the tissue within the prostatic urethra and the bladder.  There are no bladder masses.  The bladder capacity is severely diminished.    The indwelling Contour stent was grasped with a grasper and delivered per meatus with difficulty due to resistance in delivering this.  A wire was placed, also with difficulty and difficulty removing the stent due to stenosis at the  distal ureter.  A 5 Ghanaian open-ended catheter was placed over the wire with great difficulty and a retrograde pyelogram was performed showing a cut off at the distal ureter roughly 5 cm proximal to the ureterovesical junction.    A Super Stiff wire was placed through the indwelling catheter.  A ureteral dilation balloon was passed with extreme difficulty but ultimate success and a dilation of the distal ureter was performed under fluoroscopic and visual guidance.    Following this a 7 Ghanaian by 28 cm ureteral stent was placed with great difficulty over the Super Stiff wire.  This is only able to pass into the more proximal ureter and distal renal pelvis.  The circumferential force on the stent is quite severe and resulted in delamination of the wire.  All wire components were removed today as evidenced by no radiopaque wire fragments noted on fluoroscopy or when visualized directly.    The bladder was drained.      The patient was placed back in the supine position, awakened from general anesthesia and brought to the recovery room in stable condition.    SPECIMENS:   No specimens collected during this procedure.     IMPLANTS:   Implant Name Type Inv. Item Serial No.  Lot No. LRB No. Used Action   STENT CONTOUR INJ 7FR 30CM - [id number]  STENT CONTOUR INJ 7FR 30CM  [employer] [id number] Right 1 Explanted   STENT URETERAL 7FR 28CM INLAY OPTIMA - [id number]  STENT URETERAL 7FR 28CM INLAY OPTIMA  [employer] [id number] Right 1 Implanted        COMPLICATIONS: None    DISPOSITION: PACU     PLAN: The patient is exhibiting signs of either progression of disease or radiation-induced stricture in the distal ureter.  His stent is failing.  We will arrange for repeat cross-sectional imaging for this patient and then have a discussion with him in the office regarding scheduling nephrostomy tube placement and removal of his ureteral stent in the operating room setting.       I was present for the entire  procedure. and A qualified resident physician was not available.    Patient Disposition:  PACU              SIGNATURE: Eladio Mckee MD  DATE: May 2, 2025  TIME: 8:13 AM

## 2025-05-02 NOTE — ANESTHESIA PREPROCEDURE EVALUATION
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)   (+) Other hydronephrosis   (+) Stage 3b chronic kidney disease (HCC)      HEMATOLOGY   (+) Anemia associated with acute blood loss   (+) Anemia in chronic kidney disease   (+) Coagulation disorder (HCC)      MUSCULOSKELETAL   (+) Chronic bilateral low back pain with right-sided sciatica   (+) Hiatal hernia   (+) Osteoarthritis of lumbar spine   (+) Primary osteoarthritis of left hip      NEURO/PSYCH   (+) Anxiety   (+) CVA (cerebral vascular accident) (HCC)   (+) Chronic bilateral low back pain with right-sided sciatica   (+) Intractable epilepsy without status epilepticus, unspecified epilepsy type (HCC)      PULMONARY   (+) JORGE LUIS (obstructive sleep apnea)        Physical Exam    Airway    Mallampati score: II  TM Distance: >3 FB  Neck ROM: full     Dental       Cardiovascular      Pulmonary      Other Findings        Anesthesia Plan  ASA Score- 2     Anesthesia Type- general with ASA Monitors.         Additional Monitors:     Airway Plan: LMA.           Plan Factors-    Chart reviewed.              Obstructive sleep apnea risk education given perioperatively.        Induction- intravenous.    Postoperative Plan-         Informed Consent- Anesthetic plan and risks discussed with patient.  I personally reviewed this patient with the CRNA. Discussed and agreed on the Anesthesia Plan with the CRNA..      NPO  Status:  Vitals Value Taken Time   Date of last liquid 05/01/25 05/02/25 0621   Time of last liquid 2100 05/02/25 0621   Date of last solid 05/01/25 05/02/25 0621   Time of last solid 1900 05/02/25 0621

## 2025-05-02 NOTE — TELEPHONE ENCOUNTER
Status post very difficult ureteral stent exchange.  The stent placed by Dr. Carcamo has been removed.  He has a 7 Cypriot by 28 cm ureteral stent.  He needs a CT scan which has been ordered.  Please arrange a discussion visit with me in the office regarding removal of his stent going forward and nephrostomy tube placement.  This can be in the coming weeks please

## 2025-05-02 NOTE — ANESTHESIA POSTPROCEDURE EVALUATION
Post-Op Assessment Note    CV Status:  Stable  Pain Score: 0    Pain management: adequate       Mental Status:  Alert and awake   Hydration Status:  Stable   PONV Controlled:  None   Airway Patency:  Patent     Post Op Vitals Reviewed: Yes    No anethesia notable event occurred.    Staff: CRNA           Last Filed PACU Vitals:  Vitals Value Taken Time   Temp     Pulse 74 05/02/25 0822   BP     Resp     SpO2 100 % 05/02/25 0822   Vitals shown include unfiled device data.

## 2025-05-03 NOTE — TELEPHONE ENCOUNTER
Spoke with patient and confirmed surgery date of:spoke w/ pt's wife scheduled pt for 11/29/24   Type of surgery:CYSTOSCOPY/RETRO/SALVADOR STENT EXCHANGE  Operating physician: Dr. Carcamo  Location of surgery: [facility]    Verbally went over prep with patient on:   NPO  Bowel prep? No  Hospital calls afternoon prior with arrival time -Calls Friday afternoon for Monday surgeries  Patient needs ride to and from surgery (outpatient/inpatient)   Pre-op testing to be done 2 weeks prior to surgery/PRE OP LABS URINE CULTURE ORDERED FOR 11/13/24/PT'S WIFE STATES PT IS SCHEDULED FOR LABWORK TO BE DONE DRAWN AT THEIR HOME 11/13/24  (list labs needed)  Blood thinners: WARFARIN-PER DR CARCAMO PT CAN CONTINUE WARFARIN-DOES NOT NEED TO HOLD  List blood thinner/how long needs to held or no hold needed  Clearances needed: (Medical/Cardiac/None)    Mailed/emailed to patient on:EMAILED TO PT 11/13/24  Copy of packet scanned into Media on:11/13/24  Labs in packet  Soap / Bowel prep in packet  Pre-op & Post-op in packet  Dates of H&P and post-op if needed    Consent: in Media or on admit  If needed:ON ADMISSION    Medical/Cardiac Clearance:  Appt with:  Appt date and time:  Date clearance form faxed:  Best fax number:    Medication Suspension of: Medication Name / how many days  Ordering provider:  Faxed Medication Suspension form on:  Best fax number:    [place]:  Faxed form to pharmacy on:    RBC blood bank done on:    Rep info:  Emailed rep on date:    no

## 2025-05-05 DIAGNOSIS — E78.2 MIXED HYPERLIPIDEMIA: ICD-10-CM

## 2025-05-05 NOTE — TELEPHONE ENCOUNTER
Patient gave verbal consent to speak with wife     -Went over potential/expected post op symptoms   -discussed ER precautions  -Confirmed post op appts and locations. CT is scheduled for this saturday  -Went over medication

## 2025-05-05 NOTE — TELEPHONE ENCOUNTER
Post Op Note    Francis Caballero is a 85 y.o. male s/p EXCHANGE STENT URETERAL; W/ BALLOON DILATION URETHRAL STRICTURE (Right: Ureter)  performed 5/2/25.  Francis Caballero is a patient of Dr. Dr. Mckee and is seen at the [place] office.     Called and spoke with wife who advised that patient is sleeping. I cannot find a recent CC so I advised her I would call later today to speak with them

## 2025-05-06 RX ORDER — ROSUVASTATIN CALCIUM 5 MG/1
5 TABLET, COATED ORAL DAILY
Qty: 100 TABLET | Refills: 0 | Status: SHIPPED | OUTPATIENT
Start: 2025-05-06

## 2025-05-07 ENCOUNTER — RESULTS FOLLOW-UP (OUTPATIENT)
Dept: FAMILY MEDICINE CLINIC | Facility: CLINIC | Age: 85
End: 2025-05-07

## 2025-05-07 ENCOUNTER — ANTICOAG VISIT (OUTPATIENT)
Dept: FAMILY MEDICINE CLINIC | Facility: CLINIC | Age: 85
End: 2025-05-07

## 2025-05-07 LAB — INR PPP: 3 (ref 0.85–1.19)

## 2025-05-07 NOTE — TELEPHONE ENCOUNTER
----- Message from Chantel Santos DO sent at 5/7/2025 12:25 PM EDT -----  Your warfarin level is good  Continue the same dose and recheck 1 week

## 2025-05-12 ENCOUNTER — HOSPITAL ENCOUNTER (OUTPATIENT)
Dept: CT IMAGING | Facility: HOSPITAL | Age: 85
Discharge: HOME | End: 2025-05-12
Attending: UROLOGY
Payer: COMMERCIAL

## 2025-05-12 DIAGNOSIS — F41.9 ANXIETY: ICD-10-CM

## 2025-05-12 DIAGNOSIS — C67.8 MALIGNANT NEOPLASM OF OVERLAPPING SITES OF BLADDER (HCC): ICD-10-CM

## 2025-05-12 PROCEDURE — 74176 CT ABD & PELVIS W/O CONTRAST: CPT

## 2025-05-12 RX ORDER — ALPRAZOLAM 0.5 MG
0.5 TABLET ORAL 3 TIMES DAILY PRN
Qty: 90 TABLET | Refills: 0 | Status: SHIPPED | OUTPATIENT
Start: 2025-05-12

## 2025-05-12 NOTE — ANESTHESIA POSTPROCEDURE EVALUATION
Post-Op Assessment Note    CV Status:  Stable  Pain Score: 0    Pain management: adequate       Mental Status:  Alert and awake   Hydration Status:  Stable   PONV Controlled:  None   Airway Patency:  Patent  Two or more mitigation strategies used for obstructive sleep apnea   Post Op Vitals Reviewed: Yes    No anethesia notable event occurred.    Staff: CRNA, Anesthesiologist           Last Filed PACU Vitals:  Vitals Value Taken Time   Temp     Pulse 74 05/02/25 0822   BP     Resp     SpO2 100 % 05/02/25 0822   Vitals shown include unfiled device data.    Modified Priscilla:     Vitals Value Taken Time   Activity 2 05/02/25 0850   Respiration 2 05/02/25 0850   Circulation 2 05/02/25 0850   Consciousness 2 05/02/25 0850   Oxygen Saturation 2 05/02/25 0850     Modified Priscilla Score: 10

## 2025-05-13 ENCOUNTER — TELEPHONE (OUTPATIENT)
Age: 85
End: 2025-05-13

## 2025-05-13 ENCOUNTER — ANTICOAG VISIT (OUTPATIENT)
Dept: FAMILY MEDICINE CLINIC | Facility: CLINIC | Age: 85
End: 2025-05-13

## 2025-05-13 ENCOUNTER — NURSE TRIAGE (OUTPATIENT)
Age: 85
End: 2025-05-13

## 2025-05-13 LAB — INR PPP: 8 (ref 0.85–1.19)

## 2025-05-13 NOTE — TELEPHONE ENCOUNTER
Regarding: INR results  ----- Message from Ava OLEA sent at 5/13/2025  2:36 PM EDT -----  Dara called with out of range INR results-8.0 this was from today.

## 2025-05-13 NOTE — TELEPHONE ENCOUNTER
Spoke to Daniel and advised of Dr. Santos's recommendation of INR and medication. Daniel said there was no changes with time/dose of medications. Daniel did say that patient had his stent replaced last week and had to be off medication and is not sure if that could have caused the INR to be high.

## 2025-05-13 NOTE — TELEPHONE ENCOUNTER
"FOLLOW UP: Discuss with PCP and Callback by Nurse Today    REASON FOR CONVERSATION: No Triage Call    SYMPTOMS: N/A    OTHER: Did call MD INR and confirm the patient's INR level was 8.0 from today    DISPOSITION: Discuss With PCP and Callback by Nurse Today    Reason for Disposition   Nursing judgment    Answer Assessment - Initial Assessment Questions  1. REASON FOR CALL: \"What is the main reason for your call?\" or \"How can I best help you?\"      Did call MD INR and confirm the patient's INR level was 8.0 from today.    Protocols used: Information Only Call - No Triage-Adult-[place], No Protocol Available-Adult-[place]    "

## 2025-05-13 NOTE — TELEPHONE ENCOUNTER
Patient's wife, Dainel, called to check in on the CT results. I advised they are still in process but once finalized we will contact her with the results. Jose C verbalized understanding.

## 2025-05-15 NOTE — TELEPHONE ENCOUNTER
Patients spouse called for CT results.     5/12/25 CT abdomen pelvis wo contrast  Status: In process     While patients spouse was on the line, she verbalized worry. Patient has increased weakness since Monday, not drinking or eating, complaining of difficulty swallowing.Patient was unable to get upstairs, a friend was called to assist and now has not been down stairs since Monday.    Patients spouse advised to call 911 to transport to ED for further evaluation.    Patient refuses to go and spouse said she cannot make him .

## 2025-05-16 NOTE — TELEPHONE ENCOUNTER
Patient's spouse called in asking if CT results are finalized yet. Informed her they are still in process and can take 7-10 days. Will await for final results.

## 2025-05-17 ENCOUNTER — NURSE TRIAGE (OUTPATIENT)
Dept: OTHER | Facility: OTHER | Age: 85
End: 2025-05-17

## 2025-05-17 DIAGNOSIS — R31.9 HEMATURIA, UNSPECIFIED TYPE: Primary | ICD-10-CM

## 2025-05-17 NOTE — TELEPHONE ENCOUNTER
Reason for Disposition  • Pink or red-colored urine and likely from food (beets, rhubarb, red food dye)    Protocols used: Urine - Blood In-Adult-AH

## 2025-05-17 NOTE — TELEPHONE ENCOUNTER
"Regarding: blood in urine/painful urination/post op stent  ----- Message from Elham SIERRA sent at 5/17/2025  7:32 AM EDT -----  Pt's wife stated, \"My  had a stent replacement recently. For the past week he has been having blood in his urine. He is also stating it burns while he urinates and has had an increase in bowl movements.\"    "

## 2025-05-17 NOTE — TELEPHONE ENCOUNTER
"FOLLOW UP: Please follow up with patient regarding hematuria, urinary frequency symptoms, urinalysis results.  Patient is also continuing to hold Coumadin    REASON FOR CONVERSATION: Hematuria    SYMPTOMS: Increase in hematuria noted over past week with intermittent frequency/pain.  Has been holding coumadin for past week due to INR of 8.    OTHER: On call provider OK'd urinalysis order.  Advised to continue to hold Coumadin and follow up with office on Monday.  Pt's wife advised and verbalized understanding.    DISPOSITION: Home Care,  Now      Answer Assessment - Initial Assessment Questions  1. COLOR of URINE: \"Describe the color of the urine.\"  (e.g., tea-colored, pink, red, bloody) \"Do you have blood clots in your urine?\" (e.g., none, pea, grape, small coin)        Blood in urine - dark red  Estimates clots small, intermittently    2. ONSET: \"When did the bleeding start?\"         Since stent change on 5/2, but has gotten worse for at least a week    3. EPISODES: \"How many times has there been blood in the urine?\" or \"How many times today?\"        As above    4. PAIN with URINATION: \"Is there any pain with passing your urine?\" If Yes, ask: \"How bad is the pain?\"  (Scale 1-10; or mild, moderate, severe)        Yes with urination and in groin area on right  2/10 when present, random not associated with passing urine    5. FEVER: \"Do you have a fever?\" If Yes, ask: \"What is your temperature, how was it measured, and when did it start?\"        Denies    6. ASSOCIATED SYMPTOMS: \"Are you passing urine more frequently than usual?\"        Increased frequency, intermittent burning    7. OTHER SYMPTOMS: \"Do you have any other symptoms?\" (e.g., back/flank pain, abdomen pain, vomiting)        Increase in bowel movements; more loose than usual  Having a bowel movement once a day, no stool softener    2.5 mg warfarin to start today due to recent INR; had been held all week by PCP    Protocols used: Urine - " Blood In-Adult-AH

## 2025-05-18 LAB
APPEARANCE UR: ABNORMAL
COLOR UR: ABNORMAL
GLUCOSE UR QL: ABNORMAL
KETONES UR QL STRIP: ABNORMAL
PH UR STRIP: 8 [PH] (ref 5–7.5)
PROT UR QL STRIP: ABNORMAL
SP GR UR: 1.02 (ref 1–1.03)

## 2025-05-19 ENCOUNTER — RESULTS FOLLOW-UP (OUTPATIENT)
Dept: UROLOGY | Facility: CLINIC | Age: 85
End: 2025-05-19

## 2025-05-19 NOTE — TELEPHONE ENCOUNTER
Patients wife called in requesting a cb saying that the patient is eating anything.She is stating that it is urgent.     Cb: [phone] Daniel (Spouse)

## 2025-05-19 NOTE — TELEPHONE ENCOUNTER
Patient's spouse called in stating they received message from Dr. Mckee and would like to go the palliative care route. States patient does not want bilaterally nephrostomy tubes. She would like a referral to be placed. Also is asking if patient should continue staying off of his warfarin. She is not sure if patient will be able to make it to his appointment on 5/22 and would like to know if a phone call or virtual visit can happen instead. Please advise.

## 2025-05-19 NOTE — TELEPHONE ENCOUNTER
Patient's spouse's call was returned. Spouse states that patient is not eating, or drinking well do to dentures not fitting right. Spouse also states that patient's leg is retaining lots of fluid and patient can barely lift when walking to bathroom. Spouse continued to tell office that patient fell and when EMS arrived they took his vitals stated he didn't break anything and when patient was asked if he wanted to go get checked at they hospital, patient stated they did not. Spouse with now stating that they do not understand what to do with patient until palliative care reaches out to them. Office advised per nursing staff that if patient was unable to eat, drink, or peeing blood at spouse had stated earlier in call, spouse needs to call 911 and get patient to the hospital.  Spouse verbalized understanding and call was ended.

## 2025-05-20 NOTE — TELEPHONE ENCOUNTER
No- stay the course. discuss options with DV 5/22 as planned. His INR still coming down (not checked again yet since 8 last week) but warfarin should remain on hold. CT 5/15 did not show any clot burden in the bladder aside from known tumor. I do not think going to the ER would benefit right now. I think they would put a irizarry in him and irrigate and that could make things better but really could make things worse. Wait and talk it over with DV this week.

## 2025-05-20 NOTE — TELEPHONE ENCOUNTER
Called and spoke with wife and notified her that Dr Mckee will do virtural visit with him . They just have to be ready 15 min prior to appointment to set everything up.  She understood.  She also states that the [facility] want him to go to ER as he has blood in urine.  I do see that u/s in system and showed blood.  He should hydrate with water.  Some blood is normal after surgery

## 2025-05-20 NOTE — TELEPHONE ENCOUNTER
Called and spoke with patient's spouse. Informed on AP's note. She states they are getting a hospital bed for him in the next day or so and he will be able to sit up more. She states his legs are still swollen and the nephrologist is aware. She states they are the same and not worse. She also states that he is not eating much, but feels it is due to the lack of sitting up and his lack of dentures. Encouraged to keep him hydrated with fluids as best as she can and follow up as scheduled. She verbalized understanding.

## 2025-05-21 NOTE — PROGRESS NOTES
Virtual Brief Visit  Name: Francis Caballero      : 1940      MRN: [id number]  Encounter Provider: Eladio Mckee MD  Encounter Date: 2025   Encounter department: [facility] [place]  :  Assessment & Plan  Malignant neoplasm of overlapping sites of bladder (HCC)    Orders:    Ambulatory Referral to Palliative Care; Future    Ambulatory Referral to Hospice; Future    Ambulatory Referral to Social Work Care Management Program; Future    Permanent atrial fibrillation (HCC)         Cerebrovascular accident (CVA), unspecified mechanism (HCC)         Malignant 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 (HCC)         LELE (acute kidney injury) (HCC)         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 They favor the latter   His wife is struggling to care for him. Requires assistance/in home care, referrals placed  No plan for stent exchanges given comfort care preference    History of Present Illness   85 year old man with high risk bladder cancer and right hydronephrosis  Recent stent exchange with extreme difficulty changing stent  CT scan with worsening of disease  Does not want further interventions/nephrostomies  Not accomplishing ADLs  Poor performance status    The following portions of the patient's history were reviewed and updated as appropriate: allergies, current medications, past family history, past medical history, past social history, past surgical history and problem list.      Administrative Statements   Encounter provider Eladio Mckee MD    The Patient is located at Home and in  the following state in which I hold an active license [place].    The patient was identified by name and date of birth. Francis Caballero was informed that this is a telemedicine visit and that the visit is being conducted through the Epic Embedded platform. He agrees to proceed..  My office door was closed. No one else was in the room.  He acknowledged consent and understanding of privacy and security of the video platform. The patient has agreed to participate and understands they can discontinue the visit at any time.    I have spent a total time of 30 minutes in caring for this patient on the day of the visit/encounter including Diagnostic results, Prognosis, Risks and benefits of tx options, Instructions for management, Patient and family education, Importance of tx compliance, Risk factor reductions, Impressions, Counseling / Coordination of care, Documenting in the medical record, and Reviewing/placing orders in the medical record (including tests, medications, and/or procedures), not including the time spent for establishing the audio/video connection.

## 2025-05-22 ENCOUNTER — HOME CARE VISIT (OUTPATIENT)
Dept: HOME HEALTH SERVICES | Facility: HOME HEALTHCARE | Age: 85
End: 2025-05-22
Payer: MEDICARE

## 2025-05-22 ENCOUNTER — TELEMEDICINE (OUTPATIENT)
Dept: UROLOGY | Facility: HOSPITAL | Age: 85
End: 2025-05-22
Payer: COMMERCIAL

## 2025-05-22 ENCOUNTER — TELEPHONE (OUTPATIENT)
Age: 85
End: 2025-05-22

## 2025-05-22 ENCOUNTER — PATIENT OUTREACH (OUTPATIENT)
Dept: HEMATOLOGY ONCOLOGY | Facility: CLINIC | Age: 85
End: 2025-05-22

## 2025-05-22 ENCOUNTER — NURSE TRIAGE (OUTPATIENT)
Age: 85
End: 2025-05-22

## 2025-05-22 DIAGNOSIS — C67.9: ICD-10-CM

## 2025-05-22 DIAGNOSIS — C67.8 MALIGNANT NEOPLASM OF OVERLAPPING SITES OF BLADDER (HCC): Primary | ICD-10-CM

## 2025-05-22 DIAGNOSIS — I63.9 CEREBROVASCULAR ACCIDENT (CVA), UNSPECIFIED MECHANISM (HCC): ICD-10-CM

## 2025-05-22 DIAGNOSIS — I48.21 PERMANENT ATRIAL FIBRILLATION (HCC): Primary | ICD-10-CM

## 2025-05-22 DIAGNOSIS — R26.81 GAIT INSTABILITY: ICD-10-CM

## 2025-05-22 DIAGNOSIS — R62.7 FAILURE TO THRIVE IN ADULT: ICD-10-CM

## 2025-05-22 DIAGNOSIS — N17.9 AKI (ACUTE KIDNEY INJURY) (HCC): ICD-10-CM

## 2025-05-22 DIAGNOSIS — I48.21 PERMANENT ATRIAL FIBRILLATION (HCC): ICD-10-CM

## 2025-05-22 DIAGNOSIS — C67.2 MALIGNANT NEOPLASM OF LATERAL WALL OF URINARY BLADDER (HCC): ICD-10-CM

## 2025-05-22 DIAGNOSIS — K52.9 COLITIS: Primary | ICD-10-CM

## 2025-05-22 DIAGNOSIS — N13.8: ICD-10-CM

## 2025-05-22 DIAGNOSIS — R19.7 DIARRHEA, UNSPECIFIED TYPE: Primary | ICD-10-CM

## 2025-05-22 DIAGNOSIS — R29.6 FREQUENT FALLS: ICD-10-CM

## 2025-05-22 DIAGNOSIS — R53.1 WEAKNESS: ICD-10-CM

## 2025-05-22 PROCEDURE — 99214 OFFICE O/P EST MOD 30 MIN: CPT | Performed by: UROLOGY

## 2025-05-22 RX ORDER — METRONIDAZOLE 250 MG/1
250 TABLET ORAL EVERY 8 HOURS SCHEDULED
Qty: 21 TABLET | Refills: 0 | Status: SHIPPED | OUTPATIENT
Start: 2025-05-22 | End: 2025-05-29

## 2025-05-22 NOTE — TELEPHONE ENCOUNTER
Pt called in is very upset and tired stated she can't help her  as much as she could in the past. Pt health has declined rapidly and wife requested hospice order. Pt is not able to leave the house anymore, can't move his leg on his own. Wife stated she has had no help or [facility]. Pt need help getting cleaned.  Wife is stressed and upset about this situation but know he needs more help then what she is able to do. Pt doesn't want to eat much. Please follow up.

## 2025-05-22 NOTE — TELEPHONE ENCOUNTER
"FOLLOW UP: Wife on phone asking for treatment for Cdiss as she has no way to get patient to Emergency department and if call for ambulance patient will refuse and they allow. Wife is asking for treatment for Cdiff at this time.     REASON FOR CONVERSATION: Diarrhea    SYMPTOMS: Patient with diarrhea very loose, mucous stool. Frequent. Patient decreased intake. Urine with blood in it. Patient just has urology video appointment. Patient per wife, \" is on his way out\" That is why patient is refusing care. Wife does not want to get Cdiff. Rn educated patient on washing of hands after patient care. Wife understands but is fearful. Patient remains alert and oriented with intermittent confusion.    OTHER: Forwarded to PCP for review.  Rn spoke with RN in office to discuss complex case and she will make sure Dr. Santos reviews    DISPOSITION: Go to ED/UCC Now (Or to Office with PCP Approval)        Reason for Disposition   SEVERE diarrhea (e.g., 7 or more times / day more than normal) and age > 60 years    Answer Assessment - Initial Assessment Questions  1. DIARRHEA SEVERITY: \"How bad is the diarrhea?\" \"How many more stools have you had in the past 24 hours than normal?\"       Severe diarrhea  2. ONSET: \"When did the diarrhea begin?\"       10 days ago  3. STOOL DESCRIPTION:  \"How loose or watery is the diarrhea?\" \"What is the stool color?\" \"Is there any blood or mucous in the stool?\"      Liquid mucous  4. VOMITING: \"Are you also vomiting?\" If Yes, ask: \"How many times in the past 24 hours?\"       denies  5. ABDOMEN PAIN: \"Are you having any abdomen pain?\" If Yes, ask: \"What does it feel like?\" (e.g., crampy, dull, intermittent, constant)       Groin pain  6. ABDOMEN PAIN SEVERITY: If present, ask: \"How bad is the pain?\"  (e.g., Scale 1-10; mild, moderate, or severe)      denies  7. ORAL INTAKE: If vomiting, \"Have you been able to drink liquids?\" \"How much liquids have you had in the past 24 hours?\"      decreased  8. " "HYDRATION: \"Any signs of dehydration?\" (e.g., dry mouth [not just dry lips], too weak to stand, dizziness, new weight loss) \"When did you last urinate?\"      denies  9. EXPOSURE: \"Have you traveled to a foreign country recently?\" \"Have you been exposed to anyone with diarrhea?\" \"Could you have eaten any food that was spoiled?\"      denies  10. ANTIBIOTIC USE: \"Are you taking antibiotics now or have you taken antibiotics in the past 2 months?\"        denies  11. OTHER SYMPTOMS: \"Do you have any other symptoms?\" (e.g., fever, blood in stool)        Blood in urine    Protocols used: Diarrhea-Adult-[place]    "

## 2025-05-22 NOTE — TELEPHONE ENCOUNTER
Patient has virtual appointment today with Dr. Mckee at 11:30am.     Patients wife would like Dr. Mckee help to get hospice.     Please advise at office visit today.     [phone]

## 2025-05-22 NOTE — PROGRESS NOTES
Per  patient referred to palliative/hospice. Verified scheduling in process. Patient may be proceeding to ER based on today's symptoms

## 2025-05-22 NOTE — TELEPHONE ENCOUNTER
Patient wife calling in and forgot to mention that she thinks he has C-Diff. She began to get very upset and crying, expresses frustration over caring for spouse and being alone. She explains she thinks he should go to ED and she can't get him in a car, she state that if she calls 911 they wont take him for this problem and/or patient will refuse.    Discussed with patient to call PCP and perhaps the can presumptively treat him for C-diff. She is agreeable to calling them.    She wants  to be aware

## 2025-05-23 LAB — C DIFF TOX GENS STL QL NAA+PROBE: POSITIVE

## 2025-05-24 ENCOUNTER — HOME CARE VISIT (OUTPATIENT)
Dept: HOME HOSPICE | Facility: HOSPICE | Age: 85
End: 2025-05-24
Payer: MEDICARE

## 2025-05-24 ENCOUNTER — HOME CARE VISIT (OUTPATIENT)
Dept: HOME HEALTH SERVICES | Facility: HOME HEALTHCARE | Age: 85
End: 2025-05-24
Attending: UROLOGY
Payer: MEDICARE

## 2025-05-24 VITALS
BODY MASS INDEX: 20.99 KG/M2 | SYSTOLIC BLOOD PRESSURE: 140 MMHG | HEIGHT: 72 IN | RESPIRATION RATE: 18 BRPM | HEART RATE: 86 BPM | DIASTOLIC BLOOD PRESSURE: 80 MMHG | WEIGHT: 155 LBS

## 2025-05-24 PROCEDURE — G0299 HHS/HOSPICE OF RN EA 15 MIN: HCPCS

## 2025-05-30 ENCOUNTER — HOME CARE VISIT (OUTPATIENT)
Dept: HOME HOSPICE | Facility: HOSPICE | Age: 85
End: 2025-05-30
Payer: MEDICARE

## 2025-05-31 ENCOUNTER — HOME CARE VISIT (OUTPATIENT)
Dept: HOME HOSPICE | Facility: HOSPICE | Age: 85
End: 2025-05-31
Payer: MEDICARE

## 2025-06-13 ENCOUNTER — HOME CARE VISIT (OUTPATIENT)
Dept: HOME HOSPICE | Facility: HOSPICE | Age: 85
End: 2025-06-13
Payer: MEDICARE

## (undated) DEVICE — GLOVE INDICATOR PI UNDERGLOVE SZ 8 BLUE

## (undated) DEVICE — SYRINGE 10ML LL

## (undated) DEVICE — ASTOUND STANDARD SURGICAL GOWN, XL: Brand: CONVERTORS

## (undated) DEVICE — UROLOGIC DRAIN BAG: Brand: UNBRANDED

## (undated) DEVICE — SCD SEQUENTIAL COMPRESSION COMFORT SLEEVE MEDIUM KNEE LENGTH: Brand: KENDALL SCD

## (undated) DEVICE — TELFA NON-ADHERENT ABSORBENT DRESSING: Brand: TELFA

## (undated) DEVICE — BASIC SINGLE BASIN-LF: Brand: MEDLINE INDUSTRIES, INC.

## (undated) DEVICE — INVIEW CLEAR LEGGINGS: Brand: CONVERTORS

## (undated) DEVICE — TUBING SUCTION 5MM X 12 FT

## (undated) DEVICE — CATH URETERAL 5FR X 70 CM FLEX TIP POLYUR BARD

## (undated) DEVICE — SYRINGE 20ML LL

## (undated) DEVICE — HF-RESECTION ELECTRODE PLASMALOOP LOOP, MEDIUM, 24 FR., 12°-30°, ESG TURIS: Brand: OLYMPUS

## (undated) DEVICE — GAUZE SPONGES,16 PLY: Brand: CURITY

## (undated) DEVICE — AAMI LEVEL 4 POLY-REINFORCED SURGICAL GOWN, X-LARGE, STERILE, SET-IN: Brand: CONVERTORS

## (undated) DEVICE — SPECIMEN CONTAINER STERILE PEEL PACK

## (undated) DEVICE — GLOVE SRG BIOGEL 7.5

## (undated) DEVICE — GUIDEWIRE STRGHT TIP 0.035 IN  SOLO PLUS

## (undated) DEVICE — INFLATION DEVICE EAGLE 10ML LF

## (undated) DEVICE — EVACUATOR BLADDER ELLIK DISP STRL

## (undated) DEVICE — GUARDIAN LVC: Brand: GUARDIAN

## (undated) DEVICE — EXIDINE 4 PCT

## (undated) DEVICE — GUIDEWIRE AMPLATZ SS .038 180CM

## (undated) DEVICE — CATH FOLEY 18FR 5ML 2WAY LUBRICATH

## (undated) DEVICE — BASIC SINGLE BASIN 2-LF: Brand: MEDLINE INDUSTRIES, INC.

## (undated) DEVICE — 3M™ TEGADERM™ TRANSPARENT FILM DRESSING FRAME STYLE, 1624W, 2-3/8 IN X 2-3/4 IN (6 CM X 7 CM), 100/CT 4CT/CASE: Brand: 3M™ TEGADERM™

## (undated) DEVICE — CATH BAL DIL 6FR 5MM 10CM PERC X-FRC U30 INFL DEV

## (undated) DEVICE — PREMIUM DRY TRAY LF: Brand: MEDLINE INDUSTRIES, INC.

## (undated) DEVICE — SYRINGE CATH TIP 50ML

## (undated) DEVICE — CYSTO TUBING TUR Y IRRIGATION

## (undated) DEVICE — PACK TUR

## (undated) DEVICE — LUBRICANT JELLY SURGILUBE TUBE 4OZ FLIP TOP

## (undated) DEVICE — Device: Brand: OLYMPUS

## (undated) DEVICE — CHLORHEXIDINE 4PCT 4 OZ

## (undated) DEVICE — LUBRICANT JELLY SURGILUBE TUBE 2OZ FLIP TOP

## (undated) DEVICE — CUTTING LOOP, BIPOLAR, 24/26 FR.: Brand: N.A.

## (undated) DEVICE — SKIN MARKER DUAL TIP WITH RULER CAP, FLEXIBLE RULER AND LABELS: Brand: DEVON

## (undated) DEVICE — BASKET SPECIMEN RETRIVAL 1.9FR 120CM

## (undated) DEVICE — STERILE SURGICAL LUBRICANT,  TUBE: Brand: SURGILUBE

## (undated) DEVICE — GLOVE SRG BIOGEL ECLIPSE 7.5

## (undated) DEVICE — CYSTOSCOPY PACK: Brand: CONVERTORS

## (undated) DEVICE — 3M™ STERI-STRIP™ COMPOUND BENZOIN TINCTURE 40 BAGS/CARTON 4 CARTONS/CASE C1544: Brand: 3M™ STERI-STRIP™

## (undated) DEVICE — SURGICAL GOWN, XL SMARTSLEEVE: Brand: CONVERTORS

## (undated) DEVICE — DECANTER: Brand: UNBRANDED

## (undated) DEVICE — DISPOSABLE OR TOWEL: Brand: CARDINAL HEALTH

## (undated) DEVICE — CATH FOLEY 20FR 5ML 2WAY LUBRICATH